# Patient Record
Sex: MALE | Race: BLACK OR AFRICAN AMERICAN | NOT HISPANIC OR LATINO | Employment: UNEMPLOYED | URBAN - METROPOLITAN AREA
[De-identification: names, ages, dates, MRNs, and addresses within clinical notes are randomized per-mention and may not be internally consistent; named-entity substitution may affect disease eponyms.]

---

## 2023-12-30 NOTE — PROGRESS NOTES
"Transfer Type: [facility]  Transfer Triage Note    Date of call: 12/30/23  Time of call: 12:04 PM    Current Patient Location:  [facility]  Current Level of Care: ICU    Vitals:   Blood Pressure 100/51 12/30/2023 11:45 AM CST     Pulse 73 12/30/2023 12:00 PM CST     Temperature 36.7  C (98.1  F) 12/30/2023 10:00 AM CST     Respiratory Rate 16 12/30/2023 10:00 AM CST     Oxygen Saturation 94% 12/30/2023 12:00 PM CST     Inhaled Oxygen Concentration - -     Weight 96.8 kg (213 lb 6.5 oz) 12/29/2023 12:00 PM CST     Height 162.6 cm (5' 4\") 12/29/2023 12:00 PM CST     Body Mass Index 36.63 12/29/2023 12:00 PM CST        Diagnosis: Hepatic artery aneurysm  Reason for requested transfer: Procedure can be done here and not at referring hospital  Further diagnostic work up, management, and consultation for specialized care   Isolation Needs: None    Care everywhere has been updated and reviewed: Yes  Necessary images have been sent through PACS: Yes    If patient is transferring for specialty care or specific procedure, the specialist required has participated in the transfer call and agreed with need for transfer and anticipated timeline: No -- on a prior call had spoke with Dr. Ba of transplant surgery who agreed with transfer    Transfer accepted: Yes  Stability of Patient: Patient is vitally stable, with no critical labs, and will likely remain stable throughout the transfer process  Is the patient appropriate for [facility]? No, What specific [place] needs are anticipated? IR, Transplant Surgery  Level of Care Needed: IMC  Telemetry Needed:  Med (Remote) Telemetry  Expected Time of Arrival for Transfer: 8-24 hours  Arrival Location:  [facility]     Recommendations for Management and Stabilization: Not needed    Additional Comments:     Jong Quiroga is a 43 year old with a history of tobacco abuse who presented to [facility] with abdominal pain. 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  The situation was discussed with [facility] specialists (IR, Hepatobiliary) who were concerned that embolization of the aneurysm would result in depriving a significant part of the liver blood flow and thus necessitating a liver transplant.  They did not wish to proceed with an intervention unless he were in a transplant center and had been evaluated.    Initially on a nicardipine drip to keep SBP <120 (per Vascular), and now liberalized to <140 and is off the drip.  Is back on home medications including clonidine.  He is not on any other infusions.    Vasculitis labs are pending.    He is accepted to an [facility] with tele bed, unclear timing of transfer given bed limitations.    Dr. Shaver (Dick provider) will contact us if he worsens to re-discuss.      Myrna Curry MD

## 2024-01-04 ENCOUNTER — HOSPITAL ENCOUNTER (INPATIENT)
Facility: CLINIC | Age: 44
LOS: 3 days | Discharge: HOME | End: 2024-01-07
Attending: INTERNAL MEDICINE | Admitting: STUDENT IN AN ORGANIZED HEALTH CARE EDUCATION/TRAINING PROGRAM
Payer: COMMERCIAL

## 2024-01-04 DIAGNOSIS — I10 ESSENTIAL HYPERTENSION: ICD-10-CM

## 2024-01-04 DIAGNOSIS — I77.79 HEPATIC ARTERY DISSECTION (H): Primary | ICD-10-CM

## 2024-01-04 DIAGNOSIS — E11.65 TYPE 2 DIABETES MELLITUS WITH HYPERGLYCEMIA, UNSPECIFIED WHETHER LONG TERM INSULIN USE (H): ICD-10-CM

## 2024-01-04 DIAGNOSIS — R74.01 TRANSAMINITIS: ICD-10-CM

## 2024-01-04 LAB
ALBUMIN SERPL BCG-MCNC: 4.8 G/DL (ref 3.5–5.2)
ALP SERPL-CCNC: 211 U/L (ref 40–150)
ALT SERPL W P-5'-P-CCNC: 94 U/L (ref 0–70)
ANION GAP SERPL CALCULATED.3IONS-SCNC: 12 MMOL/L (ref 7–15)
AST SERPL W P-5'-P-CCNC: 44 U/L (ref 0–45)
BILIRUB SERPL-MCNC: 0.2 MG/DL
BUN SERPL-MCNC: 18.6 MG/DL (ref 6–20)
CALCIUM SERPL-MCNC: 9.9 MG/DL (ref 8.6–10)
CHLORIDE SERPL-SCNC: 100 MMOL/L (ref 98–107)
CREAT SERPL-MCNC: 1.09 MG/DL (ref 0.67–1.17)
DEPRECATED HCO3 PLAS-SCNC: 28 MMOL/L (ref 22–29)
EGFRCR SERPLBLD CKD-EPI 2021: 87 ML/MIN/1.73M2
ERYTHROCYTE [DISTWIDTH] IN BLOOD BY AUTOMATED COUNT: 14.2 % (ref 10–15)
GLUCOSE BLDC GLUCOMTR-MCNC: 131 MG/DL (ref 70–99)
GLUCOSE SERPL-MCNC: 179 MG/DL (ref 70–99)
HCT VFR BLD AUTO: 42.2 % (ref 40–53)
HGB BLD-MCNC: 13.5 G/DL (ref 13.3–17.7)
INR PPP: 0.99 (ref 0.85–1.15)
MCH RBC QN AUTO: 27.3 PG (ref 26.5–33)
MCHC RBC AUTO-ENTMCNC: 32 G/DL (ref 31.5–36.5)
MCV RBC AUTO: 85 FL (ref 78–100)
PLATELET # BLD AUTO: 379 10E3/UL (ref 150–450)
POTASSIUM SERPL-SCNC: 3.8 MMOL/L (ref 3.4–5.3)
PROT SERPL-MCNC: 8.3 G/DL (ref 6.4–8.3)
RBC # BLD AUTO: 4.94 10E6/UL (ref 4.4–5.9)
SODIUM SERPL-SCNC: 140 MMOL/L (ref 135–145)
WBC # BLD AUTO: 7.3 10E3/UL (ref 4–11)

## 2024-01-04 PROCEDURE — 250N000013 HC RX MED GY IP 250 OP 250 PS 637

## 2024-01-04 PROCEDURE — 80053 COMPREHEN METABOLIC PANEL: CPT

## 2024-01-04 PROCEDURE — 99222 1ST HOSP IP/OBS MODERATE 55: CPT | Mod: GC | Performed by: SURGERY

## 2024-01-04 PROCEDURE — 85610 PROTHROMBIN TIME: CPT

## 2024-01-04 PROCEDURE — 93010 ELECTROCARDIOGRAM REPORT: CPT | Performed by: INTERNAL MEDICINE

## 2024-01-04 PROCEDURE — 36415 COLL VENOUS BLD VENIPUNCTURE: CPT

## 2024-01-04 PROCEDURE — 120N000003 HC R&B IMCU UMMC

## 2024-01-04 PROCEDURE — 93005 ELECTROCARDIOGRAM TRACING: CPT

## 2024-01-04 PROCEDURE — 85027 COMPLETE CBC AUTOMATED: CPT

## 2024-01-04 PROCEDURE — 99223 1ST HOSP IP/OBS HIGH 75: CPT | Mod: GC | Performed by: STUDENT IN AN ORGANIZED HEALTH CARE EDUCATION/TRAINING PROGRAM

## 2024-01-04 PROCEDURE — 250N000011 HC RX IP 250 OP 636

## 2024-01-04 RX ORDER — DEXTROSE MONOHYDRATE 25 G/50ML
25-50 INJECTION, SOLUTION INTRAVENOUS
Status: DISCONTINUED | OUTPATIENT
Start: 2024-01-04 | End: 2024-01-07 | Stop reason: HOSPADM

## 2024-01-04 RX ORDER — CLONIDINE HYDROCHLORIDE 0.1 MG/1
0.1 TABLET ORAL 2 TIMES DAILY
Status: DISCONTINUED | OUTPATIENT
Start: 2024-01-05 | End: 2024-01-07 | Stop reason: HOSPADM

## 2024-01-04 RX ORDER — AMOXICILLIN 250 MG
1 CAPSULE ORAL 2 TIMES DAILY PRN
Status: DISCONTINUED | OUTPATIENT
Start: 2024-01-04 | End: 2024-01-07 | Stop reason: HOSPADM

## 2024-01-04 RX ORDER — HYDROMORPHONE HYDROCHLORIDE 1 MG/ML
0.5 INJECTION, SOLUTION INTRAMUSCULAR; INTRAVENOUS; SUBCUTANEOUS EVERY 4 HOURS PRN
Status: DISCONTINUED | OUTPATIENT
Start: 2024-01-04 | End: 2024-01-06

## 2024-01-04 RX ORDER — NALOXONE HYDROCHLORIDE 0.4 MG/ML
0.4 INJECTION, SOLUTION INTRAMUSCULAR; INTRAVENOUS; SUBCUTANEOUS
Status: DISCONTINUED | OUTPATIENT
Start: 2024-01-04 | End: 2024-01-07 | Stop reason: HOSPADM

## 2024-01-04 RX ORDER — NICOTINE POLACRILEX 4 MG
15-30 LOZENGE BUCCAL
Status: DISCONTINUED | OUTPATIENT
Start: 2024-01-04 | End: 2024-01-07 | Stop reason: HOSPADM

## 2024-01-04 RX ORDER — METOPROLOL TARTRATE 50 MG
50 TABLET ORAL 2 TIMES DAILY
Status: DISCONTINUED | OUTPATIENT
Start: 2024-01-04 | End: 2024-01-07 | Stop reason: HOSPADM

## 2024-01-04 RX ORDER — POLYETHYLENE GLYCOL 3350 17 G/17G
17 POWDER, FOR SOLUTION ORAL 2 TIMES DAILY
Status: DISCONTINUED | OUTPATIENT
Start: 2024-01-04 | End: 2024-01-07 | Stop reason: HOSPADM

## 2024-01-04 RX ORDER — ASPIRIN 81 MG/1
81 TABLET, CHEWABLE ORAL DAILY
Status: DISCONTINUED | OUTPATIENT
Start: 2024-01-05 | End: 2024-01-07 | Stop reason: HOSPADM

## 2024-01-04 RX ORDER — AMLODIPINE BESYLATE 10 MG/1
10 TABLET ORAL DAILY
Status: ON HOLD | COMMUNITY
End: 2024-01-07

## 2024-01-04 RX ORDER — HYDROCHLOROTHIAZIDE 25 MG/1
25 TABLET ORAL DAILY
Status: DISCONTINUED | OUTPATIENT
Start: 2024-01-05 | End: 2024-01-07 | Stop reason: HOSPADM

## 2024-01-04 RX ORDER — HYDROCHLOROTHIAZIDE 50 MG/1
50 TABLET ORAL DAILY
Status: DISCONTINUED | OUTPATIENT
Start: 2024-01-05 | End: 2024-01-04

## 2024-01-04 RX ORDER — POLYETHYLENE GLYCOL 3350 17 G/17G
17 POWDER, FOR SOLUTION ORAL 2 TIMES DAILY PRN
Status: DISCONTINUED | OUTPATIENT
Start: 2024-01-04 | End: 2024-01-07 | Stop reason: HOSPADM

## 2024-01-04 RX ORDER — HYDRALAZINE HYDROCHLORIDE 20 MG/ML
10-20 INJECTION INTRAMUSCULAR; INTRAVENOUS EVERY 6 HOURS PRN
Status: DISCONTINUED | OUTPATIENT
Start: 2024-01-04 | End: 2024-01-07 | Stop reason: HOSPADM

## 2024-01-04 RX ORDER — HYDRALAZINE HYDROCHLORIDE 20 MG/ML
10-20 INJECTION INTRAMUSCULAR; INTRAVENOUS EVERY 6 HOURS PRN
Status: DISCONTINUED | OUTPATIENT
Start: 2024-01-04 | End: 2024-01-04

## 2024-01-04 RX ORDER — HYDROCHLOROTHIAZIDE 25 MG/1
25 TABLET ORAL DAILY
Status: ON HOLD | COMMUNITY
End: 2024-01-07

## 2024-01-04 RX ORDER — AMOXICILLIN 250 MG
2 CAPSULE ORAL 2 TIMES DAILY PRN
Status: DISCONTINUED | OUTPATIENT
Start: 2024-01-04 | End: 2024-01-07 | Stop reason: HOSPADM

## 2024-01-04 RX ORDER — AMOXICILLIN 250 MG
2 CAPSULE ORAL AT BEDTIME
Status: DISCONTINUED | OUTPATIENT
Start: 2024-01-04 | End: 2024-01-07 | Stop reason: HOSPADM

## 2024-01-04 RX ORDER — OXYCODONE HYDROCHLORIDE 5 MG/1
5-10 TABLET ORAL EVERY 4 HOURS PRN
Status: DISCONTINUED | OUTPATIENT
Start: 2024-01-04 | End: 2024-01-07 | Stop reason: HOSPADM

## 2024-01-04 RX ORDER — NALOXONE HYDROCHLORIDE 0.4 MG/ML
0.2 INJECTION, SOLUTION INTRAMUSCULAR; INTRAVENOUS; SUBCUTANEOUS
Status: DISCONTINUED | OUTPATIENT
Start: 2024-01-04 | End: 2024-01-07 | Stop reason: HOSPADM

## 2024-01-04 RX ORDER — ACETAMINOPHEN 325 MG/1
975 TABLET ORAL EVERY 6 HOURS PRN
Status: DISCONTINUED | OUTPATIENT
Start: 2024-01-04 | End: 2024-01-07 | Stop reason: HOSPADM

## 2024-01-04 RX ORDER — CLONIDINE 0.2 MG/24H
1 PATCH, EXTENDED RELEASE TRANSDERMAL WEEKLY
Status: DISCONTINUED | OUTPATIENT
Start: 2024-01-06 | End: 2024-01-05 | Stop reason: DRUGHIGH

## 2024-01-04 RX ORDER — LIDOCAINE 40 MG/G
CREAM TOPICAL
Status: DISCONTINUED | OUTPATIENT
Start: 2024-01-04 | End: 2024-01-07 | Stop reason: HOSPADM

## 2024-01-04 RX ORDER — AMLODIPINE BESYLATE 10 MG/1
10 TABLET ORAL DAILY
Status: DISCONTINUED | OUTPATIENT
Start: 2024-01-05 | End: 2024-01-07 | Stop reason: HOSPADM

## 2024-01-04 RX ORDER — CALCIUM CARBONATE 500 MG/1
1000 TABLET, CHEWABLE ORAL 4 TIMES DAILY PRN
Status: DISCONTINUED | OUTPATIENT
Start: 2024-01-04 | End: 2024-01-07 | Stop reason: HOSPADM

## 2024-01-04 RX ORDER — METOPROLOL TARTRATE 50 MG
50 TABLET ORAL 2 TIMES DAILY
Status: ON HOLD | COMMUNITY
End: 2024-01-07

## 2024-01-04 RX ORDER — CLONIDINE HYDROCHLORIDE 0.1 MG/1
0.1 TABLET ORAL 3 TIMES DAILY PRN
Status: DISCONTINUED | OUTPATIENT
Start: 2024-01-04 | End: 2024-01-07 | Stop reason: HOSPADM

## 2024-01-04 RX ORDER — CLONIDINE 0.2 MG/24H
1 PATCH, EXTENDED RELEASE TRANSDERMAL WEEKLY
Status: ON HOLD | COMMUNITY
End: 2024-01-07

## 2024-01-04 RX ADMIN — SENNOSIDES AND DOCUSATE SODIUM 2 TABLET: 50; 8.6 TABLET ORAL at 22:30

## 2024-01-04 RX ADMIN — HYDROMORPHONE HYDROCHLORIDE 0.5 MG: 1 INJECTION, SOLUTION INTRAMUSCULAR; INTRAVENOUS; SUBCUTANEOUS at 20:36

## 2024-01-04 RX ADMIN — ACETAMINOPHEN 975 MG: 325 TABLET, FILM COATED ORAL at 17:46

## 2024-01-04 RX ADMIN — POLYETHYLENE GLYCOL 3350 17 G: 17 POWDER, FOR SOLUTION ORAL at 20:36

## 2024-01-04 RX ADMIN — INSULIN ASPART 1 UNITS: 100 INJECTION, SOLUTION INTRAVENOUS; SUBCUTANEOUS at 23:57

## 2024-01-04 RX ADMIN — OXYCODONE HYDROCHLORIDE 10 MG: 5 TABLET ORAL at 22:35

## 2024-01-04 RX ADMIN — OXYCODONE HYDROCHLORIDE 10 MG: 5 TABLET ORAL at 17:46

## 2024-01-04 RX ADMIN — METOPROLOL TARTRATE 50 MG: 50 TABLET, FILM COATED ORAL at 20:36

## 2024-01-04 ASSESSMENT — ACTIVITIES OF DAILY LIVING (ADL)
ADLS_ACUITY_SCORE: 18

## 2024-01-04 ASSESSMENT — COLUMBIA-SUICIDE SEVERITY RATING SCALE - C-SSRS
1. IN THE PAST MONTH, HAVE YOU WISHED YOU WERE DEAD OR WISHED YOU COULD GO TO SLEEP AND NOT WAKE UP?: NO
6. HAVE YOU EVER DONE ANYTHING, STARTED TO DO ANYTHING, OR PREPARED TO DO ANYTHING TO END YOUR LIFE?: NO
2. HAVE YOU ACTUALLY HAD ANY THOUGHTS OF KILLING YOURSELF IN THE PAST MONTH?: NO

## 2024-01-05 ENCOUNTER — APPOINTMENT (OUTPATIENT)
Dept: GENERAL RADIOLOGY | Facility: CLINIC | Age: 44
End: 2024-01-05
Attending: INTERNAL MEDICINE
Payer: COMMERCIAL

## 2024-01-05 ENCOUNTER — APPOINTMENT (OUTPATIENT)
Dept: CT IMAGING | Facility: CLINIC | Age: 44
End: 2024-01-05
Attending: INTERNAL MEDICINE
Payer: COMMERCIAL

## 2024-01-05 LAB
ALBUMIN SERPL BCG-MCNC: 4.3 G/DL (ref 3.5–5.2)
ALP SERPL-CCNC: 246 U/L (ref 40–150)
ALT SERPL W P-5'-P-CCNC: 235 U/L (ref 0–70)
AMPHETAMINES UR QL SCN: NORMAL
ANION GAP SERPL CALCULATED.3IONS-SCNC: 14 MMOL/L (ref 7–15)
AST SERPL W P-5'-P-CCNC: 293 U/L (ref 0–45)
ATRIAL RATE - MUSE: 64 BPM
BARBITURATES UR QL SCN: NORMAL
BENZODIAZ UR QL SCN: NORMAL
BILIRUB SERPL-MCNC: 0.6 MG/DL
BUN SERPL-MCNC: 16.5 MG/DL (ref 6–20)
BZE UR QL SCN: NORMAL
CALCIUM SERPL-MCNC: 9.6 MG/DL (ref 8.6–10)
CANNABINOIDS UR QL SCN: NORMAL
CHLORIDE SERPL-SCNC: 98 MMOL/L (ref 98–107)
CK SERPL-CCNC: 83 U/L (ref 39–308)
CREAT SERPL-MCNC: 0.93 MG/DL (ref 0.67–1.17)
DEPRECATED HCO3 PLAS-SCNC: 25 MMOL/L (ref 22–29)
DIASTOLIC BLOOD PRESSURE - MUSE: NORMAL MMHG
EGFRCR SERPLBLD CKD-EPI 2021: >90 ML/MIN/1.73M2
FENTANYL UR QL: NORMAL
GLUCOSE BLDC GLUCOMTR-MCNC: 165 MG/DL (ref 70–99)
GLUCOSE BLDC GLUCOMTR-MCNC: 167 MG/DL (ref 70–99)
GLUCOSE BLDC GLUCOMTR-MCNC: 182 MG/DL (ref 70–99)
GLUCOSE BLDC GLUCOMTR-MCNC: 247 MG/DL (ref 70–99)
GLUCOSE BLDC GLUCOMTR-MCNC: 252 MG/DL (ref 70–99)
GLUCOSE BLDC GLUCOMTR-MCNC: 309 MG/DL (ref 70–99)
GLUCOSE SERPL-MCNC: 163 MG/DL (ref 70–99)
GLUCOSE SERPL-MCNC: 163 MG/DL (ref 70–99)
HOLD SPECIMEN: NORMAL
INTERPRETATION ECG - MUSE: NORMAL
LIPASE SERPL-CCNC: 29 U/L (ref 13–60)
OPIATES UR QL SCN: NORMAL
P AXIS - MUSE: 48 DEGREES
PCP QUAL URINE (ROCHE): NORMAL
POTASSIUM SERPL-SCNC: 3.7 MMOL/L (ref 3.4–5.3)
PR INTERVAL - MUSE: 156 MS
PROT SERPL-MCNC: 7.5 G/DL (ref 6.4–8.3)
QRS DURATION - MUSE: 88 MS
QT - MUSE: 410 MS
QTC - MUSE: 422 MS
R AXIS - MUSE: 10 DEGREES
SODIUM SERPL-SCNC: 137 MMOL/L (ref 135–145)
SYSTOLIC BLOOD PRESSURE - MUSE: NORMAL MMHG
T AXIS - MUSE: 214 DEGREES
VENTRICULAR RATE- MUSE: 64 BPM

## 2024-01-05 PROCEDURE — 99232 SBSQ HOSP IP/OBS MODERATE 35: CPT | Mod: GC | Performed by: PEDIATRICS

## 2024-01-05 PROCEDURE — 250N000013 HC RX MED GY IP 250 OP 250 PS 637

## 2024-01-05 PROCEDURE — 74177 CT ABD & PELVIS W/CONTRAST: CPT | Mod: 26 | Performed by: RADIOLOGY

## 2024-01-05 PROCEDURE — 250N000011 HC RX IP 250 OP 636

## 2024-01-05 PROCEDURE — 99255 IP/OBS CONSLTJ NEW/EST HI 80: CPT | Performed by: STUDENT IN AN ORGANIZED HEALTH CARE EDUCATION/TRAINING PROGRAM

## 2024-01-05 PROCEDURE — 36415 COLL VENOUS BLD VENIPUNCTURE: CPT

## 2024-01-05 PROCEDURE — 120N000003 HC R&B IMCU UMMC

## 2024-01-05 PROCEDURE — 80053 COMPREHEN METABOLIC PANEL: CPT

## 2024-01-05 PROCEDURE — 99223 1ST HOSP IP/OBS HIGH 75: CPT | Performed by: INTERNAL MEDICINE

## 2024-01-05 PROCEDURE — 82550 ASSAY OF CK (CPK): CPT | Performed by: INTERNAL MEDICINE

## 2024-01-05 PROCEDURE — 71260 CT THORAX DX C+: CPT | Mod: 26 | Performed by: RADIOLOGY

## 2024-01-05 PROCEDURE — 74174 CTA ABD&PLVS W/CONTRAST: CPT

## 2024-01-05 PROCEDURE — 83690 ASSAY OF LIPASE: CPT | Performed by: STUDENT IN AN ORGANIZED HEALTH CARE EDUCATION/TRAINING PROGRAM

## 2024-01-05 PROCEDURE — 250N000012 HC RX MED GY IP 250 OP 636 PS 637

## 2024-01-05 PROCEDURE — 80307 DRUG TEST PRSMV CHEM ANLYZR: CPT

## 2024-01-05 PROCEDURE — 999N000122 CT OUTSIDE READ

## 2024-01-05 PROCEDURE — 99231 SBSQ HOSP IP/OBS SF/LOW 25: CPT | Performed by: NURSE PRACTITIONER

## 2024-01-05 PROCEDURE — 74174 CTA ABD&PLVS W/CONTRAST: CPT | Mod: 26 | Performed by: RADIOLOGY

## 2024-01-05 RX ORDER — IOPAMIDOL 755 MG/ML
126 INJECTION, SOLUTION INTRAVASCULAR ONCE
Status: COMPLETED | OUTPATIENT
Start: 2024-01-05 | End: 2024-01-05

## 2024-01-05 RX ORDER — ALBUTEROL SULFATE 90 UG/1
2 AEROSOL, METERED RESPIRATORY (INHALATION) EVERY 4 HOURS PRN
Status: DISCONTINUED | OUTPATIENT
Start: 2024-01-05 | End: 2024-01-07 | Stop reason: HOSPADM

## 2024-01-05 RX ORDER — ALBUTEROL SULFATE 90 UG/1
2 AEROSOL, METERED RESPIRATORY (INHALATION) EVERY 4 HOURS PRN
COMMUNITY
End: 2024-04-10

## 2024-01-05 RX ORDER — OXYCODONE HYDROCHLORIDE 5 MG/1
5-10 TABLET ORAL EVERY 4 HOURS PRN
COMMUNITY
End: 2024-04-01

## 2024-01-05 RX ORDER — NICOTINE 21 MG/24HR
1 PATCH, TRANSDERMAL 24 HOURS TRANSDERMAL DAILY PRN
COMMUNITY
End: 2024-04-01

## 2024-01-05 RX ORDER — IPRATROPIUM BROMIDE AND ALBUTEROL 20; 100 UG/1; UG/1
1 SPRAY, METERED RESPIRATORY (INHALATION) 4 TIMES DAILY PRN
COMMUNITY
End: 2024-04-10

## 2024-01-05 RX ORDER — AMOXICILLIN 250 MG
1-2 CAPSULE ORAL 2 TIMES DAILY
COMMUNITY
End: 2024-04-01

## 2024-01-05 RX ORDER — ASPIRIN 81 MG/1
81 TABLET, CHEWABLE ORAL DAILY
Status: ON HOLD | COMMUNITY
End: 2024-01-07

## 2024-01-05 RX ADMIN — HYDROMORPHONE HYDROCHLORIDE 0.5 MG: 1 INJECTION, SOLUTION INTRAMUSCULAR; INTRAVENOUS; SUBCUTANEOUS at 15:49

## 2024-01-05 RX ADMIN — OXYCODONE HYDROCHLORIDE 10 MG: 5 TABLET ORAL at 08:37

## 2024-01-05 RX ADMIN — AMLODIPINE BESYLATE 10 MG: 10 TABLET ORAL at 08:33

## 2024-01-05 RX ADMIN — INSULIN GLARGINE 30 UNITS: 100 INJECTION, SOLUTION SUBCUTANEOUS at 21:30

## 2024-01-05 RX ADMIN — OXYCODONE HYDROCHLORIDE 10 MG: 5 TABLET ORAL at 12:18

## 2024-01-05 RX ADMIN — CLONIDINE HYDROCHLORIDE 0.1 MG: 0.1 TABLET ORAL at 08:33

## 2024-01-05 RX ADMIN — ASPIRIN 81 MG CHEWABLE TABLET 81 MG: 81 TABLET CHEWABLE at 08:32

## 2024-01-05 RX ADMIN — INSULIN ASPART 3 UNITS: 100 INJECTION, SOLUTION INTRAVENOUS; SUBCUTANEOUS at 20:04

## 2024-01-05 RX ADMIN — METOPROLOL TARTRATE 50 MG: 50 TABLET, FILM COATED ORAL at 08:33

## 2024-01-05 RX ADMIN — ACETAMINOPHEN 975 MG: 325 TABLET, FILM COATED ORAL at 17:23

## 2024-01-05 RX ADMIN — IOPAMIDOL 126 ML: 755 INJECTION, SOLUTION INTRAVENOUS at 18:12

## 2024-01-05 RX ADMIN — OXYCODONE HYDROCHLORIDE 10 MG: 5 TABLET ORAL at 21:29

## 2024-01-05 RX ADMIN — HYDROMORPHONE HYDROCHLORIDE 0.5 MG: 1 INJECTION, SOLUTION INTRAMUSCULAR; INTRAVENOUS; SUBCUTANEOUS at 19:51

## 2024-01-05 RX ADMIN — CLONIDINE HYDROCHLORIDE 0.1 MG: 0.1 TABLET ORAL at 19:51

## 2024-01-05 RX ADMIN — INSULIN ASPART 4 UNITS: 100 INJECTION, SOLUTION INTRAVENOUS; SUBCUTANEOUS at 13:32

## 2024-01-05 RX ADMIN — POLYETHYLENE GLYCOL 3350 17 G: 17 POWDER, FOR SOLUTION ORAL at 19:51

## 2024-01-05 RX ADMIN — INSULIN ASPART 1 UNITS: 100 INJECTION, SOLUTION INTRAVENOUS; SUBCUTANEOUS at 09:44

## 2024-01-05 RX ADMIN — INSULIN ASPART 1 UNITS: 100 INJECTION, SOLUTION INTRAVENOUS; SUBCUTANEOUS at 04:55

## 2024-01-05 RX ADMIN — METOPROLOL TARTRATE 50 MG: 50 TABLET, FILM COATED ORAL at 19:50

## 2024-01-05 RX ADMIN — HYDROMORPHONE HYDROCHLORIDE 0.5 MG: 1 INJECTION, SOLUTION INTRAMUSCULAR; INTRAVENOUS; SUBCUTANEOUS at 00:59

## 2024-01-05 RX ADMIN — POLYETHYLENE GLYCOL 3350 17 G: 17 POWDER, FOR SOLUTION ORAL at 08:34

## 2024-01-05 RX ADMIN — HYDROCHLOROTHIAZIDE 25 MG: 25 TABLET ORAL at 08:33

## 2024-01-05 RX ADMIN — OXYCODONE HYDROCHLORIDE 10 MG: 5 TABLET ORAL at 17:23

## 2024-01-05 RX ADMIN — OXYCODONE HYDROCHLORIDE 10 MG: 5 TABLET ORAL at 04:51

## 2024-01-05 RX ADMIN — INSULIN ASPART 3 UNITS: 100 INJECTION, SOLUTION INTRAVENOUS; SUBCUTANEOUS at 15:55

## 2024-01-05 ASSESSMENT — ACTIVITIES OF DAILY LIVING (ADL)
ADLS_ACUITY_SCORE: 18

## 2024-01-05 NOTE — PROGRESS NOTES
[facility]    Progress Note - Medicine Service, MAROON TEAM 3       Date of Admission:  1/4/2024    Assessment & Plan   Jong Quiroga is a 43 year old male admitted on 1/4/2024. 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 Was transferred to [facility] where CT demonstrated complex dissection of celiac artery and aneurysm of the hepatic artery system and ultimately transferred to [facility] for further treatment option including liver transplantation.      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 Workup notable for elevated LFTS ( , , and Alk Phos 310), hgb 11.3, WBC 12.8. 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 Was started on esmolol and nicardipine gtt for SBP < 120 and transferred to [facility] on 12/29/2023. CT at [facility] demonstrated complex dissection of celiac artery with aneurysmal degeneration of the the entire common, proper and right hepatic artery. Was evaluated by IR and vascular surgery and hepatobiliary surgery services at outside hospital and given the complex involvement of the dissections was recommended to transfer to a center with limited transportation capacity. On admission afebrile, HDS.  Suspect his artery dissection is likely secondary to uncontrolled hypertension and tobacco use however ddx includes vasculitis, connective tissues disease and inflammatory process (given maternal  history of lupus, elevated inflammatory markers, elevated C3 normal C4 and subjective chronic night chills). Reassuringly initial infectious workup has been benign (negative Hep B, C and HIV). Of note, blood pressure has been well controlled with PTA antihypertensives since admission and his symptomatic pain has resolved. Has a longstanding hx of HTN on 4 antihypertensives but reports uncontrolled Bps at home without consistent medication management for years. Also chronic tobacco use with 1ppd.  Discussed case with IR and rheumatology, awaiting consults from vascular surgery, liver transplant and hepatology. No acute interventions from IR and rheumatology will follow outside imaging and make additional recommendations based on the imaging reads (requested renal vasculature notes from radiology).  - Notable labs:   - WHIT, ANCA, anti DNA neg  - CRP 0.9, ESR 32  - C3 169 elevated, C4 36.34nl  - Hep C nr, HepB ag nr, HIV neg  - continue pta hydrochlorothiazide 25 mg daily  - Continue PTA amlodipine 10 mg daily  - S/p pta clonidine patch 0.2 mg/24 hr    - Continue PO clonidine 0.1 mg BID + prn  - Continue PTA metoprolol tartrate 50 mg BID  - Continue PTA ASA  - IR consulted, appreciate recommendations  - vascular surgery consulted, appreciate recommendations  - Hepatology consulted, appreciate recommendations  - SOT liver transplant consulted, appreciate recommendations  - Rheumatology consulted, appreciate recommendations  - NPO until cleared by surgery  - oxycodone for breakthrough pain + ashlie bowel regimen   - Strict BP control with SBP<140, HR<80      Elevated transaminases  Cholelithiasis without cholecystitis  At OSH, LFTs at first , , and Alk Phos 310 but have been improving. On admission, , AST 44. ALT94. CT with Cholelithiasis without cholecystitis. Denies abd pain. Today his LFTs are rising back to initial levels at OSH, most likely secondary to complex dissection and aneurysmal  "degeneration.  - ctm  -Daily CMP     T2DM  A1c in 11/2023 13. Home regimen: 60U glargine at bedtime and 5 U aspart 3 times daily  - 30 U lantus at bedtime, held while NPO  - MDSSI     Tobacco use  Used to smoke 1- 2 ppd.  Has not smoked in a week, declines nicotine patch at this time, does have an interest in quitting.     Concern for COPD  No PFTs and denies cough. Had cough previously that improved with robitussin.  -Continue PTA albuterol PRN  -Continue PTA Ipratropium-albuterol PRN     Mild right hydronephrosis  Denies urinary symptoms             Diet: NPO for Medical/Clinical Reasons Except for: Meds, Ice Chips    DVT Prophylaxis: Low Risk/Ambulatory with no VTE prophylaxis indicated  Tran Catheter: Not present  Fluids: none  Lines: None     Cardiac Monitoring: ACTIVE order. Indication: aneurysm  Code Status: Full Code      Clinically Significant Risk Factors Present on Admission                # Drug Induced Platelet Defect: home medication list includes an antiplatelet medication   # Hypertension: Home medication list includes antihypertensive(s)      # Obesity: Estimated body mass index is 35.46 kg/m  as calculated from the following:    Height as of this encounter: 1.626 m (5' 4\").    Weight as of this encounter: 93.7 kg (206 lb 9.1 oz).              Disposition Plan      Expected Discharge Date: 01/08/2024      Destination: home  Discharge Comments: No medical interventions being offered. Possibly home tomorrow        The patient's care was discussed with the Attending Physician, Dr. Linton .    Barbara Barillas MD  Medicine Service, [address]  Securely message with [employer] (more info)  Text page via [facility] Paging/Directory   See signed in provider for up to date coverage information  ______________________________________________________________________    Interval History   LUKE. Says his pain is a lot better than it was before, he is having " minimal right abdominal pain. Says he often forgets to take his blood pressure medications at home. He is hungry, but otherwise has no concerns. Denies leg swelling, chest pain, shortness of breath. He feels his abdomen is softer than it was the last few days.    Physical Exam   Vital Signs: Temp: 97.9  F (36.6  C) Temp src: Oral BP: 114/62 Pulse: 61   Resp: 16 SpO2: 99 % O2 Device: None (Room air)    Weight: 206 lbs 9.14 oz    Constitutional: awake, alert, cooperative, no apparent distress, and appears stated age  Eyes: Lids and lashes normal, extra ocular muscles intact, sclera clear, conjunctiva normal  Respiratory: No increased work of breathing, good air exchange, clear to auscultation bilaterally, no crackles or wheezing  Cardiovascular: Regular rate and rhythm and no murmur noted  GI: No scars, normal bowel sounds, soft, distended, tender to palpation in RUQ and RLQ, no masses palpated  Skin: no rashes and no lesions  Musculoskeletal: no lower extremity pitting edema present, full range of motion noted  Neurologic: Awake, alert, oriented to name, place and time.  Cranial nerves II-XII are grossly intact. Moves all extremities equally    Medical Decision Making       Please see A&P for additional details of medical decision making.      Data     I have personally reviewed the following data over the past 24 hrs:    7.3  \   13.5   / 379     137 98 16.5 /  309 (H)   3.7 25 0.93 \     ALT: 235 (H) AST: 293 (H) AP: 246 (H) TBILI: 0.6   ALB: 4.3 TOT PROTEIN: 7.5 LIPASE: N/A     INR:  0.99 PTT:  N/A   D-dimer:  N/A Fibrinogen:  N/A       Imaging results reviewed over the past 24 hrs:   No results found for this or any previous visit (from the past 24 hour(s)).

## 2024-01-05 NOTE — CONSULTS
[facility]    Consult Note - Vascular Surgery Service  Date of Admission:  1/4/2024  Consult Requested by: Nayana Hansen MD   Reason for Consult: Right and Left Hepatic Artery Aneurysms    Assessment & Plan: Surgery   carolina Quiroga is a 43 year old male w/ PMHx significant for T2DM, COPD, Tobacco use disorder (2 PPD), and bipolar disorder who presented to [facility] ED on 12/28/2023 w/ 3 days of right shoulder pain and generalized abdominal pain with CT findings concerning for RHA aneurysm and celiac artery dissection at the origin of the left gastric, splenic and hepatic arteries. The patient was transferred to the [facility] for possible advanced vascular and liver transplant surgical intervention.     - Strict BP control with SBP<140, HR<80  - Keep NPO  - No immediate plans for surgical intervention overnight  - No feasible distal end point for stenting or bypass  - Recommend continuing with liver transplant workup   - Please page vascular surgery if changes in hemodynamics      The patient's care was discussed with the  fellow Dr. Contreras who  discussed with staff Dr. Mercado .    Fern Willard MD PGY2  General Surgery Resident   [facility]  Text page via [facility] Paging/Directory     ______________________________________________________________________    Chief Complaint   Abdominal pain     History is obtained from the patient and per chart review.     History of Present Illness   Jong Quiroga is a 43 year old male w/ PMHx significant for T2DM, COPD, Tobacco use disorder (2 PPD), and bipolar disorder who presented to [facility] on 12/28/2023 w/ 3 days of right shoulder pain and generalized abdominal pain. At the time, he was hypertensive with SBP of 206 on presentation. Labs were notable for elevated , , and Alk Phos 310. CBC showed Hgb 11.3 and WBC 12.8. CTAP was notable for for RHA aneurysm of 17  mm with pronounced heterogenous soft tissue thickening around PHILLY, PHA, and RHA. He was started on esmolol and nicardipine gtt for SBP goal < 120 at the time and transferred to [facility]. At [facility], he underwent repeat CT on 12/29 which demonstrated complex dissection of the celiac artery located at origin of left gastric, splenic and hepatic arteries. There was aneurysmal degeneration of the RHA to 3.5 cm. Origin of LHA was occluded, reconstituting via pancreaticoduodenal a. GDA not visualized.     He was transferred to [facility] given presence of liver transplant team.     On my assessment, patient states that he started to experience epigastric and right upper abdominal pain 4-5 days before presenting to the OSH on 12/28. The pain came on 30 minutes or so after eating with associated right shoulder pain. Currently the patient denies any pain. He was able to eat at the OSH without nausea, vomiting, or abdominal pain. Last BM was this morning, denies bloody stools or dark tarry stools in the last week. Has had streaks of blood in his stools in the past, however. Denies fevers or chills. Denies chest pain or SOB. Denies numbness or tingling in extremities. Denies weakness. Endorses right shoulder pain since arrival that has improved with pain medications. Denies previous history of MI, TIA, or Stroke. Denies previous surgeries.       Past Medical History    No past medical history on file.    Past Surgical History   No past surgical history on file.    Prior to Admission Medications   Current Facility-Administered Medications   Medication    acetaminophen (TYLENOL) tablet 975 mg    calcium carbonate (TUMS) chewable tablet 1,000 mg    HYDROmorphone (PF) (DILAUDID) injection 0.5 mg    lidocaine (LMX4) cream    lidocaine 1 % 0.1-1 mL    naloxone (NARCAN) injection 0.2 mg    Or    naloxone (NARCAN) injection 0.4 mg    Or    naloxone (NARCAN) injection 0.2 mg    Or    naloxone (NARCAN) injection 0.4 mg     oxyCODONE (ROXICODONE) tablet 5-10 mg    polyethylene glycol (MIRALAX) Packet 17 g    senna-docusate (SENOKOT-S/PERICOLACE) 8.6-50 MG per tablet 1 tablet    Or    senna-docusate (SENOKOT-S/PERICOLACE) 8.6-50 MG per tablet 2 tablet    sodium chloride (PF) 0.9% PF flush 3 mL    sodium chloride (PF) 0.9% PF flush 3 mL          Review of Systems    The 10 point Review of Systems is negative other than noted in the HPI or here.      Physical Exam   Vital Signs: Temp: 97.9  F (36.6  C) Temp src: Oral BP: 131/87 Pulse: 64   Resp: 14 SpO2: 100 % O2 Device: None (Room air)    Weight: 208 lbs 14.4 oz  No intake or output data in the 24 hours ending 01/04/24 1805  Constitutional: awake, alert, cooperative, no apparent distress, and appears stated age  Respiratory: non labored breathing on room air   Cardiovascular: lowly hypertensive, RRR  GI: abdomen soft, NT, ND  Skin: no bruising or bleeding, normal skin color, texture, turgor, and no redness, warmth, or swelling  Musculoskeletal: no pitting edema, palpable DP and PT pulses and palpable radial pulses bilaterally   Neurologic: AOX3, CN II-XII grossly intact, LLANOS           Data   Recent Labs   Lab 01/04/24  1749   WBC 7.3   HGB 13.5   MCV 85      INR 0.99      POTASSIUM 3.8   CHLORIDE 100   CO2 28   BUN 18.6   CR 1.09   ANIONGAP 12   LUIS 9.9   *   ALBUMIN 4.8   PROTTOTAL 8.3   BILITOTAL 0.2   ALKPHOS 211*   ALT 94*   AST 44     Vascular surgery attending staff note (virtual consultation): Chart and imaging reviewed in detail.  I agree with the documentation by our fellow, Dr. Contreras.  Patient is a 43-year-old male who is being evaluated for shoulder pain and was found to have hepatic artery aneurysm.  The common hepatic, right and left hepatic arteries are involved with the aneurysmal degeneration and goes well into the parenchyma.  Regrettably nothing for vascular surgery to offer.    MEGAN CONTRERAS M.D.

## 2024-01-05 NOTE — CONSULTS
[facility]    Consult Note - Transplant Surgery Service  Date of Admission:  1/4/2024  Consult Requested by: Nayana Hansen MD   Reason for Consult: Right Hepatic Artery Aneurysm    Assessment & Plan: Surgery   carolina Quiroga is a 43 year old male w/ PMHx significant for T2DM, COPD, Tobacco use disorder (2 PPD), and bipolar disorder who presented to [place] ED on 12/28/2023 w/ 3 days of right shoulder pain and generalized abdominal pain with CT findings concerning for RHA aneurysm and celiac artery dissection at the origin of the left gastric, splenic and hepatic arteries. The patient was transferred to the [facility] for possible advanced vascular and liver transplant surgical intervention.     - Strict BP control with SBP<140, HR<80  - Keep NPO  - Liver transplant workup pending conversation with day team in the morning      The patient's care was discussed with the  fellow Dr. Ross who will discuss with staff .    Fern Willard MD PGY2  General Surgery Resident   [facility]  Text page via [facility] Paging/Directory     ______________________________________________________________________    Chief Complaint   Abdominal pain     History is obtained from the patient and per chart review.     History of Present Illness   Jong Quiroga is a 43 year old male w/ PMHx significant for T2DM, COPD, Tobacco use disorder (2 PPD), and bipolar disorder who presented to [place] ED on 12/28/2023 w/ 3 days of right shoulder pain and generalized abdominal pain. At the time, he was hypertensive with SBP of 206 on presentation. Labs were notable for elevated , , and Alk Phos 310. CBC showed Hgb 11.3 and WBC 12.8. CTAP was notable for for RHA aneurysm of 17 mm with pronounced heterogenous soft tissue thickening around PHILLY, PHA, and RHA. He was started on esmolol and nicardipine gtt for SBP goal < 120 at the time and  transferred to [facility]. At [facility], he underwent repeat CT on 12/29 which demonstrated complex dissection of the celiac artery located at origin of left gastric, splenic and hepatic arteries. There was aneurysmal degeneration of the RHA to 3.5 cm. Origin of LHA was occluded, reconstituting via pancreaticoduodenal a. GDA not visualized.     He was transferred to [facility] given presence of liver transplant team.     On my assessment, patient states that he started to experience epigastric and right upper abdominal pain 4-5 days before presenting to the OSH on 12/28. The pain came on 30 minutes or so after eating with associated right shoulder pain. Currently the patient denies any pain. He was able to eat at the OSH without nausea, vomiting, or abdominal pain. Last BM was this morning, denies bloody stools or dark tarry stools in the last week. Has had streaks of blood in his stools in the past, however. Denies fevers or chills. Denies chest pain or SOB. Denies numbness or tingling in extremities. Denies weakness. Endorses right shoulder pain since arrival that has improved with pain medications. Denies previous history of MI, TIA, or Stroke. Denies previous surgeries.       Past Medical History    No past medical history on file.    Past Surgical History   No past surgical history on file.    Prior to Admission Medications   Current Facility-Administered Medications   Medication    acetaminophen (TYLENOL) tablet 975 mg    [START ON 1/5/2024] amLODIPine (NORVASC) tablet 10 mg    [START ON 1/5/2024] aspirin (ASA) chewable tablet 81 mg    calcium carbonate (TUMS) chewable tablet 1,000 mg    cloNIDine (CATAPRES-TTS1) Patch in Place    [START ON 1/6/2024] cloNIDine (CATAPRES-TTS2) 0.2 MG/24HR WK patch 1 patch    glucose gel 15-30 g    Or    dextrose 50 % injection 25-50 mL    Or    glucagon injection 1 mg    hydrALAZINE (APRESOLINE) injection 10-20 mg    [START ON 1/5/2024] hydrochlorothiazide  (HYDRODIURIL) tablet 50 mg    HYDROmorphone (PF) (DILAUDID) injection 0.5 mg    insulin aspart (NovoLOG) injection (RAPID ACTING)    lidocaine (LMX4) cream    lidocaine 1 % 0.1-1 mL    metoprolol tartrate (LOPRESSOR) tablet 50 mg    naloxone (NARCAN) injection 0.2 mg    Or    naloxone (NARCAN) injection 0.4 mg    Or    naloxone (NARCAN) injection 0.2 mg    Or    naloxone (NARCAN) injection 0.4 mg    oxyCODONE (ROXICODONE) tablet 5-10 mg    polyethylene glycol (MIRALAX) Packet 17 g    polyethylene glycol (MIRALAX) Packet 17 g    senna-docusate (SENOKOT-S/PERICOLACE) 8.6-50 MG per tablet 1 tablet    Or    senna-docusate (SENOKOT-S/PERICOLACE) 8.6-50 MG per tablet 2 tablet    senna-docusate (SENOKOT-S/PERICOLACE) 8.6-50 MG per tablet 2 tablet    sodium chloride (PF) 0.9% PF flush 3 mL    sodium chloride (PF) 0.9% PF flush 3 mL          Review of Systems    The 10 point Review of Systems is negative other than noted in the HPI or here.      Physical Exam   Vital Signs: Temp: 97.9  F (36.6  C) Temp src: Oral BP: 131/87 Pulse: 64   Resp: 14 SpO2: 100 % O2 Device: None (Room air)    Weight: 208 lbs 14.4 oz  No intake or output data in the 24 hours ending 01/04/24 1805  Constitutional: awake, alert, cooperative, no apparent distress, and appears stated age  Respiratory: non labored breathing on room air   Cardiovascular: lowly hypertensive, RRR  GI: abdomen soft, NT, ND  Skin: no bruising or bleeding, normal skin color, texture, turgor, and no redness, warmth, or swelling  Musculoskeletal: no pitting edema, palpable DP and PT pulses and palpable radial pulses bilaterally   Neurologic: AOX3, CN II-XII grossly intact, LLANOS           Data   Recent Labs   Lab 01/04/24  1749   WBC 7.3   HGB 13.5   MCV 85      INR 0.99      POTASSIUM 3.8   CHLORIDE 100   CO2 28   BUN 18.6   CR 1.09   ANIONGAP 12   LUIS 9.9   *   ALBUMIN 4.8   PROTTOTAL 8.3   BILITOTAL 0.2   ALKPHOS 211*   ALT 94*   AST 44      Attestation:  Patient has been seen with team and evaluated by me.  No immediate indication for LT, but complex problem re: RHA pathology that requires further diagnosis and hopefully an endovascular approach.  Will discuss with IR and vascular and continue to follow.  Vital signs, labs, medications and orders were reviewed.   When obtained, diagnostic images were reviewed by me and interpreted as above.    The care plan was discussed with the multidisciplinary team and I agree with the findings and plan in this note, with any differences recorded in blue.      Sid Pat MD  Professor of Surgery

## 2024-01-05 NOTE — CONSULTS
"    Interventional Radiology Consult Service Note    IR consulted for possible \"newly found to have hepatic artery dissection at OSH and transferred to [facility] for further care\"     This is a 43 year old male w/ PMHx significant for T2DM, COPD, Tobacco use disorder (2 PPD), and bipolar disorder who presented to [place] ED on 12/28/2023 w/ 3 days of right shoulder pain and generalized abdominal pain with CT findings concerning for RHA aneurysm and celiac artery dissection at the origin of the left gastric, splenic and hepatic arteries. The patient was transferred to the [facility] for possible advanced vascular and liver transplant surgical intervention. IR is consulted given CTA findings.    Case and imaging was reviewed with Dr. Silva and Dr. Crawford from IR. 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 No role for endovascular intervention with IR at this time given extensive nature of vascular abnormalities. Recommend medical management and consultation with rheumatology for possible etiology (vasculitis, BRUNA, infection, vs other).    Recommendations were reviewed with Dr. Barillas and Dr. Linton.    Vitals:   /75 (BP Location: Right arm)   Pulse 79   Temp 97.6  F (36.4  C) (Oral)   Resp 16   Ht 1.626 m (5' 4\")   Wt 93.7 kg (206 lb 9.1 oz)   SpO2 99%   BMI 35.46 kg/m      Pertinent Labs:     Lab Results   Component Value Date    WBC 7.3 01/04/2024       Lab Results   Component Value Date    HGB 13.5 01/04/2024       Lab Results   Component Value Date     01/04/2024       Lab Results   Component Value Date    INR 0.99 01/04/2024       Lab Results   Component Value Date    POTASSIUM 3.7 01/05/2024        CAROLINE Cruz CNP  Interventional Radiology   Pager: [phone]    "

## 2024-01-05 NOTE — PROGRESS NOTES
"VASCULAR SURGERY PROGRESS NOTE    Subjective:  Resting comfortably in bed, notes he has some sensitivity to light palpation in right upper quadrant of abdomen.  No other concerns.    Objective:  Intake/Output Summary (Last 24 hours) at 1/5/2024 0704  Last data filed at 1/4/2024 2328  Gross per 24 hour   Intake --   Output 300 ml   Net -300 ml     Labs:  ROUTINE IP LABS (Last four results)  BMP  Recent Labs   Lab 01/05/24  0454 01/05/24  0442 01/04/24  2353 01/04/24  2132 01/04/24  1749   NA  --  137  --   --  140   POTASSIUM  --  3.7  --   --  3.8   CHLORIDE  --  98  --   --  100   LUIS  --  9.6  --   --  9.9   CO2  --  25  --   --  28   BUN  --  16.5  --   --  18.6   CR  --  0.93  --   --  1.09   * 163*  163* 167* 131* 179*     CBC  Recent Labs   Lab 01/04/24  1749   WBC 7.3   RBC 4.94   HGB 13.5   HCT 42.2   MCV 85   MCH 27.3   MCHC 32.0   RDW 14.2        INR  Recent Labs   Lab 01/04/24  1749   INR 0.99     PHYSICAL EXAM:  /89 (BP Location: Right arm)   Pulse 73   Temp 97.6  F (36.4  C) (Oral)   Resp 18   Ht 1.626 m (5' 4\")   Wt 93.7 kg (206 lb 9.1 oz)   SpO2 100%   BMI 35.46 kg/m    General: The patient is alert and oriented. Appropriate. No acute distress  Psych: pleasant affect, answers questions appropriately  Skin: Color appropriate for race, warm, dry.  Respiratory: The patient does not require supplemental oxygen. Breathing unlabored  GI:  Abdomen soft, nontender to light palpation.  Extremities: Palpable DP bilaterally no edema.  Motor and sensory function intact bilaterally warm, well-perfused.    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     LFTs: rising both  and  this morning.    ASSESSMENT / PLAN:  Jong Quiroga is a 43 year old male w/ PMHx significant for T2DM, COPD, Tobacco use disorder (2 PPD), and bipolar disorder who " presented to [place] ED on 12/28/2023 w/ 3 days of right shoulder pain and generalized abdominal pain with CT findings concerning for RHA aneurysm and celiac artery dissection at the origin of the left gastric, splenic and hepatic arteries. The patient was transferred to the [facility] for possible advanced vascular and liver transplant surgical intervention.      - Strict BP control with SBP<140, HR<80  - No immediate plans for surgical intervention: no feasible distal end point for stenting or bypass  - Recommend continuing with monitoring and/or liver transplant workup   - Please page vascular surgery if changes in hemodynamics    Discussed pt history, exam, assessment and plan with Dr. Garcia of the vascular surgery service, who is in agreement with the above.    Bianca Griffin, [facility]  Vascular Surgery  Pager: [phone]  [email or web address]  Send message or 10 digit call back number Securely via [employer] with the [employer] Web Console (learn more here)

## 2024-01-05 NOTE — PLAN OF CARE
"Care from: 0700 - 1930    Neuro: A&Ox4.   Cardiac: SR. VSS.   Respiratory: Sating 99% on RA.  GI/: Adequate urine output. LBM 1/4 c/o constipation - scheduled miralax.  Diet/appetite: Tolerating Reg diet. Eating well.  Activity:  Ind up ad doris  Pain: Pt states 10/10 R arm pain - PRN oxycodone given 10mg x2  Skin: No new deficits noted.  LDA's: R PIV - SL  POCT: Q4 BG checks    /62 (BP Location: Left arm)   Pulse 61   Temp 97.9  F (36.6  C) (Oral)   Resp 16   Ht 1.626 m (5' 4\")   Wt 93.7 kg (206 lb 9.1 oz)   SpO2 99%   BMI 35.46 kg/m      Plan: Continue to monitor Pt status and report changes to primary treatment team. Encourage activity. UA sample needed.    "

## 2024-01-05 NOTE — PROGRESS NOTES
Assumed cares from 0292-4694    cardiac:  On tele SR. VSS.   Respiratory: Sating >99 on RA.  GI/: Adequate urine output. With no BM during shift  Diet/appetite: NPO since 1930.  Activity:  Independent in room and to bathroom   Pain: Pt reported R. Shoulder pain which was rated as 10/10. Provided PRN Dilaudid x2 and PRN oxy x2.   Skin: No new deficits noted.  LDA's: R. PIV SL    Plan: Continue with POC. Notify primary team with changes.

## 2024-01-05 NOTE — PROGRESS NOTES
Transplant Surgery Progress Note  01/05/2024       Subjective:  - Feeling fine. Pt states that he has some vague discomfort in his right abdomen but it is stable. No nausea or vomiting. No chest pain or shortness of breath. Pt has an appetite       Objective:  Temp:  [97.6  F (36.4  C)-98.1  F (36.7  C)] 97.9  F (36.6  C)  Pulse:  [61-79] 61  Resp:  [14-18] 16  BP: (114-133)/(62-89) 114/62  SpO2:  [99 %-100 %] 99 %    I/O last 3 completed shifts:  In: -   Out: 300 [Urine:300]      Gen: Awake, alert, NAD  Resp: NLB on RA  Abd: soft, nondistended, nontender  Ext: WWP, no edema  Skin: nonjaundiced     Labs:  Recent Labs   Lab 01/04/24  1749   WBC 7.3   HGB 13.5          Recent Labs   Lab 01/05/24  0841 01/05/24  0454 01/05/24  0442 01/04/24  2132 01/04/24  1749   NA  --   --  137  --  140   POTASSIUM  --   --  3.7  --  3.8   CHLORIDE  --   --  98  --  100   CO2  --   --  25  --  28   BUN  --   --  16.5  --  18.6   CR  --   --  0.93  --  1.09   * 165* 163*  163*   < > 179*   LUIS  --   --  9.6  --  9.9    < > = values in this interval not displayed.       Imaging:  CT Abdomen pelvis    1. Complex iliac artery dissection, associated with aneurysmal degeneration of the entire common, proper and right hepatic artery and involves the intrahepatic right-sided hepatic arteries as well with occlusions of some of the right hepatic branches. The flow lumen is irregular in appearance. Evaluation is limited by lack of noncontrast phase. The eccentric material along the aneurysm and at the edge of the splenic artery origin is most likely intramural hematoma, or false lumen thrombosis, rather than eccentric thrombus within an aneurysm.   2. No evidence of rupture.   3. Nonspecific appearance but there is minimal atherosclerotic change present and the underlying etiology could be BRUNA (segmental arterial mediolysis), or less likely a vasculitis. Infection should also be excluded but lack of inflammatory stranding would  argue against it.   4. Trace intrahepatic bile duct prominence most likely due to external compression although potentially could be related to mild bile duct ischemia.   5. Persistent urinary bladder distention.        Assessment/Plan:   43 year old male with no surgical history with PMHx significant for T2DM, COPD, Tobacco use disorder (2 PPD), and bipolar disorder, poorly controlled HTN presented from OSH for abdominal and right shoulder pain found to have CT evidence of RHA aneurysm and celiac artery dissection at the origin of the left gastric, splenic and hepatic arteries for which IR, vascular surgery, rheumatology and transplant surgery was consulted.     On evaluation of the CT scan it is a little difficult to clearly determine where the dissection starts. It does appear to be near the celiac origin and dissect distally into the intrahepatic branches, but we will need better imaging to further delineate the extent. The other concern is LFTs rising.    - Recommend Arteriogram  - Agree with vascular and IR consults   - Daily Hepatic panel  - Agree the BP control  - No role for transplant surgery at this point in time. We will follow along         Seen, examined, and discussed with chief resident, who will discuss with staff.  - - - - - - - - - - - - - - - - - -  Jadon Peterson PGY-3

## 2024-01-05 NOTE — H&P
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 Was transferred to [facility] where CT demonstrated complex dissection of iliac artery and aneurysm of the hepatic artery system and ultimately transferred to [facility] for further treatment option including liver transplantation.     Complex iliac artery dissection, associated with aneurysmal degeneration of the hepatic artery system  Hypertension  Has a longstanding hx of HTN on 4 antihypertensives but reports uncontrolled Bps at home. Also chronic tobacco use with 1ppd. Presented to [facility] ED with 3 days of right shoulder pain and generalized abd pain, . Workup notable for elevated LFTS ( , , and Alk Phos 310, now improving ), hgb 11.3, WBC 12.8. 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 Was started on esmolol and nicardipine gtt for SBP < 120 and transferred to [facility] on 12/29/2023. CT at [facility] demonstrated complex dissection of iliac artery with aneurysmal degeneration of the the entire common, proper and right hepatic artery. Images also demonstrated gallstones without cholecystitis and a mild right hydronephrosis. Was evaluated by IR and vascular surgery and hepatobiliary surgery services at outside hospital and given the complex involvement of the dissections was recommended to transfer to a center with limited transportation capacity. On admission  afebrile, HDS.  Symmetric pulses in all extremities.  Was complaining of right shoulder pain with radiation to his right neck, but pain improved with oxycodone prn. Suspect his artery dissection is likely secondary to uncontrolled hypertension and tobacco use however ddx includes vasculitis, connective tissues disease and inflammatory process (given maternal history of lupus, elevated inflammatory markers, elevated C3 normal C4 and subjective chronic night chills).  - Notable labs:   - WHIT, ANCA, anti DNA neg  - CRP 0.9, ESR 32  - C3 169 elevated, C4 36.34nl  - Hep C nr, HepB ag nr, HIV neg  - continue pta hydrochlorothiazide 25 mg daily  - Continue PTA amlodipine 10 mg daily  - discontinue pta clonidine patch 0.2 mg/24 hr    - start PO clonidine 0.1 mg BID + prn  - Continue PTA metoprolol tartrate 50 mg BID  - Continue PTA ASA  - IR consult  - vascular surgery consult  - Hepatology consult  - SOT liver transplant consult  - Rheumatology consult  - NPO  - oxycodone for breakthrough pain + ashlie bowel regimen   - Strict BP control with SBP<140, HR<80     Elevated transaminases  Cholelithiasis without cholecystitis  At OSH, LFTs at first , , and Alk Phos 310 but have been improving. On admission, , AST 44. ALT94. CT with Cholelithiasis without cholecystitis. Denies abd pain.  - ctm    T2DM  A1c in 11/2023 13. Home regimen: 60U glargine at bedtime and 5 U aspart 3 times daily  - 30 U lantus at bedtime + MDSSI    Tobacco use  Used to smoke 1- 2 ppd.  Has not smoked in a week.   - nicotine patch    Concern for COPD  No PFTs and denies cough. Had cough previously that improved with robitussin.    Mild right hydronephrosis  Denies urinary symptoms              Diet: NPO per Anesthesia Guidelines for Procedure/Surgery Except for: Meds    DVT Prophylaxis: Pneumatic Compression Devices  Tran Catheter: Not present  Fluids: none  Lines: None     Cardiac Monitoring: ACTIVE order. Indication: aneurysm  Code  "Status: Full Code      Clinically Significant Risk Factors Present on Admission                       # Obesity: Estimated body mass index is 35.86 kg/m  as calculated from the following:    Height as of this encounter: 1.626 m (5' 4\").    Weight as of this encounter: 94.8 kg (208 lb 14.4 oz).              Disposition Plan      Expected Discharge Date: 01/08/2024      Destination: home          The patient's care was discussed with the Attending Physician, Dr. House .      Nayana Hansen MD  Medicine Service, [facility]  Securely message with [employer] (more info)  Text page via [facility] Paging/Directory   See signed in provider for up to date coverage information  ______________________________________________________________________    Chief Complaint   Transfer from [facility] for complex iliac artery dissection    History is obtained from the patient and chart review.    History of Present Illness   Jong Quiroga is a 43 year old male with past medical history of hypertension on 4 antihypertensives, tobacco use, type 2 diabetes, and bipolar disorder.  Presented to [place] ED on 12/28/2023 with 3 days of right shoulder pain and generalized abd pain, . Workup notable for elevated LFTS ( , , and Alk Phos 310 ), hgb 11.3, WBC 12.8. 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 Was started on esmolol and nicardipine gtt for SBP < 120 and transferred to [facility] on 12/29/2023. CT at [facility] demonstrated complex dissection of iliac artery with aneurysmal degeneration of the the entire common, proper and right hepatic artery. Images also demonstrated gallstones without cholecystitis and a mild right hydronephrosis. Was evaluated by IR and vascular surgery and hepatobiliary surgery services at outside hospital and given the complex involvement of the dissections was recommended to transfer to a center with limited transportation " capacity.   On admission afebrile, HDS.  Symmetric pulses in all extremities.  Was complaining of right shoulder pain with radiation to his right neck, but pain improved with oxycodone prn.     ROS  + Night sweats for years  No claudications  No stiffness  No vision changes  No abdominal pain  No rash    FH  Mother: hx of lupus and  from kidney complications  Father:  diabetes    SH  Tobacco: 1-2 ppd  Previous use of unprescribed GBP      Physical Exam   Vital Signs: Temp: 97.9  F (36.6  C) Temp src: Oral BP: 131/87 Pulse: 64   Resp: 14 SpO2: 100 % O2 Device: None (Room air)    Weight: 208 lbs 14.4 oz    GEN: alert, comfortable, NAD  HEENT: EOMI,  anicteric sclera  CV: RRR, normal S1 S2, no m/g/r  RESP: lungs clear to auscultation - no rales, rhonchi or wheezes  ABDOMEN:  soft, nontender, nondistended, +BS  MSK/SKIN: no edema, no rash  NEURO: Alert and oriented, moving all limbs spontaneously  PSYCH: Appropriate mood and affect to match         Medical Decision Making             Data   Recent Labs   Lab 24  2132 24  1749   WBC  --  7.3   HGB  --  13.5   MCV  --  85   PLT  --  379   INR  --  0.99   NA  --  140   POTASSIUM  --  3.8   CHLORIDE  --  100   CO2  --  28   BUN  --  18.6   CR  --  1.09   ANIONGAP  --  12   LUIS  --  9.9   * 179*   ALBUMIN  --  4.8   PROTTOTAL  --  8.3   BILITOTAL  --  0.2   ALKPHOS  --  211*   ALT  --  94*   AST  --  44

## 2024-01-05 NOTE — CONSULTS
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  Medical hepatology was consulted for possibility of liver transplant evaluation.    #. Celiac Artery Dissection  #. Aneurysm of the right hepatic artery  #. Abnormal Liver Studies  Patient presented with abdominal pain in the setting of hypertension with systolic blood pressures above 200 and was noted to have a celiac artery dissection and an aneurysm of his right hepatic artery. With regards to etiology of his dissection it is unclear at this time.  Rheumatology has been consulted and believe that his presentation is likely suggestive of segmental artery medial lysis and less likely vasculitis.    He has abnormal liver studies with elevated AST, ALT and ALP in the 200s to 300s, which slowly improved and then stalled out around 12/31/2023 and then his AST increased on 1/5/2023. His liver studies prior to his dissection were within normal limits. Suspect his abnormal liver studies are related to his dissection - especially given the patient has what appears to be intra-hepatic biliary ductal injury related to reduced perfusion.     Hepatitis B and C studies are negative.  WHIT negative, less likely autoimmune related liver injury.  Given the patient was at an outside facility until 1/4/2024 it is difficult to know his blood pressure trends and if he had any episodes of  hypotension with his blood pressure management but none since admission to [facility].         Recommendations:   -- Follow up F-actin and CK. Consider lactate  -- Agree with recommendations per transplant surgery of: IR consultation, vascular consultation and arteriogram for further evaluation.   -- Agree with blood pressure control per primary team    Alyssa Post MD (Lizzie)   of Medicine  Advanced & Transplant Hepatology  [facility]         HPI:   Jong Quiroga is a 43 year old male with PMHx of HTN, type II diabetes, obesity and bipolar disorder who originally presented on 12/28/2023 abdominal pain + shoulder pain and was found to be hypertensive. 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  Medical hepatology was consulted for possibility of liver transplant evaluation.    See H&P from OSH for presenting symptoms in late December 2023.  At this point in time the patient feels generally well.  He reports some vague abdominal discomfort in his right lower quadrant.  He denies any nausea or vomiting.  He denies any fevers, chest pain or shortness of breath.  He is interested in eating today.         Past Medical History:   Type 2 diabetes, tobacco use, obesity, hypertension and bipolar disorder         Past Surgical History:   A surgical intervention was performed in childhood; patient unclear what it was         Medications:     Current Facility-Administered Medications   Medication     acetaminophen (TYLENOL) tablet 975 mg     albuterol (PROVENTIL HFA/VENTOLIN HFA) inhaler     amLODIPine (NORVASC) tablet 10 mg     aspirin (ASA) chewable tablet 81 mg     calcium carbonate (TUMS) chewable tablet 1,000 mg     cloNIDine (CATAPRES) tablet 0.1 mg     cloNIDine (CATAPRES) tablet 0.1 mg     glucose gel 15-30 g    Or     dextrose 50 % injection 25-50 mL    Or     glucagon injection 1 mg     hydrALAZINE (APRESOLINE) injection  "10-20 mg     hydrochlorothiazide (HYDRODIURIL) tablet 25 mg     HYDROmorphone (PF) (DILAUDID) injection 0.5 mg     insulin aspart (NovoLOG) injection (RAPID ACTING)     insulin glargine (LANTUS PEN) injection 30 Units     lidocaine (LMX4) cream     lidocaine 1 % 0.1-1 mL     metoprolol tartrate (LOPRESSOR) tablet 50 mg     naloxone (NARCAN) injection 0.2 mg    Or     naloxone (NARCAN) injection 0.4 mg    Or     naloxone (NARCAN) injection 0.2 mg    Or     naloxone (NARCAN) injection 0.4 mg     oxyCODONE (ROXICODONE) tablet 5-10 mg     polyethylene glycol (MIRALAX) Packet 17 g     polyethylene glycol (MIRALAX) Packet 17 g     senna-docusate (SENOKOT-S/PERICOLACE) 8.6-50 MG per tablet 1 tablet    Or     senna-docusate (SENOKOT-S/PERICOLACE) 8.6-50 MG per tablet 2 tablet     senna-docusate (SENOKOT-S/PERICOLACE) 8.6-50 MG per tablet 2 tablet     sodium chloride (PF) 0.9% PF flush 3 mL     sodium chloride (PF) 0.9% PF flush 3 mL     umeclidinium-vilanterol (ANORO ELLIPTA) 62.5-25 MCG/ACT oral inhaler 1 puff            Allergies    No Known Allergies         Social History:   - Lives with Milka + two kids  - Tobacco use: 1-2 ppd  - Prior alcohol use; denies any recent use  - Currently on probation; denies any current use of recreational drugs        Family History:   - Father: DM, asthma  - Mother: Lupus, renal dysfunction         Review of Systems:   A complete 10 point review of systems was obtained.  Please see the HPI for pertinent positives and negatives.  All other systems were reviewed and were found to be negative.          Physical Exam:   VS:  /62 (BP Location: Left arm)   Pulse 61   Temp 97.9  F (36.6  C) (Oral)   Resp 16   Ht 1.626 m (5' 4\")   Wt 93.7 kg (206 lb 9.1 oz)   SpO2 99%   BMI 35.46 kg/m      Wt:   Wt Readings from Last 2 Encounters:   01/05/24 93.7 kg (206 lb 9.1 oz)      Constitutional: cooperative, pleasant  Eyes: Conjunctiva anicteric  HEENT: Normal oropharynx without ulcers or " exudate  CV: No Noemi edema  Respiratory: Breathing comfortably on ambient air  Abd: Mild TTP in RLQ, no rebound or guarding   Skin: no jaundice  Neuro: A&O x 3         Laboratory:   BMP  Recent Labs   Lab 01/05/24  1330 01/05/24  0841 01/05/24  0454 01/05/24  0442 01/04/24  2132 01/04/24  1749   NA  --   --   --  137  --  140   POTASSIUM  --   --   --  3.7  --  3.8   CHLORIDE  --   --   --  98  --  100   LUIS  --   --   --  9.6  --  9.9   CO2  --   --   --  25  --  28   BUN  --   --   --  16.5  --  18.6   CR  --   --   --  0.93  --  1.09   * 182* 165* 163*  163*   < > 179*    < > = values in this interval not displayed.     CBC  Recent Labs   Lab 01/04/24  1749   WBC 7.3   RBC 4.94   HGB 13.5   HCT 42.2   MCV 85   MCH 27.3   MCHC 32.0   RDW 14.2        INR  Recent Labs   Lab 01/04/24  1749   INR 0.99     Liver Enzymes  Recent Labs   Lab 01/05/24  0442 01/04/24  1749   ALKPHOS 246* 211*   * 44   * 94*   BILITOTAL 0.6 0.2   PROTTOTAL 7.5 8.3   ALBUMIN 4.3 4.8     Imaging/Procedures/Studies:   CT Abdomen Pelvis w/Contrast 12/29/2023  1. Complex iliac artery dissection, associated with aneurysmal degeneration of the entire common, proper and right hepatic artery and involves the intrahepatic right-sided hepatic arteries as well with occlusions of some of the right hepatic branches. The flow lumen is irregular in appearance. Evaluation is limited by lack of noncontrast phase. The eccentric material along the aneurysm and at the edge of the splenic artery origin is most likely intramural hematoma, or false lumen thrombosis, rather than eccentric thrombus within an aneurysm.   2. No evidence of rupture.   3. Nonspecific appearance but there is minimal atherosclerotic change present and the underlying etiology could be BRUNA (segmental arterial mediolysis), or less likely a vasculitis. Infection should also be excluded but lack of inflammatory stranding would argue against it.   4. Trace  intrahepatic bile duct prominence most likely due to external compression although potentially could be related to mild bile duct ischemia.   5. Persistent urinary bladder distention.

## 2024-01-05 NOTE — CONSULTS
Care Management Follow Up    Length of Stay (days): 1    Expected Discharge Date: 01/08/2024     Concerns to be Addressed:  Social Work Consult     Patient plan of care discussed at interdisciplinary rounds: Yes    Anticipated Discharge Disposition:  N/A     Anticipated Discharge Services:  N/A  Anticipated Discharge DME:  N/A    Patient/family educated on Medicare website which has current facility and service quality ratings:  N/A  Education Provided on the Discharge Plan:  N/A  Patient/Family in Agreement with the Plan:  N/A    Referrals Placed by CM/SW:  N/A  Private pay costs discussed: Not applicable    Additional Information:    SW met pt at bedside. Introduced self and role. SW asked pt if pt has insurance, as it is not currently in pt's chart. Pt let SW know that pt has [employer], but the insurance card is in his wallet at home. Pt called someone in his home and they took photos of the front and back of the card. SW took photos of the card and sent an email to [email or web address] so they can upload his insurance card into his chart. SW also asked pt who his PCP is, as we don't have it in file, and pt let SW know that his PCP is Dr. Storm Armenta at [facility].    Pt also let SW know that pt has a court appointment on 1/9 @10 am at the [facility] and was wondering how he would be able to go to apt. while being inpatient. SW let pt know that if pt is still inpatient at that time, SW can work with pt to see what SW can help with. Pt was agreeable. Care management will follow for updates.    SARTHAK Amaro, ZANESW  6B   Ph: [phone]  Pager: [phone]

## 2024-01-05 NOTE — CONSULTS
RHEUMATOLOGY INPATIENT CONSULT NOTE     Subjective    CHIEF COMPLAINT/REASON FOR CONSULT  Reason for consult: hepatic artery dissection, concern for vasculitis.     HISTORY OF PRESENT ILLNESS  Mr. Quiroga is a 43 year old male with medical history notable for hypertension, history of substance use disorder, obesity, type 2 diabetes who was transferred from outside hospital for concerns of hepatic artery dissection.    He was in his usual state of health until December 24, 2024 on [place] when he felt acute sharp pain in the epigastrium that radiated to the right shoulder, pain initially was on and off  but occur 30-45 minutes after eating, continued for few days till he presented to an outside hospital on December 28, he was found at the time to have elevated liver enzymes and alkaline phosphatase and there was concern for cholecystitis, ultrasound of the abdomen the time did not show features of cholecystitis however CT of the abdomen showed soft tissue thickening surrounding common hepatic artery as well as right hepatic artery with aneurysmal dilatation therefore he was referred to [facility] for further evaluation.    Since his transfer, he is overall feeling well, he denies fevers, chills, shortness of breath, hemoptysis.    Prior to this episode he denies prolonged fevers, weight loss, chronic sinusitis, hearing loss, skin rash, ulcerations, inflammatory arthritis.    He is a current smoker, 2 pack/day, used to drink alcohol previously but he denies recent intake.  He denies recent use of recreational drugs as he is on probation.    Family history is positive for mother with lupus, no significant family history of aneurysms or early cardiovascular events or heart attacks.    He has underwent lab evaluation in the outside hospital showing negative hepatitis C antibody, HIV antibody, hepatitis B surface antigen, WHIT, double-stranded DNA, ANCA panel for vasculitis.  He had normal complement C4 and slightly elevated  complement C3    He had slightly elevated C-reactive protein at 0.9 [normal less than 0.5] on 12/29 and elevated ESR 32 on 12/30.    REVIEW OF SYSTEMS  Pertinent positives and negatives as documented in the above history of present illness.    Objective   VITAL SIGNS  Temp:  [97.6  F (36.4  C)-98.1  F (36.7  C)] 97.6  F (36.4  C)  Pulse:  [63-79] 79  Resp:  [14-18] 16  BP: (114-133)/(75-89) 114/75  SpO2:  [99 %-100 %] 99 %  Admission Weight: 94.8 kg (208 lb 14.4 oz)  Current Weight: 93.7 kg (206 lb 9.1 oz)    PHYSICAL EXAMINATION  Physical Exam  General: Alert, oriented, appropriate affect, no apparent distress.  Eyes: Clear conjunctivae and lids.  ENT:  Moist oral mucosa without mucositis.  Skin: No rheumatologic rashes or ulcers.   Vessels:  Normal radial and pedal pulses. No edema.  Heart: Regular rate. No murmurs or rubs.  Lungs: Clear to auscultation bilaterally.  Abdomen: Soft  Joints: No synovitis of the upper and lower extremities including hands, wrists, elbows, knees, ankles or feet bilaterally.       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    His presentation with acute abdominal pain that he remembers quite well with the date and the time suggests arterial dissection rather than an underlying slow inflammatory process, furthermore he denies prodromal illnesses, fevers, weight loss before his presentation that is usually seen with vasculitis.  There are no other features suggestive of vasculitis including skin rash, ulcerations.  He has modest elevation in C-reactive protein which could be  reaction to the dissecting process. Moreover, he currently overall looks well without features suggestive of an underlying ongoing inflammatory process.    The outside report has conflicting description, CT abdomen on 29 December mentioning complex ILIAC artery dissection and then further talks about hepatic artery involvement, likely this is a typo and it was meant to be celiac artery involvement rather than two territorial involvement.  Therefore, I suggest obtaining outside scans and have our radiologist over read both to further discern the underlying vascular process.    Overall, His presentation is likely suggestive of segmental arterial medial lysis [BRUNA] this is seen in mesenteric vasculature usually involving 1 territory but can be multiple, resulting in a dissecting process and usually treated conservatively without immunosuppressive therapy.    Vasculitis either infectious or autoimmune mediated can be on the differential although seems less likely at the current time, further evaluation can be pursued depending on the over read of the outside scans.     The recommendations discussed with the primary team    Rheumatology service will continue to follow and will await the CT results    If vasculitis remains on the differential after reviewing his CT scan then further workup can be pursued including evaluation for other vasculature with CTA head and Neck, CTA Chest, abdomen, pelvis with distal runoff. Workup for infectious etiology can include blood cultures, histoplasma urine antigen, syphilis screen, Quantiferon gold and Karius test.     Clemencia Harris MD  Rheumatology attending       85 MINUTES SPENT BY ME on the date of service doing chart review, history, exam, documentation & further activities per the note.

## 2024-01-05 NOTE — CONSULTS
Care Management Follow Up    Length of Stay (days): 1    Expected Discharge Date: 01/08/2024     Concerns to be Addressed:  Advance Care Directive Consult    Patient plan of care discussed at interdisciplinary rounds: Yes    Anticipated Discharge Disposition:  N/A     Anticipated Discharge Services:  N/A  Anticipated Discharge DME:  N/A    Patient/family educated on Medicare website which has current facility and service quality ratings:  N/A  Education Provided on the Discharge Plan:  N/A  Patient/Family in Agreement with the Plan:  N/A    Referrals Placed by CM/SW:  N/A  Private pay costs discussed: Not applicable    Additional Information:    SW met pt at bedside. Introduced self and role. SW provided blank honoring choices HCD form for pt to fill out. SW also notified pt that there is a notary on the hospital campus to notarize the HCD forms if interested. Pt was agreeable. Care management will follow for updates.     SARTHAK Amaro, ZANESW  6B   Ph: [phone]  Pager: [phone]

## 2024-01-05 NOTE — PHARMACY-ADMISSION MEDICATION HISTORY
Pharmacist Admission Medication History    Admission medication history is complete. The information provided in this note is only as accurate as the sources available at the time of the update.    Information Source(s): [employer] -- [facility] Discharge note from 1/4/2024    Pertinent Information:  None    Changes made to PTA medication list:  Added:   --Aspirin  --Nicotine patch  --Oxycodone  --Senna-docusate  --Insulin aspart sliding scale  --Albuterol inhaler  --Ipratropium-albuterol inhaler  Deleted: None  Changed: None       Allergies reviewed with patient and updates made in EHR: yes    Medication History Completed By: Uli Marte [facility] 1/5/2024 7:14 AM    PTA Med List   Medication Sig Last Dose    albuterol (PROAIR HFA/PROVENTIL HFA/VENTOLIN HFA) 108 (90 Base) MCG/ACT inhaler Inhale 2 puffs into the lungs every 4 hours as needed for shortness of breath Unknown    amLODIPine (NORVASC) 10 MG tablet Take 10 mg by mouth daily 1/3/2024 at 0800    aspirin (ASA) 81 MG chewable tablet Take 81 mg by mouth daily 1/3/2024 at AM    cloNIDine (CATAPRES-TTS2) 0.2 MG/24HR WK patch Place 1 patch onto the skin once a week     hydrochlorothiazide (HYDRODIURIL) 25 MG tablet Take 25 mg by mouth daily 1/3/2024 at 0800    insulin aspart (NOVOLOG PEN) 100 UNIT/ML pen Inject 0-12 Units Subcutaneous 3 times daily (before meals) Blood Glucose             Dose  < 70                               see hypoglycemia protocol                             no dose, give prandial insulin  150-199                         2 units  200-249                         4 units  250-299                         6 units  300-349                         8 units  350-399                         10 units  400 and greater             12 units and call MD 1/3/2024 at PM    insulin aspart (NOVOLOG PEN) 100 UNIT/ML pen Inject 5 Units Subcutaneous 3 times daily (with meals) 1/3/2024    insulin glargine (LANTUS PEN) 100 UNIT/ML pen  Inject 60 Units Subcutaneous at bedtime 1/3/2024 at 2200    ipratropium-albuterol (COMBIVENT RESPIMAT)  MCG/ACT inhaler Inhale 1 puff into the lungs 4 times daily as needed for shortness of breath or wheezing Unknown    metoprolol tartrate (LOPRESSOR) 50 MG tablet Take 50 mg by mouth 2 times daily 1/3/2024 at 2000    nicotine (NICODERM CQ) 14 MG/24HR 24 hr patch Place 1 patch onto the skin daily as needed for smoking cessation Past Week    oxyCODONE (ROXICODONE) 5 MG tablet Take 5-10 mg by mouth every 4 hours as needed for severe pain Past Week    senna-docusate (SENOKOT-S/PERICOLACE) 8.6-50 MG tablet Take 1-2 tablets by mouth 2 times daily 1/3/2024 at PM

## 2024-01-05 NOTE — PLAN OF CARE
B/P: 131/87, T: 97.9, P: 64, R: 14    Neuro: A&Ox4.   Cardiac: SR. VSS.   Respiratory: Sating 100% on RA.  GI/: Adequate urine output. H/o constipation, requested Miralax be scheduled.   Diet/appetite: NPO except meds.   Activity:  Up independently to bathroom.   Pain: R shoulder pain. Oxy and IV Dilaudid PRN.   Skin: No new deficits noted.  LDA's: R PIV SL, flushes well.     Plan: Awaiting Consults. Monitor BP closely and treat pain. Continue with POC. Notify primary team with changes.

## 2024-01-06 ENCOUNTER — APPOINTMENT (OUTPATIENT)
Dept: CT IMAGING | Facility: CLINIC | Age: 44
End: 2024-01-06
Attending: INTERNAL MEDICINE
Payer: COMMERCIAL

## 2024-01-06 LAB
ALBUMIN SERPL BCG-MCNC: 4.4 G/DL (ref 3.5–5.2)
ALBUMIN UR-MCNC: NEGATIVE MG/DL
ALP SERPL-CCNC: 244 U/L (ref 40–150)
ALT SERPL W P-5'-P-CCNC: 209 U/L (ref 0–70)
ANION GAP SERPL CALCULATED.3IONS-SCNC: 10 MMOL/L (ref 7–15)
APPEARANCE UR: CLEAR
AST SERPL W P-5'-P-CCNC: 96 U/L (ref 0–45)
BILIRUB SERPL-MCNC: 0.4 MG/DL
BILIRUB UR QL STRIP: NEGATIVE
BUN SERPL-MCNC: 18.7 MG/DL (ref 6–20)
CALCIUM SERPL-MCNC: 9.5 MG/DL (ref 8.6–10)
CHLORIDE SERPL-SCNC: 98 MMOL/L (ref 98–107)
COLOR UR AUTO: ABNORMAL
CREAT SERPL-MCNC: 1.06 MG/DL (ref 0.67–1.17)
DEPRECATED HCO3 PLAS-SCNC: 28 MMOL/L (ref 22–29)
EGFRCR SERPLBLD CKD-EPI 2021: 90 ML/MIN/1.73M2
ERYTHROCYTE [DISTWIDTH] IN BLOOD BY AUTOMATED COUNT: 14.1 % (ref 10–15)
GLUCOSE BLDC GLUCOMTR-MCNC: 229 MG/DL (ref 70–99)
GLUCOSE BLDC GLUCOMTR-MCNC: 231 MG/DL (ref 70–99)
GLUCOSE BLDC GLUCOMTR-MCNC: 233 MG/DL (ref 70–99)
GLUCOSE BLDC GLUCOMTR-MCNC: 253 MG/DL (ref 70–99)
GLUCOSE BLDC GLUCOMTR-MCNC: 254 MG/DL (ref 70–99)
GLUCOSE BLDC GLUCOMTR-MCNC: 315 MG/DL (ref 70–99)
GLUCOSE SERPL-MCNC: 237 MG/DL (ref 70–99)
GLUCOSE UR STRIP-MCNC: 500 MG/DL
HCT VFR BLD AUTO: 39.6 % (ref 40–53)
HGB BLD-MCNC: 12.7 G/DL (ref 13.3–17.7)
HGB UR QL STRIP: NEGATIVE
KETONES UR STRIP-MCNC: NEGATIVE MG/DL
LEUKOCYTE ESTERASE UR QL STRIP: NEGATIVE
MCH RBC QN AUTO: 26.7 PG (ref 26.5–33)
MCHC RBC AUTO-ENTMCNC: 32.1 G/DL (ref 31.5–36.5)
MCV RBC AUTO: 83 FL (ref 78–100)
MUCOUS THREADS #/AREA URNS LPF: PRESENT /LPF
NITRATE UR QL: NEGATIVE
PH UR STRIP: 5 [PH] (ref 5–7)
PLATELET # BLD AUTO: 343 10E3/UL (ref 150–450)
POTASSIUM SERPL-SCNC: 3.6 MMOL/L (ref 3.4–5.3)
PROT SERPL-MCNC: 7.6 G/DL (ref 6.4–8.3)
RBC # BLD AUTO: 4.76 10E6/UL (ref 4.4–5.9)
RBC URINE: <1 /HPF
SODIUM SERPL-SCNC: 136 MMOL/L (ref 135–145)
SP GR UR STRIP: 1.02 (ref 1–1.03)
SQUAMOUS EPITHELIAL: <1 /HPF
UROBILINOGEN UR STRIP-MCNC: NORMAL MG/DL
WBC # BLD AUTO: 6.2 10E3/UL (ref 4–11)
WBC URINE: 1 /HPF

## 2024-01-06 PROCEDURE — 250N000011 HC RX IP 250 OP 636

## 2024-01-06 PROCEDURE — 71275 CT ANGIOGRAPHY CHEST: CPT | Mod: 26 | Performed by: RADIOLOGY

## 2024-01-06 PROCEDURE — 99232 SBSQ HOSP IP/OBS MODERATE 35: CPT | Mod: GC | Performed by: PEDIATRICS

## 2024-01-06 PROCEDURE — 70496 CT ANGIOGRAPHY HEAD: CPT | Mod: 26 | Performed by: RADIOLOGY

## 2024-01-06 PROCEDURE — 81001 URINALYSIS AUTO W/SCOPE: CPT

## 2024-01-06 PROCEDURE — 71275 CT ANGIOGRAPHY CHEST: CPT

## 2024-01-06 PROCEDURE — 120N000003 HC R&B IMCU UMMC

## 2024-01-06 PROCEDURE — 80053 COMPREHEN METABOLIC PANEL: CPT

## 2024-01-06 PROCEDURE — 87040 BLOOD CULTURE FOR BACTERIA: CPT

## 2024-01-06 PROCEDURE — 86015 ACTIN ANTIBODY EACH: CPT | Performed by: INTERNAL MEDICINE

## 2024-01-06 PROCEDURE — 250N000013 HC RX MED GY IP 250 OP 250 PS 637

## 2024-01-06 PROCEDURE — 87385 HISTOPLASMA CAPSUL AG IA: CPT

## 2024-01-06 PROCEDURE — 250N000011 HC RX IP 250 OP 636: Performed by: PEDIATRICS

## 2024-01-06 PROCEDURE — 99232 SBSQ HOSP IP/OBS MODERATE 35: CPT | Performed by: STUDENT IN AN ORGANIZED HEALTH CARE EDUCATION/TRAINING PROGRAM

## 2024-01-06 PROCEDURE — 86481 TB AG RESPONSE T-CELL SUSP: CPT

## 2024-01-06 PROCEDURE — 36415 COLL VENOUS BLD VENIPUNCTURE: CPT

## 2024-01-06 PROCEDURE — 70496 CT ANGIOGRAPHY HEAD: CPT

## 2024-01-06 PROCEDURE — 86780 TREPONEMA PALLIDUM: CPT

## 2024-01-06 PROCEDURE — 70498 CT ANGIOGRAPHY NECK: CPT | Mod: 26 | Performed by: RADIOLOGY

## 2024-01-06 PROCEDURE — 85027 COMPLETE CBC AUTOMATED: CPT

## 2024-01-06 PROCEDURE — 86256 FLUORESCENT ANTIBODY TITER: CPT | Performed by: INTERNAL MEDICINE

## 2024-01-06 RX ORDER — IOPAMIDOL 755 MG/ML
134 INJECTION, SOLUTION INTRAVASCULAR ONCE
Status: COMPLETED | OUTPATIENT
Start: 2024-01-06 | End: 2024-01-06

## 2024-01-06 RX ADMIN — OXYCODONE HYDROCHLORIDE 10 MG: 5 TABLET ORAL at 15:42

## 2024-01-06 RX ADMIN — INSULIN ASPART 4 UNITS: 100 INJECTION, SOLUTION INTRAVENOUS; SUBCUTANEOUS at 13:22

## 2024-01-06 RX ADMIN — POLYETHYLENE GLYCOL 3350 17 G: 17 POWDER, FOR SOLUTION ORAL at 08:48

## 2024-01-06 RX ADMIN — INSULIN ASPART 2 UNITS: 100 INJECTION, SOLUTION INTRAVENOUS; SUBCUTANEOUS at 00:15

## 2024-01-06 RX ADMIN — HYDROMORPHONE HYDROCHLORIDE 0.5 MG: 1 INJECTION, SOLUTION INTRAMUSCULAR; INTRAVENOUS; SUBCUTANEOUS at 08:40

## 2024-01-06 RX ADMIN — HYDROMORPHONE HYDROCHLORIDE 0.5 MG: 1 INJECTION, SOLUTION INTRAMUSCULAR; INTRAVENOUS; SUBCUTANEOUS at 04:24

## 2024-01-06 RX ADMIN — HYDROCHLOROTHIAZIDE 25 MG: 25 TABLET ORAL at 08:49

## 2024-01-06 RX ADMIN — CLONIDINE HYDROCHLORIDE 0.1 MG: 0.1 TABLET ORAL at 20:28

## 2024-01-06 RX ADMIN — OXYCODONE HYDROCHLORIDE 10 MG: 5 TABLET ORAL at 02:19

## 2024-01-06 RX ADMIN — METOPROLOL TARTRATE 50 MG: 50 TABLET, FILM COATED ORAL at 08:49

## 2024-01-06 RX ADMIN — ASPIRIN 81 MG CHEWABLE TABLET 81 MG: 81 TABLET CHEWABLE at 08:49

## 2024-01-06 RX ADMIN — OXYCODONE HYDROCHLORIDE 10 MG: 5 TABLET ORAL at 20:28

## 2024-01-06 RX ADMIN — IOPAMIDOL 134 ML: 755 INJECTION, SOLUTION INTRAVENOUS at 14:16

## 2024-01-06 RX ADMIN — INSULIN ASPART 2 UNITS: 100 INJECTION, SOLUTION INTRAVENOUS; SUBCUTANEOUS at 04:37

## 2024-01-06 RX ADMIN — AMLODIPINE BESYLATE 10 MG: 10 TABLET ORAL at 08:48

## 2024-01-06 RX ADMIN — HYDROMORPHONE HYDROCHLORIDE 0.5 MG: 1 INJECTION, SOLUTION INTRAMUSCULAR; INTRAVENOUS; SUBCUTANEOUS at 00:05

## 2024-01-06 RX ADMIN — OXYCODONE HYDROCHLORIDE 10 MG: 5 TABLET ORAL at 06:43

## 2024-01-06 RX ADMIN — CLONIDINE HYDROCHLORIDE 0.1 MG: 0.1 TABLET ORAL at 08:49

## 2024-01-06 RX ADMIN — METOPROLOL TARTRATE 50 MG: 50 TABLET, FILM COATED ORAL at 20:28

## 2024-01-06 RX ADMIN — POLYETHYLENE GLYCOL 3350 17 G: 17 POWDER, FOR SOLUTION ORAL at 20:28

## 2024-01-06 RX ADMIN — INSULIN ASPART 3 UNITS: 100 INJECTION, SOLUTION INTRAVENOUS; SUBCUTANEOUS at 08:35

## 2024-01-06 ASSESSMENT — ACTIVITIES OF DAILY LIVING (ADL)
ADLS_ACUITY_SCORE: 18

## 2024-01-06 NOTE — PROGRESS NOTES
Care Management Follow Up    Length of Stay (days): 2    Expected Discharge Date: 01/08/2024     Concerns to be Addressed:   discharge planning     Patient plan of care discussed at interdisciplinary rounds: Yes    Anticipated Discharge Disposition:  Home     Anticipated Discharge Services:  TBD  Anticipated Discharge DME:  TBD    Patient/family educated on Medicare website which has current facility and service quality ratings:  N/A  Education Provided on the Discharge Plan: N/A   Patient/Family in Agreement with the Plan:  N/A    Referrals Placed by CM/SW:    Private pay costs discussed: Not applicable    Additional Information:  GEORGI requested by weekday SW to follow up with pt regarding completion of HCD and getting document notarized.     SW met with pt at bedside to follow up on completion of pt's HCD. Pt reported that he has completed the documented and provided to SW. Document completed aside from being signed and notarized. SW to check with CM Department notarize to see if they can notary pt's HCD today.    GEORGI messaged CM Dept Notary/SW (Yahaira) who reported that they could meet with pt some time today to complete the HCD. Please see GEORGI Syed note from today for further information.    SHAVON Burks   [facility]     Social Work and Care Management Department       SEARCHABLE in [employer] - search SOCIAL WORK       [place] (0800 - 1630) Saturday and Sunday     Units: 4A, 4C, & 4E Pager: [phone]     Units: 5A & 5C Pager: [phone]     Units: 6A & 6B   Pager: [phone]     Units: 6C Pager: [phone]     Units: 7A & 7B  Pager: [phone]     Units: 7C Pager: [phone]     Unit: [place] ED Pager: [phone]      [facility] ([id number]) Saturday and Sunday      Units: 5 Ortho, 5 Med/Surg & WB ED  Pager:[phone]     Units: 6 Med/Surg, 8A, & 10A ICU  Pager: [phone]        After hours (1630 - 0000) Saturday & Sunday; (5663-9265) Mon-Fri; (9564-1073) [place]  Recognized Holidays     Units: ALL  Pager: [phone]

## 2024-01-06 NOTE — PLAN OF CARE
Neuro: A&Ox4.   Cardiac: SR/SB VSS.   Respiratory: Sating 90s on RA.  GI/: Adequate urine output. BM- on shift   Diet/appetite: Tolerating Reg diet. Eating well. ACHS- primary adjusted insulin coverage   Activity:  IND in room  Pain: At acceptable level on current regimen. Managed with oxy IV dilaudid discontinued. Refuses tylenol   Skin: No new deficits noted.  LDA's: R PIV    Plan: Continue with POC. Notify primary team with changes.   CTA head/neck: No stenosis or aneurysm per findings, see results for more details  Labs: patient refusing BC-primary okay to draw in AM   Urine antigen/urine analysis: mucus and glucose present

## 2024-01-06 NOTE — PLAN OF CARE
Neuro: A&Ox4. Pleasant, able to make needs known.  Cardiac: SB-SR 50s-70s. SBP <140.   Respiratory: Sating >98% on RA.  GI/: Adequate urine output. No reported BM this shift.  Diet/appetite: Tolerating regular diet. Eating well.  Activity:  Independent in room.  Pain: At acceptable level on current regimen.   Skin: No new deficits noted.  LDA's:  -R PIV: SL    Plan: Pt hoping to discharge soon. Continue with POC. Notify primary team with changes.        Goal Outcome Evaluation: progressing

## 2024-01-06 NOTE — PLAN OF CARE
Took over care at 1600. Pt A&Ox4, up ad doris. BP by machine low, but normal when checked manually. Pt rating pain 8-10/10. Pt received dilaudid x1 and oxy with tylenol x1. Family at bedside. Pt currently down for CTA. Will cont to monitor.

## 2024-01-06 NOTE — PROVIDER NOTIFICATION
Provider notified: Barbara Barillas     9:03 AM  RN: Patient status: pt is anxious about leaving today-pt aware that he can't go home on IV diluadid and says he already manages BG at home. Requesting you to come talk with him  Provider: still waiting on CTA to result, will round on him before noon     1535 PM  RN Were you able to chat with the pt about blood cultures? Also, the new insulin orders are for meals/bedtime. Can you update the BG checks?  Provider: talked to team and they are okay with waiting until AM labs to get blood. I'll change the glucose checks

## 2024-01-06 NOTE — PROGRESS NOTES
Care Management Follow Up    Length of Stay (days): 2    Expected Discharge Date: 01/08/2024     Concerns to be Addressed:     Health Care Directive    Additional Information:    /Varghese was asked to help notarize a Health Care Directive by the [address] [place] [address] coverage SW.  met with patient to notarize HCD.  SW met with patient at bedside to complete HCD form. HCD form was verbally reviewed and verified with patient by the . Pt signed and dated form and SW notarized form.   HCD was emailed to [employer] and a copy was placed in the pt's chart. The original HCD was given to the patient along with a copy.    SW/RNCC will continue to follow and assist patient and family with any discharge needs that may arise.    SARTHAK Lieberman, [facility]  Coverage   Demetrio KwongUniversity of South Alabama Children[facility] Hospital  [email or web address]

## 2024-01-06 NOTE — PROGRESS NOTES
"RHEUMATOLOGY PROGRESS NOTE     Subjective      Jong Quiroga was seen and examined at bedside today  ROS      Objective   /78   Pulse 67   Temp 97.8  F (36.6  C) (Oral)   Resp 15   Ht 1.626 m (5' 4\")   Wt 94.3 kg (207 lb 14.3 oz)   SpO2 98%   BMI 35.68 kg/m        PHYSICAL EXAMINATION  Physical Exam  HENT:      Head: Normocephalic.      Mouth/Throat:      Mouth: Mucous membranes are moist.   Cardiovascular:      Pulses: Normal pulses.      Heart sounds: Normal heart sounds.   Pulmonary:      Effort: Pulmonary effort is normal.      Breath sounds: Normal breath sounds.   Skin:     Findings: No rash.   Neurological:      General: No focal deficit present.      Mental Status: He is alert.       Assessment & Plan     # Abdominal pain   # Celiac artery dissection  # Elevated liver enzymes, down-trending   # hypertension, diabetes, current smoking     He currently denies abdominal pain and is overall looking stable.     He underwent CTA abdomen and pelvis yesterday, final read is still pending.      I discussed the imaging with our colleagues in interventional radiology.  There seems to be dissection involving the celiac artery and there are some changes noted in the hepatic vessels as well.  There is a probability that the changes noted in the hepatic vessels are propagation from the dissection in the celiac.      There is occlusion of the hepatic artery and gets feeding from collateral circulation so there is a probability of a prior vascular event that took time to form collateralization, this is not likely related to the current presentation.     We agreed to further expand our vascular view by evaluating other vessels looking for abnormalities/inflammatory or degenerative changes that could provider more information about his disease process.     Therefore, recommend getting CTA head and neck, CTA chest as well as initiate infectious workup with urinalysis with microscopy, blood cultures, QuantiFERON " gold, syphilis screen histoplasma urine antigen.    Recommendations discussed with the primary team    Recommendations   1- CTA Head and Neck  2- CTA Chest   3- Urinalysis with microscopy   4- blood cultures, Quantiferon gold, Syphilis screen and histoplasma urine antigen      40 minutes spent by me on the date of the encounter doing chart review, history and exam, documentation and further activities per the note      Clemencia Harris MD  Rheumatology attending

## 2024-01-06 NOTE — PROGRESS NOTES
[facility]    Progress Note - Medicine Service, MAROON TEAM 3       Date of Admission:  1/4/2024    Assessment & Plan   Jong Quiroga is a 43 year old male admitted on 1/4/2024. 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 Was transferred to [facility] where CT demonstrated complex dissection of celiac artery and aneurysm of the hepatic artery system and ultimately transferred to [facility] for further treatment option including liver transplantation.    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 Workup notable for elevated LFTS ( , , and Alk Phos 310), hgb 11.3, WBC 12.8. 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 Was started on esmolol and nicardipine gtt for SBP < 120 and transferred to [facility] on 12/29/2023. CT at [facility] demonstrated complex dissection of celiac artery with aneurysmal degeneration of the the entire common, proper and right hepatic artery. Was evaluated by IR and vascular surgery and hepatobiliary surgery services at outside hospital and given the complex involvement of the dissections was recommended to transfer to a center with limited transportation capacity. On admission afebrile, HDS.  Suspect his artery  dissection is likely secondary to uncontrolled hypertension and tobacco use however ddx includes vasculitis, connective tissues disease and inflammatory process (given maternal history of lupus, elevated inflammatory markers, elevated C3 normal C4 and subjective chronic night chills). Reassuringly initial infectious workup has been benign (negative Hep B, C and HIV). Of note, blood pressure has been well controlled with PTA antihypertensives since admission and his symptomatic pain has resolved. Has a longstanding hx of HTN on 4 antihypertensives but reports uncontrolled Bps at home without consistent medication management for years. Also chronic tobacco use with 1ppd.  Multiple specialists were consulted including IR, vascular surgery, transplant surgery, hepatology and rheumatology. In terms of surgical interventions, no acute interventions at this time, he is currently not a candidate for transplant, vascular surgery has noted no feasible distal end point for stenting or bypass. Will medically optimize his blood pressure and diabetes management at this time while continuing to rule out rheumatologic etiologies including vasculitis (autoimmune vs infectious). At this time he is medically stable with stable vitals that are well managed on oral antihypertensives.  - Notable labs:   - WHIT, ANCA, anti DNA neg  - CRP 0.9, ESR 32  - C3 169 elevated, C4 36.34nl  - Hep C nr, HepB ag nr, HIV neg  - continue pta hydrochlorothiazide 25 mg daily  - Continue PTA amlodipine 10 mg daily  - S/p pta clonidine patch 0.2 mg/24 hr    - Continue PO clonidine 0.1 mg BID + prn  - Continue PTA metoprolol tartrate 50 mg BID  - Continue PTA ASA  - IR consulted, appreciate recommendations   > No intervention at this time  - vascular surgery consulted, appreciate recommendations   > No immediate plans for surgical intervention   > Strict BP control with SBP <140 and HR <80   > Continue aspirin   > Tentative plan for repeat CTA in 1 month?  -  "Hepatology consulted, appreciate recommendations   > F actin pending   > CK within normal limits  - SOT liver transplant consulted, appreciate recommendations   > No immediate plans for surgical intervention   > Recommend follow up with vascular surgery  - Rheumatology consulted, appreciate recommendations   > CTA head, neck and chest pending   > Infectious workup pending: UA with microscopy, blood cultures, histo, syphilis, quant gold  - oxycodone for breakthrough pain + ashlie bowel regimen      Elevated transaminases  Cholelithiasis without cholecystitis  At OSH, LFTs at first , , and Alk Phos 310 but have been improving. On admission, , AST 44. ALT94. CT with Cholelithiasis without cholecystitis. Denies abd pain. They have since been more elevated similar to the outside hospital, with some down trending today, most likely secondary to complex dissection and aneurysmal degeneration.  - ctm  -Daily CMP     T2DM  A1c in 11/2023 13. Home regimen: 60U glargine at bedtime and 5 U aspart 3 times daily  - 30 U lantus at bedtime  - MDSSI     Tobacco use  Used to smoke 1- 2 ppd.  Has not smoked in a week, declines nicotine patch at this time, does have an interest in quitting.     Concern for COPD  No PFTs and denies cough. Had cough previously that improved with robitussin.  -Continue PTA albuterol PRN  -Continue PTA Ipratropium-albuterol PRN     Mild right hydronephrosis  Denies urinary symptoms             Diet: Regular Diet Adult    DVT Prophylaxis: Low Risk/Ambulatory with no VTE prophylaxis indicated  Tran Catheter: Not present  Fluids: none  Lines: None     Cardiac Monitoring: None  Code Status: Full Code      Clinically Significant Risk Factors                         # Obesity: Estimated body mass index is 35.68 kg/m  as calculated from the following:    Height as of this encounter: 1.626 m (5' 4\").    Weight as of this encounter: 94.3 kg (207 lb 14.3 oz)., PRESENT ON ADMISSION        "     Disposition Plan     Expected Discharge Date: 01/08/2024      Destination: home  Discharge Comments: No medical interventions being offered. Possibly home tomorrow        The patient's care was discussed with the Attending Physician, Dr. Linton .    Barbara Barillas MD  Medicine Service, [facility] TEAM [address] [place] [address]  Securely message with [employer] (more info)  Text page via [employer] Paging/Directory   See signed in provider for up to date coverage information  ______________________________________________________________________    Interval History   NAEON. Says his pain is a lot better than it was before, his pain is well managed with oral oxycodone. He has no concerns at this time.    Physical Exam   Vital Signs: Temp: 97.8  F (36.6  C) Temp src: Oral BP: 123/78 Pulse: 67   Resp: 15 SpO2: 98 % O2 Device: None (Room air)    Weight: 207 lbs 14.3 oz    Constitutional: awake, alert, cooperative, no apparent distress, and appears stated age  Eyes: Lids and lashes normal, extra ocular muscles intact, sclera clear, conjunctiva normal  Respiratory: No increased work of breathing, good air exchange, clear to auscultation bilaterally, no crackles or wheezing  Cardiovascular: Regular rate and rhythm and no murmur noted  GI: No scars, normal bowel sounds, soft, distended, tender to palpation in RUQ and RLQ, no masses palpated  Skin: no rashes and no lesions  Musculoskeletal: no lower extremity pitting edema present, full range of motion noted  Neurologic: Awake, alert, oriented to name, place and time.  Cranial nerves II-XII are grossly intact. Moves all extremities equally    Medical Decision Making       Please see A&P for additional details of medical decision making.      Data     I have personally reviewed the following data over the past 24 hrs:    6.2  \   12.7 (L)   / 343     136 98 18.7 /  315 (H)   3.6 28 1.06 \     ALT: 209 (H) AST: 96 (H) AP: 244 (H) TBILI: 0.4   ALB: 4.4  TOT PROTEIN: 7.6 LIPASE: N/A       Imaging results reviewed over the past 24 hrs:   Recent Results (from the past 24 hour(s))   CTA Abdomen Pelvis with Contrast    Narrative    EXAM: CTA ABDOMEN PELVIS WITH CONTRAST 1/5/2024 6:31 PM    HISTORY: 43 years Male Celiac artery dissection with hepatic system  aneurysms    COMPARISON: CT 12/29/2023.    TECHNIQUE: Axial CT images from the lung bases through the lower  abdomen were obtained with IV contrast. Coronal and sagittal reformats  provided. Contrast dose: [id number]: 126mL.    FINDINGS:    VASCULAR: Again demonstrated is a dissection starting from the  mid/distal celiac artery extending to the bifurcation of the common  hepatic and splenic arteries.     There is diffuse aneurysmal degeneration extending from the common  hepatic artery through the proper hepatic artery, and to the proximal  intrahepatic segments of the right, left, and segmental hepatic  arteries. Overall, the size and morphology/appearance is unchanged.  There is circumferential soft tissue attenuation involving the involve  segments. There is also some mild involvement of the very origin of  the splenic artery.    Immediately prior to the hepatic artery bifurcation this measures up  to 3.5 cm, previous measuring up to 3.5 cm. Within the right hepatic  artery and the contrast opacified lumen is dilated measuring up to 17  mm (series 8 image 25), previously measuring up to 1.7 cm with the  combined diameter measuring up to 3.6 cm (series 8 image 25). No  inflammatory stranding is seen surrounding the aneurysmal formation.     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 No  substantial change in extent of multifocal occluded right intrahepatic  arteries throughout the liver, when compared to CT 12/29/2023. There  is partial extrinsic compression of the main portal vein secondary to  large aneurysmal degeneration with otherwise  patent appearance.    Remainder of the large mediastinal vessels of the abdomen and pelvis  are normal. Specifically, the mid and distal splenic artery, bilateral  renal arteries, and bilateral common and external iliac arteries are  normal. The aorta is normal caliber without aneurysm or dissection.    On delayed venous images, the portal vein is patent, there is some  slight mass effect from the aneurysmal hepatic artery on the proximal  right portal vein.    HEPATIC: No suspicious focal hepatic masses or lesions.    BILIARY: No calcified gallstones. Stable right intrahepatic biliary  ductal dilation. No extrahepatic ductal or ductal dilation.    SPLEEN: Normal size. No focal lesions.    PANCREAS: No mass or peripancreatic fluid.    ADRENALS: Unremarkable.    RENAL: Lobulated contour of the kidneys. No hydronephrosis. Punctate  nonobstructive right renal calculi. No suspicious renal mass. Urinary  bladder is partially distended with borderline near circumferential  bladder wall thickening. No perivesicular inflammatory changes.    GASTROINTESTINAL: The distal esophagus, stomach, and duodenum appear  unremarkable. Small and large bowel are normal in caliber and without  abnormal wall thickening. No portal venous gas or pneumatosis.  Appendix is unremarkable. Colonic diverticulosis without  diverticulitis.    MESENTERY/PERITONEUM: No ascites or pneumoperitoneum.    LYMPH NODES: No lymphadenopathy.    LUNG BASES: Unremarkable.    MUSCULOSKELETAL: Multilevel degenerative changes of the spine. No  acute or suspicious osseous abnormality.      Impression    IMPRESSION:   1.  Stable/unchanged dissection involving and starting from the mid  celiac artery 2 the bifurcation. Unchanged aneurysmal degeneration of  the common hepatic, proper hepatic, and right hepatic arteries. There  continues to be multifocal occlusions of numerous right intrahepatic  arterial branches and chronic occlusion of the left hepatic artery.  No  evidence of parenchymal ischemia or heterogeneous perfusion. No  evidence of rupture or surrounding inflammatory changes. The remainder  of the large immediate size vessels of the abdomen and pelvis are  normal. The etiology of this is indeterminate, given the relatively  normal appearance of the remainder of the visceral arteries, this may  be in keeping with a single process related to spontaneous dissection  of the celiac artery with secondary chronic aneurysmal dilatation of  the hepatic vasculature. However, vasculitis remains on the  differential, inflammatory or infectious.  2.  Stable mild right intrahepatic biliary ductal dilation which is  likely secondary to mass effect from aneurysmal dilatation of the  right hepatic artery.  3.  Urinary bladder is distended with borderline circumferential  bladder wall thickening, can be seen with cystitis. Consider  correlation with urinalysis.    I have personally reviewed the examination and initial interpretation  and I agree with the findings.    JERSON PIRES         SYSTEM ID:  [id number]

## 2024-01-06 NOTE — PROGRESS NOTES
"Vascular Surgery Progress Note    Subjective:  Patient with abdominal pain.  Patient tolerated diet well.    Objective:    *Vitals, labs, intake and output, pertinent imaging, and other pertinent studies were reviewed*    Gen: NAD  HEENT: NCAT  Abdomen: Obese, soft, nondistended, nontender  Neuro: Moving all extremities  Psych: Cooperative    Assessment and Plan:  Jong Quiroga is a 43 year old male w/ PMHx significant for T2DM, COPD, Tobacco use disorder (2 PPD), and bipolar disorder who presented to [place] ED on 12/28/2023 w/ 3 days of right shoulder pain and generalized abdominal pain with CT findings concerning for RHA aneurysm and celiac artery dissection at the origin of the left gastric, splenic and hepatic arteries. The patient was transferred to the [facility] for possible advanced vascular and liver transplant surgical intervention.      - Continue aspirin  - Strict BP control with SBP<140, HR<80  - No immediate plans for surgical intervention: no feasible distal end point for stenting or bypass  - Recommend continuing with monitoring and/or liver transplant workup   - Please page vascular surgery if changes in hemodynamics    Discussed with Dr. Radha Stout MD  Vascular Surgery Fellow        /78   Pulse 67   Temp 97.8  F (36.6  C) (Oral)   Resp 15   Ht 1.626 m (5' 4\")   Wt 94.3 kg (207 lb 14.3 oz)   SpO2 98%   BMI 35.68 kg/m       Intake/Output Summary (Last 24 hours) at 1/6/2024 1021  Last data filed at 1/6/2024 0835  Gross per 24 hour   Intake 1630 ml   Output 600 ml   Net 1030 ml                "

## 2024-01-07 VITALS
WEIGHT: 207.89 LBS | SYSTOLIC BLOOD PRESSURE: 125 MMHG | HEART RATE: 67 BPM | RESPIRATION RATE: 20 BRPM | BODY MASS INDEX: 35.49 KG/M2 | HEIGHT: 64 IN | OXYGEN SATURATION: 98 % | TEMPERATURE: 98.2 F | DIASTOLIC BLOOD PRESSURE: 84 MMHG

## 2024-01-07 DIAGNOSIS — I77.79 HEPATIC ARTERY DISSECTION (H): Primary | ICD-10-CM

## 2024-01-07 LAB
ALBUMIN SERPL BCG-MCNC: 4.3 G/DL (ref 3.5–5.2)
ALP SERPL-CCNC: ABNORMAL U/L
ALT SERPL W P-5'-P-CCNC: ABNORMAL U/L
ANION GAP SERPL CALCULATED.3IONS-SCNC: 14 MMOL/L (ref 7–15)
AST SERPL W P-5'-P-CCNC: ABNORMAL U/L
BILIRUB SERPL-MCNC: 0.4 MG/DL
BUN SERPL-MCNC: 15.7 MG/DL (ref 6–20)
CALCIUM SERPL-MCNC: 9.9 MG/DL (ref 8.6–10)
CHLORIDE SERPL-SCNC: 97 MMOL/L (ref 98–107)
CREAT SERPL-MCNC: 0.84 MG/DL (ref 0.67–1.17)
DEPRECATED HCO3 PLAS-SCNC: 24 MMOL/L (ref 22–29)
EGFRCR SERPLBLD CKD-EPI 2021: >90 ML/MIN/1.73M2
ERYTHROCYTE [DISTWIDTH] IN BLOOD BY AUTOMATED COUNT: 14.2 % (ref 10–15)
GLUCOSE BLDC GLUCOMTR-MCNC: 184 MG/DL (ref 70–99)
GLUCOSE SERPL-MCNC: 158 MG/DL (ref 70–99)
HCT VFR BLD AUTO: 41.8 % (ref 40–53)
HGB BLD-MCNC: 13.5 G/DL (ref 13.3–17.7)
MCH RBC QN AUTO: 27 PG (ref 26.5–33)
MCHC RBC AUTO-ENTMCNC: 32.3 G/DL (ref 31.5–36.5)
MCV RBC AUTO: 84 FL (ref 78–100)
PLATELET # BLD AUTO: 341 10E3/UL (ref 150–450)
POTASSIUM SERPL-SCNC: 5.9 MMOL/L (ref 3.4–5.3)
PROT SERPL-MCNC: 7.9 G/DL (ref 6.4–8.3)
RBC # BLD AUTO: 5 10E6/UL (ref 4.4–5.9)
SMA IGG SER-ACNC: 22 UNITS
SODIUM SERPL-SCNC: 135 MMOL/L (ref 135–145)
T PALLIDUM AB SER QL: NONREACTIVE
TSH SERPL DL<=0.005 MIU/L-ACNC: 0.55 UIU/ML (ref 0.3–4.2)
WBC # BLD AUTO: 6.5 10E3/UL (ref 4–11)

## 2024-01-07 PROCEDURE — 250N000013 HC RX MED GY IP 250 OP 250 PS 637

## 2024-01-07 PROCEDURE — 85027 COMPLETE CBC AUTOMATED: CPT

## 2024-01-07 PROCEDURE — 99239 HOSP IP/OBS DSCHRG MGMT >30: CPT | Performed by: PEDIATRICS

## 2024-01-07 PROCEDURE — 36415 COLL VENOUS BLD VENIPUNCTURE: CPT

## 2024-01-07 PROCEDURE — 84443 ASSAY THYROID STIM HORMONE: CPT

## 2024-01-07 PROCEDURE — 99231 SBSQ HOSP IP/OBS SF/LOW 25: CPT | Performed by: STUDENT IN AN ORGANIZED HEALTH CARE EDUCATION/TRAINING PROGRAM

## 2024-01-07 PROCEDURE — 84155 ASSAY OF PROTEIN SERUM: CPT

## 2024-01-07 PROCEDURE — 87040 BLOOD CULTURE FOR BACTERIA: CPT

## 2024-01-07 RX ORDER — HYDROCHLOROTHIAZIDE 25 MG/1
25 TABLET ORAL DAILY
Qty: 30 TABLET | Refills: 0 | Status: SHIPPED | OUTPATIENT
Start: 2024-01-07

## 2024-01-07 RX ORDER — AMLODIPINE BESYLATE 10 MG/1
10 TABLET ORAL DAILY
Qty: 30 TABLET | Refills: 0 | Status: SHIPPED | OUTPATIENT
Start: 2024-01-08

## 2024-01-07 RX ORDER — METOPROLOL TARTRATE 50 MG
50 TABLET ORAL 2 TIMES DAILY
Qty: 60 TABLET | Refills: 0 | Status: SHIPPED | OUTPATIENT
Start: 2024-01-07

## 2024-01-07 RX ORDER — ASPIRIN 81 MG/1
81 TABLET, CHEWABLE ORAL DAILY
Qty: 30 TABLET | Refills: 0 | Status: SHIPPED | OUTPATIENT
Start: 2024-01-07 | End: 2024-04-10

## 2024-01-07 RX ORDER — CLONIDINE HYDROCHLORIDE 0.1 MG/1
0.1 TABLET ORAL 2 TIMES DAILY
Qty: 60 TABLET | Refills: 0 | Status: SHIPPED | OUTPATIENT
Start: 2024-01-07

## 2024-01-07 RX ADMIN — METOPROLOL TARTRATE 50 MG: 50 TABLET, FILM COATED ORAL at 09:21

## 2024-01-07 RX ADMIN — CLONIDINE HYDROCHLORIDE 0.1 MG: 0.1 TABLET ORAL at 09:21

## 2024-01-07 RX ADMIN — ASPIRIN 81 MG CHEWABLE TABLET 81 MG: 81 TABLET CHEWABLE at 09:21

## 2024-01-07 RX ADMIN — AMLODIPINE BESYLATE 10 MG: 10 TABLET ORAL at 09:21

## 2024-01-07 RX ADMIN — POLYETHYLENE GLYCOL 3350 17 G: 17 POWDER, FOR SOLUTION ORAL at 09:21

## 2024-01-07 RX ADMIN — HYDROCHLOROTHIAZIDE 25 MG: 25 TABLET ORAL at 09:21

## 2024-01-07 ASSESSMENT — ACTIVITIES OF DAILY LIVING (ADL)
ADLS_ACUITY_SCORE: 18

## 2024-01-07 NOTE — PROGRESS NOTES
"RHEUMATOLOGY PROGRESS NOTE     Subjective      Jong Quiroga was seen and examined at bedside today  ROS      Objective   /84   Pulse 67   Temp 98.2  F (36.8  C) (Oral)   Resp 20   Ht 1.626 m (5' 4\")   Wt 94.3 kg (207 lb 14.3 oz)   SpO2 98%   BMI 35.68 kg/m        PHYSICAL EXAMINATION  Physical Exam  Constitutional:       Appearance: Normal appearance.   HENT:      Head: Normocephalic and atraumatic.   Pulmonary:      Effort: Pulmonary effort is normal.   Neurological:      Mental Status: He is alert. Mental status is at baseline.       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  CTA chest, head and neck did not reveal changes suggestive of vasculitis.  He has thyroid enlargement on CT imaging, TSH this morning is 0.5 [normal].  Infectious workup has been ordered by primary team [blood cultures, treponema screen, QuantiFERON, histoplasma urine antigen].  Serum IgG as well as IgG subclasses has been added on his blood tests.    Overall, his picture continue to be suggestive of segmental arterial medial lysis rather than an underlying inflammatory blood vessel disease at the current time. Therefore, I would not recommend initiation of immunosuppressive therapy at the current time and I would suggest close follow up for his symptoms.     We discussed warning symptoms suggestive of vasculitis including [recurrence of abdominal pain, fevers, unexplained weight loss, skin rash, ulcerations, arm or leg claudication] for which he an early evaluation would be needed.    Follow-up with rheumatology in 6-8 weeks from discharge in conjunction with our colleagues in vascular medicine.  Please place an outpatient consultation order for Rheumatology.     Recommendations discussed with primary team and the patient.        Clemencia GAR " MD Steven  Rheumatology attending

## 2024-01-07 NOTE — PROGRESS NOTES
"Vascular Surgery Progress Note    Subjective:  Patient with abdominal pain.  Patient tolerated diet well.    Objective:    *Vitals, labs, intake and output, pertinent imaging, and other pertinent studies were reviewed*    Gen: NAD  HEENT: NCAT  Abdomen: Obese, soft, nondistended, nontender  Neuro: Moving all extremities  Psych: Cooperative    Assessment and Plan:  Jong Quiroga is a 43 year old male w/ PMHx significant for T2DM, COPD, Tobacco use disorder (2 PPD), and bipolar disorder who presented to [place] ED on 12/28/2023 w/ 3 days of right shoulder pain and generalized abdominal pain with CT findings concerning for RHA aneurysm and celiac artery dissection at the origin of the left gastric, splenic and hepatic arteries. The patient was transferred to the [facility] for possible advanced vascular and liver transplant surgical intervention.      - Continue aspirin  - Strict BP control with SBP<140, HR<80  - No immediate plans for surgical intervention: no feasible distal end point for stenting or bypass  - Recommend continuing with monitoring and/or liver transplant workup  - Recommend following with Dr. Pawel Pérez, our vascular medicine doctor  - Please page vascular surgery if changes in hemodynamics    Discussed with Dr. Radha Stout MD  Vascular Surgery Fellow        /84   Pulse 67   Temp 98.2  F (36.8  C) (Oral)   Resp 20   Ht 1.626 m (5' 4\")   Wt 94.3 kg (207 lb 14.3 oz)   SpO2 98%   BMI 35.68 kg/m       Intake/Output Summary (Last 24 hours) at 1/6/2024 1021  Last data filed at 1/6/2024 0835  Gross per 24 hour   Intake 1630 ml   Output 600 ml   Net 1030 ml                "

## 2024-01-07 NOTE — DISCHARGE SUMMARY
"[facility]  Hospitalist Discharge Summary      Date of Admission:  1/4/2024  Date of Discharge:  1/7/2024 12:59 PM  Discharging Provider: DAVINA TRAN MD  Discharge Service: Medicine Service, LUIS TEAM 3    Discharge Diagnoses   Celiac artery dissection  Aneurysmal degeneration of hepatic artery  Hypertension  Elevated transaminases  Cholelithiasis  Type 2 diabetes  Tobacco use disorder    Clinically Significant Risk Factors     # Obesity: Estimated body mass index is 35.68 kg/m  as calculated from the following:    Height as of this encounter: 1.626 m (5' 4\").    Weight as of this encounter: 94.3 kg (207 lb 14.3 oz).       Follow-ups Needed After Discharge   Follow-up Appointments     Adult [facility] Follow-up and recommended labs and tests      Follow up with primary care provider, Storm Armenta, within 1-2 weeks,   for hospital follow- up and regarding new diagnosis. No follow up labs or   test are needed.   Follow up with Vascular surgery, Dr. Pawel Pérez, within 1-2 weeks. for   hospital follow- up.  No follow up labs are needed, will need repeat CTA   abdomen within the month  Follow up with Rheumatology, within the month. for hospital follow- up.    No follow up labs or test are needed.      Appointments on [place] and/or [facility] (with [facility] or [facility]   provider or service). Call [phone] if you haven't heard regarding   these appointments within 7 days of discharge.          [ ] PCP: Please monitor blood pressure (goal SBP <140), blood glucose/diabetes management, and tobacco use. Goal is tobacco cessation and tight glycemic control.   [ ] Will have follow-up with Vascular surgery and will need repeat CTA abdomen in 1 month (referral placed)  [ ] Will have follow-up with Rheumatology in 6-8 weeks (referral placed)    Unresulted Labs Ordered in the Past 30 Days of this Admission       Date and Time Order Name Status Description    1/6/2024 10:00 " PM Blood Culture Arm, Right In process     1/6/2024 10:00 PM Blood Culture Hand, Left In process     1/6/2024  1:31 PM Quantiferon TB Gold Plus Purple Tube In process     1/6/2024  1:31 PM Quantiferon TB Gold Plus Yellow Tube In process     1/6/2024  1:31 PM Quantiferon TB Gold Plus Green Tube In process     1/6/2024  1:31 PM Quantiferon TB Gold Plus Grey Tube In process     1/6/2024  1:11 PM Histoplasma Galactomannan Antigen Quant by EIA, Urine In process     1/6/2024  1:11 PM Blood Culture Hand, Left Preliminary     1/5/2024 10:00 PM F Actin EIA with reflex In process         These results will be followed up by hospitalist pool, Vascular Surgery, Rheumatology    Discharge Disposition   Discharged to home  Condition at discharge: Stable    Hospital Course   Jong Quiroga is a 43 year old male with a history of HTN (on 4 antihypertensives in the outpatient setting), DM2 (on insulin), tobacco use disorder, and bipolar disorder transferred to [facility] on 1/4/2024 for a complex dissection of the celiac artery and aneurysm of the hepatic artery. He was previously admitted to [place] ED on 12/28/23 then transferred to [facility] prior to transfer to [facility].       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 Workup notable for elevated LFTS ( , , and Alk Phos 310), hgb 11.3, WBC 12.8. 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 Was started on esmolol and nicardipine gtt for SBP < 120 and transferred to [facility] on 12/29/2023. CT at [facility] demonstrated complex dissection of celiac artery with aneurysmal degeneration of the the entire common, proper and right hepatic artery. Was evaluated by IR and vascular surgery and hepatobiliary surgery services at outside hospital and given the complex involvement of the dissections was  recommended to transfer to a center with limited transportation capacity. On admission afebrile, HDS.  Suspect his artery dissection is likely secondary to uncontrolled hypertension and tobacco use however ddx included vasculitis, connective tissues disease and inflammatory process (given maternal history of lupus, elevated inflammatory markers, elevated C3 normal C4 and subjective chronic night chills). Reassuringly, initial infectious workup has been benign (negative Hep B, C and HIV). Of note, blood pressure has been well controlled with PTA antihypertensives since admission and his symptomatic pain has resolved. Has a longstanding hx of HTN on 4 antihypertensives but reports uncontrolled BPs at home without consistent medication management for years. Also chronic tobacco use with 1ppd.  Multiple specialists were consulted including IR, vascular surgery, transplant surgery, hepatology and rheumatology. In terms of surgical interventions, no acute interventions at this time, he is currently not a candidate for transplant, vascular surgery has noted no feasible distal end point for stenting or bypass. His comorbidities were medically optimized, and he will follow up with Vascular Surgery and Rheumatology in the outpatient setting for further management and monitoring. He wished to discharge home on 1/7, and Vascular Surgery and Rheumatology felt it was safe for him to do so.   [ ] Discharge antihypertensive regimen: hydrochlorothiazide 25 mg daily, amlodipine 10 mg daily, PO clonidine 0.1 mg BID, and metoprolol tartrate 50 mg BID. Goal SBP <140 and goal HR <80.  [ ] Started aspirin 81 mg daily   [ ] Will follow-up with Vascular Surgery. Will need repeat CTA abdomen in ~4 weeks.       Elevated transaminases  Cholelithiasis without cholecystitis  At OSH, LFTs at first , , and Alk Phos 310 but have been improving. On admission, , AST 44. ALT94. CT with Cholelithiasis without cholecystitis. Denied  abd pain. LFTs downtrending prior to discharge.      T2DM  A1c in 11/2023 13. Home regimen: 60U glargine at bedtime and 5 U aspart 3 times daily. On lantus and sliding scale insulin while inpatient. Discharged on home regimen.      Tobacco use  Used to smoke 1- 2 ppd.  Has not smoked in a week, declines nicotine patch at this time, does have an interest in quitting. It is essential that he discontinues tobacco use.      Concern for COPD  No PFTs, so no official diagnosis. Continued home PRN albuterol and PRN duonebs.      Mild right hydronephrosis  Noted incidentally. Denied urinary symptoms. Follow up as indicated.     Consultations This Hospital Stay   VASCULAR SURGERY ADULT IP CONSULT  INTERVENTIONAL RADIOLOGY ADULT/PEDS IP CONSULT  GI HEPATOLOGY ADULT IP CONSULT  TRANSPLANT SURGERY LIVER ADULT IP CONSULT  RHEUMATOLOGY IP CONSULT  ADVANCE DIRECTIVE IP CONSULT  SOCIAL WORK IP CONSULT  NURSING TO CONSULT FOR VASCULAR ACCESS CARE IP CONSULT    Code Status   Full Code    Time Spent on this Encounter   I, DAVINA TRAN MD, personally saw the patient today and spent greater than 30 minutes discharging this patient.       DAVINA TRAN MD  [facility] [address] [address]  Phone: [phone]  ______________________________________________________________________    Physical Exam   Vital Signs: Temp: 98.2  F (36.8  C) Temp src: Oral BP: 125/84 Pulse: 67   Resp: 20 SpO2: 98 % O2 Device: None (Room air)    Weight: 207 lbs 14.3 oz  Constitutional: awake, alert, cooperative, no apparent distress, and appears stated age  Eyes: extra-ocular muscles intact and sclera clear  ENT: normocepalic, without obvious abnormality, MMM, neck supple  Respiratory: No increased work of breathing, good air exchange, clear to auscultation bilaterally, no crackles or wheezing  Cardiovascular: regular rate and rhythm, normal S1 and S2, no murmur noted, and no edema  GI: normal bowel sounds,  soft, non-distended but protuberant, and non-tender  Skin: normal skin color, texture, turgor  Neurologic: Awake, alert, oriented, moving all extremities, no focal deficits       Primary Care Physician   Storm Armenta    Discharge Orders      Vascular Surgery Referral      Adult Rheumatology  Referral      Reason for your hospital stay    You were hospitalized for abdominal pain and new complex celiac artery dissection. You were seen by multiple specialists during your stay and will need to follow up with vascular surgery for further imaging and rheumatology as well.   - Please continue taking your blood pressure medications as prescribed.  - Continue insulin at home as prescribed by your primary care provider    If you develop severe abdominal pain, chest pain, or difficulty breathing please present to the Emergency department for evaluation.     Activity    Your activity upon discharge: activity as tolerated     Adult [facility] Follow-up and recommended labs and tests    Follow up with primary care provider, Storm Armenta, within 1-2 weeks, for hospital follow- up and regarding new diagnosis. No follow up labs or test are needed.   Follow up with Vascular surgery, Dr. Pawel Pérez, within 1-2 weeks. for hospital follow- up.  No follow up labs are needed, will need repeat CTA abdomen within the month  Follow up with Rheumatology, within the month. for hospital follow- up.  No follow up labs or test are needed.      Appointments on [place] and/or [facility] (with [facility] or [facility] provider or service). Call [phone] if you haven't heard regarding these appointments within 7 days of discharge.     Diet    Follow this diet upon discharge: Orders Placed This Encounter      Regular Diet Adult       Significant Results and Procedures   Most Recent 3 CBC's:  Recent Labs   Lab Test 01/07/24  0913 01/06/24  0543 01/04/24  1749   WBC 6.5 6.2 7.3   HGB 13.5 12.7* 13.5   MCV 84 83 85    343 379     Most  Recent 3 BMP's:  Recent Labs   Lab Test 01/07/24  0926 01/07/24  0913 01/06/24  2259 01/06/24  0732 01/06/24  0543 01/05/24  0454 01/05/24  0442   NA  --  135  --   --  136  --  137   POTASSIUM  --  5.9*  --   --  3.6  --  3.7   CHLORIDE  --  97*  --   --  98  --  98   CO2  --  24  --   --  28  --  25   BUN  --  15.7  --   --  18.7  --  16.5   CR  --  0.84  --   --  1.06  --  0.93   ANIONGAP  --  14  --   --  10  --  14   LUIS  --  9.9  --   --  9.5  --  9.6   * 158* 253*   < > 237*   < > 163*  163*    < > = values in this interval not displayed.     Most Recent 2 LFT's:  Recent Labs   Lab Test 01/07/24 0913 01/06/24  0543 01/05/24 0442   AST  --  96* 293*   ALT  --  209* 235*   ALKPHOS  --  244* 246*   BILITOTAL 0.4 0.4 0.6     7-Day Micro Results       Collected Updated Procedure Result Status      01/07/2024 0914 01/07/2024 0937 Blood Culture Hand, Left [[id number]]   Blood from Hand, Left    In process Component Value   No component results               01/07/2024 0913 01/07/2024 0937 Blood Culture Arm, Right [[id number]]   Blood from Arm, Right    In process Component Value   No component results               01/06/2024 1639 01/06/2024 1646 Quantiferon TB Gold Plus Grey Tube [[id number]]   Blood from Hand, Left    In process Component Value   No component results            01/06/2024 1639 01/06/2024 1646 Quantiferon TB Gold Plus Green Tube [[id number]]   Blood from Hand, Left    In process Component Value   No component results            01/06/2024 1639 01/06/2024 1646 Quantiferon TB Gold Plus Yellow Tube [[id number]]   Blood from Hand, Left    In process Component Value   No component results            01/06/2024 1639 01/06/2024 1646 Quantiferon TB Gold Plus Purple Tube [[id number]]   Blood from Hand, Left    In process Component Value   No component results            01/06/2024 1543 01/06/2024 1559 Histoplasma Galactomannan Antigen Quant by EIA, Urine [[id number]]   Urine, Clean  Catch    In process Component Value   No component results            01/06/2024 1332 01/07/2024 1416 Blood Culture Hand, Left [[id number]]   Blood from Hand, Left    Preliminary result Component Value   Culture No growth after 1 day  [P]                      Most Recent TSH and T4:  Recent Labs   Lab Test 01/07/24  0913   TSH 0.55     Most Recent 6 glucoses:  Recent Labs   Lab Test 01/07/24  0926 01/07/24  0913 01/06/24  2259 01/06/24  1855 01/06/24  1329 01/06/24  0732   * 158* 253* 231* 315* 254*   ,   Results for orders placed or performed during the hospital encounter of 01/04/24   CTA Abdomen Pelvis with Contrast    Narrative    EXAM: CTA ABDOMEN PELVIS WITH CONTRAST 1/5/2024 6:31 PM    HISTORY: 43 years Male Celiac artery dissection with hepatic system  aneurysms    COMPARISON: CT 12/29/2023.    TECHNIQUE: Axial CT images from the lung bases through the lower  abdomen were obtained with IV contrast. Coronal and sagittal reformats  provided. Contrast dose: [id number]: 126mL.    FINDINGS:    VASCULAR: Again demonstrated is a dissection starting from the  mid/distal celiac artery extending to the bifurcation of the common  hepatic and splenic arteries.     There is diffuse aneurysmal degeneration extending from the common  hepatic artery through the proper hepatic artery, and to the proximal  intrahepatic segments of the right, left, and segmental hepatic  arteries. Overall, the size and morphology/appearance is unchanged.  There is circumferential soft tissue attenuation involving the involve  segments. There is also some mild involvement of the very origin of  the splenic artery.    Immediately prior to the hepatic artery bifurcation this measures up  to 3.5 cm, previous measuring up to 3.5 cm. Within the right hepatic  artery and the contrast opacified lumen is dilated measuring up to 17  mm (series 8 image 25), previously measuring up to 1.7 cm with the  combined diameter measuring up to 3.6 cm  (series 8 image 25). No  inflammatory stranding is seen surrounding the aneurysmal formation.     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 No  substantial change in extent of multifocal occluded right intrahepatic  arteries throughout the liver, when compared to CT 12/29/2023. There  is partial extrinsic compression of the main portal vein secondary to  large aneurysmal degeneration with otherwise patent appearance.    Remainder of the large mediastinal vessels of the abdomen and pelvis  are normal. Specifically, the mid and distal splenic artery, bilateral  renal arteries, and bilateral common and external iliac arteries are  normal. The aorta is normal caliber without aneurysm or dissection.    On delayed venous images, the portal vein is patent, there is some  slight mass effect from the aneurysmal hepatic artery on the proximal  right portal vein.    HEPATIC: No suspicious focal hepatic masses or lesions.    BILIARY: No calcified gallstones. Stable right intrahepatic biliary  ductal dilation. No extrahepatic ductal or ductal dilation.    SPLEEN: Normal size. No focal lesions.    PANCREAS: No mass or peripancreatic fluid.    ADRENALS: Unremarkable.    RENAL: Lobulated contour of the kidneys. No hydronephrosis. Punctate  nonobstructive right renal calculi. No suspicious renal mass. Urinary  bladder is partially distended with borderline near circumferential  bladder wall thickening. No perivesicular inflammatory changes.    GASTROINTESTINAL: The distal esophagus, stomach, and duodenum appear  unremarkable. Small and large bowel are normal in caliber and without  abnormal wall thickening. No portal venous gas or pneumatosis.  Appendix is unremarkable. Colonic diverticulosis without  diverticulitis.    MESENTERY/PERITONEUM: No ascites or pneumoperitoneum.    LYMPH NODES: No lymphadenopathy.    LUNG BASES: Unremarkable.    MUSCULOSKELETAL:  Multilevel degenerative changes of the spine. No  acute or suspicious osseous abnormality.      Impression    IMPRESSION:   1.  Stable/unchanged dissection involving and starting from the mid  celiac artery 2 the bifurcation. Unchanged aneurysmal degeneration of  the common hepatic, proper hepatic, and right hepatic arteries. There  continues to be multifocal occlusions of numerous right intrahepatic  arterial branches and chronic occlusion of the left hepatic artery. No  evidence of parenchymal ischemia or heterogeneous perfusion. No  evidence of rupture or surrounding inflammatory changes. The remainder  of the large immediate size vessels of the abdomen and pelvis are  normal. The etiology of this is indeterminate, given the relatively  normal appearance of the remainder of the visceral arteries, this may  be in keeping with a single process related to spontaneous dissection  of the celiac artery with secondary chronic aneurysmal dilatation of  the hepatic vasculature. However, vasculitis remains on the  differential, inflammatory or infectious.  2.  Stable mild right intrahepatic biliary ductal dilation which is  likely secondary to mass effect from aneurysmal dilatation of the  right hepatic artery.  3.  Urinary bladder is distended with borderline circumferential  bladder wall thickening, can be seen with cystitis. Consider  correlation with urinalysis.    I have personally reviewed the examination and initial interpretation  and I agree with the findings.    [facility]         SYSTEM ID:  [id number]   CTA Head Neck with Contrast    Addendum: 1/6/2024    This is an addendum to the prior report to include the noncontrast CT  head images.    There is no mass effect, midline shift or extra-axial fluid  collection. Ventricles are proportionate to the cerebral sulci. No  acute loss of gray-white matter differentiation. Gray-white matter  differentiation is well-maintained. Basilar cisterns are  clear.    Paranasal sinuses and mastoid air cells are clear.    No acute intracranial pathology.    ANNIE MCMAHON MD         SYSTEM ID:  [id number]      Narrative    CTA HEAD NECK W CONTRAST 1/6/2024 2:44 PM    CT angiogram of the neck   CT angiogram of the base of the brain with contrast  Reconstruction on the 3D workstation    Provided History:  eval for aneurysm    Comparison:  None available.      Technique:  HEAD and NECK CTA: During rapid bolus intravenous injection of  nonionic contrast material, axial images were obtained using thin  collimation multidetector helical technique from the base of the upper  aortic arch through the [place] of [place]. This CT angiogram data was  reconstructed at thin intervals with mild overlap. Images were sent to  the 3D workstation, and 3D reconstructions were obtained. The axial  source images, multiplanar reformations, 3D reconstructions in both  maximum intensity projection display and volume rendered models were  reviewed, with reconstructions performed by the technologist.    Contrast: [id number]: 134mL    Findings:    Head CTA demonstrates no aneurysm or stenosis of the major  intracranial arteries.    Neck CTA demonstrates no stenosis of the major cervical arteries.  Common origin of the brachiocephalic and left common carotid artery..  The normal distal right internal carotid artery measures 5 mm. The  normal distal left internal carotid artery measures 5 mm.     No mass within the visualized portions of the cervical soft tissues or  lung apices. Large thyroid goiter extending into the upper mediastinum      Impression    Impression:    1. Head CTA demonstrates no aneurysm or stenosis of the major  intracranial arteries.   2. Neck CTA demonstrates no aneurysm or stenosis of the major cervical  arteries.  3. Thyroid goiter extending into the upper mediastinum.    I have personally reviewed the examination and initial interpretation  and I agree with the  findings.    ANNIE MCMAHON MD         SYSTEM ID:  [id number]   CTA Chest with Contrast    Narrative    EXAM: CTA CHEST WITH CONTRAST, 1/6/2024    INDICATION: eval for aneurysm.    COMPARISON: None available.    TECHNIQUE:  Helical scans through the chest were obtained before the  administration of intravenous contrast media and following the  injection of contrast media in the arterial phase. Source images were  reviewed as well as 3D and multi-planar reconstructions.    FINDINGS:      Normal caliber aorta and main pulmonary artery. There is no evidence  of dissection, ulceration, or aneurysm of the aorta.     There is normal branching pattern of the great vessels. Common origin  of the brachial cephalic and left carotid arteries, The proximal  pulmonary vasculature appears normal. The heart size is normal.No  pericardial effusion.     The central tracheobronchial tree is patent. No thoracic adenopathy.  No focal airspace opacities or consolidation No pneumothorax or  pleural effusion. No suspicious nodules or masses. Normal thyroid.     Redemonstration of complex hepatic artery aneurysmal dilation  involving the common, proper and intrahepatic right hepatic artery.  There is eccentric mural thrombus within the lumen. No worrisome bony  or soft tissue lesions.      Impression    IMPRESSION:  1. No aneurysm within the chest  2. Redemonstration of aneurysmal dilation of the common, proper and  intrahepatic right hepatic arteries.    I have personally reviewed the examination and initial interpretation  and I agree with the findings.    JERSON PIRES         SYSTEM ID:  [id number]       Discharge Medications   Current Discharge Medication List        START taking these medications    Details   cloNIDine (CATAPRES) 0.1 MG tablet Take 1 tablet (0.1 mg) by mouth 2 times daily  Qty: 60 tablet, Refills: 0    Associated Diagnoses: Hepatic artery dissection (H24); Essential hypertension           CONTINUE these  medications which have CHANGED    Details   amLODIPine (NORVASC) 10 MG tablet Take 1 tablet (10 mg) by mouth daily  Qty: 30 tablet, Refills: 0    Associated Diagnoses: Hepatic artery dissection (H24); Essential hypertension      aspirin (ASA) 81 MG chewable tablet Take 1 tablet (81 mg) by mouth daily  Qty: 30 tablet, Refills: 0    Associated Diagnoses: Hepatic artery dissection (H24)      hydrochlorothiazide (HYDRODIURIL) 25 MG tablet Take 1 tablet (25 mg) by mouth daily  Qty: 30 tablet, Refills: 0    Associated Diagnoses: Hepatic artery dissection (H24); Essential hypertension      !! insulin aspart (NOVOLOG PEN) 100 UNIT/ML pen Inject 5 Units Subcutaneous 3 times daily (with meals)  Qty: 15 mL, Refills: 0    Associated Diagnoses: Type 2 diabetes mellitus with hyperglycemia, unspecified whether long term insulin use (H)      insulin glargine (LANTUS PEN) 100 UNIT/ML pen Inject 60 Units Subcutaneous at bedtime  Qty: 15 mL, Refills: 0    Comments: If Lantus is not covered by insurance, may substitute Basaglar or Semglee or other insulin glargine product per insurance preference at same dose and frequency.    Associated Diagnoses: Type 2 diabetes mellitus with hyperglycemia, unspecified whether long term insulin use (H)      metoprolol tartrate (LOPRESSOR) 50 MG tablet Take 1 tablet (50 mg) by mouth 2 times daily  Qty: 60 tablet, Refills: 0    Associated Diagnoses: Hepatic artery dissection (H24); Essential hypertension       !! - Potential duplicate medications found. Please discuss with provider.        CONTINUE these medications which have NOT CHANGED    Details   albuterol (PROAIR HFA/PROVENTIL HFA/VENTOLIN HFA) 108 (90 Base) MCG/ACT inhaler Inhale 2 puffs into the lungs every 4 hours as needed for shortness of breath      !! insulin aspart (NOVOLOG PEN) 100 UNIT/ML pen Inject 0-12 Units Subcutaneous 3 times daily (before meals) Blood Glucose             Dose  < 70                               see hypoglycemia  protocol                             no dose, give prandial insulin  150-199                         2 units  200-249                         4 units  250-299                         6 units  300-349                         8 units  350-399                         10 units  400 and greater             12 units and call MD      ipratropium-albuterol (COMBIVENT RESPIMAT)  MCG/ACT inhaler Inhale 1 puff into the lungs 4 times daily as needed for shortness of breath or wheezing      nicotine (NICODERM CQ) 14 MG/24HR 24 hr patch Place 1 patch onto the skin daily as needed for smoking cessation      oxyCODONE (ROXICODONE) 5 MG tablet Take 5-10 mg by mouth every 4 hours as needed for severe pain      senna-docusate (SENOKOT-S/PERICOLACE) 8.6-50 MG tablet Take 1-2 tablets by mouth 2 times daily       !! - Potential duplicate medications found. Please discuss with provider.        STOP taking these medications       cloNIDine (CATAPRES-TTS2) 0.2 MG/24HR WK patch Comments:   Reason for Stopping:             Allergies   No Known Allergies

## 2024-01-07 NOTE — PROGRESS NOTES
DISCHARGE                         1/7/2024 12:45 PM  ----------------------------------------------------------------------------  Discharged to: Home  Via: private transportation  Accompanied by: Family (wife)  Discharge Instructions: Reg diet, as tolerated activity, (continue BP meds and insulin as directed, call with questions) medications, follow up appointments ([facility]), when to call the MD, aftercare instructions.  Prescriptions: To be filled by  pharmacy; medication list reviewed & sent with pt  Follow Up Appointments: arranged; information given  Belongings: All sent with pt  IV: d/c'd  Telemetry: d/c'd  Pt exhibits understanding of above discharge instructions; all questions answered.    Discharge Paperwork: Signed, copied, and sent home with patient.

## 2024-01-07 NOTE — PLAN OF CARE
Neuro: A&Ox4. Pleasant, able to make needs known. Slept well overnight.   Cardiac: SB-SR 50s-70s. SBP <140.              Respiratory: Sating >98% on RA.  GI/: Adequate urine output. No reported BM this shift.  Diet/appetite: Tolerating regular diet. Eating well.  Activity:  Independent in room.  Pain: At acceptable level on current regimen, prn oxy given x1.   Skin: No new deficits noted.  LDA's:  -R PIV: SL    Shift events: Pt refused AM labs at 6am, lab to retry later in morning.     Plan: Pt hoping to discharge soon. Continue with POC. Notify primary team with changes.    Goal Outcome Evaluation: progressing

## 2024-01-08 ENCOUNTER — PATIENT OUTREACH (OUTPATIENT)
Dept: CARE COORDINATION | Facility: CLINIC | Age: 44
End: 2024-01-08
Payer: MEDICAID

## 2024-01-08 ENCOUNTER — TELEPHONE (OUTPATIENT)
Dept: RHEUMATOLOGY | Facility: CLINIC | Age: 44
End: 2024-01-08
Payer: MEDICAID

## 2024-01-08 ENCOUNTER — DOCUMENTATION ONLY (OUTPATIENT)
Dept: OTHER | Facility: CLINIC | Age: 44
End: 2024-01-08
Payer: MEDICAID

## 2024-01-08 ENCOUNTER — TELEPHONE (OUTPATIENT)
Dept: VASCULAR SURGERY | Facility: CLINIC | Age: 44
End: 2024-01-08
Payer: MEDICAID

## 2024-01-08 LAB
QUANTIFERON MITOGEN: 10 IU/ML
QUANTIFERON NIL TUBE: 0.07 IU/ML
QUANTIFERON TB1 TUBE: 0.09 IU/ML
QUANTIFERON TB2 TUBE: 0.07

## 2024-01-08 NOTE — TELEPHONE ENCOUNTER
Called pt and LVM. Per Dr. Harris, he would like to see pt at 10 am on 02/13/2024-override DEBBY slot if pt is able to come to this. Needs labs 1 week prior to 2/13 if pt can come this day.

## 2024-01-08 NOTE — PROGRESS NOTES
[facility]: [facility]: Post-Discharge Note  SITUATION                                                      Admission:    Admission Date: 01/04/24   Reason for Admission: You were hospitalized for abdominal pain and new complex celiac artery dissection. You were seen by multiple  specialists during your stay and will need to follow up with vascular surgery for further imaging and  rheumatology as well.  Discharge:   Discharge Date: 01/07/24  Discharge Diagnosis: You were hospitalized for abdominal pain and new complex celiac artery dissection. You were seen by multiple  specialists during your stay and will need to follow up with vascular surgery for further imaging and  rheumatology as well.    BACKGROUND                                                      Per hospital discharge summary and inpatient provider notes:    Jong Quiroga is a 43 year old male with past medical history of hypertension on 4 antihypertensives, tobacco use, type 2 diabetes, and bipolar disorder.  Presented to [place] ED on 12/28/2023 with 3 days of right shoulder pain and generalized abd pain, . Workup notable for elevated LFTS ( , , and Alk Phos 310 ), hgb 11.3, WBC 12.8. 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 Was started on esmolol and nicardipine gtt for SBP < 120 and transferred to [facility] on 12/29/2023. CT at [facility] demonstrated complex dissection of iliac artery with aneurysmal degeneration of the the entire common, proper and right hepatic artery. Images also demonstrated gallstones without cholecystitis and a mild right hydronephrosis. Was evaluated by IR and vascular surgery and hepatobiliary surgery services at outside hospital and given the complex involvement of the dissections was recommended to transfer to a center with limited transportation capacity.   On admission afebrile, HDS.  Symmetric pulses in all extremities.  Was complaining of right shoulder  pain with radiation to his right neck, but pain improved with oxycodone prn.     ASSESSMENT           Discharge Assessment  How are you doing now that you are home?: I am doing pretty good.  How are your symptoms? (Red Flag symptoms escalate to triage hotline per guidelines): Improved  Do you feel your condition is stable enough to be safe at home until your provider visit?: Yes  Does the patient have their discharge instructions? : Yes  Does the patient have questions regarding their discharge instructions? : No  Were you started on any new medications or were there changes to any of your previous medications? : Yes  Does the patient have all of their medications?: Yes  Do you have questions regarding any of your medications? : No  Discharge follow-up appointment scheduled within 14 calendar days? : No (Pt is waiting to hear back from clinics to schedule follow ups.)  Is patient agreeable to assistance with scheduling? : No                  PLAN                                                      Outpatient Plan:  Follow up with primary care provider, Storm Armenta, within 1-2 weeks, for hospital follow- up and regarding new diagnosis.  No follow up labs or test are needed.  Follow up with Vascular surgery, Dr. Pawel Pérez, within 1-2 weeks. for hospital follow- up. No follow up labs are needed,  will need repeat CTA abdomen within the month  Follow up with Rheumatology, within the month. for hospital follow- up. No follow up labs or test are needed.  No future appointments.      For any urgent concerns, please contact our 24 hour nurse triage line: [phone] ([phone])         MILA White  [phone]  [facility]

## 2024-01-08 NOTE — TELEPHONE ENCOUNTER
Patient Contacted     Appointment type: MACIEL  Provider: JENN  Return date: 1/12/24  Specialty phone number: [phone]  Additional appointment(s) needed: NA  Additonal Notes: per referral.

## 2024-01-09 LAB
GAMMA INTERFERON BACKGROUND BLD IA-ACNC: 0.07 IU/ML
H CAPSUL AG UR QL IA: NOT DETECTED
H CAPSUL AG UR-MCNC: NOT DETECTED NG/ML
M TB IFN-G BLD-IMP: NEGATIVE
M TB IFN-G CD4+ BCKGRND COR BLD-ACNC: 9.93 IU/ML
MITOGEN IGNF BCKGRD COR BLD-ACNC: 0 IU/ML
MITOGEN IGNF BCKGRD COR BLD-ACNC: 0.02 IU/ML
SMOOTH MUSCLE IGG TITR SER: ABNORMAL {TITER}

## 2024-01-11 ENCOUNTER — TELEPHONE (OUTPATIENT)
Dept: RHEUMATOLOGY | Facility: CLINIC | Age: 44
End: 2024-01-11
Payer: MEDICAID

## 2024-01-11 LAB — BACTERIA BLD CULT: NO GROWTH

## 2024-01-11 NOTE — TELEPHONE ENCOUNTER
----- Message from Clemencia Harris MD sent at 1/7/2024 12:56 PM CST -----  Regarding: Hospital follow up  Leroy Hyl[place] and [place],    Can we schedule Jong with me on 2/13/2024 at 10 am DEBBY slot, 60 minute,New.  Labs 1 week before the appointment     Thank you

## 2024-01-12 ENCOUNTER — OFFICE VISIT (OUTPATIENT)
Dept: VASCULAR SURGERY | Facility: CLINIC | Age: 44
End: 2024-01-12
Payer: COMMERCIAL

## 2024-01-12 VITALS — SYSTOLIC BLOOD PRESSURE: 133 MMHG | DIASTOLIC BLOOD PRESSURE: 83 MMHG | OXYGEN SATURATION: 97 % | HEART RATE: 77 BPM

## 2024-01-12 DIAGNOSIS — I77.79 HEPATIC ARTERY DISSECTION (H): ICD-10-CM

## 2024-01-12 DIAGNOSIS — R74.01 TRANSAMINITIS: ICD-10-CM

## 2024-01-12 DIAGNOSIS — I99.8 SEGMENTAL ARTERIAL MEDIOLYSIS: Primary | ICD-10-CM

## 2024-01-12 DIAGNOSIS — I77.79 CELIAC ARTERY DISSECTION (H): ICD-10-CM

## 2024-01-12 LAB
BACTERIA BLD CULT: NO GROWTH
BACTERIA BLD CULT: NO GROWTH

## 2024-01-12 PROCEDURE — 99214 OFFICE O/P EST MOD 30 MIN: CPT | Performed by: HOSPITALIST

## 2024-01-12 RX ORDER — BLOOD-GLUCOSE METER
EACH MISCELLANEOUS
COMMUNITY
Start: 2023-11-22

## 2024-01-12 RX ORDER — PEN NEEDLE, DIABETIC 32GX 5/32"
NEEDLE, DISPOSABLE MISCELLANEOUS
COMMUNITY
Start: 2024-01-07

## 2024-01-12 RX ORDER — IBUPROFEN 600 MG/1
600 TABLET, FILM COATED ORAL
COMMUNITY
Start: 2023-08-15 | End: 2024-04-01

## 2024-01-12 RX ORDER — BLOOD SUGAR DIAGNOSTIC
STRIP MISCELLANEOUS
COMMUNITY
Start: 2023-11-22

## 2024-01-12 RX ORDER — LANCETS
EACH MISCELLANEOUS
COMMUNITY
Start: 2023-11-22

## 2024-01-12 RX ORDER — ACETAMINOPHEN 500 MG
1000 TABLET ORAL
COMMUNITY
Start: 2022-07-17 | End: 2024-04-01

## 2024-01-12 RX ORDER — DIPHENOXYLATE HYDROCHLORIDE AND ATROPINE SULFATE 2.5; .025 MG/1; MG/1
1 TABLET ORAL
COMMUNITY
Start: 2022-09-23 | End: 2024-04-01

## 2024-01-12 ASSESSMENT — PAIN SCALES - GENERAL: PAINLEVEL: NO PAIN (0)

## 2024-01-12 NOTE — PROGRESS NOTES
VASCULAR MEDICINE CONSULT NOTE          LOCATION:  [facility]        Date of Service: 1/12/2024      Primary Care Provider: Storm Armenta  Referring provider; n/a      Reason for the visit/chief complaint:   Celiac artery dissection      HPI:  oJng Quiroga is a pleasant 43 year old male who presents to our Vascular Medicine clinic accompanied by significant other Mena to follow-up after recent hospital stay for celiac artery dissection.    Mr. Quiroga has past medical history significant for recently diagnosed type 2 diabetes mellitus, hypertension, obesity, tobacco smoking and bipolar disorder who initially presented to outside hospital on 12/28 with abdominal pain.  At that time, he was noted to have elevated liver enzymes (, , and alk phos 310) and elevated blood pressure (200 systolic).  There was initial concern for possible cholecystitis.  However, further evaluation with CT abdomen pelvis showed aneurysmal dilatation of the right hepatic artery with diameter 1.7 mm with soft tissue thickening if included 36 mm.  At that time, patient was transferred to [facility], he was started on nicardipine and esmolol drip to improve blood pressure to control his blood pressure.      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 He was evaluated by IR, vascular surgery and hepatobiliary surgery there.  Due to the intrahepatic segment involvement of the dissection, recommendation was to transfer to a liver transplant center.  He was subsequently transferred to [facility] on 1/4.    After being transferred to [facility], he was evaluated by rheumatology, vascular surgery, GI/hepatobiliary, IR and  transplant surgery.  Differential diagnosis included vasculitis, infectious etiology among others. Per GI, there was no concern for autoimmune hepatitis.  I do see anti-smooth muscle antibodies came back elevated 1:160, F-Actin (smooth muscle) Ab 22 (0-19 units) but I do not see specific comments about this. Some infectious workup such HIV and viral hepatitis came back negative. Overall transaminitis improved and liver transplant was not felt indicated.  Per rheumatology evaluation, he had modest elevation in CRP and ESR CRP 8.9  (normal less than 10.5 mg/dl), ESR 32 with no constitutional symptoms or systemic symptoms to suggest vasculitis.  Similarly, he had no other areas of involvement on his arterial beds.  His presentation and abdominal pain were more so acute rather than insidious.  Further evaluation to the rest of his arterial bed included CT chest and head and neck that returned with no evidence of another aneurysm or dissection elsewhere.      Today, patient denies any abdominal pain.  He was discharged last Sunday approximately 5 days ago.  He has follow-up with rheumatology in 1 month but no other follow-ups.  He has some repeat labs ordered likely prior to his rheumatology follow-up?    With regards to his medical history:  Hypertension: Patient reports that he was diagnosed with hypertension approximately 10 years ago.  This has been labeled uncontrolled for which he has been on multiple medications however per patient and significant other, he was noncompliant and was not necessarily taking his blood pressure medications regularly.  Currently he is discharged on hydrochlorothiazide 25 mg daily, amlodipine 10 mg daily, PO clonidine 0.1 mg BID, and metoprolol tartrate 50 mg BID and reports complete compliance with these.  His blood pressure has been in the range of 120s systolic at home.  Type 2 diabetes mellitus: This was diagnosed in November after he was admitted with hyperosmolar hyper glycemic  status.  His A1c was found to be 13%.  Currently on insulin.  Tobacco smoking: Patient reports smoking 2 PPD, started in his early 30s approximately 10 years ago.  Although he did not smoke when his abdominal pain started, he is now back to smoking after his hospital discharge.        REVIEW OF SYSTEMS:      A 12 point ROS was reviewed and is negative except what is mentioned in HPI.       Past medical history, surgical history, medications, family history, social history and allergies were reviewed. Pertinent points mentioned under HPI.          OBJECTIVE:    Vital signs:  There were no vitals taken for this visit.  Wt Readings from Last 1 Encounters:   01/06/24 207 lb 14.3 oz     There is no height or weight on file to calculate BMI.    Physical exam:  General appearance: Pleasant male in no apparent distress.    HEENT: NC/AT.    Neck: Carotids +2/2 bilaterally without bruits.  No jugular venous distension.   Heart: RRR. Normal S1, S2. No murmur, rub, click, or gallop.   Chest: Clear to auscultation bilaterally.  Abdomen: Soft, very mild tenderness noted in the epigastrium and right upper quadrant only with deep palpation. No pulsatile mass.  No bruits.   Extremities: No lower extremity edema.  Lower extremity pulse exam completely normal.  Palpable DP and PT 2/2 bilaterally  Skin: No skin wounds.  Neurological: Alert, awake and oriented       DIAGNOSTIC STUDIES:   Labs and diagnostics reviewed including outside records. Pertinent points are mentioned under HPI and assessment and plan sections.          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 This is a nonatherosclerotic, non inherited, non inflammatory arteriopathy predominantly affecting the splanchnic vasculature. This is typically a diagnosis of exclusion.  Despite having risk factors for atherosclerosis such as hypertension, diabetes and tobacco smoking and with his age, Jong has very minimal atherosclerosis noted on his imaging.  Vasculitis was felt to be unlikely and I agree given the acute presentation.  WHIT was normal.  Elevated complement 3 is in the setting of the acute dissection (acute phase reactant), one would expect low C3 if autoimmune disorder is suspected.  He was evaluated by rheumatology who agreed to the low suspicion of vasculitis.  Ideally, we will get genetic evaluation to rule out other connective tissue disease/syndromes before we diagnose BRUNA, patient would like to defer this for now.    Overall, we discussed the importance of active surveillance and symptomatic/conservative management.  We discussed our available data in regards to the natural history of BRUNA, presentation, possible complications and the favorable outcome in terms of recurrent dissection or aneurysm although remains a possibility.  Patient clinically feels much improved.  I recommend repeating his currently ordered labs that include follow-up on liver function test, CRP and ESR in approximately 10 days - 2 weeks from his discharge.  Recommend continuing aspirin 81 mg indefinitely.        Recommendations:  Repeat CTA abdomen in 1 month expected the beginning of February with phone call follow-up.  If stable findings, we will repeat in another 3 months.  Continue aspirin 81 mg indefinitely.  Defer genetic workup for now.  He questioned if she needs to follow-up with rheumatology.  I will reach out to rheumatology/ and discuss.  Might need follow up with GI for the highly positive anti-smooth muscle antibodies.  I  understand his WHIT was negative.  Will check his repeat CMP and consider GI referral.  For his cardiovascular health in general, we discussed the importance of blood pressure control, diabetes control, healthy eating, exercising and weight loss.      Pleasure meeting with Jong and his significant other Milka in our clinic today.    Pawel Pérez MD  Vascular Medicine  January 12, 2024

## 2024-01-12 NOTE — PATIENT INSTRUCTIONS
Thank you so much for choosing us for your care. It was a pleasure to see you at the vascular clinic today.     Follow-up recommendations: Follow up Friday, 02/02/2024, with Dr Pérez for a telephone visit to discuss CT results.    Additional testing/imaging ordered today:   - Repeat CTA in a few weeks prior to 02/02 visit.   - Complete labs next week on Tuesday or Wednesday.       Our scheduling team will get in touch with you to set up any follow-up testing/imaging and/or appointments. Please be aware that any testing/imaging recommended today will need to completed prior to your next visit with the provider. If testing/imaging is not completed prior to your next visit, your visit may be rescheduled.        If you have any questions, please contact our clinic directly at [phone] and ask for the nurse. We also encourage the use of [employer] to communicate with your HealthCare Provider.      If you have an urgent need after business hours (8:00 am to 4:30 pm) please call [phone], option 4, and ask for the vascular attending on call. For non-urgent after hours needs, please call the vascular clinic at [phone]. For scheduling needs, please call our clinic directly at [phone].    Learning About BE FAST: Stroke Warning Signs  BE FAST is a simple way to remember the main symptoms of stroke. These symptoms happen suddenly. So learning what to look for helps you know when to call for medical help. BE FAST stands for:    B - Balance.  Loss of balance or trouble walking.     E - Eyes.  Trouble seeing out of one or both eyes.     F - Face.  Weakness or drooping on one side of the face.     A - Arm.  Weakness or numbness in an arm or leg.     S - Speech.  Trouble speaking.     T - Time to call 911.  Also call 911 if you have other stroke symptoms. They include:  Sudden confusion.  Sudden trouble understanding simple statements.  Fainting.  A seizure.  A sudden, severe headache.     A stroke happens when a  "blood vessel in the brain bursts or is blocked by a blood clot. The blood supply to part of the brain--and the oxygen the blood carries--is reduced. This damages the brain.   If you have a stroke, quick treatment may save your life. And it may reduce the damage in your brain so that you have fewer problems after the stroke.   Where can you learn more?  Go to https://www.[employer].net/patiented  Enter E640 in the search box to learn more about \"Learning About BE FAST: Stroke Warning Signs.\"  Current as of: December 18, 2022               Content Version: 13.8    4818-0944 [employer].   Care instructions adapted under license by your healthcare professional. If you have questions about a medical condition or this instruction, always ask your healthcare professional. [employer] disclaims any warranty or liability for your use of this information.    Learning About How to Prevent a Stroke  What is a stroke?  A stroke is damage to the brain that occurs when a blood vessel in the brain bursts or is blocked by a blood clot. Without blood and the oxygen it carries, part of the brain starts to die. The part of the body controlled by the damaged area of the brain can't work properly.  Brain damage can start within minutes of a stroke. But quick treatment can help limit the damage and increase the chance of a full recovery.  What puts you at risk for stroke?  A risk factor is anything that makes you more likely to have a particular health problem.  Risk factors for stroke that you can manage or change include:  Health problems like atrial fibrillation, diabetes, high blood pressure, high cholesterol, hardening of the arteries (atherosclerosis), and sickle cell disease.  Smoking.  Drinking more than 2 alcoholic drinks a day for men and 1 drink a day for women.  Being overweight.  Not eating healthy foods.  Not getting enough physical activity.  Risk factors you can't change include:  Having a " previous stroke.  Family history of stroke.  Being older.  Being , [place] Native, , or South  American.  Being female.  Having certain problems during pregnancy, such as preeclampsia.  Being past menopause.  Your doctor can help you know your risk. Then you and your doctor can talk about whether to take steps to lower it.  How can you help prevent a stroke?  Here are some things you can do to help prevent a stroke.  Manage health problems that raise your risk. These include atrial fibrillation, diabetes, high blood pressure, and high cholesterol.  Have a heart-healthy lifestyle.  Don't smoke. If you need help quitting, talk to your doctor about stop-smoking programs and medicines. These can increase your chances of quitting for good.  Limit alcohol to 2 drinks a day for men and 1 drink a day for women.  Stay at a healthy weight. Lose weight if you need to.  Be active. Get at least 30 minutes of exercise on most days of the week. Walking is a good choice. You also may want to do other activities, such as running, swimming, cycling, or playing tennis or team sports.  Eat heart-healthy foods. These include vegetables, fruits, nuts, beans, lean meat, fish, and whole grains. Limit sodium and sugar.  If you think you may have a problem with alcohol or drug use, talk to your doctor.  If you use hormone therapy for menopause or hormonal birth control, talk with your doctor. Ask if these are right for you. They may raise the risk of stroke in some people.  Decide with your doctor whether you will also take medicines to help lower your risk. For example, you and your doctor may decide you will take a medicine that prevents blood clots.  What are the BE FAST stroke warning signs?  BE FAST is a simple way to remember the main symptoms of stroke. These symptoms happen suddenly. So knowing what to look for helps you know when to call for medical help.  BE FAST stands for:  MONIKA berman. Loss of  "balance or trouble walking.  E yes. Trouble seeing out of one or both eyes.  F ace. Weakness or drooping on one side of the face.  A rm. Weakness or numbness in an arm or leg.  S peech. Trouble speaking.  T willard to call 911.  Other stroke symptoms include sudden confusion, sudden trouble understanding simple statements, fainting, a seizure, and a sudden, severe headache.  What are the symptoms of a stroke?  Symptoms of a stroke happen quickly. A stroke may cause:  Sudden numbness, tingling, weakness, or loss of movement in your face, arm, or leg, especially on only one side of your body.  Sudden vision changes.  Sudden trouble speaking.  Sudden confusion or trouble understanding simple statements.  Sudden problems with walking or balance.  A sudden, severe headache that is different from past headaches.  Fainting.  A seizure.  It's important to call for medical help if you have stroke symptoms. Quick treatment may save your life. And it may reduce the damage in your brain so that you have fewer problems after the stroke.  Follow-up care is a key part of your treatment and safety. Be sure to make and go to all appointments, and call your doctor if you are having problems. It's also a good idea to know your test results and keep a list of the medicines you take.  Where can you learn more?  Go to https://www.[employer].net/patiented  Enter G757 in the search box to learn more about \"Learning About How to Prevent a Stroke.\"  Current as of: December 18, 2022               Content Version: 13.8    1620-6344 [employer].   Care instructions adapted under license by your healthcare professional. If you have questions about a medical condition or this instruction, always ask your healthcare professional. [employer] disclaims any warranty or liability for your use of this information.         "

## 2024-01-12 NOTE — NURSING NOTE
Chief Complaint   Patient presents with    New Patient     New vascular        Vitals were taken, medications reconciled.    Anny Hernandez CMA  9:09 AM     How Severe Is Your Skin Lesion?: mild Has Your Skin Lesion Been Treated?: not been treated Is This A New Presentation, Or A Follow-Up?: Skin Lesion Is This A New Presentation, Or A Follow-Up?: Skin Lesions

## 2024-01-12 NOTE — LETTER
1/12/2024       RE: Jong Quiroga  [address] [facility] [address]       Dear Colleague,    Thank you for referring your patient, Jong Quiroga, to the [facility]ASCULAR [facility] [place] [facility]. Please see a copy of my visit note below.    VASCULAR MEDICINE CONSULT NOTE          LOCATION:  [facility]- CLINICS AND SURGERY CENTER        Date of Service: 1/12/2024      Primary Care Provider: Storm Armenta  Referring provider; n/a      Reason for the visit/chief complaint:   Celiac artery dissection      HPI:  Jong Quiroga is a pleasant 43 year old male who presents to our Vascular Medicine clinic accompanied by significant other Mena to follow-up after recent hospital stay for celiac artery dissection.    Mr. Quiroga has past medical history significant for recently diagnosed type 2 diabetes mellitus, hypertension, obesity, tobacco smoking and bipolar disorder who initially presented to outside hospital on 12/28 with abdominal pain.  At that time, he was noted to have elevated liver enzymes (, , and alk phos 310) and elevated blood pressure (200 systolic).  There was initial concern for possible cholecystitis.  However, further evaluation with CT abdomen pelvis showed aneurysmal dilatation of the right hepatic artery with diameter 1.7 mm with soft tissue thickening if included 36 mm.  At that time, patient was transferred to [facility], he was started on nicardipine and esmolol drip to improve blood pressure to control his blood pressure.      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 He was evaluated by IR, vascular surgery and hepatobiliary surgery  there.  Due to the intrahepatic segment involvement of the dissection, recommendation was to transfer to a liver transplant center.  He was subsequently transferred to [facility] on 1/4.    After being transferred to [facility], he was evaluated by rheumatology, vascular surgery, GI/hepatobiliary, IR and transplant surgery.  Differential diagnosis included vasculitis, infectious etiology among others. Per GI, there was no concern for autoimmune hepatitis.  I do see anti-smooth muscle antibodies came back elevated 1:160, F-Actin (smooth muscle) Ab 22 (0-19 units) but I do not see specific comments about this. Some infectious workup such HIV and viral hepatitis came back negative. Overall transaminitis improved and liver transplant was not felt indicated.  Per rheumatology evaluation, he had modest elevation in CRP and ESR CRP 8.9  (normal less than 10.5 mg/dl), ESR 32 with no constitutional symptoms or systemic symptoms to suggest vasculitis.  Similarly, he had no other areas of involvement on his arterial beds.  His presentation and abdominal pain were more so acute rather than insidious.  Further evaluation to the rest of his arterial bed included CT chest and head and neck that returned with no evidence of another aneurysm or dissection elsewhere.      Today, patient denies any abdominal pain.  He was discharged last Sunday approximately 5 days ago.  He has follow-up with rheumatology in 1 month but no other follow-ups.  He has some repeat labs ordered likely prior to his rheumatology follow-up?    With regards to his medical history:  Hypertension: Patient reports that he was diagnosed with hypertension approximately 10 years ago.  This has been labeled uncontrolled for which he has been on multiple medications however per patient and significant other, he was noncompliant and was not necessarily taking his blood pressure medications regularly.  Currently he is discharged on hydrochlorothiazide 25 mg daily,  amlodipine 10 mg daily, PO clonidine 0.1 mg BID, and metoprolol tartrate 50 mg BID and reports complete compliance with these.  His blood pressure has been in the range of 120s systolic at home.  Type 2 diabetes mellitus: This was diagnosed in November after he was admitted with hyperosmolar hyper glycemic status.  His A1c was found to be 13%.  Currently on insulin.  Tobacco smoking: Patient reports smoking 2 PPD, started in his early 30s approximately 10 years ago.  Although he did not smoke when his abdominal pain started, he is now back to smoking after his hospital discharge.        REVIEW OF SYSTEMS:      A 12 point ROS was reviewed and is negative except what is mentioned in HPI.       Past medical history, surgical history, medications, family history, social history and allergies were reviewed. Pertinent points mentioned under HPI.          OBJECTIVE:    Vital signs:  There were no vitals taken for this visit.  Wt Readings from Last 1 Encounters:   01/06/24 207 lb 14.3 oz     There is no height or weight on file to calculate BMI.    Physical exam:  General appearance: Pleasant male in no apparent distress.    HEENT: NC/AT.    Neck: Carotids +2/2 bilaterally without bruits.  No jugular venous distension.   Heart: RRR. Normal S1, S2. No murmur, rub, click, or gallop.   Chest: Clear to auscultation bilaterally.  Abdomen: Soft, very mild tenderness noted in the epigastrium and right upper quadrant only with deep palpation. No pulsatile mass.  No bruits.   Extremities: No lower extremity edema.  Lower extremity pulse exam completely normal.  Palpable DP and PT 2/2 bilaterally  Skin: No skin wounds.  Neurological: Alert, awake and oriented       DIAGNOSTIC STUDIES:   Labs and diagnostics reviewed including outside records. Pertinent points are mentioned under HPI and assessment and plan sections.          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 This is a nonatherosclerotic, non inherited, non inflammatory arteriopathy predominantly affecting the splanchnic vasculature. This is typically a diagnosis of exclusion.  Despite having risk factors for atherosclerosis such as hypertension, diabetes and tobacco smoking and with his age, Jong has very minimal atherosclerosis noted on his imaging.  Vasculitis was felt to be unlikely and I agree given the acute presentation.  WHIT was normal.  Elevated complement 3 is in the setting of the acute dissection (acute phase reactant), one would expect low C3 if autoimmune disorder is suspected.  He was evaluated by rheumatology who agreed to the low suspicion of vasculitis.  Ideally, we will get genetic evaluation to rule out other connective tissue disease/syndromes before we diagnose BRUNA, patient would like to defer this for now.    Overall, we discussed the importance of active surveillance and symptomatic/conservative management.  We discussed our available data in regards to the natural history of BRUNA, presentation, possible complications and the favorable outcome in terms of recurrent dissection or aneurysm although remains a possibility.  Patient clinically feels much improved.  I recommend repeating his currently ordered labs that include follow-up on liver function test, CRP and ESR in approximately 10 days - 2 weeks from his discharge.  Recommend continuing aspirin 81 mg indefinitely.        Recommendations:  Repeat CTA abdomen in 1 month expected the beginning of February with phone call follow-up.  If stable findings, we  will repeat in another 3 months.  Continue aspirin 81 mg indefinitely.  Defer genetic workup for now.  He questioned if she needs to follow-up with rheumatology.  I will reach out to rheumatology/ and discuss.  Might need follow up with GI for the highly positive anti-smooth muscle antibodies.  I understand his WHIT was negative.  Will check his repeat CMP and consider GI referral.  For his cardiovascular health in general, we discussed the importance of blood pressure control, diabetes control, healthy eating, exercising and weight loss.      Pleasure meeting with Jong and his significant other Milka in our clinic today.        Again, thank you for allowing me to participate in the care of your patient.      Sincerely,    Pawel Pérez MD

## 2024-01-17 ENCOUNTER — TELEPHONE (OUTPATIENT)
Dept: VASCULAR SURGERY | Facility: CLINIC | Age: 44
End: 2024-01-17
Payer: MEDICAID

## 2024-01-17 NOTE — TELEPHONE ENCOUNTER
RECORDS RECEIVED FROM:   Pawel Pérez MD         Appt Date: 1/25/2024   NOTES STATUS DETAILS   OFFICE NOTE from referring provider N/A    OFFICE NOTES from other specialists Internal 1/12/2024 [facility] with VIKAS Schulz   DISCHARGE SUMMARY from hospital Care Everywhere 1/4/2024 [facility] with VIKAS Linton  12/29/2023 [facility] with RANDY Tay   MEDICATION LIST N/A    LIVER BIOSPY (IF APPLICABLE)      PATHOLOGY REPORTS  N/A    IMAGING     ENDOSCOPY (IF AVAILABLE) N/A    COLONOSCOPY (IF AVAILABLE) N/A    ULTRASOUND LIVER Care Everywhere 12/2/8/2023([facility])   CT OF ABDOMEN Care Everywhere 1/5/2024, 1/6/2024, 12/29/2023([facility]), 12/28/2023([facility])   MRI OF LIVER N/A    FIBROSCAN, US ELASTOGRAPHY, FIBROSIS SCAN, MR ELASTOGRAPHY N/A    LABS     HEPATIC PANEL (LIVER PANEL) Internal 12/31/2023, 12/29/2023   BASIC METABOLIC PANEL Internal 12/29/2023, 11/21/2023   COMPLETE METABOLIC PANEL Internal 1/7/2024, 1/6/2024, 1/5/2024   COMPLETE BLOOD COUNT (CBC) Internal 1/7/2024, 1/6/2024, 12/29/2023, 12/28/2023   INTERNATIONAL NORMALIZED RATIO (INR) Internal 1/4/2024   HEPATITIS C ANTIBODY Internal 6/17/2019   HEPATITIS C VIRAL LOAD/PCR N/A    HEPATITIS C GENOTYPE N/A    HEPATITIS B SURFACE ANTIGEN Internal 6/17/2019

## 2024-01-17 NOTE — TELEPHONE ENCOUNTER
1/17 [facility] for patient to schedule labs Tuesday or Wednesday next week with a telephone follow-up with Dr. Pérez on February 2 with a repeat CTA 1 to 2 days prior

## 2024-01-18 ENCOUNTER — TELEPHONE (OUTPATIENT)
Dept: VASCULAR SURGERY | Facility: CLINIC | Age: 44
End: 2024-01-18
Payer: MEDICAID

## 2024-01-18 ENCOUNTER — TELEPHONE (OUTPATIENT)
Dept: GASTROENTEROLOGY | Facility: CLINIC | Age: 44
End: 2024-01-18
Payer: MEDICAID

## 2024-01-18 DIAGNOSIS — R79.89 ELEVATED LFTS: Primary | ICD-10-CM

## 2024-01-18 NOTE — TELEPHONE ENCOUNTER
LVM for PT to call [phone] to schedule lab appt at least 3 days before 1/25 for Myrna Kelly appt.

## 2024-01-18 NOTE — TELEPHONE ENCOUNTER
1/17 [facility] for patient to schedule labs Tuesday/Wednesday next week with telephone follow-up with Dr. Pérez on February 2nd with a repeat CTA 1 to 2 days prior        Max attempts reached  Closing encounter

## 2024-01-18 NOTE — TELEPHONE ENCOUNTER
----- Message from Nita Blackwell, RN sent at 1/18/2024 11:57 AM CST -----  Regarding: pre-visit labs  New patient scheduled with Myrna Kelly PA-C. Patient needs pre-visit lab appointment. Orders in. 2 attempts needed.    Thanks!    PAKO Del Valle, RN, PHN  Hepatology Clinic  [address] [place] [address]

## 2024-01-25 ENCOUNTER — PRE VISIT (OUTPATIENT)
Dept: GASTROENTEROLOGY | Facility: CLINIC | Age: 44
End: 2024-01-25

## 2024-01-30 ENCOUNTER — LAB (OUTPATIENT)
Dept: LAB | Facility: CLINIC | Age: 44
End: 2024-01-30
Payer: COMMERCIAL

## 2024-01-30 DIAGNOSIS — R79.89 ELEVATED LFTS: ICD-10-CM

## 2024-01-30 DIAGNOSIS — R74.01 TRANSAMINITIS: ICD-10-CM

## 2024-01-30 LAB
ALBUMIN SERPL BCG-MCNC: 4.4 G/DL (ref 3.5–5.2)
ALP SERPL-CCNC: 109 U/L (ref 40–150)
ALT SERPL W P-5'-P-CCNC: 21 U/L (ref 0–70)
ANION GAP SERPL CALCULATED.3IONS-SCNC: 11 MMOL/L (ref 7–15)
AST SERPL W P-5'-P-CCNC: 17 U/L (ref 0–45)
BILIRUB DIRECT SERPL-MCNC: <0.2 MG/DL (ref 0–0.3)
BILIRUB SERPL-MCNC: 0.4 MG/DL
BUN SERPL-MCNC: 13.3 MG/DL (ref 6–20)
CALCIUM SERPL-MCNC: 9.5 MG/DL (ref 8.6–10)
CHLORIDE SERPL-SCNC: 99 MMOL/L (ref 98–107)
CREAT SERPL-MCNC: 1.07 MG/DL (ref 0.67–1.17)
DEPRECATED HCO3 PLAS-SCNC: 29 MMOL/L (ref 22–29)
EGFRCR SERPLBLD CKD-EPI 2021: 88 ML/MIN/1.73M2
ERYTHROCYTE [DISTWIDTH] IN BLOOD BY AUTOMATED COUNT: 14.6 % (ref 10–15)
GLUCOSE SERPL-MCNC: 120 MG/DL (ref 70–99)
HCT VFR BLD AUTO: 38.5 % (ref 40–53)
HGB BLD-MCNC: 12.7 G/DL (ref 13.3–17.7)
INR PPP: 1.09 (ref 0.85–1.15)
MCH RBC QN AUTO: 27 PG (ref 26.5–33)
MCHC RBC AUTO-ENTMCNC: 33 G/DL (ref 31.5–36.5)
MCV RBC AUTO: 82 FL (ref 78–100)
PLATELET # BLD AUTO: 294 10E3/UL (ref 150–450)
POTASSIUM SERPL-SCNC: 3.2 MMOL/L (ref 3.4–5.3)
PROT SERPL-MCNC: 7.5 G/DL (ref 6.4–8.3)
RBC # BLD AUTO: 4.7 10E6/UL (ref 4.4–5.9)
SODIUM SERPL-SCNC: 139 MMOL/L (ref 135–145)
WBC # BLD AUTO: 9.2 10E3/UL (ref 4–11)

## 2024-01-30 PROCEDURE — 86706 HEP B SURFACE ANTIBODY: CPT | Performed by: PHYSICIAN ASSISTANT

## 2024-01-30 PROCEDURE — 82248 BILIRUBIN DIRECT: CPT | Performed by: PATHOLOGY

## 2024-01-30 PROCEDURE — 86704 HEP B CORE ANTIBODY TOTAL: CPT | Performed by: PHYSICIAN ASSISTANT

## 2024-01-30 PROCEDURE — 80053 COMPREHEN METABOLIC PANEL: CPT | Performed by: PATHOLOGY

## 2024-01-30 PROCEDURE — 36415 COLL VENOUS BLD VENIPUNCTURE: CPT | Performed by: PATHOLOGY

## 2024-01-30 PROCEDURE — 99000 SPECIMEN HANDLING OFFICE-LAB: CPT | Performed by: PATHOLOGY

## 2024-01-30 PROCEDURE — 85027 COMPLETE CBC AUTOMATED: CPT | Performed by: PATHOLOGY

## 2024-01-30 PROCEDURE — 85610 PROTHROMBIN TIME: CPT | Performed by: PATHOLOGY

## 2024-01-31 LAB
HBV CORE AB SERPL QL IA: NONREACTIVE
HBV SURFACE AB SERPL IA-ACNC: <3.5 M[IU]/ML
HBV SURFACE AB SERPL IA-ACNC: NONREACTIVE M[IU]/ML

## 2024-02-01 ENCOUNTER — ANCILLARY PROCEDURE (OUTPATIENT)
Dept: CT IMAGING | Facility: CLINIC | Age: 44
End: 2024-02-01
Attending: HOSPITALIST
Payer: MEDICAID

## 2024-02-01 DIAGNOSIS — R74.01 TRANSAMINITIS: ICD-10-CM

## 2024-02-01 DIAGNOSIS — I77.79 HEPATIC ARTERY DISSECTION (H): ICD-10-CM

## 2024-02-01 PROCEDURE — 74177 CT ABD & PELVIS W/CONTRAST: CPT | Performed by: STUDENT IN AN ORGANIZED HEALTH CARE EDUCATION/TRAINING PROGRAM

## 2024-02-01 RX ORDER — IOPAMIDOL 755 MG/ML
90 INJECTION, SOLUTION INTRAVASCULAR ONCE
Status: COMPLETED | OUTPATIENT
Start: 2024-02-01 | End: 2024-02-01

## 2024-02-01 RX ADMIN — IOPAMIDOL 90 ML: 755 INJECTION, SOLUTION INTRAVASCULAR at 08:34

## 2024-02-01 NOTE — DISCHARGE INSTRUCTIONS

## 2024-02-02 ENCOUNTER — TELEPHONE (OUTPATIENT)
Dept: VASCULAR SURGERY | Facility: CLINIC | Age: 44
End: 2024-02-02

## 2024-02-02 DIAGNOSIS — I77.79 HEPATIC ARTERY DISSECTION (H): Primary | ICD-10-CM

## 2024-02-02 DIAGNOSIS — I77.79 CELIAC ARTERY DISSECTION (H): ICD-10-CM

## 2024-02-02 NOTE — TELEPHONE ENCOUNTER
Vascular Clinic Appointment Request    Imaging needed? Yes. Orders placed.     Visit type: In-person    Provider: Dr. Pawel Pérez    Timeframe: 3 months (May 2024)    Appt notes: Follow-up celiac dissection surveillance    New or return?: Return vascular slot    Documentation routed to clinic coordinators for scheduling.    DAKOTA GrayN, RN  RN Care Coordinator  [facility] Vascular Surgery [facility] phone: [phone]  Fax: [phone]

## 2024-02-02 NOTE — TELEPHONE ENCOUNTER
I called Jong and let the pt know his CTA was stable per Dr Pérez. He should follow-up in 3 months. His potassium was slightly low at 3.2. Educated pt re: high-potassium diet and let him know he should have his potassium rechecked with his PCP in 1 week. Pt verbalized understanding.    PAKO Gray, RN  RN Care Coordinator  [facility] Vascular Surgery - [facility] phone: [phone]  Fax: [phone]

## 2024-02-06 ENCOUNTER — TELEPHONE (OUTPATIENT)
Dept: VASCULAR SURGERY | Facility: CLINIC | Age: 44
End: 2024-02-06
Payer: MEDICAID

## 2024-02-06 NOTE — TELEPHONE ENCOUNTER
Patient Contacted    Appointment type: MARTINASCP  Provider: JENN  Return date: 5/10/24  Specialty phone number: [phone]  Additional appointment(s) needed: CTA  Additonal Notes: per RNCC.

## 2024-02-09 DIAGNOSIS — I77.79 HEPATIC ARTERY DISSECTION (H): ICD-10-CM

## 2024-02-09 DIAGNOSIS — I10 ESSENTIAL HYPERTENSION: ICD-10-CM

## 2024-02-15 ENCOUNTER — DOCUMENTATION ONLY (OUTPATIENT)
Dept: RHEUMATOLOGY | Facility: CLINIC | Age: 44
End: 2024-02-15
Payer: MEDICAID

## 2024-02-15 NOTE — PROGRESS NOTES
Jong was a no-show for Rheumatology visit on 2/14/2024.    Clemencia Harris MD  Rheumatology attending

## 2024-04-01 ENCOUNTER — APPOINTMENT (OUTPATIENT)
Dept: ULTRASOUND IMAGING | Facility: CLINIC | Age: 44
End: 2024-04-01
Attending: EMERGENCY MEDICINE
Payer: MEDICAID

## 2024-04-01 ENCOUNTER — HOSPITAL ENCOUNTER (INPATIENT)
Facility: CLINIC | Age: 44
LOS: 2 days | Discharge: HOME | End: 2024-04-04
Attending: EMERGENCY MEDICINE | Admitting: HOSPITALIST
Payer: MEDICAID

## 2024-04-01 ENCOUNTER — APPOINTMENT (OUTPATIENT)
Dept: CT IMAGING | Facility: CLINIC | Age: 44
End: 2024-04-01
Attending: EMERGENCY MEDICINE
Payer: MEDICAID

## 2024-04-01 DIAGNOSIS — E80.6 HYPERBILIRUBINEMIA: ICD-10-CM

## 2024-04-01 DIAGNOSIS — E11.65 TYPE 2 DIABETES MELLITUS WITH HYPERGLYCEMIA, UNSPECIFIED WHETHER LONG TERM INSULIN USE (H): ICD-10-CM

## 2024-04-01 DIAGNOSIS — R79.89 ELEVATED LFTS: ICD-10-CM

## 2024-04-01 DIAGNOSIS — K80.71 CALCULUS OF GALLBLADDER AND BILE DUCT WITH OBSTRUCTION WITHOUT CHOLECYSTITIS: Primary | ICD-10-CM

## 2024-04-01 DIAGNOSIS — K83.1 BILIARY OBSTRUCTION (H): ICD-10-CM

## 2024-04-01 DIAGNOSIS — R11.2 NAUSEA AND VOMITING, UNSPECIFIED VOMITING TYPE: ICD-10-CM

## 2024-04-01 LAB
ALBUMIN SERPL BCG-MCNC: 4.8 G/DL (ref 3.5–5.2)
ALBUMIN UR-MCNC: 30 MG/DL
ALP SERPL-CCNC: 584 U/L (ref 40–150)
ALT SERPL W P-5'-P-CCNC: 716 U/L (ref 0–70)
ANION GAP SERPL CALCULATED.3IONS-SCNC: 17 MMOL/L (ref 7–15)
APPEARANCE UR: CLEAR
AST SERPL W P-5'-P-CCNC: ABNORMAL U/L
BASOPHILS # BLD AUTO: 0.1 10E3/UL (ref 0–0.2)
BASOPHILS NFR BLD AUTO: 1 %
BILIRUB DIRECT SERPL-MCNC: 6.19 MG/DL (ref 0–0.3)
BILIRUB SERPL-MCNC: 8.1 MG/DL
BILIRUB UR QL STRIP: ABNORMAL
BUN SERPL-MCNC: 13 MG/DL (ref 6–20)
CALCIUM SERPL-MCNC: 9.9 MG/DL (ref 8.6–10)
CELLULAR CAST: 1 /LPF
CHLORIDE SERPL-SCNC: 92 MMOL/L (ref 98–107)
COLOR UR AUTO: ABNORMAL
CREAT SERPL-MCNC: 0.98 MG/DL (ref 0.67–1.17)
DEPRECATED HCO3 PLAS-SCNC: 27 MMOL/L (ref 22–29)
EGFRCR SERPLBLD CKD-EPI 2021: >90 ML/MIN/1.73M2
EOSINOPHIL # BLD AUTO: 0.2 10E3/UL (ref 0–0.7)
EOSINOPHIL NFR BLD AUTO: 3 %
ERYTHROCYTE [DISTWIDTH] IN BLOOD BY AUTOMATED COUNT: 16.7 % (ref 10–15)
GLUCOSE SERPL-MCNC: 131 MG/DL (ref 70–99)
GLUCOSE UR STRIP-MCNC: NEGATIVE MG/DL
HCT VFR BLD AUTO: 42.9 % (ref 40–53)
HGB BLD-MCNC: 14.8 G/DL (ref 13.3–17.7)
HGB UR QL STRIP: ABNORMAL
IMM GRANULOCYTES # BLD: 0 10E3/UL
IMM GRANULOCYTES NFR BLD: 1 %
INR PPP: 0.91 (ref 0.85–1.15)
KETONES UR STRIP-MCNC: NEGATIVE MG/DL
LACTATE SERPL-SCNC: 1.7 MMOL/L (ref 0.7–2)
LEUKOCYTE ESTERASE UR QL STRIP: NEGATIVE
LIPASE SERPL-CCNC: 30 U/L (ref 13–60)
LYMPHOCYTES # BLD AUTO: 1.7 10E3/UL (ref 0.8–5.3)
LYMPHOCYTES NFR BLD AUTO: 23 %
MCH RBC QN AUTO: 28.4 PG (ref 26.5–33)
MCHC RBC AUTO-ENTMCNC: 34.5 G/DL (ref 31.5–36.5)
MCV RBC AUTO: 82 FL (ref 78–100)
MONOCYTES # BLD AUTO: 0.5 10E3/UL (ref 0–1.3)
MONOCYTES NFR BLD AUTO: 7 %
MUCOUS THREADS #/AREA URNS LPF: PRESENT /LPF
NEUTROPHILS # BLD AUTO: 4.9 10E3/UL (ref 1.6–8.3)
NEUTROPHILS NFR BLD AUTO: 65 %
NITRATE UR QL: NEGATIVE
NRBC # BLD AUTO: 0 10E3/UL
NRBC BLD AUTO-RTO: 0 /100
PH UR STRIP: 6 [PH] (ref 5–7)
PLATELET # BLD AUTO: 346 10E3/UL (ref 150–450)
POTASSIUM SERPL-SCNC: 3.4 MMOL/L (ref 3.4–5.3)
PROT SERPL-MCNC: 8.5 G/DL (ref 6.4–8.3)
RBC # BLD AUTO: 5.22 10E6/UL (ref 4.4–5.9)
RBC URINE: <1 /HPF
SODIUM SERPL-SCNC: 136 MMOL/L (ref 135–145)
SP GR UR STRIP: 1.02 (ref 1–1.03)
SQUAMOUS EPITHELIAL: <1 /HPF
T4 FREE SERPL-MCNC: 1.37 NG/DL (ref 0.9–1.7)
TROPONIN T SERPL HS-MCNC: 18 NG/L
TSH SERPL DL<=0.005 MIU/L-ACNC: 0.23 UIU/ML (ref 0.3–4.2)
UROBILINOGEN UR STRIP-MCNC: 3 MG/DL
WBC # BLD AUTO: 7.4 10E3/UL (ref 4–11)
WBC URINE: 3 /HPF

## 2024-04-01 PROCEDURE — 84484 ASSAY OF TROPONIN QUANT: CPT | Performed by: EMERGENCY MEDICINE

## 2024-04-01 PROCEDURE — 81001 URINALYSIS AUTO W/SCOPE: CPT | Performed by: EMERGENCY MEDICINE

## 2024-04-01 PROCEDURE — 74174 CTA ABD&PLVS W/CONTRAST: CPT | Mod: 26 | Performed by: RADIOLOGY

## 2024-04-01 PROCEDURE — 99285 EMERGENCY DEPT VISIT HI MDM: CPT | Mod: 25 | Performed by: EMERGENCY MEDICINE

## 2024-04-01 PROCEDURE — 93005 ELECTROCARDIOGRAM TRACING: CPT | Performed by: EMERGENCY MEDICINE

## 2024-04-01 PROCEDURE — 84443 ASSAY THYROID STIM HORMONE: CPT | Performed by: EMERGENCY MEDICINE

## 2024-04-01 PROCEDURE — 83690 ASSAY OF LIPASE: CPT | Performed by: EMERGENCY MEDICINE

## 2024-04-01 PROCEDURE — 84439 ASSAY OF FREE THYROXINE: CPT | Performed by: EMERGENCY MEDICINE

## 2024-04-01 PROCEDURE — 74174 CTA ABD&PLVS W/CONTRAST: CPT

## 2024-04-01 PROCEDURE — 93010 ELECTROCARDIOGRAM REPORT: CPT | Performed by: EMERGENCY MEDICINE

## 2024-04-01 PROCEDURE — 36415 COLL VENOUS BLD VENIPUNCTURE: CPT | Performed by: EMERGENCY MEDICINE

## 2024-04-01 PROCEDURE — 84155 ASSAY OF PROTEIN SERUM: CPT | Performed by: EMERGENCY MEDICINE

## 2024-04-01 PROCEDURE — 82248 BILIRUBIN DIRECT: CPT | Performed by: EMERGENCY MEDICINE

## 2024-04-01 PROCEDURE — 93976 VASCULAR STUDY: CPT | Mod: 26 | Performed by: RADIOLOGY

## 2024-04-01 PROCEDURE — 76705 ECHO EXAM OF ABDOMEN: CPT

## 2024-04-01 PROCEDURE — 83605 ASSAY OF LACTIC ACID: CPT | Performed by: EMERGENCY MEDICINE

## 2024-04-01 PROCEDURE — 85610 PROTHROMBIN TIME: CPT | Performed by: EMERGENCY MEDICINE

## 2024-04-01 PROCEDURE — 85025 COMPLETE CBC W/AUTO DIFF WBC: CPT | Performed by: EMERGENCY MEDICINE

## 2024-04-01 PROCEDURE — 82247 BILIRUBIN TOTAL: CPT | Performed by: EMERGENCY MEDICINE

## 2024-04-01 PROCEDURE — 250N000011 HC RX IP 250 OP 636: Performed by: EMERGENCY MEDICINE

## 2024-04-01 PROCEDURE — 76705 ECHO EXAM OF ABDOMEN: CPT | Mod: 26 | Performed by: RADIOLOGY

## 2024-04-01 RX ORDER — IBUPROFEN 600 MG/1
600 TABLET, FILM COATED ORAL EVERY 6 HOURS PRN
COMMUNITY
End: 2024-04-10

## 2024-04-01 RX ORDER — IOPAMIDOL 755 MG/ML
127 INJECTION, SOLUTION INTRAVASCULAR ONCE
Status: COMPLETED | OUTPATIENT
Start: 2024-04-01 | End: 2024-04-01

## 2024-04-01 RX ADMIN — IOPAMIDOL 90 ML: 755 INJECTION, SOLUTION INTRAVENOUS at 22:37

## 2024-04-01 ASSESSMENT — ACTIVITIES OF DAILY LIVING (ADL)
ADLS_ACUITY_SCORE: 35

## 2024-04-02 ENCOUNTER — APPOINTMENT (OUTPATIENT)
Dept: MRI IMAGING | Facility: CLINIC | Age: 44
End: 2024-04-02
Payer: MEDICAID

## 2024-04-02 PROBLEM — R79.89 ELEVATED LFTS: Status: ACTIVE | Noted: 2024-04-02

## 2024-04-02 PROBLEM — E80.6 HYPERBILIRUBINEMIA: Status: ACTIVE | Noted: 2024-04-02

## 2024-04-02 PROBLEM — K83.1 BILIARY OBSTRUCTION (H): Status: ACTIVE | Noted: 2024-04-02

## 2024-04-02 LAB
ALBUMIN SERPL BCG-MCNC: 4.5 G/DL (ref 3.5–5.2)
ALP SERPL-CCNC: 557 U/L (ref 40–150)
ALT SERPL W P-5'-P-CCNC: 633 U/L (ref 0–70)
ANION GAP SERPL CALCULATED.3IONS-SCNC: 16 MMOL/L (ref 7–15)
AST SERPL W P-5'-P-CCNC: 332 U/L (ref 0–45)
AST SERPL W P-5'-P-CCNC: 334 U/L (ref 0–45)
BASOPHILS # BLD AUTO: 0.1 10E3/UL (ref 0–0.2)
BASOPHILS NFR BLD AUTO: 1 %
BILIRUB SERPL-MCNC: 8.7 MG/DL
BUN SERPL-MCNC: 11.1 MG/DL (ref 6–20)
CALCIUM SERPL-MCNC: 9.8 MG/DL (ref 8.6–10)
CHLORIDE SERPL-SCNC: 92 MMOL/L (ref 98–107)
CREAT SERPL-MCNC: 0.87 MG/DL (ref 0.67–1.17)
CRP SERPL-MCNC: 15 MG/L
DEPRECATED HCO3 PLAS-SCNC: 27 MMOL/L (ref 22–29)
EGFRCR SERPLBLD CKD-EPI 2021: >90 ML/MIN/1.73M2
EOSINOPHIL # BLD AUTO: 0.2 10E3/UL (ref 0–0.7)
EOSINOPHIL NFR BLD AUTO: 3 %
ERYTHROCYTE [DISTWIDTH] IN BLOOD BY AUTOMATED COUNT: 16.4 % (ref 10–15)
GLUCOSE BLDC GLUCOMTR-MCNC: 132 MG/DL (ref 70–99)
GLUCOSE BLDC GLUCOMTR-MCNC: 141 MG/DL (ref 70–99)
GLUCOSE BLDC GLUCOMTR-MCNC: 153 MG/DL (ref 70–99)
GLUCOSE BLDC GLUCOMTR-MCNC: 154 MG/DL (ref 70–99)
GLUCOSE SERPL-MCNC: 133 MG/DL (ref 70–99)
HCT VFR BLD AUTO: 40.8 % (ref 40–53)
HGB BLD-MCNC: 14.3 G/DL (ref 13.3–17.7)
IMM GRANULOCYTES # BLD: 0 10E3/UL
IMM GRANULOCYTES NFR BLD: 1 %
LYMPHOCYTES # BLD AUTO: 1.6 10E3/UL (ref 0.8–5.3)
LYMPHOCYTES NFR BLD AUTO: 22 %
MCH RBC QN AUTO: 28.5 PG (ref 26.5–33)
MCHC RBC AUTO-ENTMCNC: 35 G/DL (ref 31.5–36.5)
MCV RBC AUTO: 81 FL (ref 78–100)
MONOCYTES # BLD AUTO: 0.5 10E3/UL (ref 0–1.3)
MONOCYTES NFR BLD AUTO: 7 %
NEUTROPHILS # BLD AUTO: 4.6 10E3/UL (ref 1.6–8.3)
NEUTROPHILS NFR BLD AUTO: 66 %
NRBC # BLD AUTO: 0 10E3/UL
NRBC BLD AUTO-RTO: 0 /100
PLATELET # BLD AUTO: 333 10E3/UL (ref 150–450)
POTASSIUM SERPL-SCNC: 2.6 MMOL/L (ref 3.4–5.3)
PROT SERPL-MCNC: 8 G/DL (ref 6.4–8.3)
RBC # BLD AUTO: 5.01 10E6/UL (ref 4.4–5.9)
SODIUM SERPL-SCNC: 135 MMOL/L (ref 135–145)
WBC # BLD AUTO: 7 10E3/UL (ref 4–11)

## 2024-04-02 PROCEDURE — 84450 TRANSFERASE (AST) (SGOT): CPT | Performed by: EMERGENCY MEDICINE

## 2024-04-02 PROCEDURE — 74183 MRI ABD W/O CNTR FLWD CNTR: CPT | Mod: 26 | Performed by: STUDENT IN AN ORGANIZED HEALTH CARE EDUCATION/TRAINING PROGRAM

## 2024-04-02 PROCEDURE — 99223 1ST HOSP IP/OBS HIGH 75: CPT | Mod: GC | Performed by: HOSPITALIST

## 2024-04-02 PROCEDURE — 250N000013 HC RX MED GY IP 250 OP 250 PS 637

## 2024-04-02 PROCEDURE — 74183 MRI ABD W/O CNTR FLWD CNTR: CPT

## 2024-04-02 PROCEDURE — 255N000002 HC RX 255 OP 636: Performed by: STUDENT IN AN ORGANIZED HEALTH CARE EDUCATION/TRAINING PROGRAM

## 2024-04-02 PROCEDURE — 82962 GLUCOSE BLOOD TEST: CPT

## 2024-04-02 PROCEDURE — 250N000012 HC RX MED GY IP 250 OP 636 PS 637

## 2024-04-02 PROCEDURE — 85025 COMPLETE CBC W/AUTO DIFF WBC: CPT

## 2024-04-02 PROCEDURE — 36415 COLL VENOUS BLD VENIPUNCTURE: CPT | Performed by: EMERGENCY MEDICINE

## 2024-04-02 PROCEDURE — 84450 TRANSFERASE (AST) (SGOT): CPT

## 2024-04-02 PROCEDURE — 86140 C-REACTIVE PROTEIN: CPT | Performed by: STUDENT IN AN ORGANIZED HEALTH CARE EDUCATION/TRAINING PROGRAM

## 2024-04-02 PROCEDURE — 258N000003 HC RX IP 258 OP 636

## 2024-04-02 PROCEDURE — 250N000011 HC RX IP 250 OP 636

## 2024-04-02 PROCEDURE — 120N000002 HC R&B MED SURG/OB UMMC

## 2024-04-02 PROCEDURE — 99207 PR NO BILLABLE SERVICE THIS VISIT: CPT | Performed by: STUDENT IN AN ORGANIZED HEALTH CARE EDUCATION/TRAINING PROGRAM

## 2024-04-02 PROCEDURE — A9585 GADOBUTROL INJECTION: HCPCS | Performed by: STUDENT IN AN ORGANIZED HEALTH CARE EDUCATION/TRAINING PROGRAM

## 2024-04-02 PROCEDURE — 99223 1ST HOSP IP/OBS HIGH 75: CPT | Performed by: PHYSICIAN ASSISTANT

## 2024-04-02 PROCEDURE — 36415 COLL VENOUS BLD VENIPUNCTURE: CPT

## 2024-04-02 PROCEDURE — 99207 PR NO BILLABLE SERVICE THIS VISIT: CPT | Mod: GC | Performed by: INTERNAL MEDICINE

## 2024-04-02 RX ORDER — DEXTROSE MONOHYDRATE 25 G/50ML
25-50 INJECTION, SOLUTION INTRAVENOUS
Status: DISCONTINUED | OUTPATIENT
Start: 2024-04-02 | End: 2024-04-04 | Stop reason: HOSPADM

## 2024-04-02 RX ORDER — AMOXICILLIN 250 MG
1 CAPSULE ORAL 2 TIMES DAILY PRN
Status: DISCONTINUED | OUTPATIENT
Start: 2024-04-02 | End: 2024-04-04 | Stop reason: HOSPADM

## 2024-04-02 RX ORDER — POTASSIUM CHLORIDE 750 MG/1
40 TABLET, EXTENDED RELEASE ORAL ONCE
Status: COMPLETED | OUTPATIENT
Start: 2024-04-02 | End: 2024-04-02

## 2024-04-02 RX ORDER — SODIUM CHLORIDE, SODIUM LACTATE, POTASSIUM CHLORIDE, CALCIUM CHLORIDE 600; 310; 30; 20 MG/100ML; MG/100ML; MG/100ML; MG/100ML
INJECTION, SOLUTION INTRAVENOUS CONTINUOUS
Status: DISCONTINUED | OUTPATIENT
Start: 2024-04-02 | End: 2024-04-04 | Stop reason: HOSPADM

## 2024-04-02 RX ORDER — GADOBUTROL 604.72 MG/ML
10 INJECTION INTRAVENOUS ONCE
Status: COMPLETED | OUTPATIENT
Start: 2024-04-02 | End: 2024-04-02

## 2024-04-02 RX ORDER — NICOTINE POLACRILEX 4 MG
15-30 LOZENGE BUCCAL
Status: DISCONTINUED | OUTPATIENT
Start: 2024-04-02 | End: 2024-04-04 | Stop reason: HOSPADM

## 2024-04-02 RX ORDER — METOPROLOL TARTRATE 50 MG
50 TABLET ORAL 2 TIMES DAILY
Status: DISCONTINUED | OUTPATIENT
Start: 2024-04-02 | End: 2024-04-04 | Stop reason: HOSPADM

## 2024-04-02 RX ORDER — AMOXICILLIN 250 MG
2 CAPSULE ORAL 2 TIMES DAILY PRN
Status: DISCONTINUED | OUTPATIENT
Start: 2024-04-02 | End: 2024-04-04 | Stop reason: HOSPADM

## 2024-04-02 RX ORDER — HYDROCHLOROTHIAZIDE 25 MG/1
25 TABLET ORAL DAILY
Status: DISCONTINUED | OUTPATIENT
Start: 2024-04-02 | End: 2024-04-04 | Stop reason: HOSPADM

## 2024-04-02 RX ORDER — AMLODIPINE BESYLATE 10 MG/1
10 TABLET ORAL DAILY
Status: DISCONTINUED | OUTPATIENT
Start: 2024-04-02 | End: 2024-04-04 | Stop reason: HOSPADM

## 2024-04-02 RX ORDER — ALBUTEROL SULFATE 90 UG/1
2 AEROSOL, METERED RESPIRATORY (INHALATION) EVERY 4 HOURS PRN
Status: DISCONTINUED | OUTPATIENT
Start: 2024-04-02 | End: 2024-04-04 | Stop reason: HOSPADM

## 2024-04-02 RX ORDER — HYDRALAZINE HYDROCHLORIDE 20 MG/ML
10 INJECTION INTRAMUSCULAR; INTRAVENOUS EVERY 4 HOURS PRN
Status: DISCONTINUED | OUTPATIENT
Start: 2024-04-02 | End: 2024-04-04 | Stop reason: HOSPADM

## 2024-04-02 RX ORDER — CALCIUM CARBONATE 500 MG/1
1000 TABLET, CHEWABLE ORAL 4 TIMES DAILY PRN
Status: DISCONTINUED | OUTPATIENT
Start: 2024-04-02 | End: 2024-04-04 | Stop reason: HOSPADM

## 2024-04-02 RX ORDER — ASPIRIN 81 MG/1
81 TABLET, CHEWABLE ORAL DAILY
Status: DISCONTINUED | OUTPATIENT
Start: 2024-04-02 | End: 2024-04-04 | Stop reason: HOSPADM

## 2024-04-02 RX ORDER — CLONIDINE HYDROCHLORIDE 0.1 MG/1
0.1 TABLET ORAL 2 TIMES DAILY
Status: DISCONTINUED | OUTPATIENT
Start: 2024-04-02 | End: 2024-04-04 | Stop reason: HOSPADM

## 2024-04-02 RX ORDER — IBUPROFEN 600 MG/1
600 TABLET, FILM COATED ORAL EVERY 6 HOURS PRN
Status: DISCONTINUED | OUTPATIENT
Start: 2024-04-02 | End: 2024-04-04 | Stop reason: HOSPADM

## 2024-04-02 RX ORDER — POTASSIUM CHLORIDE 7.45 MG/ML
10 INJECTION INTRAVENOUS
Status: COMPLETED | OUTPATIENT
Start: 2024-04-02 | End: 2024-04-02

## 2024-04-02 RX ORDER — LIDOCAINE 40 MG/G
CREAM TOPICAL
Status: DISCONTINUED | OUTPATIENT
Start: 2024-04-02 | End: 2024-04-04 | Stop reason: HOSPADM

## 2024-04-02 RX ADMIN — METOPROLOL TARTRATE 50 MG: 50 TABLET, FILM COATED ORAL at 08:10

## 2024-04-02 RX ADMIN — SODIUM CHLORIDE, POTASSIUM CHLORIDE, SODIUM LACTATE AND CALCIUM CHLORIDE: 600; 310; 30; 20 INJECTION, SOLUTION INTRAVENOUS at 22:44

## 2024-04-02 RX ADMIN — INSULIN ASPART 1 UNITS: 100 INJECTION, SOLUTION INTRAVENOUS; SUBCUTANEOUS at 22:50

## 2024-04-02 RX ADMIN — METOPROLOL TARTRATE 50 MG: 50 TABLET, FILM COATED ORAL at 22:37

## 2024-04-02 RX ADMIN — POTASSIUM CHLORIDE 40 MEQ: 750 TABLET, EXTENDED RELEASE ORAL at 08:32

## 2024-04-02 RX ADMIN — HYDROCHLOROTHIAZIDE 25 MG: 25 TABLET ORAL at 08:10

## 2024-04-02 RX ADMIN — SODIUM CHLORIDE, POTASSIUM CHLORIDE, SODIUM LACTATE AND CALCIUM CHLORIDE: 600; 310; 30; 20 INJECTION, SOLUTION INTRAVENOUS at 13:15

## 2024-04-02 RX ADMIN — POTASSIUM CHLORIDE 10 MEQ: 7.46 INJECTION, SOLUTION INTRAVENOUS at 09:55

## 2024-04-02 RX ADMIN — CLONIDINE HYDROCHLORIDE 0.1 MG: 0.1 TABLET ORAL at 08:10

## 2024-04-02 RX ADMIN — INSULIN GLARGINE 20 UNITS: 100 INJECTION, SOLUTION SUBCUTANEOUS at 22:50

## 2024-04-02 RX ADMIN — GADOBUTROL 10 ML: 604.72 INJECTION INTRAVENOUS at 15:24

## 2024-04-02 RX ADMIN — AMLODIPINE BESYLATE 10 MG: 10 TABLET ORAL at 08:10

## 2024-04-02 RX ADMIN — CLONIDINE HYDROCHLORIDE 0.1 MG: 0.1 TABLET ORAL at 22:37

## 2024-04-02 RX ADMIN — ASPIRIN 81 MG CHEWABLE TABLET 81 MG: 81 TABLET CHEWABLE at 08:10

## 2024-04-02 RX ADMIN — POTASSIUM CHLORIDE 40 MEQ: 750 TABLET, EXTENDED RELEASE ORAL at 17:30

## 2024-04-02 RX ADMIN — POTASSIUM CHLORIDE 10 MEQ: 7.46 INJECTION, SOLUTION INTRAVENOUS at 08:40

## 2024-04-02 ASSESSMENT — ACTIVITIES OF DAILY LIVING (ADL)
ADLS_ACUITY_SCORE: 35
ADLS_ACUITY_SCORE: 18
ADLS_ACUITY_SCORE: 35
ADLS_ACUITY_SCORE: 18
ADLS_ACUITY_SCORE: 35

## 2024-04-02 NOTE — CONSULTS
Advanced Endoscopy/Pancreaticobiliary Consultation      Date of Admission:  4/1/2024  Reason for Admission: Jaundice, nausea  Date of Consult  4/2/2024   Requesting Physician:  Hai Lei DO           ASSESSMENT AND RECOMMENDATIONS:   Assessment:  44 year old male with a history of HTN (on 4 antihypertensives), DM2, polysubstance use disorder (marijuana, alcohol and tobacco), complex celiac artery dissection and hepatic artery aneurysm 2/2 possible segmental arterial mediolysis (follows with vascular surgery Dr. Pawel Pérez) who presented to the ED with abdominal pain, elevated liver tests and GI AE consulted for further evaluation and management of possible biliary obstruction.    #. Celiac Artery Dissection  #. Aneurysm of the right hepatic artery  #. Abnormal Liver Studies, cholestatic pattern  Patient presenting with abdominal discomfort and jaundice. Liver tests significantly elevated and higher than prior admission in January, now with hyperbilirubinemia. Tbili 8.1 (direct 6.19), alk phos 584, , . Imaging with known stable size of the aneurysmal hepatic artery but now with increase recanalization seen. There is new dilation of the intrahepatic bile ducts with normal size extrahepatic bile duct. Concern now for biliary obstruction and plan for MRCP for further evaluation, possible ERCP pending results.      #. Positive Smooth Muscle Wendy IgG titer (1/6/2024)  Admission in January with elevated liver tests. F-actin weakly positive and reflexed to smooth muscle wendy titer which was 1:160, suggestive of autoimmune hepatitis or chronic active hepatitis. Hepatology team also involved and tentatively planning liver biopsy pending MR results    #. Polysubstance use  Reports alcohol and continuous tobacco use. Recommended cessation especially in light of underlying liver disease.     Recommendations:  -- MR/MRCP with IV contrast today for further evaluation of biliary obstruction  -- Hepatology  involved  -- If ERCP indicated, may consider EUS w liver biopsy under same anesthesia  -- Alcohol cessation  -- Analgesia/Antiemetics per primary team    Discussed with primary team medicine resident    Gastroenterology follow up recommendations: MARYELLEN    Thank you for involving us in this patient's care. Please do not hesitate to contact the GI service with any questions or concerns.     Pt seen and care plan discussed with Dr. Guru Castle, GI staff physician.      Overall time spent on the date of this encounter preparing to see the patient (including chart review of available notes, clinical status events, imaging and labs); obtaining history; examining the patient, ordering medications, tests or procedures; communicating with other health care professionals; and documenting the above clinical information in the electronic medical record was  85  minutes.      Keyona Patton PA-C  Advanced Endoscopy/Pancreaticobiliary GI Service  [facility]  Vocera   -------------------------------------------------------------------------------------------------------------------       Reason for Consultation:   Patient with history of hepatic artery aneurysm, presented with transaminitis, elevated ALP.  Possible MRCP?            History of Present Illness:     Jong Quiroga is a 44 year old male with a history of HTN (on 4 antihypertensives), DM2, polysubstance use disorder (marijuana, alcohol and tobacco), complex celiac artery dissection and hepatic artery aneurysm 2/2 possible segmental arterial mediolysis (follows with vascular surgery Dr. Pawel Pérez) who presented to the ED with nausea and dark urine.    In the ED, vitals included T 98.5F, HR 73, /84, RR 20, O2 sat 99 on RA. Lab eval remarkable for WBC 7.4, Hgb 14.8, Plt 346, Tbili 8.1, Alk phos 584, ,  and INR 0.91. Imaging obtained included CT abd/pelv and RUQ US (described below). The patient was made NPO after  "midnight and admitted to the internal med service. Has decline pain medication.    Patient seen and examined at 1125. Currently, the patient reports that he came to the hospital because he has been nauseated and unable to eat anything for ~1 month. Notes that his urine was really dark, had trouble urinating and so came to the hospital. No vomiting but just \"feels ill\" after eating. Thinks he is losing weight (clothes fitting loosely) but when weight this AM was 191 which is his baseline. Denies fevers, chills or sweats. Notes that he has been seeing dark red blood in his stool for ~1 month. Denies hematemesis. No abdominal pain. Feels very tired and itchy. States that he is very frustrated about answering he same questions and being told the same thing over and over from multiple people. He states that he was hospitalized for 12 days back in January and left with no answers and nothing was done. Never had a colonoscopy. Last BM was this AM and described as soft and light brown, some \"flecks\" or red blood. .                Past Medical History:   Reviewed and edited as appropriate  Segmental arterial mediolysis  Celiac artery dissection  Hepatic artery aneurysm  Alcohol use disorder  Tobacco use  Hypertension         Past Surgical History:   Never had surgery         Social History:   The patient lives in [place] with his SO  Employer: not employed    Reports ongoing alcohol use, last drink yesterday           Family History:   Patient's family history is reviewed today and is non-contributory  No known family history of GI/liver disease         Allergies:   Reviewed and edited as appropriate   No Known Allergies         Medications:     Current Facility-Administered Medications   Medication    albuterol (PROVENTIL HFA/VENTOLIN HFA) inhaler    amLODIPine (NORVASC) tablet 10 mg    aspirin (ASA) chewable tablet 81 mg    calcium carbonate (TUMS) chewable tablet 1,000 mg    cloNIDine (CATAPRES) tablet 0.1 mg    " glucose gel 15-30 g    Or    dextrose 50 % injection 25-50 mL    Or    glucagon injection 1 mg    hydrochlorothiazide (HYDRODIURIL) tablet 25 mg    [Held by provider] ibuprofen (ADVIL/MOTRIN) tablet 600 mg    insulin aspart (NovoLOG) injection (RAPID ACTING)    insulin glargine (LANTUS PEN) injection 20 Units    lidocaine (LMX4) cream    lidocaine 1 % 0.1-1 mL    metoprolol tartrate (LOPRESSOR) tablet 50 mg    potassium chloride 10 mEq in 100 mL sterile water infusion    senna-docusate (SENOKOT-S/PERICOLACE) 8.6-50 MG per tablet 1 tablet    Or    senna-docusate (SENOKOT-S/PERICOLACE) 8.6-50 MG per tablet 2 tablet    sodium chloride (PF) 0.9% PF flush 3 mL    sodium chloride (PF) 0.9% PF flush 3 mL    umeclidinium-vilanterol (ANORO ELLIPTA) 62.5-25 MCG/ACT oral inhaler 1 puff     Current Outpatient Medications   Medication Sig    albuterol (PROAIR HFA/PROVENTIL HFA/VENTOLIN HFA) 108 (90 Base) MCG/ACT inhaler Inhale 2 puffs into the lungs every 4 hours as needed for shortness of breath    amLODIPine (NORVASC) 10 MG tablet Take 1 tablet (10 mg) by mouth daily    aspirin (ASA) 81 MG chewable tablet Take 1 tablet (81 mg) by mouth daily    cloNIDine (CATAPRES) 0.1 MG tablet Take 1 tablet (0.1 mg) by mouth 2 times daily    hydrochlorothiazide (HYDRODIURIL) 25 MG tablet Take 1 tablet (25 mg) by mouth daily    ibuprofen (ADVIL/MOTRIN) 600 MG tablet Take 600 mg by mouth every 6 hours as needed for moderate pain    insulin aspart (NOVOLOG PEN) 100 UNIT/ML pen Inject 5 Units Subcutaneous 3 times daily (with meals)    insulin aspart (NOVOLOG PEN) 100 UNIT/ML pen Inject 0-12 Units Subcutaneous 3 times daily (before meals) Blood Glucose             Dose  < 70                               see hypoglycemia protocol                             no dose, give prandial insulin  150-199                         2 units  200-249                         4 units  250-299                         6 units  300-349                          8 units  350-399                         10 units  400 and greater             12 units and call MD    insulin glargine (LANTUS PEN) 100 UNIT/ML pen Inject 60 Units Subcutaneous at bedtime    ipratropium-albuterol (COMBIVENT RESPIMAT)  MCG/ACT inhaler Inhale 1 puff into the lungs 4 times daily as needed for shortness of breath or wheezing    metoprolol tartrate (LOPRESSOR) 50 MG tablet Take 1 tablet (50 mg) by mouth 2 times daily    BD LINO U/F 32G X 4 MM insulin pen needle     blood glucose monitoring (SOFTCLIX) lancets     Blood Glucose Monitoring Suppl (ACCU-CHEK GUIDE ME) w/Device KIT     OPTIUM TEST STRIPS test strip Dispense item covered by pt ins. E11.65 NIDDM type II, uncontrolled - Test 4 times/day. Reason: High A1C             Physical Exam:   Temp: 98.3  F (36.8  C) Temp src: Oral BP: (!) 173/98 Pulse: 73   Resp: 16 SpO2: 97 % O2 Device: None (Room air)    Wt:   Wt Readings from Last 2 Encounters:   01/06/24 94.3 kg (207 lb 14.3 oz)        General: Comfortable appearing black male in NAD.  Answers appropriately.    HEENT: Head is AT/NC. Sclera icteric. No conjunctival injection.  Oropharynx is clear, moist and w/o exudate or lesions.  Lungs: Clear to auscultation anteriorly  Heart: Regular rate and rhythm.    Abdomen:  Soft, non-tender, non-distended.  BS +. No rebound or peritoneal signs  Extremities: WWP, no pedal edema.  Skin: jaundiced mucous membranes (mouth)  Neurologic: Grossly non-focal.  CN 2-12 grossly intact.            Data:   Labs and imaging below were independently reviewed and interpreted    LAB WORK:    BMP  Recent Labs   Lab 04/02/24  0646 04/01/24 2050    136   POTASSIUM 2.6* 3.4   CHLORIDE 92* 92*   LUIS 9.8 9.9   CO2 27 27   BUN 11.1 13.0   CR 0.87 0.98   * 131*     CBC  Recent Labs   Lab 04/02/24 0646 04/01/24 2050   WBC 7.0 7.4   RBC 5.01 5.22   HGB 14.3 14.8   HCT 40.8 42.9   MCV 81 82   MCH 28.5 28.4   MCHC 35.0 34.5   RDW 16.4* 16.7*    346      INR  Recent Labs   Lab 04/01/24 2050   INR 0.91     LFTs  Recent Labs   Lab 04/02/24  0646 04/02/24  0338 04/01/24 2050   ALKPHOS 557*  --  584*   * 334*  --    *  --  716*   BILITOTAL 8.7*  --  8.1*   PROTTOTAL 8.0  --  8.5*   ALBUMIN 4.5  --  4.8      PANC  Recent Labs   Lab 04/01/24 2050   LIPASE 30       IMAGING:  ------------------------------------------------------------------  EXAM: CTA ABDOMEN PELVIS WITH CONTRAST  LOCATION: [facility]  DATE: 4/1/2024                                                                      IMPRESSION:  1.  Since 02/01/2024, the overall size of the aneurysmal hepatic artery appears stable, however there is increased recanalization seen of the partially thrombosed proper hepatic artery.     2.  Increased dilatation of the intrahepatic bile ducts with normal-caliber extrahepatic bile ducts, this is worrisome for changes of obstruction and this may be secondary to the above-mentioned area of aneurysmal dilatation of the hepatic artery.   Laboratory correlation would be of benefit, if further imaging is required then an MRCP would be the exam of choice for further evaluation.  ------------------------------------------------------------------  EXAMINATION: US ABDOMEN LIMITED W ABDOMEN DOPPLER LIMITED 4/2/2024  12:14 AM                                                               Impression:   1. Patent Doppler evaluation with antegrade flow. Known hepatic artery  aneurysm is better evaluated on recent CT. Slow velocity in the  hepatic artery.  2. Normal grayscale appearance of the liver.  3. The gallbladder is obscured by overlying bowel gas.       =======================================================================

## 2024-04-02 NOTE — ED PROVIDER NOTES
[place] EMERGENCY DEPARTMENT ([facility])    4/01/24       ED PROVIDER NOTE    History   No chief complaint on file.    HPI  Jong Quiroga is a 44 year old male with PMH notable for celiac artery dissection, hepatic artery aneurysm, HTN, COPD, DM2 with long-term use of insulin, MDD, Anxiety, PTSD, etc. who presents to the Emergency Department due to 5 days of abdominal pain, darker urine, nausea.     REVIEW OF HISTORY:  Patient was seen in [facility] ED on 12/28/23 with RUQ abdominal pain and was found to have Aneurysmal dilatation of the hepatic artery. He was therefore transferred to [facility] on 12/29/23. CT showed complex dissection involving the celiac artery at the origin of the left gastric, splenic, and hepatic arteries. Per IR and vascular surgery consult, he was transferred to [facility] for evaluation by liver transplant service. Suspect aneurysm 2/2 uncontrolled HTN and tobacco abuse. No acute interventions at that time. Not a candidate for transplant. Per vascular surgery, no feasible distal end point for stenting or bypass. He was planned follow up with Vascular Surgery and Rheumatology in the outpatient setting for further management.      CTA ABDOMEN PELVIS WITH CONTRAST 2/1/2024   IMPRESSION:   1.  Stable/unchanged dissection involving and starting from the mid celiac artery 2 the bifurcation. Unchanged aneurysmal degeneration of the common hepatic, proper hepatic, and right hepatic arteries. There continues to be multifocal occlusions of numerous right intrahepatic arterial branches and chronic occlusion of the left hepatic artery. No evidence of parenchymal ischemia or heterogeneous perfusion. No evidence of rupture or surrounding inflammatory changes. The remainder of the large immediate size vessels of the abdomen and pelvis are normal. The etiology of this is indeterminate, given the relatively normal appearance of the remainder of the visceral arteries, this may  be in keeping with a single  process related to spontaneous dissection of the celiac artery with secondary chronic aneurysmal dilatation of the hepatic vasculature. Differential includes vasculitis, Segmental  arterial mediolysis.   2.  Slightly increased thrombosis of the right hepatic arterial aneurysm, with slightly decreased size of the perfused part.   3.  Stable mild right intrahepatic biliary ductal dilation which is likely secondary to mass effect from aneurysmal dilatation of the right hepatic artery.  4.  Urinary bladder is distended with borderline circumferential bladder wall thickening, can be seen with cystitis. Consider correlation with urinalysis.    CURRENT PRESENTATION  Patient presents today with a 5-day history of vague abdominal discomfort as well as noticing darker urine, nausea and does not quite feeling well.    He reports that he been doing fairly well over the last couple of months since his prior admission and the above conditions as described there and further in EMR. More recently though, he has gotten off probation, and spent some time enjoying himself and reports that he had been drinking, smoking marijuana, and he is at the point now where he wants to get healthier and is not liking how he physically feels at this point.    Over the last 5 days he has had the vague abdominal discomfort throughout the whole of the abdomen, perhaps a bit worse in the upper abdomen, but not significantly so. Associate with nausea but without vomiting. Does have poor appetite. No fevers or chills. No chest pain or breathing symptoms. Has noticed that his urine is darker, but not bloody, no dysuria and no other  symptoms. No change to bowel habits or BMs. No blood or melena. No new neck or back pain. No extremity symptoms, no neurosymptoms. No rashes or skin changes, but is diffusely itchy. No other new symptoms or complaints at this time.  Full ROS completed without any additional findings.    This part of the document was  transcribed by Jordon Jean, Medical Scribe.     Past Medical History  PMH: celiac artery dissection, hepatic artery aneurysm, HTN, COPD, DM2 with long-term use of insulin, MDD, Anxiety, PTSD, etc.  No past surgical history on file.  acetaminophen (TYLENOL) 500 MG tablet  albuterol (PROAIR HFA/PROVENTIL HFA/VENTOLIN HFA) 108 (90 Base) MCG/ACT inhaler  amLODIPine (NORVASC) 10 MG tablet  aspirin (ASA) 81 MG chewable tablet  BD LINO U/F 32G X 4 MM insulin pen needle  blood glucose monitoring (SOFTCLIX) lancets  Blood Glucose Monitoring Suppl (ACCU-CHEK GUIDE ME) w/Device KIT  cloNIDine (CATAPRES) 0.1 MG tablet  hydrochlorothiazide (HYDRODIURIL) 25 MG tablet  ibuprofen (ADVIL/MOTRIN) 600 MG tablet  insulin aspart (NOVOLOG PEN) 100 UNIT/ML pen  insulin aspart (NOVOLOG PEN) 100 UNIT/ML pen  insulin glargine (LANTUS PEN) 100 UNIT/ML pen  ipratropium-albuterol (COMBIVENT RESPIMAT)  MCG/ACT inhaler  metoprolol tartrate (LOPRESSOR) 50 MG tablet  Multiple Vitamin (MULTI-VITAMINS) TABS  nicotine (NICODERM CQ) 14 MG/24HR 24 hr patch  OPTIUM TEST STRIPS test strip  oxyCODONE (ROXICODONE) 5 MG tablet  senna-docusate (SENOKOT-S/PERICOLACE) 8.6-50 MG tablet      No Known Allergies  Family History  No family history on file.  Social History   Social History     Tobacco Use    Smoking status: Every Day     Types: Cigarettes     Passive exposure: Current    Smokeless tobacco: Never   Substance Use Topics    Alcohol use: Not Currently    Drug use: Yes     Types: Marijuana         A complete review of systems was performed with pertinent positives and negatives noted in the HPI, and all other systems negative.    Physical Exam   BP: 123/84  Pulse: 73  Temp: 98.5  F (36.9  C)  Resp: 20  SpO2: 99 %  Physical Exam  CONSTITUTIONAL: Well-developed and well-nourished. Awake and alert. Non-toxic appearance. No acute distress.   HENT:   - Head: Normocephalic and atraumatic.   - Ears: External ear grossly normal.   - Nose: Nose normal.  No rhinorrhea. No epistaxis.   - Mouth/Throat: MMM  EYES: Conjunctivae and lids are normal. Scleral icterus is noted.   NECK: Normal range of motion and phonation normal. Neck supple.  No tracheal deviation, no stridor. No edema or erythema noted.  CARDIOVASCULAR: Normal rate, regular rhythm and no appreciable abnormal heart sounds.  PULMONARY/CHEST: Normal work of breathing. No accessory muscle usage or stridor. No respiratory distress.  No appreciable abnormal breath sounds.  ABDOMEN: Does have some mild diffuse discomfort to palpation but without echo peritoneal findings. No palpable masses or abnormal pulsatility appreciated.   MUSCULOSKELETAL: Extremities warm and seemingly well perfused. No edema or calf tenderness.  NEUROLOGIC: Awake, alert. Not disoriented. No seizure activity. GCS 15  SKIN: Skin is warm and dry. No rash noted. No diaphoresis. No pallor.   PSYCHIATRIC: Normal mood and affect. Speech and behavior normal. Thought processes linear. Cognition and memory are normal. No SI/HI reported.      ED Course, Procedures, & Data             EKG Interpretation:      Interpreted by Anna Marie Archuleta MD  Time reviewed: 2005  Symptoms at time of EKG: Abdominal pain, nausea  Rhythm: Sinus  Rate: Normal  Axis: Normal  Ectopy: None  Conduction: Possible LVH (consistent with previous)  ST Segments/ T Waves: ST/T wave findings consistent with prior LVH/repolarization abnormalities.  Has TWI in leads I, II, III and aVF as well as V4-V6, and then has some concave ST elevation in V2 (all of which is consistent with previous)  Comparison to prior: vs 4 Jan 2024; appears quite similar to previous.  Clinical Impression: Sinus with findings for LVH/repolarization findings, as well as T WI in multiple leads as described above, all grossly similar to previous.      Critical care was not performed.     Medical Decision Making  The patient's presentation was of high complexity (an acute health issue posing potential threat to  life or bodily function).    The patient's evaluation involved:  review of 3+ test result(s) ordered prior to this encounter (see separate area of note for details)  ordering and/or review of 3+ test(s) in this encounter (see separate area of note for details)  discussion of management or test interpretation with another health professional (see separate area of note for details)    The patient's management necessitated high risk (a decision regarding hospitalization) and further care after sign-out to admitting IM team and overnight EM physician (see their note for further management).    Assessment & Plan    IMPRESSION:   Jong Quiroga is a 44 year old male with PMH notable for celiac artery dissection, hepatic artery aneurysm, HTN, COPD, DM2 with long-term use of insulin, MDD, Anxiety, PTSD, etc. who presents to the Emergency Department due to 5 days of abdominal pain, darker urine, nausea.     Clinically, patient appears nontoxic, NAD. Vitals grossly WNL. Otherwise on examination some diffuse abdominal discomfort to palpation without echo peritoneal findings. No other acute abnormalities appreciated on exam.    DDx includes, but not limited to, some sort of hepatobiliary issue (does seem to have some icterus on exam and is reporting diffuse itching), complication from his prior celiac artery dissection, artery aneurysm, thrombus, pancreatitis (says he has been drinking previously though not today and is currently clinically sober), gastritis/PUD, enteritis, colitis, amongst others.  Considered cardiac or thoracic etiology though think that to be of lower likelihood.    PLAN:   - Laboratory studies, urine studies  - Abdominal imaging, originally talked about ultrasound, but will after discussing with Radiology will get CTA of the abdomen/ pelvis (later added US given lab findings). Risks/benefits of pursuing imaging reviewed and accepted.   - Symptom management as needed.  - Dispo pending ED  "course.    RESULTS:  Labs:   - Notable hyperbilirubinemia (total and direct both elevated), ALT and Alk phos elevated.  - AST hemolyzed, new sample being drawn  - Normal lactate, normal troponin, normal WBC and Hgb  Urine:   - No apparent UTI  Imaging: Written preliminary reports reviewed:  - CT A/P: \"1.  Since 02/01/2024, the overall size of the aneurysmal hepatic artery appears stable, however there is increased recanalization seen of the partially thrombosed proper hepatic artery.  2.  Increased dilatation of the intrahepatic bile ducts with normal-caliber extrahepatic bile ducts, this is worrisome for changes of obstruction and this may be secondary to the above-mentioned area of aneurysmal dilatation of the hepatic artery.   Laboratory correlation would be of benefit, if further imaging is required then an MRCP would be the exam of choice for further evaluation.\"  Results/reports reviewed w/ patient who expresses understanding of findings and F/U recommendations.    RE-EVALUATION:  - Pt otherwise continues to do well here in the ED, no acute issues or apparent concerning changes in vitals or clinical appearance.    DISCUSSIONS:  - w/ IM: Reviewed patient/presentation, current state of workup/any pending studies. They will admit for further evaluation/management, F/U pending studies as needed, coordinate w/ consulting services as needed. No additional requests/recommendations for workup/management for in the ED at this time.  - w/ Patient: I have reviewed the available findings, tentative plan, with the patient and his loved one/guest. They expressed understanding and agreement with this plan. All questions answered to the best of our ability at this time.       DISPOSITION/PLANNING:  IMPRESSION:   - Hyperbilirubinemia / biliary obstruction / LFT elevation  - Hepatic artery aneurysm  - Abdominal pain, nausea, scleral icterus, diffuse pruritis  DISPOSITION:  - Admit to IM for further evaluation/management  OTHER " RECOMMENDATIONS:   - GI / Vascular consults  - Serial labs  - Likely MRCP and/or ERCP      ______________________________________________________________________          Anna Marie Archuleta MD  [facility] EMERGENCY DEPARTMENT  4/1/2024     Anna Marie Archuleta MD  04/02/24 0625

## 2024-04-02 NOTE — PROGRESS NOTES
Gastroenterology Consultation  GI Hepatology Service    Date of Admission:  4/1/2024  Date of Consult  4/2/2024   Reason for consult:   Mixed pattern of liver injury, high anti-smooth muscle titer  Requesting Physician:  Hai Lei DO  PATIENT:  Jong Quiroga  MRN:         [id number]          ASSESSMENT AND RECOMMENDATIONS:   Assessment:  Jong Quiroga is a 44 year old male who was admitted on 4/1/2024 for 5 days of nausea and progressively dark urine.      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  He was evaluated by vascular surgery, IR, rhematology, and hepatology.  No suitable surgical / endovascular target.  Extensive workup revealed negative Hep B, C, HIV, histoplasma, quant gold, treponemia, WHIT, ANCA, DsDNA, normal C3 and C4.  However, Anti-smooth muscle antibody was positive at 1:160 titer and F-actin was mildly positive at 22. Patient' CRP initially was only 0.9.  Patient did not follow-up with GI in the clinic because he felt well.     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 CTA in Feb and at admission shows stable size of hepatic artery but LFTs are much higher.  Does show intrahepatic dilation but no extrahpeatic dilation.  CRP is also now more elevated than in January.     - AIH is a challengeing diagnosis.  [employer] Hepatitis [employer] (IAIHG) simplified diagnostic criteria include autoantibodies, immunoglobulin G, histology, and exclusion of viral hepatitis.     Patient meets criteria for autoantibodies and exclusion of viral hepatitis;  no IgG level or histology yet.  Response to steroid is another criteria that solidifies the  diagnosis.      # HTN - on 4 anti-hypertensive   # T2DM - on insulin  # Obesity, class I  # Bipolar disorder  # Binge Alcohol use     Recommendations:  - Agree with MRCP and panc-bili GI team is following.    - Patient likely needs a liver biopsy.  IR is consulted and tentatively planning on doing on 4/5/24 if patient stays inpatient because of need to hold ASA for 3 days.   - Ordered IgG level and hepatitis A and E level (not done last time).   - Recommend complete alcohol cessation -- currently binge drinks 1 Bottle of vodka twice a week.     Thank you for involving us in this patient's care. Please do not hesitate to contact the GI service with any questions or concerns.  The patient was discussed and plan agreed upon with Dr. Ramos.    Haresh Klein (Vijay Sloan DO, MS  Gastroenterology Fellow  [facility]  Text Page          History of Present Illness:   Patient seen and examined at 11am. History is obtained from chart review and patient.    Jong Quiroga is a 44 year old male who was admitted on 4/1/2024 for 5 days of nausea and progressively dark urine.  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  He was evaluated by vascular surgery, IR, rhematology, and hepatology.  No suitable surgical / endovascular target.  Extensive workup revealed negative Hep B, C, HIV, histoplasma, quant gold, treponemia, WHIT, ANCA, DsDNA, normal C3 and C4.  However, Anti-smooth muscle antibody was positive at 1:160 titer and F-actin was mildly positive at 22. Patient's CRP initially was only 0.9.  Patient did not follow-up with GI in the clinic because he felt well.             Past Medical History:   Reviewed            Past Surgical History:   Reviewed            Social History:   [place]   Employer: Unemployed  Alcohol: 1  bottle of vodka twice a week, binge drinks.   Tobacco:  ~ 1 PPD  Marijuana: Daily use  Illicit drugs: Denies.            Family History:   Patient's family history is reviewed today and is non-contributory           Allergies:   Reviewed         Medications:   Antiplatelets: Yes  Anticoagulation: No         Review of Systems:     A review of systems was performed and is negative except as noted in the HPI           Physical Exam:   Temp: 98.3  F (36.8  C) Temp src: Oral BP: (!) 146/99 Pulse: 66   Resp: 16 SpO2: 97 % O2 Device: None (Room air)          General Appearance: NAD, pleasant  HEENT: EOMI   Respiratory: Breathing comfortably on RA   Cardiovascular:   GI: soft, NTND, no peritoneal sign  Extremities: No LE edema noted   Neuro: Moving all extremities   Skin: No jaundice rash on exposed areas   Psych: Alert and oriented, appropriate mood and affect, speech coherent / logical    Data:  Labs and imaging from this admission reviewed.    30y (1993) man with a PMHx significant for SLE on hydroxychloroquine, initially admitted to [facility] on 12/12, found to have b/l PE w/ superimposed PNA, transferred to [facility] on 12/22 for thoracic eval of b/l L > R effusions, L loculated. Patient was on heparin drip. Followed by ID and rheumatology due to perisistent fevers, no clear source identified (was on vancomycin, piperacillin-tazobactam). RRT called on 12/25 for SOB and chest pain. During RRT, patient was witnessed to have an episode of generalized convulsion lasting ~45 seconds that resolved spontaneously, associated with urinary incontinence. Prolonged period of encephalopathy this admission. Initially followed by General Neurology team due to concern for seizure, EEG showed mild cerebral dysfunction but no epileptiform activity or seizures. Stroke Neurology consulted for imaging findings -  L temporal cortical punctate focus of diffusion restriction - ?infarct.    Impression:  1. Incidental finding of small RIGHT temporal cortical punctate focus with diffusion restriction which may be acute ischemic infarct, differential also includes post-ictal changes, lupus related inflammatory changes. No focal neurologic deficits on exam.  Patient is currently anticoagulated for PE. If ischemic, mechanism of infarct may be related to patient's SLE - vasculopathy, hypercoagulability, or sterile nonbacterial thrombotic endocarditis (Libman-Sacks endocarditis). Patient may be hypercoagulable in setting of SLE, however APS labs interpreted as negative per Rheumatolgy. Will need to evaluate for cardiac thrombus or vegetations with KARIE.  2. One episode of generalized convulsion associated with urinary incontinence which may have been seizure, possibly provoked event. Multiple episodes of lower extremity shaking while conversational during episodes, no tongue biting, post ictal period or incontinence likely due to metabolic etiology, less likely focal onset aware motor seizures. EEGs showing mild cerebral dysfunction but no epileptiform activity or seizures, being monitored off antiseizure medications.    Recommendations:  [x] Obtain CT Angio Head/ Neck - results above (no acute finding)  [ ] Recommend KARIE to evaluate for potential thrombus/ vegetations   [x] Continue anticoagulation - transitioned to apixaban 5mg BID  [x] TTE done 12/25  [x] MRI brain w/o, reviewed  [ ] SBP goal normotension  [x] A1C 5.5,  - continue atorvastatin 80mg as ordered  [x] LP done 12/25, results reviewed   [x] EEG on 12/25/24 and 1/8/24 - Mild cerebral dysfunction No seizures or epileptiform activity.   [x] Appreciate Rheum evaluation. Will need to repeat hypercoagulable, APLS labs outpatient   [ ] Continue to monitor patient off EEG, no anti-seizure medications recommended at this time  [ ] Seizure and fall precautions  [ ] PT/OT as tolerated     Patient seen and d/w Dr. Libman. Plan is formalized once attending attestation is complete. 30y (1993) man with a PMHx significant for SLE on hydroxychloroquine, initially admitted to [facility] on 12/12, found to have b/l PE w/ superimposed PNA, transferred to [facility] on 12/22 for thoracic eval of b/l L > R effusions, L loculated. Patient was on heparin drip. Followed by ID and rheumatology due to perisistent fevers, no clear source identified (was on vancomycin, piperacillin-tazobactam). RRT called on 12/25 for SOB and chest pain. During RRT, patient was witnessed to have an episode of generalized convulsion lasting ~45 seconds that resolved spontaneously, associated with urinary incontinence. Prolonged period of encephalopathy this admission. Initially followed by General Neurology team due to concern for seizure, EEG showed mild cerebral dysfunction but no epileptiform activity or seizures. Stroke Neurology consulted for imaging findings -  L temporal cortical punctate focus of diffusion restriction - ?infarct.    Impression:  1. Incidental finding of small RIGHT temporal cortical punctate focus with diffusion restriction which may be acute ischemic infarct, differential also includes post-ictal changes, lupus related inflammatory changes. No focal neurologic deficits on exam.  Patient is currently anticoagulated for PE. If ischemic, mechanism of infarct may be related to patient's SLE - vasculopathy, hypercoagulability, or sterile nonbacterial thrombotic endocarditis (Libman-Sacks endocarditis). Patient may be hypercoagulable in setting of SLE, however APS labs interpreted as negative per Rheumatolgy. Will need to evaluate for cardiac thrombus or vegetations with KARIE.  2. One episode of generalized convulsion associated with urinary incontinence which may have been seizure, possibly provoked event. Multiple episodes of lower extremity shaking while conversational during episodes, no tongue biting, post ictal period or incontinence likely due to metabolic etiology, less likely focal onset aware motor seizures. EEGs showing mild cerebral dysfunction but no epileptiform activity or seizures, being monitored off antiseizure medications.    Recommendations:  [x] Obtain CT Angio Head/ Neck - results above (no acute finding)  [ ] Recommend KARIE to evaluate for potential thrombus/ vegetations   [x] Continue anticoagulation - transitioned to apixaban 5mg BID  [x] TTE done 12/25  [x] MRI brain w/o, reviewed  [ ] SBP goal normotension  [x] A1C 5.5,  - continue atorvastatin 80mg as ordered  [x] LP done 12/25, results reviewed   [x] EEG on 12/25/24 and 1/8/24 - Mild cerebral dysfunction No seizures or epileptiform activity.   [x] Appreciate Rheum evaluation. Will need to repeat hypercoagulable, APLS labs outpatient   [ ] Continue to monitor patient off EEG, no anti-seizure medications recommended at this time  [ ] Seizure and fall precautions  [ ] PT/OT as tolerated     Patient seen and d/w Dr. Libman. Plan is formalized once attending attestation is complete. 30y (1993) man with a PMHx significant for SLE on hydroxychloroquine, initially admitted to [facility] on 12/12, found to have b/l PE w/ superimposed PNA, transferred to [facility] on 12/22 for thoracic eval of b/l L > R effusions, L loculated. Patient was on heparin drip. Followed by ID and rheumatology due to perisistent fevers, no clear source identified (was on vancomycin, piperacillin-tazobactam). RRT called on 12/25 for SOB and chest pain. During RRT, patient was witnessed to have an episode of generalized convulsion lasting ~45 seconds that resolved spontaneously, associated with urinary incontinence. Prolonged period of encephalopathy this admission. Initially followed by General Neurology team due to concern for seizure, EEG showed mild cerebral dysfunction but no epileptiform activity or seizures. Stroke Neurology consulted for imaging findings -  L temporal cortical punctate focus of diffusion restriction - ?infarct.    Impression:  1. Incidental finding of small RIGHT temporal cortical punctate focus with diffusion restriction (PCA vs. MCA territory) which may be acute ischemic infarct, differential also includes post-ictal changes, lupus related inflammatory changes. No focal neurologic deficits on exam.  Patient is currently anticoagulated for PE. If ischemic, mechanism of infarct may be related to patient's SLE - vasculopathy, hypercoagulability, or sterile nonbacterial thrombotic endocarditis (Libman-Sacks endocarditis). Patient may be hypercoagulable in setting of SLE, however APS labs interpreted as negative per Rheumatolgy. Will need to evaluate for cardiac thrombus or vegetations with KARIE.  2. One episode of generalized convulsion associated with urinary incontinence which may have been seizure, possibly provoked event. Multiple episodes of lower extremity shaking while conversational during episodes, no tongue biting, post ictal period or incontinence likely due to metabolic etiology, less likely focal onset aware motor seizures. EEGs showing mild cerebral dysfunction but no epileptiform activity or seizures, being monitored off antiseizure medications.    Recommendations:  [x] Obtain CT Angio Head/ Neck - results above (no acute finding)  [ ] Recommend KARIE to evaluate for potential thrombus/ vegetations   [x] Continue anticoagulation - transitioned to apixaban 5mg BID  [x] TTE done 12/25  [x] MRI brain w/o, reviewed  [ ] SBP goal normotension  [x] A1C 5.5,  - continue atorvastatin 80mg as ordered  [x] LP done 12/25, results reviewed   [x] EEG on 12/25/24 and 1/8/24 - Mild cerebral dysfunction No seizures or epileptiform activity.   [x] Appreciate Rheum evaluation. Will need to repeat hypercoagulable, APLS labs outpatient   [ ] Continue to monitor patient off EEG, no anti-seizure medications recommended at this time  [ ] Seizure and fall precautions  [ ] PT/OT as tolerated     Patient seen and d/w Dr. Libman. Plan is formalized once attending attestation is complete. 30y (1993) man with a PMHx significant for SLE on hydroxychloroquine, initially admitted to [facility] on 12/12, found to have b/l PE w/ superimposed PNA, transferred to [facility] on 12/22 for thoracic eval of b/l L > R effusions, L loculated. Patient was on heparin drip. Followed by ID and rheumatology due to perisistent fevers, no clear source identified (was on vancomycin, piperacillin-tazobactam). RRT called on 12/25 for SOB and chest pain. During RRT, patient was witnessed to have an episode of generalized convulsion lasting ~45 seconds that resolved spontaneously, associated with urinary incontinence. Prolonged period of encephalopathy this admission. Initially followed by General Neurology team due to concern for seizure, EEG showed mild cerebral dysfunction but no epileptiform activity or seizures. Stroke Neurology consulted for imaging findings -  L temporal cortical punctate focus of diffusion restriction - ?infarct.    Impression:  1. Incidental finding of small RIGHT temporal cortical punctate focus with diffusion restriction (PCA vs. MCA territory) which may be acute ischemic infarct, differential also includes post-ictal changes, lupus related inflammatory changes. No focal neurologic deficits on exam.  Patient is currently anticoagulated for PE. If ischemic, mechanism of infarct may be related to patient's SLE - vasculopathy, hypercoagulability, or sterile nonbacterial thrombotic endocarditis (Libman-Sacks endocarditis). Patient may be hypercoagulable in setting of SLE, however APS labs interpreted as negative per Rheumatolgy. Will need to evaluate for cardiac thrombus or vegetations with KARIE.  2. One episode of generalized convulsion associated with urinary incontinence which may have been seizure, possibly provoked event. Multiple episodes of lower extremity shaking while conversational during episodes, no tongue biting, post ictal period or incontinence likely due to metabolic etiology, less likely focal onset aware motor seizures. EEGs showing mild cerebral dysfunction but no epileptiform activity or seizures, being monitored off antiseizure medications.    Recommendations:  [x] Obtain CT Angio Head/ Neck - results above (no acute finding)  [ ] Recommend KARIE to evaluate for potential thrombus/ vegetations   [x] Continue anticoagulation - transitioned to apixaban 5mg BID  [x] TTE done 12/25  [x] MRI brain w/o, reviewed  [ ] SBP goal normotension  [x] A1C 5.5,  - continue atorvastatin 80mg as ordered  [x] LP done 12/25, results reviewed   [x] EEG on 12/25/24 and 1/8/24 - Mild cerebral dysfunction No seizures or epileptiform activity.   [x] Appreciate Rheum evaluation. Will need to repeat hypercoagulable, APLS labs outpatient   [ ] Continue to monitor patient off EEG, no anti-seizure medications recommended at this time  [ ] Seizure and fall precautions  [ ] PT/OT as tolerated     Patient seen and d/w Dr. Libman. Plan is formalized once attending attestation is complete. 30y (1993) man with a PMHx significant for SLE on hydroxychloroquine, initially admitted to [facility] on 12/12, found to have b/l PE w/ superimposed PNA, transferred to [facility] on 12/22 for thoracic eval of b/l L > R effusions, L loculated. Patient was on heparin drip. Followed by ID and rheumatology due to perisistent fevers, no clear source identified (was on vancomycin, piperacillin-tazobactam). RRT called on 12/25 for SOB and chest pain. During RRT, patient was witnessed to have an episode of generalized convulsion lasting ~45 seconds that resolved spontaneously, associated with urinary incontinence. Prolonged period of encephalopathy this admission. Initially followed by General Neurology team due to concern for seizure, EEG showed mild cerebral dysfunction but no epileptiform activity or seizures. Stroke Neurology consulted for imaging findings -  R medial temporal cortical punctate focus of diffusion restriction - ?infarct.    Impression:  1. Incidental finding of small RIGHT medial temporal cortical punctate focus with diffusion restriction (PCA vs. MCA territory) which may be acute ischemic infarct, differential also includes post-ictal changes, cerebritis. No focal neurologic deficits on exam.  Patient is currently anticoagulated for PE. If ischemic, mechanism of infarct may be related to patient's SLE - vasculopathy, hypercoagulability, or sterile nonbacterial thrombotic endocarditis (Libman-Sacks endocarditis). Patient may be hypercoagulable in setting of SLE, however APS labs interpreted as negative per Rheumatolgy. Will need to evaluate for cardiac thrombus or vegetations with KARIE.  2. One episode of generalized convulsion associated with urinary incontinence which may have been seizure, possibly provoked event. Multiple episodes of lower extremity shaking while conversational during episodes, no tongue biting, post ictal period or incontinence likely due to metabolic etiology, less likely focal onset aware motor seizures. EEGs showing mild cerebral dysfunction but no epileptiform activity or seizures, being monitored off antiseizure medications.    Recommendations:  [x] Obtain CT Angio Head/ Neck - results above (no acute finding)  [ ] Recommend KARIE to evaluate for potential thrombus/ vegetations   [x] Continue anticoagulation - transitioned to apixaban 5mg BID  [x] TTE done 12/25  [x] MRI brain w/o, reviewed  [ ] SBP goal normotension  [x] A1C 5.5,  - continue atorvastatin 80mg as ordered  [x] LP done 12/25, results reviewed   [x] EEG on 12/25/24 and 1/8/24 - Mild cerebral dysfunction No seizures or epileptiform activity.   [x] Appreciate Rheum evaluation. Will need to repeat hypercoagulable, APLS labs outpatient   [ ] Continue to monitor patient off EEG, no anti-seizure medications recommended at this time  [ ] Seizure and fall precautions  [ ] PT/OT as tolerated     Patient seen and d/w Dr. Libman. Plan is formalized once attending attestation is complete. 30y (1993) man with a PMHx significant for SLE on hydroxychloroquine, initially admitted to [facility] on 12/12, found to have b/l PE w/ superimposed PNA, transferred to [facility] on 12/22 for thoracic eval of b/l L > R effusions, L loculated. Patient was on heparin drip. Followed by ID and rheumatology due to perisistent fevers, no clear source identified (was on vancomycin, piperacillin-tazobactam). RRT called on 12/25 for SOB and chest pain. During RRT, patient was witnessed to have an episode of generalized convulsion lasting ~45 seconds that resolved spontaneously, associated with urinary incontinence. Prolonged period of encephalopathy this admission. Initially followed by General Neurology team due to concern for seizure, EEG showed mild cerebral dysfunction but no epileptiform activity or seizures. Stroke Neurology consulted for imaging findings -  R medial temporal cortical punctate focus of diffusion restriction - ?infarct.    Impression:  1. Incidental finding of small RIGHT medial temporal cortical punctate focus with diffusion restriction (PCA vs. MCA territory) which may be acute ischemic infarct, differential also includes post-ictal changes, cerebritis. No focal neurologic deficits on exam.  Patient is currently anticoagulated for PE. If ischemic, mechanism of infarct may be related to patient's SLE - vasculopathy, hypercoagulability, or sterile nonbacterial thrombotic endocarditis (Libman-Sacks endocarditis). Patient may be hypercoagulable in setting of SLE, however APS labs interpreted as negative per Rheumatolgy. Will need to evaluate for cardiac thrombus or vegetations with KAIRE.  2. One episode of generalized convulsion associated with urinary incontinence which may have been seizure, possibly provoked event. Multiple episodes of lower extremity shaking while conversational during episodes, no tongue biting, post ictal period or incontinence likely due to metabolic etiology, less likely focal onset aware motor seizures. EEGs showing mild cerebral dysfunction but no epileptiform activity or seizures, being monitored off antiseizure medications.    Recommendations:  [x] Obtain CT Angio Head/ Neck - results above (no acute finding)  [ ] Recommend KARIE to evaluate for potential thrombus/ vegetations   [x] Continue anticoagulation - transitioned to apixaban 5mg BID  [x] TTE done 12/25  [x] MRI brain w/o, reviewed  [ ] SBP goal normotension  [x] A1C 5.5,  - continue atorvastatin 80mg as ordered  [x] LP done 12/25, results reviewed   [x] EEG on 12/25/24 and 1/8/24 - Mild cerebral dysfunction No seizures or epileptiform activity.   [x] Appreciate Rheum evaluation. Will need to repeat hypercoagulable, APLS labs outpatient   [ ] Continue to monitor patient off EEG, no anti-seizure medications recommended at this time  [ ] Seizure and fall precautions  [ ] PT/OT as tolerated     Patient seen and d/w Dr. Libman. Plan is formalized once attending attestation is complete. 30y (1993) man with a PMHx significant for SLE on hydroxychloroquine, initially admitted to [facility] on 12/12, found to have b/l PE w/ superimposed PNA, transferred to [facility] on 12/22 for thoracic eval of b/l L > R effusions, L loculated. Patient was on heparin drip. Followed by ID and rheumatology due to perisistent fevers, no clear source identified (was on vancomycin, piperacillin-tazobactam). RRT called on 12/25 for SOB and chest pain. During RRT, patient was witnessed to have an episode of generalized convulsion lasting ~45 seconds that resolved spontaneously, associated with urinary incontinence. Prolonged period of encephalopathy this admission. Initially followed by General Neurology team due to concern for seizure, EEG showed mild cerebral dysfunction but no epileptiform activity or seizures. Stroke Neurology consulted for imaging findings -  R medial temporal cortical punctate focus of diffusion restriction - ?infarct.    Impression:  1. Incidental finding of small RIGHT medial temporal white matter/cortical punctate focus with diffusion restriction (PCA vs. MCA territory) which may be acute ischemic infarct, differential also includes post-ictal changes, cerebritis. No focal neurologic deficits on exam.  Patient is currently anticoagulated for PE. If ischemic, mechanism of infarct may be related to patient's SLE - vasculopathy, hypercoagulability, or sterile nonbacterial thrombotic endocarditis (Libman-Sacks endocarditis). Patient may be hypercoagulable in setting of SLE, however APS labs interpreted as negative per Rheumatology Will need to evaluate for cardiac thrombus or vegetations with KARIE.  2. One episode of generalized convulsion associated with urinary incontinence which may have been seizure, possibly provoked event. Multiple episodes of lower extremity shaking while conversational during episodes, no tongue biting, post ictal period or incontinence likely due to metabolic etiology, less likely focal onset aware motor seizures. EEGs showing mild cerebral dysfunction but no epileptiform activity or seizures, being monitored off antiseizure medications.    Recommendations:  [x] Obtain CT Angio Head/ Neck - results above (no acute finding)  [ ] Recommend KARIE to evaluate for potential thrombus/ vegetations   [x] Continue anticoagulation - transitioned to apixaban 5mg BID  [x] TTE done 12/25  [x] MRI brain w/o, reviewed  [ ] SBP goal normotension  [x] A1C 5.5,  - continue atorvastatin 80mg as ordered  [x] LP done 12/25, results reviewed   [x] EEG on 12/25/24 and 1/8/24 - Mild cerebral dysfunction No seizures or epileptiform activity.   [x] Appreciate Rheum evaluation. Will need to repeat hypercoagulable, APLS labs outpatient   [ ] Continue to monitor patient off EEG, no anti-seizure medications recommended at this time  [ ] Seizure and fall precautions  [ ] PT/OT as tolerated     Patient seen and d/w Dr. Libman. Plan is formalized once attending attestation is complete. 30y (1993) man with a PMHx significant for SLE on hydroxychloroquine, initially admitted to [facility] on 12/12, found to have b/l PE w/ superimposed PNA, transferred to [facility] on 12/22 for thoracic eval of b/l L > R effusions, L loculated. Patient was on heparin drip. Followed by ID and rheumatology due to perisistent fevers, no clear source identified (was on vancomycin, piperacillin-tazobactam). RRT called on 12/25 for SOB and chest pain. During RRT, patient was witnessed to have an episode of generalized convulsion lasting ~45 seconds that resolved spontaneously, associated with urinary incontinence. Prolonged period of encephalopathy this admission. Initially followed by General Neurology team due to concern for seizure, EEG showed mild cerebral dysfunction but no epileptiform activity or seizures. Stroke Neurology consulted for imaging findings -  R medial temporal cortical punctate focus of diffusion restriction - ?infarct.    Impression:  1. Incidental finding of small RIGHT medial temporal white matter/cortical punctate focus with diffusion restriction (PCA vs. MCA territory) which may be acute ischemic infarct, differential also includes post-ictal changes, cerebritis. No focal neurologic deficits on exam.  Patient is currently anticoagulated for PE. If ischemic, mechanism of infarct may be related to patient's SLE - vasculopathy, hypercoagulability, or sterile nonbacterial thrombotic endocarditis (Libman-Sacks endocarditis). Patient may be hypercoagulable in setting of SLE, however APS labs interpreted as negative per Rheumatology Will need to evaluate for cardiac thrombus or vegetations with KARIE.  2. One episode of generalized convulsion associated with urinary incontinence which may have been seizure, possibly provoked event. Multiple episodes of lower extremity shaking while conversational during episodes, no tongue biting, post ictal period or incontinence likely due to metabolic etiology, less likely focal onset aware motor seizures. EEGs showing mild cerebral dysfunction but no epileptiform activity or seizures, being monitored off antiseizure medications.    Recommendations:  [x] Obtain CT Angio Head/ Neck - results above (no acute finding)  [ ] Recommend KARIE to evaluate for potential thrombus/ vegetations   [x] Continue anticoagulation - transitioned to apixaban 5mg BID  [x] TTE done 12/25  [x] MRI brain w/o, reviewed  [ ] SBP goal normotension  [x] A1C 5.5,  - continue atorvastatin 80mg as ordered  [x] LP done 12/25, results reviewed   [x] EEG on 12/25/24 and 1/8/24 - Mild cerebral dysfunction No seizures or epileptiform activity.   [x] Appreciate Rheum evaluation. Will need to repeat hypercoagulable, APLS labs outpatient   [ ] Continue to monitor patient off EEG, no anti-seizure medications recommended at this time  [ ] Seizure and fall precautions  [ ] PT/OT as tolerated     Patient seen and d/w Dr. Libman. Plan is formalized once attending attestation is complete.

## 2024-04-02 NOTE — PROGRESS NOTES
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 Found to have cholelithiasis and choledocholithiasis on MRCP.     Today:  -Hold ASA for potential liver biopsy 4/5 if pt still admitted  -MRCP with cholelithiasis and choledocholithiasis, plan for ERCP 4/3    Segmental arterial mediolysis   H/O Complex celiac artery dissection  H/O Aneurysmal hepatic artery (stable)  Increased intrahepatic biliary duct dilatation likely secondary to aneurysmal hepatic artery  Worsening transaminitis, Elevated ALP  Direct Hyperbilirubinemia  Cholelithiasis and choledocholithiasis w/o s/o cholecystis   Mr. Quiroga presented to the emergency department with 1 week of nausea, abdominal discomfort which he thinks is exacerbated by the recent increase in alcohol, marijuana and tobacco use.  Last alcoholic drink on Monday.  He has been smoking marijuana every day, last smoked yesterday.  Patient also endorses that he has been noticing dark urine, pale stools since last 5 days.  Although he has been having elevated liver enzymes patient was asymptomatic.  Recently hospitalized for 9 days but got nothing done.  Today, exam notable for mild jaundice, his labs notable for hyperbilirubinemia (increased direct bilirubin), CBC, troponin, lactate within normal limits.  CMP notable for elevated LFTs.  Although TSH is low, free T4 within normal limits.  Lipase within normal limits.  UA notable for bilirubinuria and dark urine and proteinuria.  CTA  abdomen pelvis notable for stable hepatic artery aneurysm, recanalization of partial hepatic artery thrombus, increased dilation of intrahepatic bile ducts likely secondary to aneurysmal dilatation of hepatic artery.  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  Less likely to be rheumatological disorder although anti-smooth muscle antibodies are elevated, however, he has follow-up scheduled with rheumatology. He was seen by multiple specialists including IR, vascular surgery, transplant surgery, hepatology and rheumatology during his last hospital admission and no surgical interventions have been taken at that time.  He is currently not a candidate for transplant given ongoing EtOH use, vascular surgery had noted no feasible distal endpoint for stenting or bypass. Pt got MRCP 4/2/24 which showed cholelithiasis and choledocholithiasis. GI deciding how best to manage.   -GI consulted and appreciate their recs  -ERCP planned for 4/3  -Patient likely needs a liver biopsy. IR is consulted and tentatively planning on doing on 4/5/24 if patient stays inpatient because of need to hold ASA for 3 days.   -No current utility or specific questions for vascular surgery, rheumatology or IR given stable aneurysm      Hypertension  -Continue PTA amlodipine 10 mg daily  -Continue PTA clonidine 0.1 mg twice daily  -Continue PTA hydrochlorothiazide 25 mg daily  -Continue PTA metoprolol 50 mg twice daily  -Hold aspirin 81 mg once daily  -PRN hydralazine for BP>160/100     Type 2 diabetes mellitus  HbA1c on 2/14/2024 is 9.2. Home regimen: 60U glargine at bedtime and 5 U aspart 3 times daily. On lantus and sliding scale insulin while inpatient.  FYI, patient has not been eating well since last week.  -N.p.o. for possible procedure  -Continue PTA glargine at a lower dose of 20 units (PTA 60 units at bedtime)  -medium-dose sliding scale insulin  -Hypoglycemia protocol  -POCT glucose checks      Tobacco use  Marijuana use  EtOH use   Used to smoke 1- 2 ppd.  Endorses that he has been smoking marijuana and tobacco since a month, which he thinks could have exacerbated his symptoms.  -Declined nicotine patch at the time of admission.  Will have to readdress this while inpatient  -Will offer addiction med services if pt interested    Hypokalemia  Potassium 2.6 on 4/2. S/p oral and IV repletion. Recheck 4/3 AM           Diet: Moderate Consistent Carb (60 g CHO per Meal) Diet    DVT Prophylaxis: Low Risk/Ambulatory with no VTE prophylaxis indicated  Tran Catheter: Not present  Fluids: IV LR 100ml/hr  Lines: None     Cardiac Monitoring: None  Code Status: Full Code        Disposition Plan   Dispo pending inpatient GI workup for biliary pathology likely 2/2 hepatic aneurysm.      Expected Discharge Date: 04/04/2024                The patient's care was discussed with the Attending Physician, Dr. Lei .    Resident/Fellow Attestation   I, Margarita Prajapati MD, was present with the medical/[employer] student who participated in the service and in the documentation of the note.  I have verified the history and personally performed the physical exam and medical decision making.  I agree with the assessment and plan of care as documented in the note.        Margarita Prajapati MD  PGY2  Date of Service (when I saw the patient): 04/02/24        [address]  Medical Student  Medicine Service, [facility] TEAM [address] [place] [address]  Securely message with [employer] (more info)  Text page via [facility] Paging/Directory   See signed in provider for up to date coverage information  ______________________________________________________________________    Interval History   Pt with improved nausea this AM and denies abdominal pain. Feeling frustrated by being in/out of hospital, but understanding of the importance of this hospital stay. GI involved and recommending MRCP.     Physical Exam   Vital Signs:  Temp: 98.3  F (36.8  C) Temp src: Oral BP: (!) 146/99 Pulse: 66   Resp: 16 SpO2: 97 % O2 Device: None (Room air)    Weight: 0 lbs 0 oz    CONSTITUTIONAL: Well-developed and well-nourished. Awake and alert.  Not in acute distress.  Appears frustrated  EYES: Conjunctivae and lids are normal. Mild scleral icterus  CARDIOVASCULAR: Normal rate, regular rhythm and no appreciable abnormal heart sounds.  PULMONARY/CHEST: Bilateral air entry present, no crackles heard.  ABDOMEN: Soft, nontender, nondistended.  Discomfort to palpation  MUSCULOSKELETAL: Extremities warm and seemingly well perfused. No edema or calf tenderness.  NEUROLOGIC: Awake, alert. Not disoriented. No seizure activity. GCS 15.  No focal neurological status  SKIN: Skin is warm and dry. No rash noted. No diaphoresis. No pallor.       Data     I have personally reviewed the following data over the past 24 hrs:    7.0  \   14.3   / 333     135 92 (L) 11.1 /  132 (H)   2.6 (LL) 27 0.87 \     ALT: 633 (HH) AST: 332 (H) AP: 557 (H) TBILI: 8.7 (H)   ALB: 4.5 TOT PROTEIN: 8.0 LIPASE: 30     Trop: 18 BNP: N/A     TSH: 0.23 (L) T4: 1.37 A1C: N/A     Procal: N/A CRP: 15.00 (H) Lactic Acid: 1.7       INR:  0.91 PTT:  N/A   D-dimer:  N/A Fibrinogen:  N/A         Narrative & Impression   MRCP Without and With Contrast     CLINICAL HISTORY: 43 y/o M known hepatic aneurysm presenting w/  worsening direct hyperbili and c/f other obstructive process.     DATE: 4/2/2024 3:23 PM                                         IMPRESSION:      Cholelithiasis and proximal choledocholithiasis. There is marked  narrowing of the mid common bile duct due to the known hepatic artery  aneurysm, which is better characterized on the recent CTA on 4/1/2024.  Mass effect of the aneurysm on the common bile duct likely results in  the moderate to marked intrahepatic biliary ductal dilatation and  probable intrahepatic gallstones. The distal common bile duct at the  ampulla is unremarkable.  No  evidence of acute cholecystitis.     I have personally reviewed the examination and initial interpretation  and I agree with the findings.

## 2024-04-02 NOTE — MEDICATION SCRIBE - ADMISSION MEDICATION HISTORY
Medication Scribe Admission Medication History    Admission medication history is complete. The information provided in this note is only as accurate as the sources available at the time of the update.    Information Source(s): Patient via in-person    Pertinent Information:    Medication on the PTA  list as reported by the patient as not being taken are: acetaminophen 500 MG tablet take 1,000 mg by mouth, senna-docusate 8.6-50 MG tablet take 1-2 tablets by mouth 2 times daily , oxycodone 5 mg tablet take 5-10 mg by mouth every 4 hours as needed for severe pain, nicotine 14 MG/24HR 24 hr patch place 1 patch onto the skin daily as needed for smoking cessation, multiple vitamin take 1 tablet by mouth,   Patient has been prescribed insulin aspart ( Novolog Pen) 100 Unit/ML pen inject 5 units subcutaneous 3 times daily with meals, also prescribed insulin glargine (Lantus Pen)  inject 60 units subcutaneous at bedtime patient reported that he has not taken the Lantus in about 2 weeks.patient says he does not think the Lantus dose was 60 units his doctor dropped insulin down to 48 units, 60 units is what is noted on the PTA list patient has sliding scale    insulin aspart (NOVOLOG PEN) 100 UNIT/ML pen     Inject 0-12 Units Subcutaneous 3 times daily (before meals) Blood Glucose             Dose  < 70                               see hypoglycemia protocol                             no dose, give prandial insulin  150-199                         2 units  200-249                         4 units  250-299                         6 units  300-349                         8 units  350-399                         10 units  400 and greater             12 units and call MD    Patient is not carb counting    Changes made to PTA medication list:  Added: None  Deleted:  acetaminophen 500 MG tablet,  senna-docusate 8.6-50 MG tablet, oxycodone 5 mg tablet,  nicotine 14 MG/24HR 24 hr patch, multiple vitamin   Changed: From  ibuprofen 600 MG tablet take 600 mg by mouth to ibuprofen 600 MG tablet take 600 mg by mouth as needed    Allergies reviewed with patient and updates made in EHR: yes    Medication History Completed By: Caprice Fraire 4/1/2024 11:32 PM    PTA Med List   Medication Sig Last Dose    albuterol (PROAIR HFA/PROVENTIL HFA/VENTOLIN HFA) 108 (90 Base) MCG/ACT inhaler Inhale 2 puffs into the lungs every 4 hours as needed for shortness of breath Unknown at as needed    amLODIPine (NORVASC) 10 MG tablet Take 1 tablet (10 mg) by mouth daily 4/1/2024 at am    aspirin (ASA) 81 MG chewable tablet Take 1 tablet (81 mg) by mouth daily 4/1/2024 at am    cloNIDine (CATAPRES) 0.1 MG tablet Take 1 tablet (0.1 mg) by mouth 2 times daily 4/1/2024 at am    hydrochlorothiazide (HYDRODIURIL) 25 MG tablet Take 1 tablet (25 mg) by mouth daily 4/1/2024 at am    ibuprofen (ADVIL/MOTRIN) 600 MG tablet Take 600 mg by mouth every 6 hours as needed for moderate pain Unknown at as needed    insulin aspart (NOVOLOG PEN) 100 UNIT/ML pen Inject 5 Units Subcutaneous 3 times daily (with meals) 3/31/2024 at am    insulin aspart (NOVOLOG PEN) 100 UNIT/ML pen Inject 0-12 Units Subcutaneous 3 times daily (before meals) Blood Glucose             Dose  < 70                               see hypoglycemia protocol                             no dose, give prandial insulin  150-199                         2 units  200-249                         4 units  250-299                         6 units  300-349                         8 units  350-399                         10 units  400 and greater             12 units and call MD Unknown at unknown    insulin glargine (LANTUS PEN) 100 UNIT/ML pen Inject 60 Units Subcutaneous at bedtime Past Week at unknown    ipratropium-albuterol (COMBIVENT RESPIMAT)  MCG/ACT inhaler Inhale 1 puff into the lungs 4 times daily as needed for shortness of breath or wheezing Unknown at as needed    metoprolol tartrate  (LOPRESSOR) 50 MG tablet Take 1 tablet (50 mg) by mouth 2 times daily 4/1/2024 at am

## 2024-04-02 NOTE — H&P
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    Segmental arterial mediolysis   H/O Complex celiac artery dissection  H/O Aneurysmal hepatic artery (stable)  Increased intrahepatic biliary duct dilatation likely secondary to aneurysmal hepatic artery  Worsening transaminitis, Elevated ALP  Hyperbilirubinemia  Obstructive jaundice  Mr. Quiroga presented to the emergency department with 1 week of nausea, abdominal discomfort which he thinks is exacerbated by the recent increase in alcohol, marijuana and tobacco use.  Last alcoholic drink on Monday.  He has been smoking marijuana every day, last smoked yesterday.  Patient also endorses that he has been noticing dark urine, pale stools since last 5 days.  Although he has been having elevated liver enzymes (, , and Alk Phos 310), patient was asymptomatic.  Recently hospitalized for 9 days but got nothing done.  Today, exam notable for mild jaundice, his labs notable for hyperbilirubinemia (increased direct bilirubin), CBC, troponin, lactate within normal limits.  CMP notable for ALP of 584, ALT of 716, AST of 334.  Although TSH is low, free T4 within normal limits.  Lipase within normal limits.  UA notable for bilirubinuria and dark urine and proteinuria.  CTA abdomen pelvis notable for stable hepatic artery aneurysm, recanalization of partial hepatic  artery thrombus, increased dilation of intrahepatic bile ducts likely secondary to aneurysmal dilatation of hepatic artery.  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  Less likely to be rheumatological disorder although anti-smooth muscle antibodies are elevated, however, he has follow-up scheduled with rheumatology. He was seen by multiple specialists including IR, vascular surgery, transplant surgery, hepatology and rheumatology during his last hospital admission and no surgical interventions have been taken at that time.  He is currently not a candidate for transplant, vascular surgery has noted no feasible distal endpoint for stenting or bypass (Patient is very frustrated about this).  -GI consulted and appreciate their recs  -N.p.o. since midnight  -Could consider MRCP for further evaluation of  intrahepatic bile duct dilation  -Might have to touch base with vascular surgery, rheumatology and IR again  -Did not start him on antiemetics as QTc is 448  -Patient refused pain medications as he does not have abdominal pain    Hypertension  -Continue PTA amlodipine 10 mg daily  -Continue PTA clonidine 0.1 mg twice daily  -Continue PTA hydrochlorothiazide 25 mg daily  -Continue PTA metoprolol 50 mg twice daily  -Continue aspirin 81 mg once daily    Type 2 diabetes mellitus  HbA1c on 2/14/2024 is 9.2. Home regimen: 60U glargine at bedtime and 5 U aspart 3 times daily. On lantus and sliding scale insulin while inpatient.  FYI, patient has not been eating well since last week.  -N.p.o. for possible procedure  -Continue PTA glargine at a lower dose of 20 units (PTA 60 units at bedtime)  -High-dose sliding scale insulin  -Hypoglycemia protocol  -POCT glucose checks     Tobacco use  Marijuana use  Used to smoke 1- 2 ppd.  Endorses that he has been smoking marijuana and tobacco since a month, which he thinks could have exacerbated his symptoms.  -Declined nicotine patch at the time  of admission.  Will have to readdress this while inpatient     Concern for COPD  No PFTs, so no official diagnosis. Continued home PRN albuterol and PRN Anoro Ellipta.        Diet: NPO for Medical/Clinical Reasons Except for: No Exceptions    DVT Prophylaxis: Ambulate every shift  Tran Catheter: Not present  Fluids: None  Lines: None     Cardiac Monitoring: None  Code Status: Full Code      Clinically Significant Risk Factors Present on Admission                # Drug Induced Platelet Defect: home medication list includes an antiplatelet medication   # Hypertension: Home medication list includes antihypertensive(s)                 Disposition Plan      Expected Discharge Date: 04/04/2024                The patient's care to be discussed in the AM.      Richard Staples MD  Medicine Service, [facility]  Securely message with [employer] (more info)  Text page via [employer] [employer] Paging/Directory   See signed in provider for up to date coverage information  ______________________________________________________________________    Chief Complaint   -Nausea  -Unable to eat anything  -Mild abdominal pain    History is obtained from the patient    History of Present Illness   Jong Quiroga is a 44 year old male who with a past medical history of segmental arterial mediolysis, history of celiac artery dissection, stable hepatic artery aneurysm presents to the emergency department with nausea and abdominal discomfort started a week ago.  Patient has been noticing darker urine and constipation since then.  Denies abdominal pain associated with discomfort.  Endorses chronic night sweats, reports recent weight loss (he is not sure how much he has lost but endorses that his shirts are loose for him now).  Denies fever, chills, rigors, cough, chest pain, headache, dizziness, blurry vision, epigastric abdominal pain, back pain.  He was very frustrated to answer all the questions  at 3 AM in the morning, did not actually want any provider to talk to him until 8 AM.  Reports that he is very frustrated with this hospital as he have to talk to a lot of doctors but nothing gets done.      Past Medical History    No past medical history on file.    Past Surgical History   No past surgical history on file.    Prior to Admission Medications   Prior to Admission Medications   Prescriptions Last Dose Informant Patient Reported? Taking?   BD LINO U/F 32G X 4 MM insulin pen needle  Self Yes No   Blood Glucose Monitoring Suppl (ACCU-CHEK GUIDE ME) w/Device KIT  Self Yes No   OPTIUM TEST STRIPS test strip  Self Yes No   Sig: Dispense item covered by pt ins. E11.65 NIDDM type II, uncontrolled - Test 4 times/day. Reason: High A1C   albuterol (PROAIR HFA/PROVENTIL HFA/VENTOLIN HFA) 108 (90 Base) MCG/ACT inhaler Unknown at as needed Self Yes Yes   Sig: Inhale 2 puffs into the lungs every 4 hours as needed for shortness of breath   amLODIPine (NORVASC) 10 MG tablet 4/1/2024 at am Self No Yes   Sig: Take 1 tablet (10 mg) by mouth daily   aspirin (ASA) 81 MG chewable tablet 4/1/2024 at am Self No Yes   Sig: Take 1 tablet (81 mg) by mouth daily   blood glucose monitoring (SOFTCLIX) lancets  Self Yes No   cloNIDine (CATAPRES) 0.1 MG tablet 4/1/2024 at am Self No Yes   Sig: Take 1 tablet (0.1 mg) by mouth 2 times daily   hydrochlorothiazide (HYDRODIURIL) 25 MG tablet 4/1/2024 at am Self No Yes   Sig: Take 1 tablet (25 mg) by mouth daily   ibuprofen (ADVIL/MOTRIN) 600 MG tablet Unknown at as needed  Yes Yes   Sig: Take 600 mg by mouth every 6 hours as needed for moderate pain   insulin aspart (NOVOLOG PEN) 100 UNIT/ML pen Unknown at unknown Self Yes Yes   Sig: Inject 0-12 Units Subcutaneous 3 times daily (before meals) Blood Glucose             Dose  < 70                               see hypoglycemia protocol                             no dose, give prandial insulin  150-199                         2  units  200-249                         4 units  250-299                         6 units  300-349                         8 units  350-399                         10 units  400 and greater             12 units and call MD   insulin aspart (NOVOLOG PEN) 100 UNIT/ML pen 3/31/2024 at am Self No Yes   Sig: Inject 5 Units Subcutaneous 3 times daily (with meals)   insulin glargine (LANTUS PEN) 100 UNIT/ML pen Past Week at unknown Self No Yes   Sig: Inject 60 Units Subcutaneous at bedtime   ipratropium-albuterol (COMBIVENT RESPIMAT)  MCG/ACT inhaler Unknown at as needed Self Yes Yes   Sig: Inhale 1 puff into the lungs 4 times daily as needed for shortness of breath or wheezing   metoprolol tartrate (LOPRESSOR) 50 MG tablet 4/1/2024 at am Self No Yes   Sig: Take 1 tablet (50 mg) by mouth 2 times daily      Facility-Administered Medications: None           Physical Exam   Vital Signs: Temp: 98.4  F (36.9  C) Temp src: Oral BP: (!) 141/76 Pulse: 73   Resp: 20 SpO2: 97 % O2 Device: None (Room air)    Weight: 0 lbs 0 oz    CONSTITUTIONAL: Well-developed and well-nourished. Awake and alert.  Not in acute distress.  Appears frustrated  EYES: Conjunctivae and lids are normal. No scleral icterus.   NECK: Normal range of motion and phonation normal. Neck supple.  No tracheal deviation, no stridor. No edema or erythema noted.  CARDIOVASCULAR: Normal rate, regular rhythm and no appreciable abnormal heart sounds.  PULMONARY/CHEST: Bilateral air entry present, no crackles heard.  ABDOMEN: Soft, nontender, nondistended.  Discomfort to palpation  MUSCULOSKELETAL: Extremities warm and seemingly well perfused. No edema or calf tenderness.  NEUROLOGIC: Awake, alert. Not disoriented. No seizure activity. GCS 15.  No focal neurological status  SKIN: Skin is warm and dry. No rash noted. No diaphoresis. No pallor.   PSYCHIATRIC: Patient appears very frustrated at 3 AM.     Medical Decision Making       Please see A&P for additional details  of medical decision making.      Data   ------------------------- PAST 24 HR DATA REVIEWED -----------------------------------------------    I have personally reviewed the following data over the past 24 hrs:    7.4  \   14.8   / 346     136 92 (L) 13.0 /  131 (H)   3.4 27 0.98 \     ALT: 716 (HH) AST: 334 (H) AP: 584 (H) TBILI: 8.1 (H)   ALB: 4.8 TOT PROTEIN: 8.5 (H) LIPASE: 30     Trop: 18 BNP: N/A     TSH: 0.23 (L) T4: 1.37 A1C: N/A     Procal: N/A CRP: N/A Lactic Acid: 1.7       INR:  0.91 PTT:  N/A   D-dimer:  N/A Fibrinogen:  N/A       Imaging results reviewed over the past 24 hrs:   Recent Results (from the past 24 hour(s))   CTA Abdomen Pelvis with Contrast    Narrative    EXAM: CTA ABDOMEN PELVIS WITH CONTRAST  LOCATION: [facility]  DATE: 4/1/2024    INDICATION: Hx celiac and hepatic artery dissections; abdominal pain. Nausea.  COMPARISON: 02/01/2024.  TECHNIQUE: CT angiogram abdomen pelvis during arterial phase of injection of IV contrast. 2D and 3D MIP reconstructions were performed by the CT technologist. Dose reduction techniques were used.  CONTRAST: Iopamidol (ISOVUE-370) solution 127 mL.    FINDINGS:  ANGIOGRAM ABDOMEN/PELVIS: Since 02/01/2024, the only significant change I see is there has been an increase in the amount of recannulization involving the distal aspect of the hepatic artery with recanalized area of aneurysmal dilatation on today's study   measuring approximately 1.7 cm in greatest radial dimension and on prior study this measured 1.2 cm. The overall size of the aneurysm to include the recanalized portion and the noncanalized portion overall appears stable. The multifocal areas of   dissection involving the celiac artery appear unchanged.    LOWER CHEST: Normal.    HEPATOBILIARY: There has been a slight increase in caliber of the intrahepatic bile ducts which are now mildly distended. Clinical/laboratory correlation may be helpful to rule  out changes of biliary tract obstruction and if this is of concern, MRCP may   be helpful for further evaluation. The extrahepatic bile duct shows no significant dilatation. This may represent mass effect from the known hepatic artery aneurysm. Normal gallbladder. The portal veins are patent.    PANCREAS: Normal.    SPLEEN: Normal.    ADRENAL GLANDS: Normal.    KIDNEYS/BLADDER: Normal variant persistent fetal lobulation.    BOWEL: Normal.    LYMPH NODES: Normal.    PELVIC ORGANS: Normal.    MUSCULOSKELETAL: Mild degenerative changes lower lumbar spine facet joints.      Impression    IMPRESSION:  1.  Since 02/01/2024, the overall size of the aneurysmal hepatic artery appears stable, however there is increased recanalization seen of the partially thrombosed proper hepatic artery.    2.  Increased dilatation of the intrahepatic bile ducts with normal-caliber extrahepatic bile ducts, this is worrisome for changes of obstruction and this may be secondary to the above-mentioned area of aneurysmal dilatation of the hepatic artery.   Laboratory correlation would be of benefit, if further imaging is required then an MRCP would be the exam of choice for further evaluation.   US Abdomen Limited w Abdomen Doppler Limited    Impression    RESIDENT PRELIMINARY INTERPRETATION  Impression:   1. Patent Doppler evaluation with antegrade flow. Known hepatic artery  aneurysm is better evaluated on recent CT.  2. Normal grayscale appearance of the liver.  3. The gallbladder is obscured by overlying bowel gas.

## 2024-04-02 NOTE — ED TRIAGE NOTES
Pt BIBA for orange urine, abd pain for 5 days, feels nausea and decreased appetite. Has not taken insulin due to low BG. Still taking other prescription meds

## 2024-04-02 NOTE — CONSULTS
"      [facility] Consult Service Note  Interventional Radiology  04/02/24   1:36 PM    Consult Requested: \"Elevated LFTs, concerns for autoimmune hepatitis\"    Recommendations/Plan:    Pending MRCP, IR will re-evaluate for percutaneous liver biopsy. We would not be able to offer biopsy until at least 4/5/24 given recent ASA intake.  Timing of procedure is TBD based on IR staffing/schedule and triage. We will not be scheduling the patient until MRCP is reviewed.   Please contact the IR charge RN at [phone] for estimated time of procedure.     Labs WNL for procedure.    Orders have NOT been entered for procedure.  Patient was given ASA 81mg 1 tab PO on 4/2/24. We need ASA 81mg to be held x3 days prior to biopsy. Earliest date we can offer is 4/5/24.     Case and imaging discussed with IR attending Dr. Chandana Lawson MD   Recommendations were reviewed with Keyona Patton and  Margarita Prajapati.    ADDENDUM: 4/3/24 1309  Per Dr. Klein from GI, no indication for IR biopsy at this time. Plan will be to pursue outpatient biopsy if LFTs do not improve. Plan will be for GI to place referral when/if bx if indicated.    IR will sign off at this time.    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 Patient is having an MRCP 4/2/24 to r/o biliary obstruction, if negative, would like a percutaneous liver biopsy. If MRCP with findings of biliary obstruction, GI plans to consider ERCP with possible EUS and liver biopsy.     Diagnostic labs to be " entered by: Connie Daniel.      Pertinent Imaging Reviewed:   US Abdomen Limited 4/1/24  CTA 4/1/24    Expected date of discharge:  04/04/2024    Vitals:   BP (!) 146/99   Pulse 66   Temp 98.3  F (36.8  C) (Oral)   Resp 16   SpO2 97%     Pertinent Labs Reviewed:  CBC:  Lab Results   Component Value Date    WBC 7.0 04/02/2024    WBC 7.4 04/01/2024    WBC 9.2 01/30/2024     Lab Results   Component Value Date    HGB 14.3 04/02/2024    HGB 14.8 04/01/2024    HGB 12.7 01/30/2024     Lab Results   Component Value Date     04/02/2024     04/01/2024     01/30/2024    INR:  Lab Results   Component Value Date    INR 0.91 04/01/2024          COVID Results:  COVID-19 Antibody Results, Testing for Immunity           No data to display              COVID-19 PCR Results           No data to display             Potassium   Date Value Ref Range Status   04/02/2024 2.6 (LL) 3.4 - 5.3 mmol/L Final          Simran Pacheco PA-C  Interventional Radiology  Pager: [phone]

## 2024-04-02 NOTE — PROGRESS NOTES
Neuro: A&Ox4.   Cardiac: Afebrile, VSS.   Respiratory: RA, lung sounds clear, denies SOB  GI/: Voiding spontaneously. No BM this shift.  Diet/appetite: Regular diet . Denies nausea.   Activity: Up independently     Pain: Denies   Skin: No new deficits noted.  Lines: PIV, SL  Drains: none  Replacements: none given    Plan: Continue with current POC. Report changes to primary team.

## 2024-04-03 ENCOUNTER — APPOINTMENT (OUTPATIENT)
Dept: GENERAL RADIOLOGY | Facility: CLINIC | Age: 44
End: 2024-04-03
Attending: STUDENT IN AN ORGANIZED HEALTH CARE EDUCATION/TRAINING PROGRAM
Payer: MEDICAID

## 2024-04-03 ENCOUNTER — ANESTHESIA EVENT (OUTPATIENT)
Dept: SURGERY | Facility: CLINIC | Age: 44
End: 2024-04-03
Payer: MEDICAID

## 2024-04-03 ENCOUNTER — ANESTHESIA (OUTPATIENT)
Dept: SURGERY | Facility: CLINIC | Age: 44
End: 2024-04-03
Payer: MEDICAID

## 2024-04-03 LAB
ALBUMIN SERPL BCG-MCNC: 4.2 G/DL (ref 3.5–5.2)
ALP SERPL-CCNC: 547 U/L (ref 40–150)
ALT SERPL W P-5'-P-CCNC: 538 U/L (ref 0–70)
ANION GAP SERPL CALCULATED.3IONS-SCNC: 13 MMOL/L (ref 7–15)
AST SERPL W P-5'-P-CCNC: 288 U/L (ref 0–45)
ATRIAL RATE - MUSE: 67 BPM
BASOPHILS # BLD AUTO: 0 10E3/UL (ref 0–0.2)
BASOPHILS NFR BLD AUTO: 1 %
BILIRUB SERPL-MCNC: 9.4 MG/DL
BUN SERPL-MCNC: 10.9 MG/DL (ref 6–20)
CALCIUM SERPL-MCNC: 9.7 MG/DL (ref 8.6–10)
CHLORIDE SERPL-SCNC: 97 MMOL/L (ref 98–107)
CREAT SERPL-MCNC: 0.83 MG/DL (ref 0.67–1.17)
DEPRECATED HCO3 PLAS-SCNC: 26 MMOL/L (ref 22–29)
DIASTOLIC BLOOD PRESSURE - MUSE: NORMAL MMHG
EGFRCR SERPLBLD CKD-EPI 2021: >90 ML/MIN/1.73M2
EOSINOPHIL # BLD AUTO: 0.2 10E3/UL (ref 0–0.7)
EOSINOPHIL NFR BLD AUTO: 3 %
ERYTHROCYTE [DISTWIDTH] IN BLOOD BY AUTOMATED COUNT: 16.5 % (ref 10–15)
GLUCOSE BLDC GLUCOMTR-MCNC: 106 MG/DL (ref 70–99)
GLUCOSE BLDC GLUCOMTR-MCNC: 116 MG/DL (ref 70–99)
GLUCOSE BLDC GLUCOMTR-MCNC: 147 MG/DL (ref 70–99)
GLUCOSE BLDC GLUCOMTR-MCNC: 158 MG/DL (ref 70–99)
GLUCOSE BLDC GLUCOMTR-MCNC: 165 MG/DL (ref 70–99)
GLUCOSE BLDC GLUCOMTR-MCNC: 178 MG/DL (ref 70–99)
GLUCOSE SERPL-MCNC: 122 MG/DL (ref 70–99)
HAV IGM SERPL QL IA: NONREACTIVE
HCT VFR BLD AUTO: 39.3 % (ref 40–53)
HGB BLD-MCNC: 13.7 G/DL (ref 13.3–17.7)
IGG SERPL-MCNC: 1093 MG/DL (ref 610–1616)
IMM GRANULOCYTES # BLD: 0 10E3/UL
IMM GRANULOCYTES NFR BLD: 1 %
INR PPP: 0.93 (ref 0.85–1.15)
INTERPRETATION ECG - MUSE: NORMAL
LYMPHOCYTES # BLD AUTO: 1.6 10E3/UL (ref 0.8–5.3)
LYMPHOCYTES NFR BLD AUTO: 23 %
MCH RBC QN AUTO: 28.2 PG (ref 26.5–33)
MCHC RBC AUTO-ENTMCNC: 34.9 G/DL (ref 31.5–36.5)
MCV RBC AUTO: 81 FL (ref 78–100)
MONOCYTES # BLD AUTO: 0.5 10E3/UL (ref 0–1.3)
MONOCYTES NFR BLD AUTO: 7 %
NEUTROPHILS # BLD AUTO: 4.3 10E3/UL (ref 1.6–8.3)
NEUTROPHILS NFR BLD AUTO: 65 %
NRBC # BLD AUTO: 0 10E3/UL
NRBC BLD AUTO-RTO: 0 /100
P AXIS - MUSE: 54 DEGREES
PLATELET # BLD AUTO: 349 10E3/UL (ref 150–450)
POTASSIUM SERPL-SCNC: 3.4 MMOL/L (ref 3.4–5.3)
PR INTERVAL - MUSE: 168 MS
PROT SERPL-MCNC: 7.5 G/DL (ref 6.4–8.3)
QRS DURATION - MUSE: 100 MS
QT - MUSE: 424 MS
QTC - MUSE: 448 MS
R AXIS - MUSE: 25 DEGREES
RBC # BLD AUTO: 4.85 10E6/UL (ref 4.4–5.9)
SODIUM SERPL-SCNC: 136 MMOL/L (ref 135–145)
SYSTOLIC BLOOD PRESSURE - MUSE: NORMAL MMHG
T AXIS - MUSE: 252 DEGREES
VENTRICULAR RATE- MUSE: 67 BPM
WBC # BLD AUTO: 6.6 10E3/UL (ref 4–11)

## 2024-04-03 PROCEDURE — C2617 STENT, NON-COR, TEM W/O DEL: HCPCS | Performed by: INTERNAL MEDICINE

## 2024-04-03 PROCEDURE — 360N000082 HC SURGERY LEVEL 2 W/ FLUORO, PER MIN: Performed by: INTERNAL MEDICINE

## 2024-04-03 PROCEDURE — 255N000002 HC RX 255 OP 636: Performed by: INTERNAL MEDICINE

## 2024-04-03 PROCEDURE — 99233 SBSQ HOSP IP/OBS HIGH 50: CPT | Mod: GC | Performed by: STUDENT IN AN ORGANIZED HEALTH CARE EDUCATION/TRAINING PROGRAM

## 2024-04-03 PROCEDURE — 43274 ERCP DUCT STENT PLACEMENT: CPT | Performed by: NURSE ANESTHETIST, CERTIFIED REGISTERED

## 2024-04-03 PROCEDURE — 250N000009 HC RX 250: Performed by: ANESTHESIOLOGY

## 2024-04-03 PROCEDURE — 250N000011 HC RX IP 250 OP 636: Performed by: NURSE ANESTHETIST, CERTIFIED REGISTERED

## 2024-04-03 PROCEDURE — 82040 ASSAY OF SERUM ALBUMIN: CPT

## 2024-04-03 PROCEDURE — 360N000083 HC SURGERY LEVEL 3 W/ FLUORO, PER MIN: Performed by: INTERNAL MEDICINE

## 2024-04-03 PROCEDURE — 250N000013 HC RX MED GY IP 250 OP 250 PS 637: Performed by: INTERNAL MEDICINE

## 2024-04-03 PROCEDURE — 250N000011 HC RX IP 250 OP 636: Performed by: INTERNAL MEDICINE

## 2024-04-03 PROCEDURE — 250N000009 HC RX 250: Performed by: INTERNAL MEDICINE

## 2024-04-03 PROCEDURE — 999N000141 HC STATISTIC PRE-PROCEDURE NURSING ASSESSMENT: Performed by: INTERNAL MEDICINE

## 2024-04-03 PROCEDURE — 370N000017 HC ANESTHESIA TECHNICAL FEE, PER MIN: Performed by: INTERNAL MEDICINE

## 2024-04-03 PROCEDURE — 272N000001 HC OR GENERAL SUPPLY STERILE: Performed by: INTERNAL MEDICINE

## 2024-04-03 PROCEDURE — 250N000013 HC RX MED GY IP 250 OP 250 PS 637

## 2024-04-03 PROCEDURE — 86709 HEPATITIS A IGM ANTIBODY: CPT | Performed by: STUDENT IN AN ORGANIZED HEALTH CARE EDUCATION/TRAINING PROGRAM

## 2024-04-03 PROCEDURE — 99233 SBSQ HOSP IP/OBS HIGH 50: CPT | Mod: 25 | Performed by: INTERNAL MEDICINE

## 2024-04-03 PROCEDURE — 250N000025 HC SEVOFLURANE, PER MIN: Performed by: INTERNAL MEDICINE

## 2024-04-03 PROCEDURE — 258N000003 HC RX IP 258 OP 636

## 2024-04-03 PROCEDURE — 0F798DZ DILATION OF COMMON BILE DUCT WITH INTRALUMINAL DEVICE, VIA NATURAL OR ARTIFICIAL OPENING ENDOSCOPIC: ICD-10-PCS | Performed by: INTERNAL MEDICINE

## 2024-04-03 PROCEDURE — 710N000010 HC RECOVERY PHASE 1, LEVEL 2, PER MIN: Performed by: INTERNAL MEDICINE

## 2024-04-03 PROCEDURE — C1769 GUIDE WIRE: HCPCS | Performed by: INTERNAL MEDICINE

## 2024-04-03 PROCEDURE — 85041 AUTOMATED RBC COUNT: CPT

## 2024-04-03 PROCEDURE — 82784 ASSAY IGA/IGD/IGG/IGM EACH: CPT | Performed by: STUDENT IN AN ORGANIZED HEALTH CARE EDUCATION/TRAINING PROGRAM

## 2024-04-03 PROCEDURE — 86790 VIRUS ANTIBODY NOS: CPT | Performed by: STUDENT IN AN ORGANIZED HEALTH CARE EDUCATION/TRAINING PROGRAM

## 2024-04-03 PROCEDURE — 250N000009 HC RX 250: Performed by: NURSE ANESTHETIST, CERTIFIED REGISTERED

## 2024-04-03 PROCEDURE — 36415 COLL VENOUS BLD VENIPUNCTURE: CPT

## 2024-04-03 PROCEDURE — 85610 PROTHROMBIN TIME: CPT

## 2024-04-03 PROCEDURE — 258N000003 HC RX IP 258 OP 636: Performed by: NURSE ANESTHETIST, CERTIFIED REGISTERED

## 2024-04-03 PROCEDURE — 0FC98ZZ EXTIRPATION OF MATTER FROM COMMON BILE DUCT, VIA NATURAL OR ARTIFICIAL OPENING ENDOSCOPIC: ICD-10-PCS | Performed by: INTERNAL MEDICINE

## 2024-04-03 PROCEDURE — 258N000003 HC RX IP 258 OP 636: Performed by: INTERNAL MEDICINE

## 2024-04-03 PROCEDURE — 250N000011 HC RX IP 250 OP 636: Performed by: ANESTHESIOLOGY

## 2024-04-03 PROCEDURE — C1726 CATH, BAL DIL, NON-VASCULAR: HCPCS | Performed by: INTERNAL MEDICINE

## 2024-04-03 PROCEDURE — 120N000002 HC R&B MED SURG/OB UMMC

## 2024-04-03 PROCEDURE — 999N000181 XR SURGERY CARM FLUORO GREATER THAN 5 MIN W STILLS: Mod: TC

## 2024-04-03 PROCEDURE — 43274 ERCP DUCT STENT PLACEMENT: CPT | Performed by: ANESTHESIOLOGY

## 2024-04-03 DEVICE — IMPLANTABLE DEVICE
Type: IMPLANTABLE DEVICE | Site: BILE DUCT | Status: NON-FUNCTIONAL
Removed: 2024-04-11

## 2024-04-03 RX ORDER — NALOXONE HYDROCHLORIDE 0.4 MG/ML
0.2 INJECTION, SOLUTION INTRAMUSCULAR; INTRAVENOUS; SUBCUTANEOUS
Status: DISCONTINUED | OUTPATIENT
Start: 2024-04-03 | End: 2024-04-04 | Stop reason: HOSPADM

## 2024-04-03 RX ORDER — IOPAMIDOL 510 MG/ML
INJECTION, SOLUTION INTRAVASCULAR PRN
Status: DISCONTINUED | OUTPATIENT
Start: 2024-04-03 | End: 2024-04-03 | Stop reason: HOSPADM

## 2024-04-03 RX ORDER — SODIUM CHLORIDE, SODIUM LACTATE, POTASSIUM CHLORIDE, CALCIUM CHLORIDE 600; 310; 30; 20 MG/100ML; MG/100ML; MG/100ML; MG/100ML
INJECTION, SOLUTION INTRAVENOUS CONTINUOUS PRN
Status: DISCONTINUED | OUTPATIENT
Start: 2024-04-03 | End: 2024-04-03

## 2024-04-03 RX ORDER — METOPROLOL TARTRATE 1 MG/ML
1-2 INJECTION, SOLUTION INTRAVENOUS EVERY 5 MIN PRN
Status: DISCONTINUED | OUTPATIENT
Start: 2024-04-03 | End: 2024-04-03 | Stop reason: HOSPADM

## 2024-04-03 RX ORDER — NALOXONE HYDROCHLORIDE 0.4 MG/ML
0.1 INJECTION, SOLUTION INTRAMUSCULAR; INTRAVENOUS; SUBCUTANEOUS
Status: DISCONTINUED | OUTPATIENT
Start: 2024-04-03 | End: 2024-04-03 | Stop reason: HOSPADM

## 2024-04-03 RX ORDER — HYDRALAZINE HYDROCHLORIDE 20 MG/ML
2.5-5 INJECTION INTRAMUSCULAR; INTRAVENOUS EVERY 10 MIN PRN
Status: DISCONTINUED | OUTPATIENT
Start: 2024-04-03 | End: 2024-04-03 | Stop reason: HOSPADM

## 2024-04-03 RX ORDER — FENTANYL CITRATE 50 UG/ML
INJECTION, SOLUTION INTRAMUSCULAR; INTRAVENOUS PRN
Status: DISCONTINUED | OUTPATIENT
Start: 2024-04-03 | End: 2024-04-03

## 2024-04-03 RX ORDER — ONDANSETRON 2 MG/ML
4 INJECTION INTRAMUSCULAR; INTRAVENOUS EVERY 30 MIN PRN
Status: DISCONTINUED | OUTPATIENT
Start: 2024-04-03 | End: 2024-04-03 | Stop reason: HOSPADM

## 2024-04-03 RX ORDER — ESMOLOL HYDROCHLORIDE 10 MG/ML
INJECTION INTRAVENOUS PRN
Status: DISCONTINUED | OUTPATIENT
Start: 2024-04-03 | End: 2024-04-03

## 2024-04-03 RX ORDER — ONDANSETRON 2 MG/ML
INJECTION INTRAMUSCULAR; INTRAVENOUS PRN
Status: DISCONTINUED | OUTPATIENT
Start: 2024-04-03 | End: 2024-04-03

## 2024-04-03 RX ORDER — INDOMETHACIN 50 MG/1
SUPPOSITORY RECTAL PRN
Status: DISCONTINUED | OUTPATIENT
Start: 2024-04-03 | End: 2024-04-03 | Stop reason: HOSPADM

## 2024-04-03 RX ORDER — HYDROMORPHONE HCL IN WATER/PF 6 MG/30 ML
0.2 PATIENT CONTROLLED ANALGESIA SYRINGE INTRAVENOUS EVERY 5 MIN PRN
Status: DISCONTINUED | OUTPATIENT
Start: 2024-04-03 | End: 2024-04-03 | Stop reason: HOSPADM

## 2024-04-03 RX ORDER — PROPOFOL 10 MG/ML
INJECTION, EMULSION INTRAVENOUS PRN
Status: DISCONTINUED | OUTPATIENT
Start: 2024-04-03 | End: 2024-04-03

## 2024-04-03 RX ORDER — HYDROMORPHONE HCL IN WATER/PF 6 MG/30 ML
0.4 PATIENT CONTROLLED ANALGESIA SYRINGE INTRAVENOUS EVERY 5 MIN PRN
Status: DISCONTINUED | OUTPATIENT
Start: 2024-04-03 | End: 2024-04-03 | Stop reason: HOSPADM

## 2024-04-03 RX ORDER — LABETALOL 20 MG/4 ML (5 MG/ML) INTRAVENOUS SYRINGE
PRN
Status: DISCONTINUED | OUTPATIENT
Start: 2024-04-03 | End: 2024-04-03

## 2024-04-03 RX ORDER — ONDANSETRON 4 MG/1
4 TABLET, ORALLY DISINTEGRATING ORAL EVERY 30 MIN PRN
Status: DISCONTINUED | OUTPATIENT
Start: 2024-04-03 | End: 2024-04-03 | Stop reason: HOSPADM

## 2024-04-03 RX ORDER — FENTANYL CITRATE 50 UG/ML
25 INJECTION, SOLUTION INTRAMUSCULAR; INTRAVENOUS EVERY 5 MIN PRN
Status: DISCONTINUED | OUTPATIENT
Start: 2024-04-03 | End: 2024-04-03 | Stop reason: HOSPADM

## 2024-04-03 RX ORDER — SODIUM CHLORIDE, SODIUM LACTATE, POTASSIUM CHLORIDE, CALCIUM CHLORIDE 600; 310; 30; 20 MG/100ML; MG/100ML; MG/100ML; MG/100ML
INJECTION, SOLUTION INTRAVENOUS CONTINUOUS
Status: DISCONTINUED | OUTPATIENT
Start: 2024-04-03 | End: 2024-04-03 | Stop reason: HOSPADM

## 2024-04-03 RX ORDER — FLUMAZENIL 0.1 MG/ML
0.2 INJECTION, SOLUTION INTRAVENOUS
Status: ACTIVE | OUTPATIENT
Start: 2024-04-03 | End: 2024-04-04

## 2024-04-03 RX ORDER — FENTANYL CITRATE 50 UG/ML
50 INJECTION, SOLUTION INTRAMUSCULAR; INTRAVENOUS EVERY 5 MIN PRN
Status: DISCONTINUED | OUTPATIENT
Start: 2024-04-03 | End: 2024-04-03 | Stop reason: HOSPADM

## 2024-04-03 RX ORDER — LIDOCAINE HYDROCHLORIDE 20 MG/ML
INJECTION, SOLUTION INFILTRATION; PERINEURAL PRN
Status: DISCONTINUED | OUTPATIENT
Start: 2024-04-03 | End: 2024-04-03

## 2024-04-03 RX ORDER — LIDOCAINE 40 MG/G
CREAM TOPICAL
Status: DISCONTINUED | OUTPATIENT
Start: 2024-04-03 | End: 2024-04-03 | Stop reason: HOSPADM

## 2024-04-03 RX ORDER — NALOXONE HYDROCHLORIDE 0.4 MG/ML
0.4 INJECTION, SOLUTION INTRAMUSCULAR; INTRAVENOUS; SUBCUTANEOUS
Status: DISCONTINUED | OUTPATIENT
Start: 2024-04-03 | End: 2024-04-04 | Stop reason: HOSPADM

## 2024-04-03 RX ORDER — INDOMETHACIN 50 MG/1
100 SUPPOSITORY RECTAL
Status: DISCONTINUED | OUTPATIENT
Start: 2024-04-03 | End: 2024-04-03 | Stop reason: HOSPADM

## 2024-04-03 RX ORDER — ONDANSETRON 4 MG/1
4 TABLET, ORALLY DISINTEGRATING ORAL EVERY 6 HOURS PRN
Status: DISCONTINUED | OUTPATIENT
Start: 2024-04-03 | End: 2024-04-04 | Stop reason: HOSPADM

## 2024-04-03 RX ORDER — ONDANSETRON 2 MG/ML
4 INJECTION INTRAMUSCULAR; INTRAVENOUS EVERY 6 HOURS PRN
Status: DISCONTINUED | OUTPATIENT
Start: 2024-04-03 | End: 2024-04-04 | Stop reason: HOSPADM

## 2024-04-03 RX ADMIN — AMLODIPINE BESYLATE 10 MG: 10 TABLET ORAL at 08:43

## 2024-04-03 RX ADMIN — MIDAZOLAM 2 MG: 1 INJECTION INTRAMUSCULAR; INTRAVENOUS at 16:18

## 2024-04-03 RX ADMIN — FENTANYL CITRATE 50 MCG: 50 INJECTION INTRAMUSCULAR; INTRAVENOUS at 16:58

## 2024-04-03 RX ADMIN — PROCHLORPERAZINE EDISYLATE 5 MG: 5 INJECTION INTRAMUSCULAR; INTRAVENOUS at 18:53

## 2024-04-03 RX ADMIN — ESMOLOL HYDROCHLORIDE 20 MG: 10 INJECTION, SOLUTION INTRAVENOUS at 17:29

## 2024-04-03 RX ADMIN — CLONIDINE HYDROCHLORIDE 0.1 MG: 0.1 TABLET ORAL at 20:41

## 2024-04-03 RX ADMIN — HYDROCHLOROTHIAZIDE 25 MG: 25 TABLET ORAL at 08:43

## 2024-04-03 RX ADMIN — Medication 10 MG: at 16:51

## 2024-04-03 RX ADMIN — Medication 30 MG: at 16:31

## 2024-04-03 RX ADMIN — PROPOFOL 200 MG: 10 INJECTION, EMULSION INTRAVENOUS at 16:30

## 2024-04-03 RX ADMIN — METOPROLOL TARTRATE 50 MG: 50 TABLET, FILM COATED ORAL at 08:43

## 2024-04-03 RX ADMIN — FENTANYL CITRATE 100 MCG: 50 INJECTION INTRAMUSCULAR; INTRAVENOUS at 16:30

## 2024-04-03 RX ADMIN — HYDRALAZINE HYDROCHLORIDE 10 MG: 20 INJECTION INTRAMUSCULAR; INTRAVENOUS at 20:56

## 2024-04-03 RX ADMIN — SUGAMMADEX 100 MG: 100 INJECTION, SOLUTION INTRAVENOUS at 17:37

## 2024-04-03 RX ADMIN — LABETALOL 20 MG/4 ML (5 MG/ML) INTRAVENOUS SYRINGE 10 MG: at 17:32

## 2024-04-03 RX ADMIN — METOPROLOL TARTRATE 50 MG: 50 TABLET, FILM COATED ORAL at 20:40

## 2024-04-03 RX ADMIN — ESMOLOL HYDROCHLORIDE 20 MG: 10 INJECTION, SOLUTION INTRAVENOUS at 17:19

## 2024-04-03 RX ADMIN — SODIUM CHLORIDE, POTASSIUM CHLORIDE, SODIUM LACTATE AND CALCIUM CHLORIDE: 600; 310; 30; 20 INJECTION, SOLUTION INTRAVENOUS at 20:50

## 2024-04-03 RX ADMIN — INSULIN ASPART 1 UNITS: 100 INJECTION, SOLUTION INTRAVENOUS; SUBCUTANEOUS at 08:52

## 2024-04-03 RX ADMIN — CLONIDINE HYDROCHLORIDE 0.1 MG: 0.1 TABLET ORAL at 08:43

## 2024-04-03 RX ADMIN — SODIUM CHLORIDE, POTASSIUM CHLORIDE, SODIUM LACTATE AND CALCIUM CHLORIDE: 600; 310; 30; 20 INJECTION, SOLUTION INTRAVENOUS at 16:28

## 2024-04-03 RX ADMIN — ONDANSETRON 4 MG: 2 INJECTION INTRAMUSCULAR; INTRAVENOUS at 17:35

## 2024-04-03 RX ADMIN — FENTANYL CITRATE 50 MCG: 50 INJECTION INTRAMUSCULAR; INTRAVENOUS at 17:13

## 2024-04-03 RX ADMIN — INSULIN GLARGINE 20 UNITS: 100 INJECTION, SOLUTION SUBCUTANEOUS at 21:39

## 2024-04-03 RX ADMIN — Medication 10 MG: at 17:20

## 2024-04-03 RX ADMIN — INSULIN ASPART 1 UNITS: 100 INJECTION, SOLUTION INTRAVENOUS; SUBCUTANEOUS at 21:38

## 2024-04-03 RX ADMIN — SODIUM CHLORIDE, POTASSIUM CHLORIDE, SODIUM LACTATE AND CALCIUM CHLORIDE: 600; 310; 30; 20 INJECTION, SOLUTION INTRAVENOUS at 08:38

## 2024-04-03 RX ADMIN — ONDANSETRON 4 MG: 2 INJECTION INTRAMUSCULAR; INTRAVENOUS at 18:37

## 2024-04-03 RX ADMIN — METOPROLOL TARTRATE 2 MG: 5 INJECTION INTRAVENOUS at 19:03

## 2024-04-03 RX ADMIN — LIDOCAINE HYDROCHLORIDE 80 MG: 20 INJECTION, SOLUTION INFILTRATION; PERINEURAL at 16:30

## 2024-04-03 ASSESSMENT — ACTIVITIES OF DAILY LIVING (ADL)
ADLS_ACUITY_SCORE: 18
ADLS_ACUITY_SCORE: 23
ADLS_ACUITY_SCORE: 18

## 2024-04-03 NOTE — BRIEF OP NOTE
[facility]    Brief Operative Note    Pre-operative diagnosis: Biliary obstruction (H28) [K83.1]  Post-operative diagnosis Same as pre-operative diagnosis    Procedure: ENDOSCOPIC RETROGRADE CHOLANGIOPANCREATOGRAPHY, biliary sphincterotomy, stone removal, stent placement, N/A - Mouth    Surgeon: Surgeon(s) and Role:     * Harsha Ferguson MD - Primary     * Elizabeth Canales MD - Fellow - Assisting  Anesthesia: General   Estimated Blood Loss: None    Drains: None  Specimens: * No specimens in log *  Findings:   None.  Complications: None.  Implants:   Implant Name Type Inv. Item Serial No.  Lot No. LRB No. Used Action   STENT COTTON-BREEN[employer]itex.la [id number] - [id number] Stent STENT COTTON-BREEN [place] [id number]  [employer] [id number] N/A 1 Implanted       Findings:  - Common hepatic duct stricture likely due to extrinsic compression from the hepatic artery aneurysm.  Dilated intrahepatics likely due to obstruction from this stricture.    - Selective biliary cannulation and biliary sphincterotomy performed.  - Cholelithiasis as well as stones in the distal common bile duct and in the left intrahepatics proximal to the common hepatic duct stricture, extracted with a 12mm balloon.  - A 10Fr stent placed across the area of extrinsic compression in the common hepatic duct.      Recommendations:  - Return patient to hospital [facility] for ongoing care.   - Resume previous diet.   - Avoid anticoagulation and antiplatelet for 72 hours post-sphincterotomy.  - Would repeat ERCP in 2-3 months for stent exchange, and continue treatment as long as aneurysm causes biliary compression.    - Would trend liver enzymes - if they continue to be elevated long-term, would consider further workup for alternative (non-obstructive) causes of elevation.    - Inpatient panc-bili team to continue to follow.  - The findings and  recommendations were discussed with the patient and their spouse.

## 2024-04-03 NOTE — PROGRESS NOTES
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 Found to have cholelithiasis and choledocholithiasis on MRCP. ERCP performed 4/3.    Today:  -Hold ASA for potential liver biopsy 4/5 if pt still admitted  -ERCP 4/3 w/ stent placed      Segmental arterial mediolysis   H/O Complex celiac artery dissection  H/O Aneurysmal hepatic artery (stable)  Increased intrahepatic biliary duct dilatation likely secondary to aneurysmal hepatic artery  Worsening transaminitis, Elevated ALP  Direct Hyperbilirubinemia  Cholelithiasis and choledocholithiasis w/o s/o cholecystis   Mr. Quiroga presented to the emergency department with 1 week of nausea, abdominal discomfort which he thinks is exacerbated by the recent increase in alcohol, marijuana and tobacco use.  Last alcoholic drink on Monday.  He has been smoking marijuana every day, last smoked yesterday.  Patient also endorses that he has been noticing dark urine, pale stools since last 5 days.  Although he has been having elevated liver enzymes patient was asymptomatic.  Recently hospitalized for 9 days but got nothing done.  Today, exam notable for mild jaundice, his labs notable for hyperbilirubinemia (increased direct bilirubin), CBC, troponin, lactate within normal limits.  CMP notable for elevated LFTs.  Although TSH is low, free T4 within normal limits.  Lipase within normal limits.  UA notable for bilirubinuria and dark urine and proteinuria.  CTA abdomen pelvis notable  for stable hepatic artery aneurysm, recanalization of partial hepatic artery thrombus, increased dilation of intrahepatic bile ducts likely secondary to aneurysmal dilatation of hepatic artery.  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  Less likely to be rheumatological disorder although anti-smooth muscle antibodies are elevated, however, he has follow-up scheduled with rheumatology. He was seen by multiple specialists including IR, vascular surgery, transplant surgery, hepatology and rheumatology during his last hospital admission and no surgical interventions have been taken at that time.  He is currently not a candidate for transplant given ongoing EtOH use, vascular surgery had noted no feasible distal endpoint for stenting or bypass. Pt got MRCP 4/2/24 which showed cholelithiasis and choledocholithiasis. GI deciding how best to manage.   -GI consulted and appreciate their recs  -ERCP 4/3 with stent placed  -IR consulted for liver biopsy to w/u autoimmune hepatitis, tentatively planning on doing on 4/5/24 if patient stays inpatient   -Discussed most recent imaging with vascular surgery who on review feel that surgical intervention still not feasible  -No current utility or specific questions for rheumatology or IR given stable aneurysm   -Daily CBC, CMP, INR     Hypertension  -Continue PTA amlodipine 10 mg daily  -Continue PTA clonidine 0.1 mg twice daily  -Continue PTA hydrochlorothiazide 25 mg daily  -Continue PTA metoprolol 50 mg twice daily  -Hold aspirin 81 mg once daily  -PRN hydralazine for BP>160/100     Type 2 diabetes mellitus  HbA1c on 2/14/2024 is 9.2. Home regimen: 60U glargine at bedtime and 5 U aspart 3 times daily. On lantus and sliding scale insulin while inpatient.  FYI, patient has not been eating well since last week.  -N.p.o. for possible procedure  -Continue PTA glargine at a lower dose of 20 units (PTA 60 units at bedtime)  -medium-dose sliding  scale insulin  -Hypoglycemia protocol  -POCT glucose checks     Tobacco use  Marijuana use  EtOH use   Used to smoke 1- 2 ppd.  Endorses that he has been smoking marijuana and tobacco since a month, which he thinks could have exacerbated his symptoms.  -Declined nicotine patch at the time of admission.  Will have to readdress this while inpatient  -Not interested in talking with addiction medicine    Hypokalemia  Potassium 2.6 on 4/2. Resolved with oral and IV repletion 4/3.           Diet: NPO per Anesthesia Guidelines for Procedure/Surgery Except for: Meds    DVT Prophylaxis: Low Risk/Ambulatory with no VTE prophylaxis indicated  Tran Catheter: Not present  Fluids: IV LR 100ml/hr  Lines: None     Cardiac Monitoring: None  Code Status: Full Code        Disposition Plan   Dispo pending inpatient GI workup for biliary pathology likely 2/2 hepatic aneurysm.      Expected Discharge Date: 04/05/2024                The patient's care was discussed with the Attending Physician, Dr. Lei .        [address]  Medical Student  Medicine Service, [address]  Securely message with [employer] (more info)  Text page via [employer] Paging/Directory   See signed in provider for up to date coverage information  ______________________________________________________________________    Interval History   Pt with improved nausea this AM and denies abdominal pain. Feeling frustrated by being in/out of hospital, but understanding of the importance of this hospital stay. ERCP performed with stent placed. Tolerated procedure well.     Physical Exam   Vital Signs: Temp: 98.6  F (37  C) Temp src: Oral BP: (!) 142/88 Pulse: 69   Resp: 18 SpO2: 100 % O2 Device: None (Room air)    Weight: 0 lbs 0 oz    CONSTITUTIONAL: Well-developed and well-nourished. Awake and alert.  Not in acute distress.  Appears frustrated  EYES: Conjunctivae and lids are normal. Mild scleral  icterus  CARDIOVASCULAR: Normal rate, regular rhythm and no appreciable abnormal heart sounds.  PULMONARY/CHEST: Bilateral air entry present, no crackles heard.  ABDOMEN: Soft, nontender, nondistended.  Discomfort to palpation  MUSCULOSKELETAL: Extremities warm and seemingly well perfused. No edema or calf tenderness.  NEUROLOGIC: Awake, alert. Not disoriented. No seizure activity. GCS 15.  No focal neurological status  SKIN: Skin is warm and dry. No rash noted. No diaphoresis. No pallor.       Data     I have personally reviewed the following data over the past 24 hrs:    6.6  \   13.7   / 349     136 97 (L) 10.9 /  147 (H)   3.4 26 0.83 \     ALT: 538 (HH) AST: 288 (H) AP: 547 (H) TBILI: 9.4 (H)   ALB: 4.2 TOT PROTEIN: 7.5 LIPASE: N/A     INR:  0.93 PTT:  N/A   D-dimer:  N/A Fibrinogen:  N/A         Narrative & Impression   MRCP Without and With Contrast     CLINICAL HISTORY: 43 y/o M known hepatic aneurysm presenting w/  worsening direct hyperbili and c/f other obstructive process.     DATE: 4/2/2024 3:23 PM                                         IMPRESSION:      Cholelithiasis and proximal choledocholithiasis. There is marked  narrowing of the mid common bile duct due to the known hepatic artery  aneurysm, which is better characterized on the recent CTA on 4/1/2024.  Mass effect of the aneurysm on the common bile duct likely results in  the moderate to marked intrahepatic biliary ductal dilatation and  probable intrahepatic gallstones. The distal common bile duct at the  ampulla is unremarkable.  No evidence of acute cholecystitis.     I have personally reviewed the examination and initial interpretation  and I agree with the findings.       Brief Operative Note     Pre-operative diagnosis:         Biliary obstruction (H28) [K83.1]  Post-operative diagnosis        Same as pre-operative diagnosis  Procedure:      ENDOSCOPIC RETROGRADE CHOLANGIOPANCREATOGRAPHY, biliary sphincterotomy, stone removal, stent  placement, N/A - Mouth     Surgeon:         Surgeon(s) and Role:     * Harsha Ferguson MD - Primary     * Elizabeth Canales MD - Fellow - Assisting     Implant Name Type Inv. Item Serial No.  Lot No. LRB No. Used Action   STENT [facility] [id number] - [id number] Stent STENT [facility] [id number]   [facility] [id number] N/A 1 Implanted         Findings:  - Common hepatic duct stricture likely due to extrinsic compression from the hepatic artery aneurysm.  Dilated intrahepatics likely due to obstruction from this stricture.    - Selective biliary cannulation and biliary sphincterotomy performed.  - Cholelithiasis as well as stones in the distal common bile duct and in the left intrahepatics proximal to the common hepatic duct stricture, extracted with a 12mm balloon.  - A 10Fr stent placed across the area of extrinsic compression in the common hepatic duct.

## 2024-04-03 NOTE — ANESTHESIA CARE TRANSFER NOTE
Patient: Jong Quiroga    Procedure: Procedure(s):  ENDOSCOPIC RETROGRADE CHOLANGIOPANCREATOGRAPHY, biliary sphincterotomy, stone removal, stent placement       Diagnosis: Biliary obstruction (H28) [K83.1]  Diagnosis Additional Information: No value filed.    Anesthesia Type:   General     Note:    Oropharynx: spontaneously breathing  Level of Consciousness: awake  Oxygen Supplementation: face mask  Level of Supplemental Oxygen (L/min / FiO2): 6  Independent Airway: airway patency satisfactory and stable  Dentition: dentition unchanged  Vital Signs Stable: post-procedure vital signs reviewed and stable  Report to RN Given: handoff report given  Patient transferred to: PACU  Comments: VSS. Report to RN. Patient awake and airway patent.  Handoff Report: Identifed the Patient, Identified the Reponsible Provider, Reviewed the pertinent medical history, Discussed the surgical course, Reviewed Intra-OP anesthesia mangement and issues during anesthesia, Set expectations for post-procedure period and Allowed opportunity for questions and acknowledgement of understanding      Vitals:  Vitals Value Taken Time   /99 04/03/24 1756   Temp     Pulse 84 04/03/24 1758   Resp 20 04/03/24 1758   SpO2 96 % 04/03/24 1758   Vitals shown include unfiled device data.    Electronically Signed By: CAROLINE Ghosh CRNA  April 3, 2024  5:58 PM

## 2024-04-03 NOTE — ANESTHESIA PROCEDURE NOTES
Airway       Patient location during procedure: OR       Procedure Start/Stop Times: 4/3/2024 4:34 PM  Staff -        CRNA: Olga Malloy APRN CRNA       Performed By: CRNA  Consent for Airway        Urgency: elective  Indications and Patient Condition       Indications for airway management: oneil-procedural       Induction type:intravenous       Mask difficulty assessment: 2 - vent by mask + OA or adjuvant +/- NMBA    Final Airway Details       Final airway type: endotracheal airway       Successful airway: ETT - single  Endotracheal Airway Details        ETT size (mm): 7.5       Cuffed: yes       Successful intubation technique: direct laryngoscopy       DL Blade Type: Vasquez 2       Grade View of Cords: 2       Adjucts: stylet       Position: Right       Measured from: lips       Secured at (cm): 22       Bite Block used: Endo bite block.    Post intubation assessment        Placement verified by: capnometry and chest rise        Number of attempts at approach: 1       Secured with: tape       Ease of procedure: easy       Dentition: Intact and Unchanged    Medication(s) Administered   Medication Administration Time: 4/3/2024 4:34 PM

## 2024-04-03 NOTE — PLAN OF CARE
Goal Outcome Evaluation:    A&Ox4.On RA. HTN but within parameters. Denies pain and nausea. Up ad doris in room. No BM, voiding spontaneously via urinal, AUOP. R PIV infusing LR @ 100ml/hr. NPO w/ meds. ERCP with possible IR liver biopsy today. Continue with POC.     Vitals: BP (!) 142/88 (BP Location: Right arm)   Pulse 69   Temp 98.6  F (37  C) (Oral)   Resp 18   SpO2 100%

## 2024-04-03 NOTE — ANESTHESIA PREPROCEDURE EVALUATION
Anesthesia Pre-Procedure Evaluation    Patient: Jong Quiroga   MRN: [id number] : 1980        Procedure : Procedure(s):  ENDOSCOPIC RETROGRADE CHOLANGIOPANCREATOGRAPHY          History reviewed. No pertinent past medical history.   No past surgical history on file.   No Known Allergies   Social History     Tobacco Use    Smoking status: Every Day     Types: Cigarettes     Passive exposure: Current    Smokeless tobacco: Never   Substance Use Topics    Alcohol use: Not Currently      Wt Readings from Last 1 Encounters:   24 94.3 kg (207 lb 14.3 oz)        Anesthesia Evaluation   Pt has had prior anesthetic.     No history of anesthetic complications       ROS/MED HX  ENT/Pulmonary:       Neurologic:       Cardiovascular:     (+)  hypertension- -   -  - -                                   (-) murmur   METS/Exercise Tolerance: >4 METS    Hematologic:       Musculoskeletal:       GI/Hepatic: Comment: Segmental arterial mediolysis   H/O Complex celiac artery dissection  H/O Aneurysmal hepatic artery (stable)  Increased intrahepatic biliary duct dilatation likely secondary to aneurysmal hepatic artery  Worsening transaminitis, Elevated ALP  Direct Hyperbilirubinemia  Cholelithiasis and choledocholithiasis w/o s/o cholecystis         Renal/Genitourinary:       Endo:     (+)  type II DM,                    Psychiatric/Substance Use: Comment: Tobacco use  Marijuana use  EtOH use         Infectious Disease:       Malignancy:       Other:            Physical Exam    Airway        Mallampati: I   TM distance: > 3 FB   Neck ROM: full   Mouth opening: > 3 cm    Respiratory Devices and Support         Dental     Comment: Teeth examined without any significant abnormality, patient denies loose, missing or chipped teeth or anything removable.         Cardiovascular          Rhythm and rate: regular and normal (-) no murmur    Pulmonary   pulmonary exam normal        breath sounds clear to auscultation           OUTSIDE  "LABS:  CBC:   Lab Results   Component Value Date    WBC 6.6 04/03/2024    WBC 7.0 04/02/2024    HGB 13.7 04/03/2024    HGB 14.3 04/02/2024    HCT 39.3 (L) 04/03/2024    HCT 40.8 04/02/2024     04/03/2024     04/02/2024     BMP:   Lab Results   Component Value Date     04/03/2024     04/02/2024    POTASSIUM 3.4 04/03/2024    POTASSIUM 2.6 (LL) 04/02/2024    CHLORIDE 97 (L) 04/03/2024    CHLORIDE 92 (L) 04/02/2024    CO2 26 04/03/2024    CO2 27 04/02/2024    BUN 10.9 04/03/2024    BUN 11.1 04/02/2024    CR 0.83 04/03/2024    CR 0.87 04/02/2024     (H) 04/03/2024     (H) 04/03/2024     COAGS:   Lab Results   Component Value Date    INR 0.93 04/03/2024     POC: No results found for: \"BGM\", \"HCG\", \"HCGS\"  HEPATIC:   Lab Results   Component Value Date    ALBUMIN 4.2 04/03/2024    PROTTOTAL 7.5 04/03/2024     (HH) 04/03/2024     (H) 04/03/2024    ALKPHOS 547 (H) 04/03/2024    BILITOTAL 9.4 (H) 04/03/2024     OTHER:   Lab Results   Component Value Date    LACT 1.7 04/01/2024    LUIS 9.7 04/03/2024    LIPASE 30 04/01/2024    TSH 0.23 (L) 04/01/2024    T4 1.37 04/01/2024       Anesthesia Plan    ASA Status:  2    NPO Status:  NPO Appropriate    Anesthesia Type: General.     - Airway: ETT   Induction: Intravenous, Propofol.   Maintenance: Balanced.        Consents    Anesthesia Plan(s) and associated risks, benefits, and realistic alternatives discussed. Questions answered and patient/representative(s) expressed understanding.     - Discussed:     - Discussed with:  Patient      - Extended Intubation/Ventilatory Support Discussed: No.      - Patient is DNR/DNI Status: No     Use of blood products discussed: No .     Postoperative Care    Pain management: IV analgesics, Oral pain medications, Multi-modal analgesia.   PONV prophylaxis: Ondansetron (or other 5HT-3), Dexamethasone or Solumedrol     Comments:    Other Comments: Discussed plan for general anesthetic with Oral ETT. " Discussed risks of sore throat, post op pain/nausea, oropharyngeal damage, rare major complications.             Greg Peters MD    I have reviewed the pertinent notes and labs in the chart from the past 30 days and (re)examined the patient.  Any updates or changes from those notes are reflected in this note.

## 2024-04-03 NOTE — PROGRESS NOTES
Gastroenterology Consultation  GI Hepatology Service    Date of Admission:  4/1/2024  Date of Consult  4/2/2024   Reason for consult:   Mixed pattern of liver injury, high anti-smooth muscle titer  Requesting Physician:  Hai Lei DO  PATIENT:  Jong Quiroga  MRN:         [id number]          ASSESSMENT AND RECOMMENDATIONS:   Assessment:  Jong Quiroga is a 44 year old male who was admitted on 4/1/2024 for 5 days of nausea and progressively dark urine.      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  He was evaluated by vascular surgery, IR, rhematology, and hepatology.  No suitable surgical / endovascular target.  Extensive workup revealed negative Hep B, C, HIV, histoplasma, quant gold, treponemia, WHIT, ANCA, DsDNA, normal C3 and C4.  However, Anti-smooth muscle antibody was positive at 1:160 titer and F-actin was mildly positive at 22. Patient' CRP initially was only 0.9.  Patient did not follow-up with GI in the clinic because he felt well.     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   - Given elevated ASMA and F-Actin, AIH is still on the ddx.  International [facility] (IAIHG) simplified diagnostic criteria include autoantibodies, immunoglobulin G, histology, and exclusion of viral hepatitis.     Patient meets criteria for autoantibodies and exclusion of viral hepatitis;  no IgG level or histology yet.  Response to steroid/Aza is another criteria that solidifies the diagnosis.      # HTN - on 4 anti-hypertensive   # T2DM - on insulin  # Obesity,  class I with significant central obesity  # Bipolar disorder  # Binge Alcohol use     Recommendations:  - ERCP today by panc bili team.   - Trend LFTs.  - If LFTs do not improve post ERCP, patient will need an outpatient liver biopsy.      - Follow-up IgG level and hepatitis E.   - Recommend complete alcohol cessation -- currently binge drinks 1 Bottle of vodka twice a week -- for ALD component.   - Recommend 5-10% total body weight loss for likely MASLD component as well as optimization of DM and HTN.     Outpatient: GI follow-up outpatient to ensure LFT normalization and possible liver biopsy if that doesn't happen.  He will need to hold asa for 3 days prior to outpatient liver biopsy per IR guidelines.     Thank you for involving us in this patient's care. Please do not hesitate to contact the GI service with any questions or concerns.  The patient was discussed and plan agreed upon with Dr. Ramos.    Haresh Klein (AdithyaHCA Florida Gulf Coast Hospitalangie Osbornemckenna, , MS  Gastroenterology Fellow  [facility]  Text Page          History of Present Illness:   Patient seen and examined at 11am. History is obtained from chart review and patient.    Jong Quiroga is a 44 year old male who was admitted on 4/1/2024 for 5 days of nausea and progressively dark urine.  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  He was evaluated by vascular surgery, IR, rhematology, and hepatology.  No suitable surgical / endovascular target.  Extensive workup revealed negative Hep B, C, HIV, histoplasma, quant gold, treponemia, WHIT, ANCA, DsDNA, normal C3 and C4.  However, Anti-smooth muscle antibody was positive at 1:160 titer and F-actin was mildly positive at 22. Patient's CRP initially was only 0.9.  Patient did not follow-up with GI in the clinic because he felt well.             Past Medical History:    Reviewed            Past Surgical History:   Reviewed            Social History:   [place]   Employer: Unemployed  Alcohol: 1 bottle of vodka twice a week, binge drinks.   Tobacco:  ~ 1 PPD  Marijuana: Daily use  Illicit drugs: Denies.            Family History:   Patient's family history is reviewed today and is non-contributory           Allergies:   Reviewed         Medications:   Antiplatelets: Yes  Anticoagulation: No         Review of Systems:     A review of systems was performed and is negative except as noted in the HPI           Physical Exam:   Temp: 98.6  F (37  C) Temp src: Oral BP: (!) 142/88 Pulse: 69   Resp: 18 SpO2: 100 % O2 Device: None (Room air)          General Appearance: NAD, pleasant  HEENT: EOMI   Respiratory: Breathing comfortably on RA   Cardiovascular:   GI: soft, NTND, no peritoneal sign  Extremities: No LE edema noted   Neuro: Moving all extremities   Skin: No jaundice rash on exposed areas   Psych: Alert and oriented, appropriate mood and affect, speech coherent / logical    Data:  Labs and imaging from this admission reviewed.

## 2024-04-03 NOTE — PLAN OF CARE
Goal Outcome Evaluation:      Plan of Care Reviewed With: patient    Overall Patient Progress: no changeOverall Patient Progress: no change     Temp: 98.5  F (36.9  C) Temp src: Oral BP: 136/81 Pulse: 72   Resp: 16 SpO2: 98 % O2 Device: None (Room air)        Slept well overnight. A&Ox4. Up independent. PIV infusing LR at 100mL/hr. Voiding adequate. Abdomen rounded. Diaphoretic.

## 2024-04-03 NOTE — PROGRESS NOTES
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 Pawel Pérez) who presented to the ED with abdominal pain, elevated liver tests and GI panc/bili team consulted for further evaluation and management of possible biliary obstruction.     # Celiac Artery Dissection  # Aneurysm of the right hepatic artery  # Abnormal Liver Studies, cholestatic pattern  # Suspected intrahepatic biliary ductal gallstones on MRCP  Patient presented with abdominal discomfort and jaundice. Liver tests significantly elevated and higher than prior admission in January, with new hyperbilirubinemia. Admission labs notable for: Tbili 8.1 (direct 6.19), alk phos 584, , . Imaging with known stable size of the aneurysmal hepatic artery but now with increase recanalization seen. Additionally, there is new dilation of the intrahepatic bile ducts with normal size extrahepatic bile duct. MRCP showed marked narrowing of mid CBD likely due to mass effect from hepatic artery aneurysm, resulting in intrahepatic biliary ductal dilatation. Will plan for ERCP for biliary drainage today (4/3).      #. Positive Smooth Muscle Wendy IgG titer (1/6/2024)  Admission in January with elevated liver tests. F-actin weakly positive and reflexed to smooth muscle wendy titer which was 1:160, suggestive of autoimmune hepatitis or chronic active hepatitis. Hepatology team involved, potential outpatient liver biopsy pending clinical course after ERCP.      #. Polysubstance use  Reports alcohol and continuous tobacco use. Recommended cessation especially in light of underlying liver disease.    RECOMMENDATIONS:  - ERCP today (4/3) for biliary  "drainage  - Appreciate hepatology involvement for possibility of autoimmune hepatitis. Potential outpatient liver biopsy pending clinical course after ERCP.   - Will discuss need for cholecystectomy given suspicion for intrahepatic ductal gallstones on MRCP  - Continue to encourage alcohol cessation  - Further recs to follow after ERCP    The patient was discussed and plan agreed upon with GI staff, Dr. Ferguson.     Cali Ayala MD  PGY2, Internal Medicine  Gastroenterology Consult Service    _______________________________________________________________  S: Patient seen this AM feeling \"ok.\" Denies any significant pain. No fevers/chills or vomiting. Aware of plan for procedure today. He is hoping to return home tomorrow after his procedure, as he was planning to have a vacation to the [place].     O:  Blood pressure (!) 142/88, pulse 69, temperature 98.6  F (37  C), temperature source Oral, resp. rate 18, SpO2 100%.    Gen: No acute distress, lying in bed.   HEENT: Normocephalic, atraumatic. Scleral icterus is present.   CV: RRR, no obvious murmur.   Lungs: CTAB, non-labored breathing on room air.   Abd: Soft, non-tender to palpation. +BS  Skin: Jaundice on mucous membranes, less apparent on skin.   Neuro: Alert and conversant.   Psych: Appropriate affect.       LABS:  [facility]  Recent Labs   Lab 04/03/24  0851 04/03/24  0637 04/03/24  0202 04/02/24  2251 04/02/24  1147 04/02/24  0646 04/01/24  2050   NA  --  136  --   --   --  135 136   POTASSIUM  --  3.4  --   --   --  2.6* 3.4   CHLORIDE  --  97*  --   --   --  92* 92*   LUIS  --  9.7  --   --   --  9.8 9.9   CO2  --  26  --   --   --  27 27   BUN  --  10.9  --   --   --  11.1 13.0   CR  --  0.83  --   --   --  0.87 0.98   * 122* 158* 153*   < > 133* 131*    < > = values in this interval not displayed.     CBC  Recent Labs   Lab 04/03/24  0637 04/02/24  0646 04/01/24  2050   WBC 6.6 7.0 7.4   RBC 4.85 5.01 5.22   HGB 13.7 14.3 14.8   HCT 39.3* 40.8 42.9 "   MCV 81 81 82   MCH 28.2 28.5 28.4   MCHC 34.9 35.0 34.5   RDW 16.5* 16.4* 16.7*    333 346     INR  Recent Labs   Lab 04/03/24  0637 04/01/24 2050   INR 0.93 0.91     LFTs  Recent Labs   Lab 04/03/24  0637 04/02/24  0646 04/02/24  0338 04/01/24 2050   ALKPHOS 547* 557*  --  584*   * 332* 334*  --    * 633*  --  716*   BILITOTAL 9.4* 8.7*  --  8.1*   PROTTOTAL 7.5 8.0  --  8.5*   ALBUMIN 4.2 4.5  --  4.8      PANC  Recent Labs   Lab 04/01/24 2050   LIPASE 30       IMAGING:  MRCP 4/2/2024  IMPRESSION:      Cholelithiasis and proximal choledocholithiasis. There is marked  narrowing of the mid common bile duct due to the known hepatic artery  aneurysm, which is better characterized on the recent CTA on 4/1/2024.  Mass effect of the aneurysm on the common bile duct likely results in  the moderate to marked intrahepatic biliary ductal dilatation and  probable intrahepatic gallstones. The distal common bile duct at the  ampulla is unremarkable.  No evidence of acute cholecystitis.    ENDOSCOPY:  None.

## 2024-04-03 NOTE — PLAN OF CARE
Goal Outcome Evaluation:      Plan of Care Reviewed With: patient    Overall Patient Progress: no changeOverall Patient Progress: no change    Vitals: Stable on RA  Neuro: A&Ox4  Mobility: Pt moves independently.  Pain/Nausea: Pain denied. Denies nausea.   Diet & GI: Regular diet. No BM this shift.  Labs: Monitored.   LDAs: R PIV w/ LR at 100  Skin/incisions: assessment refused  Respiratory: No acute changes this shift.  Cardiac: No acute changes this shift.  : Voiding appropriately and spontaneously.     NEW CHANGES: Pt arrived to unit from ED. No acute changes this shift. Plan for ERCP tomorrow.    Continue to implement Care Plan.    Fermin Stacy RN

## 2024-04-03 NOTE — PROGRESS NOTES
Admitted/transferred from:   2 RN full except refused   skin assessment completed by Fermin Stacy RN and n/a.  Skin assessment finding: other assessment refused    Interventions/actions: other assessment refused      Bedside Emergency Equipment Present:  Suction Regulator: Yes  Suction Canister: Yes  Tubing between Regulator and Canister: Yes  O2 Regulator with Tree: Yes  Ambu Bag: Yes    Fermin Stacy RN

## 2024-04-03 NOTE — ANESTHESIA POSTPROCEDURE EVALUATION
Patient: Jong Quiroga    Procedure: Procedure(s):  ENDOSCOPIC RETROGRADE CHOLANGIOPANCREATOGRAPHY, biliary sphincterotomy, stone removal, stent placement       Anesthesia Type:  General    Note:  Disposition: Outpatient   Postop Pain Control: Uneventful            Sign Out: Well controlled pain   PONV: No   Neuro/Psych: Uneventful            Sign Out: Acceptable/Baseline neuro status   Airway/Respiratory: Uneventful            Sign Out: Acceptable/Baseline resp. status   CV/Hemodynamics: Uneventful            Sign Out: Acceptable CV status; No obvious hypovolemia; No obvious fluid overload   Other NRE: NONE   DID A NON-ROUTINE EVENT OCCUR? No    Event details/Postop Comments:  No complications.           Last vitals:  Vitals Value Taken Time   /103 04/03/24 1815   Temp 37  C (98.6  F) 04/03/24 1800   Pulse 74 04/03/24 1816   Resp 22 04/03/24 1816   SpO2 100 % 04/03/24 1816   Vitals shown include unfiled device data.    Electronically Signed By: Storm Bland MD  April 3, 2024  6:16 PM

## 2024-04-04 VITALS
SYSTOLIC BLOOD PRESSURE: 162 MMHG | DIASTOLIC BLOOD PRESSURE: 94 MMHG | OXYGEN SATURATION: 99 % | RESPIRATION RATE: 16 BRPM | TEMPERATURE: 98.8 F | HEART RATE: 86 BPM

## 2024-04-04 LAB
ALBUMIN SERPL BCG-MCNC: 4.6 G/DL (ref 3.5–5.2)
ALP SERPL-CCNC: 558 U/L (ref 40–150)
ALT SERPL W P-5'-P-CCNC: 521 U/L (ref 0–70)
ANION GAP SERPL CALCULATED.3IONS-SCNC: 19 MMOL/L (ref 7–15)
AST SERPL W P-5'-P-CCNC: 261 U/L (ref 0–45)
BASOPHILS # BLD AUTO: 0 10E3/UL (ref 0–0.2)
BASOPHILS NFR BLD AUTO: 0 %
BILIRUB SERPL-MCNC: 4.3 MG/DL
BUN SERPL-MCNC: 13.9 MG/DL (ref 6–20)
CALCIUM SERPL-MCNC: 9.9 MG/DL (ref 8.6–10)
CHLORIDE SERPL-SCNC: 93 MMOL/L (ref 98–107)
CREAT SERPL-MCNC: 0.84 MG/DL (ref 0.67–1.17)
DEPRECATED HCO3 PLAS-SCNC: 22 MMOL/L (ref 22–29)
EGFRCR SERPLBLD CKD-EPI 2021: >90 ML/MIN/1.73M2
EOSINOPHIL # BLD AUTO: 0 10E3/UL (ref 0–0.7)
EOSINOPHIL NFR BLD AUTO: 0 %
ERYTHROCYTE [DISTWIDTH] IN BLOOD BY AUTOMATED COUNT: 16.5 % (ref 10–15)
GLUCOSE BLDC GLUCOMTR-MCNC: 139 MG/DL (ref 70–99)
GLUCOSE BLDC GLUCOMTR-MCNC: 175 MG/DL (ref 70–99)
GLUCOSE BLDC GLUCOMTR-MCNC: 176 MG/DL (ref 70–99)
GLUCOSE SERPL-MCNC: 166 MG/DL (ref 70–99)
HCT VFR BLD AUTO: 40.4 % (ref 40–53)
HGB BLD-MCNC: 14.2 G/DL (ref 13.3–17.7)
IMM GRANULOCYTES # BLD: 0.3 10E3/UL
IMM GRANULOCYTES NFR BLD: 2 %
INR PPP: 0.93 (ref 0.85–1.15)
LYMPHOCYTES # BLD AUTO: 0.9 10E3/UL (ref 0.8–5.3)
LYMPHOCYTES NFR BLD AUTO: 6 %
MCH RBC QN AUTO: 28.5 PG (ref 26.5–33)
MCHC RBC AUTO-ENTMCNC: 35.1 G/DL (ref 31.5–36.5)
MCV RBC AUTO: 81 FL (ref 78–100)
MONOCYTES # BLD AUTO: 0.2 10E3/UL (ref 0–1.3)
MONOCYTES NFR BLD AUTO: 1 %
NEUTROPHILS # BLD AUTO: 13.3 10E3/UL (ref 1.6–8.3)
NEUTROPHILS NFR BLD AUTO: 91 %
NRBC # BLD AUTO: 0 10E3/UL
NRBC BLD AUTO-RTO: 0 /100
PLATELET # BLD AUTO: 345 10E3/UL (ref 150–450)
POTASSIUM SERPL-SCNC: 3.5 MMOL/L (ref 3.4–5.3)
PROT SERPL-MCNC: 8.5 G/DL (ref 6.4–8.3)
RBC # BLD AUTO: 4.98 10E6/UL (ref 4.4–5.9)
SODIUM SERPL-SCNC: 134 MMOL/L (ref 135–145)
WBC # BLD AUTO: 14.7 10E3/UL (ref 4–11)

## 2024-04-04 PROCEDURE — 99207 PR NO BILLABLE SERVICE THIS VISIT: CPT | Performed by: SURGERY

## 2024-04-04 PROCEDURE — 80053 COMPREHEN METABOLIC PANEL: CPT | Performed by: INTERNAL MEDICINE

## 2024-04-04 PROCEDURE — 999N000248 HC STATISTIC IV INSERT WITH US BY RN

## 2024-04-04 PROCEDURE — 85610 PROTHROMBIN TIME: CPT | Performed by: INTERNAL MEDICINE

## 2024-04-04 PROCEDURE — 250N000011 HC RX IP 250 OP 636

## 2024-04-04 PROCEDURE — 85041 AUTOMATED RBC COUNT: CPT | Performed by: INTERNAL MEDICINE

## 2024-04-04 PROCEDURE — 99232 SBSQ HOSP IP/OBS MODERATE 35: CPT | Mod: GC | Performed by: INTERNAL MEDICINE

## 2024-04-04 PROCEDURE — 36415 COLL VENOUS BLD VENIPUNCTURE: CPT | Performed by: INTERNAL MEDICINE

## 2024-04-04 PROCEDURE — 250N000011 HC RX IP 250 OP 636: Performed by: INTERNAL MEDICINE

## 2024-04-04 PROCEDURE — 250N000013 HC RX MED GY IP 250 OP 250 PS 637: Performed by: INTERNAL MEDICINE

## 2024-04-04 PROCEDURE — 99239 HOSP IP/OBS DSCHRG MGMT >30: CPT | Mod: GC | Performed by: STUDENT IN AN ORGANIZED HEALTH CARE EDUCATION/TRAINING PROGRAM

## 2024-04-04 PROCEDURE — 258N000003 HC RX IP 258 OP 636: Performed by: INTERNAL MEDICINE

## 2024-04-04 RX ORDER — PROCHLORPERAZINE MALEATE 10 MG
10 TABLET ORAL EVERY 6 HOURS PRN
Qty: 15 TABLET | Refills: 0 | Status: SHIPPED | OUTPATIENT
Start: 2024-04-04 | End: 2024-07-02

## 2024-04-04 RX ADMIN — METOPROLOL TARTRATE 50 MG: 50 TABLET, FILM COATED ORAL at 08:20

## 2024-04-04 RX ADMIN — INSULIN ASPART 1 UNITS: 100 INJECTION, SOLUTION INTRAVENOUS; SUBCUTANEOUS at 08:20

## 2024-04-04 RX ADMIN — ONDANSETRON 4 MG: 4 TABLET, ORALLY DISINTEGRATING ORAL at 04:03

## 2024-04-04 RX ADMIN — SODIUM CHLORIDE, POTASSIUM CHLORIDE, SODIUM LACTATE AND CALCIUM CHLORIDE: 600; 310; 30; 20 INJECTION, SOLUTION INTRAVENOUS at 06:46

## 2024-04-04 RX ADMIN — PROCHLORPERAZINE EDISYLATE 5 MG: 5 INJECTION INTRAMUSCULAR; INTRAVENOUS at 07:45

## 2024-04-04 RX ADMIN — INSULIN ASPART 1 UNITS: 100 INJECTION, SOLUTION INTRAVENOUS; SUBCUTANEOUS at 12:13

## 2024-04-04 RX ADMIN — AMLODIPINE BESYLATE 10 MG: 10 TABLET ORAL at 08:20

## 2024-04-04 RX ADMIN — CLONIDINE HYDROCHLORIDE 0.1 MG: 0.1 TABLET ORAL at 08:20

## 2024-04-04 RX ADMIN — HYDROCHLOROTHIAZIDE 25 MG: 25 TABLET ORAL at 08:20

## 2024-04-04 ASSESSMENT — ACTIVITIES OF DAILY LIVING (ADL)
ADLS_ACUITY_SCORE: 27
ADLS_ACUITY_SCORE: 21
ADLS_ACUITY_SCORE: 27
ADLS_ACUITY_SCORE: 21
ADLS_ACUITY_SCORE: 21
ADLS_ACUITY_SCORE: 27
ADLS_ACUITY_SCORE: 21
ADLS_ACUITY_SCORE: 27

## 2024-04-04 NOTE — PROGRESS NOTES
Gastroenterology Consultation  GI Hepatology Service    Date of Admission:  4/1/2024  Date of Consult  4/2/2024   Reason for consult:   Mixed pattern of liver injury, high anti-smooth muscle titer  Requesting Physician:  Hai Lei DO  PATIENT:  Jong Quiroga  MRN:         [id number]          ASSESSMENT AND RECOMMENDATIONS:   Assessment:  Jong Quiroga is a 44 year old male who was admitted on 4/1/2024 for 5 days of nausea and progressively dark urine.      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  He was evaluated by vascular surgery, IR, rhematology, and hepatology.  No suitable surgical / endovascular target.  Extensive workup revealed negative Hep B, C, HIV, histoplasma, quant gold, treponemia, WHIT, ANCA, DsDNA, normal C3 and C4.  However, Anti-smooth muscle antibody was positive at 1:160 titer and F-actin was mildly positive at 22. Patient' CRP initially was only 0.9.  Patient did not follow-up with GI in the clinic because he felt well.     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  Now doing well post ERCP.   - Given elevated ASMA and F-Actin, AIH is still on the ddx.  IgG level is WNL. If LFTs do not normalize outpatient, may need liver biopsy to r/o AIH.     # HTN - on 4 anti-hypertensive   # T2DM - on insulin  # Obesity, class I with significant central obesity  #  Bipolar disorder  # Binge Alcohol use     Recommendations:  - We will discuss patient in multidisciplinary meeting with hepatology, radiology, and surgery about treatment option for BRUNA.   - Trend LFTs to normalization outpatient.  If doesn't normalize, may need outpatient liver biopsy.  He will need to hold asa for 3 days prior to outpatient liver biopsy per IR guidelines.    - Panc bili team will arrange repeat ERCP.  - Consider cholecystectomy given choledocholithiasis.  - Recommend complete alcohol cessation -- currently binge drinks 1 Bottle of vodka twice a week -- for ALD component.   - Recommend 5-10% total body weight loss for likely MASLD component as well as optimization of DM and HTN.     Thank you for involving us in this patient's care. Please do not hesitate to contact the GI service with any questions or concerns.  The patient was discussed and plan agreed upon with Dr. Sumner.    Haresh Klein (AdithyaHCA Florida University Hospital IndiaDO mckenna, MS  Gastroenterology Fellow  [facility]  Text Page          History of Present Illness:   Doing well post-ERCP.  No abdominal pain.  No GIB.  Tolerating diet.                 Physical Exam:   Temp: 98.8  F (37.1  C) Temp src: Oral BP: (!) 162/94 Pulse: 86   Resp: 16 SpO2: 99 % O2 Device: None (Room air) Oxygen Delivery: 2 LPM    General Appearance: NAD, pleasant  HEENT: EOMI   Respiratory: Breathing comfortably on RA   Cardiovascular:   GI: soft, NTND, no peritoneal sign  Extremities: No LE edema noted   Neuro: Moving all extremities   Skin: No jaundice rash on exposed areas   Psych: Alert and oriented, appropriate mood and affect, speech coherent / logical    Data:  Labs and imaging from this admission reviewed.

## 2024-04-04 NOTE — PROGRESS NOTES
Admitted/transferred from:   2 RN full except pt refused   skin assessment refused    Bedside Emergency Equipment Present:  Suction Regulator: Yes  Suction Canister: Yes  Tubing between Regulator and Canister: Yes  O2 Regulator with Tree: Yes  Ambu Bag: Yes    Fermin Stacy RN

## 2024-04-04 NOTE — DISCHARGE SUMMARY
[facility]  Discharge Summary - Medicine & Pediatrics       Date of Admission:  4/1/2024  Date of Discharge:  4/4/2024  2:50 PM  Discharging Provider: Dr. Hai Lei  Discharge Service: Medicine Service, LUIS TEAM 4    Discharge Diagnoses   Segmental arterial mediolysis   H/O Complex celiac artery dissection  H/O Aneurysmal hepatic artery (stable)  Increased intrahepatic biliary duct dilatation likely secondary to aneurysmal hepatic artery  Worsening transaminitis, Elevated ALP  Direct Hyperbilirubinemia  Cholelithiasis and choledocholithiasis w/o cholecystis  Hypertension   Type 2 diabetes mellitus  Tobacco use  Marijuana use  EtOH use    Hypokalemia       Follow-ups Needed After Discharge   Follow-up Appointments     Follow Up and recommended labs and tests      Follow up with primary care provider, Storm Armenta, within 7 days for   hospital follow- up.  The following labs/tests are recommended: CMP and   CBC.            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 Found to have cholelithiasis and choledocholithiasis s/p ERCP 4/3 with stone removal and stent placement. Hyperbilirubinemia improved post op.         Jaundice  Worsening transaminitis, Elevated ALP  Direct  Hyperbilirubinemia  Cholelithiasis and choledocholithiasis w/o cholecystis   Mr. Quiroga presented to the emergency department with 1 week of nausea, abdominal discomfort, dark urine, and pale stools since x 5 days. Was jaundiced on admission with labs notable for direct hyperbilirubinemia and elevated liver enzymes (Alk phos 584, , ). CBC, lipase, troponin, lactate within normal limits. UA notable for bilirubinuria and proteinuria.  CTA abdomen pelvis notable for stable hepatic artery aneurysm, recanalization of partial hepatic artery thrombus, increased dilation of intrahepatic bile ducts likely secondary to aneurysmal dilatation of hepatic artery. MRCP 4/2/24 showed cholelithiasis and choledocholithiasis. ERCP w/ stone removal and stent placement 4/3. Direct hyperbilirubinemia and LFTs improved at time of discharge. Of note, pt did have a rising WBC count on 4/4 (previously WBC 6.6 and at time of discharge was 14.7). He was asymptomatic with no abdominal tenderness and was afebrile. Discussed that he is at high risk for biliary infection and this may be initial signs, he was emphatic on going home and understands the signs/symptoms to return to the ED for.   -outpatient GI follow up with potential for biopsy for autoimmune hepatitis  -general surgery referral for potential cholecystectomy  -Repeat CBC, CMP, and INR in 1 week with PCP follow up shortly after       Segmental arterial mediolysis   H/O Complex celiac artery dissection  H/O Aneurysmal hepatic artery (stable)  Increased intrahepatic biliary duct dilatation likely secondary to aneurysmal hepatic artery  Segmental arterial mediolysis (BRUNA), involving celiac artery dissection and R hepatic artery aneurysm, 17mm, first diagnosed on 12/20203 when he began having R shoulder pain. He was evaluated by vascular surgery, IR, rhematology, and hepatology during hospitalization 1/4-1/7/2024. No suitable surgical / endovascular target. Extensive workup  revealed negative Hep B, C, HIV, histoplasma, quant gold, treponemia, WHIT, ANCA, DsDNA, normal C3 and C4. However, Anti-smooth muscle antibody was positive at 1:160 titer and F-actin was mildly positive at 22. Pt was supposed to follow up with GI outpatient but was feeling well and declined.  -Vascular surgery asked to review CTA from 4/1/24, no significant changes or new recommendations. Still not endovascular candidate       Hypertension  Patient with intermittent htn to 160/100. Unclear hx around current BP regiment choices. PCP to follow up. Potential need for further optimization marii in the setting of hepatic artery aneurysm.  -Continue amlodipine 10 mg daily  -Continue clonidine 0.1 mg twice daily  -Continue hydrochlorothiazide 25 mg daily  -Continue metoprolol 50 mg twice daily  -Continue aspirin 81 mg once daily       Type 2 diabetes mellitus  HbA1c on 2/14/2024 is 9.2. Home regimen: 48U glargine at bedtime and 5 U aspart 3 times daily. On lantus and sliding scale insulin and 20U glargine while inpatient.   -Continue PTA glargine at a lower dose 30 U daily  -follow up with primary marissa     Tobacco use  Marijuana use  EtOH use   Chronically smoked 1- 2 ppd. reduced tobacco use to nearly none but still with daily marijuana use. Reportedly drinking 1 pint of vodka twice a week over the past month.   -Not interested in talking with addiction medicine in patient         Consultations This Hospital Stay   GI PANCREATICOBILIARY ADULT IP CONSULT  INTERVENTIONAL RADIOLOGY ADULT/PEDS IP CONSULT  NURSING TO CONSULT FOR VASCULAR ACCESS CARE IP CONSULT  VASCULAR SURGERY ADULT IP CONSULT  PHARMACY LIAISON FOR MEDICATION COVERAGE CONSULT    Code Status   Full Code     Resident/Fellow Attestation   I, Margarita Prajapati MD, was present with the medical/SHLOMO student who participated in the service and in the documentation of the note.  I have verified the history and personally performed the physical exam and medical decision  making.  I agree with the assessment and plan of care as documented in the note.        Margarita Prajapati MD  PGY2  Date of Service (when I saw the patient): 04/04/24    The patient was discussed with Dr. Quique Campbell 4 Service  [facility] [address]  [address]  [place] [address]  Phone: [phone]  ______________________________________________________________________    Physical Exam   Vital Signs: Temp: 98.8  F (37.1  C) Temp src: Oral BP: (!) 162/94 Pulse: 86   Resp: 16 SpO2: 99 % O2 Device: None (Room air) Oxygen Delivery: 2 LPM  Weight: 0 lbs 0 oz    Exam  Gen: Awake and alert, appears comfortable, appears stated age, .  HEENT: NCAT, sclerae icteric, improving  CV: regular rhythm,  rate, S1/S2, extremities warm and well perfused, no lower extremity edema.  Pulm: Normal work of breathing, lungs clear to auscultation, no crackles or wheezing.  GI: Nontender, nondistended, . +bowel sounds.  Skin: Warm, dry, mild jaundice, .  Neuro: AOx3, speech normal, moves all extremities symmetrically.  Psych: Mood is good, affect is congruent.        Primary Care Physician   Storm Armenta    Discharge Orders      Comprehensive metabolic panel     INR     CBC with platelets     Adult General Surg Referral      Reason for your hospital stay    You were seen in the hospital for nausea and vomiting and jaundice that was caused by a blockage in your biliary system caused by a gallstone. Our gastroenterology doctors saw you and did a procedure to remove the stone. They also placed a stent to keep that area more open. Your liver numbers improved, which is good and we anticipate they will continue to improve in the coming days. You will need to get a repeat blood test to ensure this.     On the day of your discharge we did notice that your white blood cell count, which is a marker for infection and/or inflammation had doubled. This might be the first sign of infection. If you develop any  fevers, chills, or abdominal pain you need to return to the ED for evaluation.     Additionally if your nausea and vomiting return or your jaundice worsens please return to the ED since biliary stents can clog up again.     Please follow up with your PCP in the next 1-2 weeks.     Activity    Your activity upon discharge: activity as tolerated     Follow Up and recommended labs and tests    Follow up with primary care provider, Storm Armenta, within 7 days for hospital follow- up.  The following labs/tests are recommended: CMP and CBC.     Diet    Follow this diet upon discharge: Orders Placed This Encounter      Clear Liquid Diet       Significant Results and Procedures   Most Recent 3 CBC's:  Recent Labs   Lab Test 04/04/24  0822 04/03/24  0637 04/02/24  0646   WBC 14.7* 6.6 7.0   HGB 14.2 13.7 14.3   MCV 81 81 81    349 333     Most Recent 3 BMP's:  Recent Labs   Lab Test 04/04/24  1213 04/04/24  0822 04/04/24  0817 04/03/24  0851 04/03/24  0637 04/02/24  1147 04/02/24  0646   NA  --  134*  --   --  136  --  135   POTASSIUM  --  3.5  --   --  3.4  --  2.6*   CHLORIDE  --  93*  --   --  97*  --  92*   CO2  --  22  --   --  26  --  27   BUN  --  13.9  --   --  10.9  --  11.1   CR  --  0.84  --   --  0.83  --  0.87   ANIONGAP  --  19*  --   --  13  --  16*   LUIS  --  9.9  --   --  9.7  --  9.8   * 166* 175*   < > 122*   < > 133*    < > = values in this interval not displayed.     Most Recent 2 LFT's:  Recent Labs   Lab Test 04/04/24  0822 04/03/24  0637   * 288*   * 538*   ALKPHOS 558* 547*   BILITOTAL 4.3* 9.4*     Most Recent 3 INR's:  Recent Labs   Lab Test 04/04/24  0822 04/03/24  0637 04/01/24 2050   INR 0.93 0.93 0.91     Most Recent Urinalysis:  Recent Labs   Lab Test 04/01/24 2054   COLOR Dark Yellow*   APPEARANCE Clear   URINEGLC Negative   URINEBILI Large*   URINEKETONE Negative   SG 1.022   UBLD Trace*   URINEPH 6.0   PROTEIN 30*   NITRITE Negative   LEUKEST Negative   RBCU  <1   WBCU 3   ,   Results for orders placed or performed during the hospital encounter of 04/01/24   CTA Abdomen Pelvis with Contrast    Narrative    EXAM: CTA ABDOMEN PELVIS WITH CONTRAST  LOCATION: [facility]  DATE: 4/1/2024    INDICATION: Hx celiac and hepatic artery dissections; abdominal pain. Nausea.  COMPARISON: 02/01/2024.  TECHNIQUE: CT angiogram abdomen pelvis during arterial phase of injection of IV contrast. 2D and 3D MIP reconstructions were performed by the CT technologist. Dose reduction techniques were used.  CONTRAST: Iopamidol (ISOVUE-370) solution 127 mL.    FINDINGS:  ANGIOGRAM ABDOMEN/PELVIS: Since 02/01/2024, the only significant change I see is there has been an increase in the amount of recannulization involving the distal aspect of the hepatic artery with recanalized area of aneurysmal dilatation on today's study   measuring approximately 1.7 cm in greatest radial dimension and on prior study this measured 1.2 cm. The overall size of the aneurysm to include the recanalized portion and the noncanalized portion overall appears stable. The multifocal areas of   dissection involving the celiac artery appear unchanged.    LOWER CHEST: Normal.    HEPATOBILIARY: There has been a slight increase in caliber of the intrahepatic bile ducts which are now mildly distended. Clinical/laboratory correlation may be helpful to rule out changes of biliary tract obstruction and if this is of concern, MRCP may   be helpful for further evaluation. The extrahepatic bile duct shows no significant dilatation. This may represent mass effect from the known hepatic artery aneurysm. Normal gallbladder. The portal veins are patent.    PANCREAS: Normal.    SPLEEN: Normal.    ADRENAL GLANDS: Normal.    KIDNEYS/BLADDER: Normal variant persistent fetal lobulation.    BOWEL: Normal.    LYMPH NODES: Normal.    PELVIC ORGANS: Normal.    MUSCULOSKELETAL: Mild degenerative changes  lower lumbar spine facet joints.      Impression    IMPRESSION:  1.  Since 02/01/2024, the overall size of the aneurysmal hepatic artery appears stable, however there is increased recanalization seen of the partially thrombosed proper hepatic artery.    2.  Increased dilatation of the intrahepatic bile ducts with normal-caliber extrahepatic bile ducts, this is worrisome for changes of obstruction and this may be secondary to the above-mentioned area of aneurysmal dilatation of the hepatic artery.   Laboratory correlation would be of benefit, if further imaging is required then an MRCP would be the exam of choice for further evaluation.   US Abdomen Limited w Abdomen Doppler Limited    Narrative    EXAMINATION: US ABDOMEN LIMITED W ABDOMEN DOPPLER LIMITED 4/2/2024  12:14 AM     COMPARISON: Earlier same day CT    HISTORY: Abd pain, nausea, elevated LFTs (hx hepatic art aneurysm,  celiac artery dissection, thrombus)    TECHNIQUE: The abdomen was scanned in standard fashion with  specialized ultrasound transducer(s) using both gray-scale, color  Doppler, and spectral flow techniques.    Findings:  Liver: The liver demonstrates normal homogeneous echotexture. No  evidence of a focal hepatic mass.     Extrahepatic portal vein flow is antegrade at 58 cm/s.  Right portal vein flow is antegrade, measuring 20 cm/s.  Left portal vein flow is antegrade, measuring 16 cm/s.    Flow in the hepatic artery is towards the liver and:  36 cm/s peak systolic  0.73 resistive index.   Hepatic artery aneurysm at the majo hepatis, better evaluated on  recent CT.    The splenic vein is patent and flow is towards the liver. The left,  middle, and right hepatic veins are patent with flow towards the IVC.  The IVC is patent with flow towards the heart. The visualized aorta is  not dilated.    Gallbladder: Obscured by overlying bowel gas.    Bile Ducts: No intrahepatic biliary dilatation. The common bile duct  is obscured by overlying bowel  gas.    Pancreas: Visualized portions of the head and body of the pancreas are  unremarkable.    Right kidney: Measures 13.9 cm in length. Normal echotexture. No  hydronephrosis.      Fluid: No evidence of ascites or pleural effusions.        Impression    Impression:   1. Patent Doppler evaluation with antegrade flow. Known hepatic artery  aneurysm is better evaluated on recent CT. Slow velocity in the  hepatic artery.  2. Normal grayscale appearance of the liver.  3. The gallbladder is obscured by overlying bowel gas.    I have personally reviewed the examination and initial interpretation  and I agree with the findings.    MARIZA AWAN MD         SYSTEM ID:  [id number]   MR Abdomen MRCP w/o & w Contrast    Narrative    MRCP Without and With Contrast    CLINICAL HISTORY: 43 y/o M known hepatic aneurysm presenting w/  worsening direct hyperbili and c/f other obstructive process.    DATE: 4/2/2024 3:23 PM    TECHNIQUE:     Images were acquired with and without intravenous gadolinium contrast  through the upper abdomen. The following MR images were acquired  without intravenous contrast: TrueFISP, multiplanar T2-weighted, axial  T1 in/out of phase, T2-weighted MRCP images, axial diffusion-weighted  and axial apparent diffusion coefficient. T1-weighted images were  obtained before contrast at the multiple time points following  contrast injection. 3-D reformatted images were generated by the  technologist. Contrast dose: 10mL Gadavist    Comparison study: CTA abdomen and pelvis 4/1/2024    FINDINGS:    Liver: No evidence of cirrhosis. No focal mass. Redemonstration of the  aneurysmal hepatic artery, recently characterized on 4/1/2024 CTA.    Gallbladder/biliary tree: Cholelithiasis. Moderate to marked  intrahepatic biliary dilation. There are filling defects of the  proximal common bile duct (series 4 image 10). Suspected intrahepatic  biliary ductal gallstones as well (series 4 image 15 and series 10  image 47).  There is marked narrowing of the common bile duct as it  passes anterior around the hepatic artery aneurysm (series 10 image  43-38). The distal common bile duct is normal in caliber without  filling defects (series 10 image 38-35).  No associated inflammation.    Spleen: normal.    Kidneys: No hydronephrosis or mass. Lobulated kidneys with parapelvic  cysts, normal variant.    Adrenal glands: Normal.    Pancreas: No pancreatic mass or inflammation.    Bowel: Normal caliber of the visualized bowel. Colonic diverticulosis  without evidence of acute diverticulitis.    Lymph nodes: No suspicious lymphadenopathy.    Bones and soft tissues: No suspicious osseous lesions.    Ascites: No significant ascites.      Impression    IMPRESSION:     Cholelithiasis and proximal choledocholithiasis. There is marked  narrowing of the mid common bile duct due to the known hepatic artery  aneurysm, which is better characterized on the recent CTA on 4/1/2024.  Mass effect of the aneurysm on the common bile duct likely results in  the moderate to marked intrahepatic biliary ductal dilatation and  probable intrahepatic gallstones. The distal common bile duct at the  ampulla is unremarkable.  No evidence of acute cholecystitis.    I have personally reviewed the examination and initial interpretation  and I agree with the findings.    CRISTHIAN GUTIERRES DO         SYSTEM ID:  [id number]   XR Surgery GIAN G/T 5 Min Fluoro w Stills    Narrative    This exam was marked as non-reportable because it will not be read by a   radiologist or a [place] non-radiologist provider.             Discharge Medications   Discharge Medication List as of 4/4/2024  1:40 PM        START taking these medications    Details   prochlorperazine (COMPAZINE) 10 MG tablet Take 1 tablet (10 mg) by mouth every 6 hours as needed for nausea or vomiting, Disp-15 tablet, R-0, E-Prescribe           CONTINUE these medications which have CHANGED    Details   insulin glargine (LANTUS  PEN) 100 UNIT/ML pen Inject 30 Units Subcutaneous at bedtime, No Print OutIf Lantus is not covered by insurance, may substitute Basaglar or Semglee or other insulin glargine product per insurance preference at same dose and frequency.             CONTINUE these medications which have NOT CHANGED    Details   albuterol (PROAIR HFA/PROVENTIL HFA/VENTOLIN HFA) 108 (90 Base) MCG/ACT inhaler Inhale 2 puffs into the lungs every 4 hours as needed for shortness of breath, Historical      amLODIPine (NORVASC) 10 MG tablet Take 1 tablet (10 mg) by mouth daily, Disp-30 tablet, R-0, E-Prescribe      aspirin (ASA) 81 MG chewable tablet Take 1 tablet (81 mg) by mouth daily, Disp-30 tablet, R-0, E-Prescribe      cloNIDine (CATAPRES) 0.1 MG tablet Take 1 tablet (0.1 mg) by mouth 2 times daily, Disp-60 tablet, R-0, E-Prescribe      hydrochlorothiazide (HYDRODIURIL) 25 MG tablet Take 1 tablet (25 mg) by mouth daily, Disp-30 tablet, R-0, E-Prescribe      ibuprofen (ADVIL/MOTRIN) 600 MG tablet Take 600 mg by mouth every 6 hours as needed for moderate pain, Historical      !! insulin aspart (NOVOLOG PEN) 100 UNIT/ML pen Inject 5 Units Subcutaneous 3 times daily (with meals), Disp-15 mL, R-0, E-Prescribe      !! insulin aspart (NOVOLOG PEN) 100 UNIT/ML pen Inject 0-12 Units Subcutaneous 3 times daily (before meals) Blood Glucose             Dose  < 70                               see hypoglycemia protocol                             no dose, give prandial insulin  150-199                         2 u nits  200-249                         4 units  250-299                         6 units  300-349                         8 units  350-399                         10 units  400 and greater             12 units and call MD, Historical      ipratropium-albuterol (COMBIVENT RESPIMAT)  MCG/ACT inhaler Inhale 1 puff into the lungs 4 times daily as needed for shortness of breath or wheezing, Historical      metoprolol tartrate  (LOPRESSOR) 50 MG tablet Take 1 tablet (50 mg) by mouth 2 times daily, Disp-60 tablet, R-0, E-Prescribe      BD LINO U/F 32G X 4 MM insulin pen needle DAWHistorical      blood glucose monitoring (SOFTCLIX) lancets Historical      Blood Glucose Monitoring Suppl (ACCU-CHEK GUIDE ME) w/Device KIT Historical      OPTIUM TEST STRIPS test strip Dispense item covered by pt ins. E11.65 NIDDM type II, uncontrolled - Test 4 times/day. Reason: High A1C, [place], Historical       !! - Potential duplicate medications found. Please discuss with provider.        Allergies   No Known Allergies

## 2024-04-04 NOTE — PROGRESS NOTES
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 Pawel Pérez) who presented to the ED with abdominal pain, elevated liver tests and GI panc/bili team consulted for further evaluation and management of possible biliary obstruction.     # Celiac Artery Dissection  # Aneurysm of the right hepatic artery  # Abnormal Liver Studies, cholestatic pattern  # Intrahepatic biliary ductal gallstones on MRCP s/p duct clearance on ERCP  Patient presented with abdominal discomfort and jaundice. Liver tests significantly elevated and higher than prior admission in January, with new hyperbilirubinemia. Admission labs notable for: Tbili 8.1 (direct 6.19), alk phos 584, ,     S/p ERCP 4/3  - Common hepatic duct stricture likely due to extrinsic compression from the hepatic artery aneurysm.  Dilated intrahepatics likely due to obstruction from this stricture.    - Selective biliary cannulation and biliary sphincterotomy performed.  - Cholelithiasis as well as stones in the distal common bile duct and in the left intrahepatics proximal to the common hepatic duct stricture, extracted with a 12mm balloon.  - A 10Fr stent placed across the area of extrinsic compression in the common hepatic duct.           RECOMMENDATIONS:  -He tolerated the procedure well, and bilirubin downtrending well  - Appreciate hepatology involvement for possibility of autoimmune hepatitis. Potential outpatient liver biopsy pending clinical course after ERCP  - Avoid anticoagulation and antiplatelet for 72 hours post-sphincterotomy   - Will discuss need for cholecystectomy given suspicion for intrahepatic ductal gallstones on  MRCP  - Continue to encourage alcohol cessation        #. Positive Smooth Muscle Wendy IgG titer (1/6/2024)  Admission in January with elevated liver tests. F-actin weakly positive and reflexed to smooth muscle wendy titer which was 1:160, suggestive of autoimmune hepatitis or chronic active hepatitis. Hepatology team involved, potential outpatient liver biopsy pending clinical course after ERCP.      #. Polysubstance use  Reports alcohol and continuous tobacco use. Recommended cessation especially in light of underlying liver disease.         Outpatient:   Would repeat ERCP in 2-3 months for stent exchange, and continue treatment as long as aneurysm causes biliary compression      The patient was discussed and plan agreed upon with GI staff, Dr. Eulogio Zepeda MD  [id number]  P:[phone]    _______________________________________________________________  S: No aucte evewnyts overnight. He underwent ERCP with sphincterotomy, stone and sludge extraction and stent placement    O:  Blood pressure (!) 162/94, pulse 86, temperature 98.8  F (37.1  C), resp. rate 16, SpO2 99%.      Gen: No acute distress, lying in bed.   HEENT: Normocephalic, atraumatic. Scleral icterus is present.   CV: RRR, no obvious murmur.   Lungs: CTAB, non-labored breathing on room air.   Abd: Soft, non-tender to palpation. +BS  Skin: Jaundice on mucous membranes, less apparent on skin.   Neuro: Alert and conversant.   Psych: Appropriate affect.     LABS:  BMP  Recent Labs   Lab 04/04/24  0822 04/04/24  0817 04/04/24  0358 04/03/24  2111 04/03/24  0851 04/03/24  0637 04/02/24  1147 04/02/24  0646 04/01/24  2050   *  --   --   --   --  136  --  135 136   POTASSIUM 3.5  --   --   --   --  3.4  --  2.6* 3.4   CHLORIDE 93*  --   --   --   --  97*  --  92* 92*   LUIS 9.9  --   --   --   --  9.7  --  9.8 9.9   CO2 22  --   --   --   --  26  --  27 27   BUN 13.9  --   --   --   --  10.9  --  11.1 13.0   CR 0.84  --   --   --   --  0.83  --  0.87  0.98   * 175* 139* 178*   < > 122*   < > 133* 131*    < > = values in this interval not displayed.     CBC  Recent Labs   Lab 04/04/24  0822 04/03/24  0637 04/02/24  0646 04/01/24 2050   WBC 14.7* 6.6 7.0 7.4   RBC 4.98 4.85 5.01 5.22   HGB 14.2 13.7 14.3 14.8   HCT 40.4 39.3* 40.8 42.9   MCV 81 81 81 82   MCH 28.5 28.2 28.5 28.4   MCHC 35.1 34.9 35.0 34.5   RDW 16.5* 16.5* 16.4* 16.7*    349 333 346     INR  Recent Labs   Lab 04/04/24  0822 04/03/24 0637 04/01/24 2050   INR 0.93 0.93 0.91     LFTs  Recent Labs   Lab 04/04/24  0822 04/03/24  0637 04/02/24  0646 04/02/24  0338 04/01/24 2050   ALKPHOS 558* 547* 557*  --  584*   * 288* 332* 334*  --    * 538* 633*  --  716*   BILITOTAL 4.3* 9.4* 8.7*  --  8.1*   PROTTOTAL 8.5* 7.5 8.0  --  8.5*   ALBUMIN 4.6 4.2 4.5  --  4.8      PANC  Recent Labs   Lab 04/01/24 2050   LIPASE 30           ENDO 4/3-   Common hepatic duct stricture likely due to extrinsic compression from the hepatic artery aneurysm.  Dilated intrahepatics likely due to obstruction from this stricture.    - Selective biliary cannulation and biliary sphincterotomy performed.  - Cholelithiasis as well as stones in the distal common bile duct and in the left intrahepatics proximal to the common hepatic duct stricture, extracted with a 12mm balloon.  - A 10Fr stent placed across the area of extrinsic compression in the common hepatic duct.  SCOPY:

## 2024-04-04 NOTE — PLAN OF CARE
[facility]  Vascular Surgery Attending On Call  Asked to review recent CTA by medicine team wrt Hepatic Artery Aneurysm.  Chart reviewed; patient not seen.    Personally reviewed cta abdomen images of 4/1/24 and 1/5/24.  Note hepatic artery aneurysm. Hepatic artery normalises distally in the intra-hepatic portion post division.  He does not appear to have a distal landing zone for a stent graft nor an area for a  distal anastamosis for open surgery.   Agree with the vascular surgery notes from Jan 2024 that also give this opinion.  Suggest liase with Transplant surgery.    Approx 30min was performed in chart review and personal image evaluation in this complex patient.

## 2024-04-04 NOTE — PLAN OF CARE
Goal Outcome Evaluation:      Plan of Care Reviewed With: patient    Overall Patient Progress: improvingOverall Patient Progress: improving    Vitals: Stable on RA  Neuro: A&Ox4  Mobility: Pt moves independently.  Pain/Nausea: Pain denied. Denies nausea.   Diet & GI: Clear liquid diet. No BM this shift.  Labs: Monitored.   LDAs: R PIV  Skin/incisions: assessment refused  Respiratory: No acute changes this shift.  Cardiac: No acute changes this shift.  : Voiding appropriately and spontaneously.    NEW CHANGES: Pt returned from ERCP this shift. Voiding appropriately. Postop Vitals sequence started. No acute changes this shift.    Continue to implement Care Plan.    Fermin Stacy RN

## 2024-04-04 NOTE — PLAN OF CARE
Patient discharged to via Family. Accompanied by family. Discharge instructions reviewed with patient, opportunity offered to ask questions. All IV access taken. Prescriptions sent to discharge pharmacy, will be pickup by patient. All belongings sent with patient.    Hodan Norman RN

## 2024-04-05 ENCOUNTER — DOCUMENTATION ONLY (OUTPATIENT)
Dept: TRANSPLANT | Facility: CLINIC | Age: 44
End: 2024-04-05
Payer: MEDICAID

## 2024-04-05 LAB
ERCP: NORMAL
HEV IGM SER QL: NEGATIVE

## 2024-04-05 NOTE — PROGRESS NOTES
Tumor Conference Review 4/5/2024  MRI/CT Scan    Gallbladder with biliary dilatation, common bile duct stones.Dilation with narrowing appearing obstructions. Thrombosed vessel layer, spontaneous dissection history likely related to uncontrolled hypertension.     Plan: Consider Stent in IR consult

## 2024-04-07 ENCOUNTER — PATIENT OUTREACH (OUTPATIENT)
Dept: CARE COORDINATION | Facility: CLINIC | Age: 44
End: 2024-04-07
Payer: MEDICAID

## 2024-04-07 NOTE — PROGRESS NOTES
"Clinic Care Coordination Contact  [facility]: Post-Discharge Note  SITUATION                                                      Admission:    Admission Date: 04/01/24   Reason for Admission: Biliary obstruction  Discharge:   Discharge Date: 04/04/24    BACKGROUND                                                      Per hospital discharge summary and inpatient provider notes:  Jong Quiroga is a 44 year old male admitted on 4/1/2024 with a history of HTN (on 4 antihypertensives in the outpatient setting), DM2 (on insulin), polysubstance use disorder (marijuana, alcohol and tobacco), complex celiac artery dissection and 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 Found to have cholelithiasis and choledocholithiasis s/p ERCP 4/3 with stone removal and stent placement. Hyperbilirubinemia improved post op.      ASSESSMENT           Discharge Assessment  How are you doing now that you are home?: \" I'm doing okay \"  How are your symptoms? (Red Flag symptoms escalate to triage hotline per guidelines): Improved  Do you feel your condition is stable enough to be safe at home until your provider visit?: Yes  Does the patient have their discharge instructions? : Yes  Does the patient have questions regarding their discharge instructions? : No  Were you started on any new medications or were there changes to any of your previous medications? : Yes  Does the patient have all of their medications?: Yes  Do you have questions regarding any of your medications? : No  Do you have all of your needed medical supplies or equipment (DME)?  (i.e. oxygen tank, CPAP, cane, etc.): Yes  Discharge follow-up appointment scheduled within 14 calendar days? : Yes  Discharge Follow Up Appointment Date: 04/09/24  Discharge Follow Up Appointment Scheduled with?: Specialty Care Provider    Post-op ([facility] CTA Only)  If the patient had a " surgery or procedure, do they have any questions for a nurse?: No             PLAN                                                      Outpatient Plan:    Your activity upon discharge: activity as tolerated          Follow Up and recommended labs and tests     Follow up with primary care provider, Storm Armenta, within 7 days for hospital follow- up.  The following labs/tests are recommended: CMP and CBC.          Diet     Follow this diet upon discharge: Orders Placed This Encounter      Clear Liquid Diet       Future Appointments   Date Time Provider Department Center   5/10/2024 10:20 AM [id number]   5/10/2024 11:30 AM Pawel Pérez MD [facility]         For any urgent concerns, please contact our 24 hour nurse triage line: [phone] ([phone])         Mary Parekh MA

## 2024-04-08 ENCOUNTER — HOSPITAL ENCOUNTER (OUTPATIENT)
Facility: CLINIC | Age: 44
End: 2024-04-08
Attending: INTERNAL MEDICINE | Admitting: INTERNAL MEDICINE
Payer: COMMERCIAL

## 2024-04-08 ENCOUNTER — PATIENT OUTREACH (OUTPATIENT)
Dept: GASTROENTEROLOGY | Facility: CLINIC | Age: 44
End: 2024-04-08
Payer: MEDICAID

## 2024-04-08 ENCOUNTER — PREP FOR PROCEDURE (OUTPATIENT)
Dept: GASTROENTEROLOGY | Facility: CLINIC | Age: 44
End: 2024-04-08
Payer: MEDICAID

## 2024-04-08 DIAGNOSIS — K83.1 BILIARY STRICTURE (H): Primary | ICD-10-CM

## 2024-04-08 DIAGNOSIS — R79.89 ELEVATED LFTS: Primary | ICD-10-CM

## 2024-04-08 NOTE — TELEPHONE ENCOUNTER
"  Called to discuss with patient follow up to hospitalization with Dr Ferguson, procedure completed on 4/3. Pt said he was having intermittent dark urine. Is staying hydrated and able to keep fluids down. Did report difficulty with getting enough food down and not having normal BM's. Had one small BM since discharge. Is taking miralax and we discussed keeping up with this and if no response to consider OTC prn laxative like MOM. Encouraged continued hydration and monitoring of jaundice and urine for signs of elevated LFTs. Denies abdominal pain or vomiting, does spit up phlegm at times. Denies increase in jaundice at this time.     Pt is planning on coming to lab today to have labs ordered by provider Dr Prajapati drawn.  Will plan future LFT drawn in one month when he is having IR intervention.      Per Dr Ferguson \"I think one more check to ensure they normalize is fine. He's getting a CT and seeing IR to see if they can do something about this aneurysm early next month. We can get LFTs then. I can do at [facility].     Procedure/Imaging/Clinic: ERCP   Physician: Marty   Timin-3 months   Scope time needed: 20 min   Anesthesia: MAC   Dx: biliary stricture   Tier: 3   Location: [facility] or [facility]   Header of letter for pt communication: ERCP\"    Pt in agreement with  procedure date at [facility].      Explained they can expect a call from  for date of procedure, OR will call 1-2 days prior to procedure date with arrival time, will need a , someone to stay with them for 24 hours and should stay in town for 24 hours (within 45 min of Hospital) post procedure    Patient needs to get pre-op physical completed. If outside  health system will need physical faxed to number [phone]     If you do not get a preop physical, your procedure could be cancelled, patient voiced understanding*    Preop Plan: Dr Armenta, [facility] provider, will be seeing patient for follow up to hospitalization. Will update this note accordingly.     Does " patient have [employer] insurance?: No coverage listed - will ask PA team to contact patient in regards to procedure coverage.     Med Review    Blood thinner -   none  ASA - 81 mg  Diabetic - lantus 30 units at bedtime, discussed holding this the night prior to procedure  Any meds by injection or mouth for weight loss or diabetes- none    Patient Education r/t procedure: letter in mail, address confirmed    A pre-op nurse will call 1-2 days prior to the procedure.    NPO/Prep:   Adults and Children of all ages may consume solids up to 8 hours prior to arrival time      Verbalized understanding of all instructions. All questions answered.     Procedure order placed, message routed to OR      Roxanna Evans, RN, BSN,   [facility]  Care coordinator

## 2024-04-10 ENCOUNTER — APPOINTMENT (OUTPATIENT)
Dept: CT IMAGING | Facility: CLINIC | Age: 44
End: 2024-04-10
Attending: STUDENT IN AN ORGANIZED HEALTH CARE EDUCATION/TRAINING PROGRAM
Payer: MEDICAID

## 2024-04-10 ENCOUNTER — ANESTHESIA EVENT (OUTPATIENT)
Dept: SURGERY | Facility: CLINIC | Age: 44
End: 2024-04-10
Payer: MEDICAID

## 2024-04-10 ENCOUNTER — HOSPITAL ENCOUNTER (INPATIENT)
Facility: CLINIC | Age: 44
LOS: 4 days | Discharge: HOME | End: 2024-04-14
Attending: STUDENT IN AN ORGANIZED HEALTH CARE EDUCATION/TRAINING PROGRAM | Admitting: STUDENT IN AN ORGANIZED HEALTH CARE EDUCATION/TRAINING PROGRAM
Payer: MEDICAID

## 2024-04-10 DIAGNOSIS — R78.81 BACTEREMIA: ICD-10-CM

## 2024-04-10 DIAGNOSIS — K83.09 CHOLANGITIS (H): ICD-10-CM

## 2024-04-10 DIAGNOSIS — K83.1 BILIARY OBSTRUCTION (H): Primary | ICD-10-CM

## 2024-04-10 DIAGNOSIS — K92.1 MELENA: ICD-10-CM

## 2024-04-10 DIAGNOSIS — E11.65 TYPE 2 DIABETES MELLITUS WITH HYPERGLYCEMIA, UNSPECIFIED WHETHER LONG TERM INSULIN USE (H): ICD-10-CM

## 2024-04-10 DIAGNOSIS — K92.2 UPPER GI BLEED: ICD-10-CM

## 2024-04-10 LAB
ABO/RH(D): NORMAL
ALBUMIN SERPL BCG-MCNC: 4.1 G/DL (ref 3.5–5.2)
ALP SERPL-CCNC: 678 U/L (ref 40–150)
ALT SERPL W P-5'-P-CCNC: 362 U/L (ref 0–70)
ANION GAP SERPL CALCULATED.3IONS-SCNC: 13 MMOL/L (ref 7–15)
ANION GAP SERPL CALCULATED.3IONS-SCNC: 15 MMOL/L (ref 7–15)
ANTIBODY SCREEN: NEGATIVE
AST SERPL W P-5'-P-CCNC: 82 U/L (ref 0–45)
BASOPHILS # BLD AUTO: 0 10E3/UL (ref 0–0.2)
BASOPHILS NFR BLD AUTO: 0 %
BILIRUB SERPL-MCNC: 2.1 MG/DL
BUN SERPL-MCNC: 19.3 MG/DL (ref 6–20)
BUN SERPL-MCNC: 23.3 MG/DL (ref 6–20)
CALCIUM SERPL-MCNC: 8.7 MG/DL (ref 8.6–10)
CALCIUM SERPL-MCNC: 9.3 MG/DL (ref 8.6–10)
CHLORIDE SERPL-SCNC: 89 MMOL/L (ref 98–107)
CHLORIDE SERPL-SCNC: 92 MMOL/L (ref 98–107)
CREAT SERPL-MCNC: 1 MG/DL (ref 0.67–1.17)
CREAT SERPL-MCNC: 1.15 MG/DL (ref 0.67–1.17)
DEPRECATED HCO3 PLAS-SCNC: 28 MMOL/L (ref 22–29)
DEPRECATED HCO3 PLAS-SCNC: 28 MMOL/L (ref 22–29)
EGFRCR SERPLBLD CKD-EPI 2021: 81 ML/MIN/1.73M2
EGFRCR SERPLBLD CKD-EPI 2021: >90 ML/MIN/1.73M2
EOSINOPHIL # BLD AUTO: 0.1 10E3/UL (ref 0–0.7)
EOSINOPHIL NFR BLD AUTO: 1 %
ERYTHROCYTE [DISTWIDTH] IN BLOOD BY AUTOMATED COUNT: 15.8 % (ref 10–15)
ERYTHROCYTE [DISTWIDTH] IN BLOOD BY AUTOMATED COUNT: 15.8 % (ref 10–15)
ERYTHROCYTE [DISTWIDTH] IN BLOOD BY AUTOMATED COUNT: 15.9 % (ref 10–15)
GLUCOSE BLDC GLUCOMTR-MCNC: 121 MG/DL (ref 70–99)
GLUCOSE BLDC GLUCOMTR-MCNC: 136 MG/DL (ref 70–99)
GLUCOSE SERPL-MCNC: 125 MG/DL (ref 70–99)
GLUCOSE SERPL-MCNC: 187 MG/DL (ref 70–99)
HCT VFR BLD AUTO: 25.7 % (ref 40–53)
HCT VFR BLD AUTO: 28.3 % (ref 40–53)
HCT VFR BLD AUTO: 31.9 % (ref 40–53)
HGB BLD-MCNC: 11 G/DL (ref 13.3–17.7)
HGB BLD-MCNC: 8.9 G/DL (ref 13.3–17.7)
HGB BLD-MCNC: 9.2 G/DL (ref 13.3–17.7)
HGB BLD-MCNC: 9.8 G/DL (ref 13.3–17.7)
HGB BLD-MCNC: 9.8 G/DL (ref 13.3–17.7)
IMM GRANULOCYTES # BLD: 0.1 10E3/UL
IMM GRANULOCYTES NFR BLD: 1 %
INR PPP: 1.15 (ref 0.85–1.15)
LACTATE SERPL-SCNC: 1 MMOL/L (ref 0.7–2)
LACTATE SERPL-SCNC: 2.1 MMOL/L (ref 0.7–2)
LIPASE SERPL-CCNC: 23 U/L (ref 13–60)
LYMPHOCYTES # BLD AUTO: 1.1 10E3/UL (ref 0.8–5.3)
LYMPHOCYTES NFR BLD AUTO: 10 %
MCH RBC QN AUTO: 28.5 PG (ref 26.5–33)
MCH RBC QN AUTO: 28.6 PG (ref 26.5–33)
MCH RBC QN AUTO: 28.8 PG (ref 26.5–33)
MCHC RBC AUTO-ENTMCNC: 34.5 G/DL (ref 31.5–36.5)
MCHC RBC AUTO-ENTMCNC: 34.6 G/DL (ref 31.5–36.5)
MCHC RBC AUTO-ENTMCNC: 34.6 G/DL (ref 31.5–36.5)
MCV RBC AUTO: 82 FL (ref 78–100)
MCV RBC AUTO: 83 FL (ref 78–100)
MCV RBC AUTO: 84 FL (ref 78–100)
MONOCYTES # BLD AUTO: 1 10E3/UL (ref 0–1.3)
MONOCYTES NFR BLD AUTO: 8 %
MRSA DNA SPEC QL NAA+PROBE: NEGATIVE
NEUTROPHILS # BLD AUTO: 9.6 10E3/UL (ref 1.6–8.3)
NEUTROPHILS NFR BLD AUTO: 80 %
NRBC # BLD AUTO: 0 10E3/UL
NRBC BLD AUTO-RTO: 0 /100
PLATELET # BLD AUTO: 350 10E3/UL (ref 150–450)
PLATELET # BLD AUTO: 380 10E3/UL (ref 150–450)
PLATELET # BLD AUTO: 380 10E3/UL (ref 150–450)
POTASSIUM SERPL-SCNC: 2.9 MMOL/L (ref 3.4–5.3)
POTASSIUM SERPL-SCNC: 3.2 MMOL/L (ref 3.4–5.3)
POTASSIUM SERPL-SCNC: 3.4 MMOL/L (ref 3.4–5.3)
PROT SERPL-MCNC: 8.1 G/DL (ref 6.4–8.3)
RBC # BLD AUTO: 3.12 10E6/UL (ref 4.4–5.9)
RBC # BLD AUTO: 3.43 10E6/UL (ref 4.4–5.9)
RBC # BLD AUTO: 3.82 10E6/UL (ref 4.4–5.9)
SA TARGET DNA: NEGATIVE
SODIUM SERPL-SCNC: 132 MMOL/L (ref 135–145)
SODIUM SERPL-SCNC: 133 MMOL/L (ref 135–145)
SPECIMEN EXPIRATION DATE: NORMAL
WBC # BLD AUTO: 10.9 10E3/UL (ref 4–11)
WBC # BLD AUTO: 11 10E3/UL (ref 4–11)
WBC # BLD AUTO: 11.9 10E3/UL (ref 4–11)

## 2024-04-10 PROCEDURE — 99285 EMERGENCY DEPT VISIT HI MDM: CPT | Mod: 25 | Performed by: STUDENT IN AN ORGANIZED HEALTH CARE EDUCATION/TRAINING PROGRAM

## 2024-04-10 PROCEDURE — 84132 ASSAY OF SERUM POTASSIUM: CPT

## 2024-04-10 PROCEDURE — 80053 COMPREHEN METABOLIC PANEL: CPT

## 2024-04-10 PROCEDURE — 85025 COMPLETE CBC W/AUTO DIFF WBC: CPT | Performed by: STUDENT IN AN ORGANIZED HEALTH CARE EDUCATION/TRAINING PROGRAM

## 2024-04-10 PROCEDURE — C9113 INJ PANTOPRAZOLE SODIUM, VIA: HCPCS

## 2024-04-10 PROCEDURE — 36415 COLL VENOUS BLD VENIPUNCTURE: CPT | Performed by: STUDENT IN AN ORGANIZED HEALTH CARE EDUCATION/TRAINING PROGRAM

## 2024-04-10 PROCEDURE — 85018 HEMOGLOBIN: CPT

## 2024-04-10 PROCEDURE — 80053 COMPREHEN METABOLIC PANEL: CPT | Performed by: STUDENT IN AN ORGANIZED HEALTH CARE EDUCATION/TRAINING PROGRAM

## 2024-04-10 PROCEDURE — 250N000011 HC RX IP 250 OP 636: Performed by: STUDENT IN AN ORGANIZED HEALTH CARE EDUCATION/TRAINING PROGRAM

## 2024-04-10 PROCEDURE — 87149 DNA/RNA DIRECT PROBE: CPT | Performed by: STUDENT IN AN ORGANIZED HEALTH CARE EDUCATION/TRAINING PROGRAM

## 2024-04-10 PROCEDURE — 120N000003 HC R&B IMCU UMMC

## 2024-04-10 PROCEDURE — 85027 COMPLETE CBC AUTOMATED: CPT

## 2024-04-10 PROCEDURE — 258N000003 HC RX IP 258 OP 636: Performed by: STUDENT IN AN ORGANIZED HEALTH CARE EDUCATION/TRAINING PROGRAM

## 2024-04-10 PROCEDURE — 87181 SC STD AGAR DILUTION PER AGT: CPT | Performed by: STUDENT IN AN ORGANIZED HEALTH CARE EDUCATION/TRAINING PROGRAM

## 2024-04-10 PROCEDURE — 86900 BLOOD TYPING SEROLOGIC ABO: CPT | Performed by: STUDENT IN AN ORGANIZED HEALTH CARE EDUCATION/TRAINING PROGRAM

## 2024-04-10 PROCEDURE — 250N000011 HC RX IP 250 OP 636

## 2024-04-10 PROCEDURE — 85610 PROTHROMBIN TIME: CPT | Performed by: STUDENT IN AN ORGANIZED HEALTH CARE EDUCATION/TRAINING PROGRAM

## 2024-04-10 PROCEDURE — 87640 STAPH A DNA AMP PROBE: CPT

## 2024-04-10 PROCEDURE — 99207 PR NO BILLABLE SERVICE THIS VISIT: CPT | Performed by: PHYSICIAN ASSISTANT

## 2024-04-10 PROCEDURE — 82962 GLUCOSE BLOOD TEST: CPT

## 2024-04-10 PROCEDURE — 96361 HYDRATE IV INFUSION ADD-ON: CPT | Performed by: STUDENT IN AN ORGANIZED HEALTH CARE EDUCATION/TRAINING PROGRAM

## 2024-04-10 PROCEDURE — 74174 CTA ABD&PLVS W/CONTRAST: CPT

## 2024-04-10 PROCEDURE — 86923 COMPATIBILITY TEST ELECTRIC: CPT | Performed by: INTERNAL MEDICINE

## 2024-04-10 PROCEDURE — 36415 COLL VENOUS BLD VENIPUNCTURE: CPT

## 2024-04-10 PROCEDURE — 83605 ASSAY OF LACTIC ACID: CPT | Performed by: STUDENT IN AN ORGANIZED HEALTH CARE EDUCATION/TRAINING PROGRAM

## 2024-04-10 PROCEDURE — 74174 CTA ABD&PLVS W/CONTRAST: CPT | Mod: 26 | Performed by: RADIOLOGY

## 2024-04-10 PROCEDURE — C9113 INJ PANTOPRAZOLE SODIUM, VIA: HCPCS | Performed by: STUDENT IN AN ORGANIZED HEALTH CARE EDUCATION/TRAINING PROGRAM

## 2024-04-10 PROCEDURE — 99223 1ST HOSP IP/OBS HIGH 75: CPT | Mod: GC | Performed by: STUDENT IN AN ORGANIZED HEALTH CARE EDUCATION/TRAINING PROGRAM

## 2024-04-10 PROCEDURE — 96374 THER/PROPH/DIAG INJ IV PUSH: CPT | Performed by: STUDENT IN AN ORGANIZED HEALTH CARE EDUCATION/TRAINING PROGRAM

## 2024-04-10 PROCEDURE — 99285 EMERGENCY DEPT VISIT HI MDM: CPT | Performed by: STUDENT IN AN ORGANIZED HEALTH CARE EDUCATION/TRAINING PROGRAM

## 2024-04-10 PROCEDURE — 83690 ASSAY OF LIPASE: CPT | Performed by: STUDENT IN AN ORGANIZED HEALTH CARE EDUCATION/TRAINING PROGRAM

## 2024-04-10 PROCEDURE — 250N000013 HC RX MED GY IP 250 OP 250 PS 637: Performed by: STUDENT IN AN ORGANIZED HEALTH CARE EDUCATION/TRAINING PROGRAM

## 2024-04-10 RX ORDER — HYDROCHLOROTHIAZIDE 25 MG/1
25 TABLET ORAL DAILY
Status: CANCELLED | OUTPATIENT
Start: 2024-04-10

## 2024-04-10 RX ORDER — AMLODIPINE BESYLATE 5 MG/1
10 TABLET ORAL DAILY
Status: CANCELLED | OUTPATIENT
Start: 2024-04-10

## 2024-04-10 RX ORDER — PROCHLORPERAZINE 25 MG
25 SUPPOSITORY, RECTAL RECTAL EVERY 12 HOURS PRN
Status: DISCONTINUED | OUTPATIENT
Start: 2024-04-10 | End: 2024-04-14 | Stop reason: HOSPADM

## 2024-04-10 RX ORDER — LIDOCAINE 40 MG/G
CREAM TOPICAL
Status: DISCONTINUED | OUTPATIENT
Start: 2024-04-10 | End: 2024-04-14 | Stop reason: HOSPADM

## 2024-04-10 RX ORDER — METOPROLOL TARTRATE 50 MG
50 TABLET ORAL 2 TIMES DAILY
Status: CANCELLED | OUTPATIENT
Start: 2024-04-10

## 2024-04-10 RX ORDER — POTASSIUM CHLORIDE 750 MG/1
40 TABLET, EXTENDED RELEASE ORAL ONCE
Status: COMPLETED | OUTPATIENT
Start: 2024-04-10 | End: 2024-04-10

## 2024-04-10 RX ORDER — NICOTINE POLACRILEX 4 MG
15-30 LOZENGE BUCCAL
Status: DISCONTINUED | OUTPATIENT
Start: 2024-04-10 | End: 2024-04-14 | Stop reason: HOSPADM

## 2024-04-10 RX ORDER — PIPERACILLIN SODIUM, TAZOBACTAM SODIUM 3; .375 G/15ML; G/15ML
3.38 INJECTION, POWDER, LYOPHILIZED, FOR SOLUTION INTRAVENOUS EVERY 6 HOURS
Status: DISCONTINUED | OUTPATIENT
Start: 2024-04-10 | End: 2024-04-12 | Stop reason: ALTCHOICE

## 2024-04-10 RX ORDER — PROCHLORPERAZINE MALEATE 5 MG
5 TABLET ORAL EVERY 6 HOURS PRN
Status: DISCONTINUED | OUTPATIENT
Start: 2024-04-10 | End: 2024-04-14 | Stop reason: HOSPADM

## 2024-04-10 RX ORDER — POTASSIUM CHLORIDE 750 MG/1
20 TABLET, EXTENDED RELEASE ORAL ONCE
Status: COMPLETED | OUTPATIENT
Start: 2024-04-10 | End: 2024-04-10

## 2024-04-10 RX ORDER — PIPERACILLIN SODIUM, TAZOBACTAM SODIUM 4; .5 G/20ML; G/20ML
4.5 INJECTION, POWDER, LYOPHILIZED, FOR SOLUTION INTRAVENOUS ONCE
Qty: 15 ML | Refills: 0 | Status: DISCONTINUED | OUTPATIENT
Start: 2024-04-10 | End: 2024-04-10

## 2024-04-10 RX ORDER — IOPAMIDOL 755 MG/ML
112 INJECTION, SOLUTION INTRAVASCULAR ONCE
Status: COMPLETED | OUTPATIENT
Start: 2024-04-10 | End: 2024-04-10

## 2024-04-10 RX ORDER — CLONIDINE HYDROCHLORIDE 0.1 MG/1
0.1 TABLET ORAL 2 TIMES DAILY
Status: CANCELLED | OUTPATIENT
Start: 2024-04-10

## 2024-04-10 RX ORDER — ALBUTEROL SULFATE 90 UG/1
2 AEROSOL, METERED RESPIRATORY (INHALATION) EVERY 4 HOURS PRN
Status: CANCELLED | OUTPATIENT
Start: 2024-04-10

## 2024-04-10 RX ORDER — ASPIRIN 81 MG/1
81 TABLET, CHEWABLE ORAL DAILY
Status: CANCELLED | OUTPATIENT
Start: 2024-04-10

## 2024-04-10 RX ORDER — DEXTROSE MONOHYDRATE 25 G/50ML
25-50 INJECTION, SOLUTION INTRAVENOUS
Status: DISCONTINUED | OUTPATIENT
Start: 2024-04-10 | End: 2024-04-14 | Stop reason: HOSPADM

## 2024-04-10 RX ADMIN — PANTOPRAZOLE SODIUM 80 MG: 40 INJECTION, POWDER, FOR SOLUTION INTRAVENOUS at 11:03

## 2024-04-10 RX ADMIN — SODIUM CHLORIDE 1000 ML: 9 INJECTION, SOLUTION INTRAVENOUS at 11:03

## 2024-04-10 RX ADMIN — POTASSIUM CHLORIDE 20 MEQ: 750 TABLET, EXTENDED RELEASE ORAL at 15:55

## 2024-04-10 RX ADMIN — PIPERACILLIN AND TAZOBACTAM 3.38 G: 3; .375 INJECTION, POWDER, LYOPHILIZED, FOR SOLUTION INTRAVENOUS at 22:50

## 2024-04-10 RX ADMIN — SODIUM CHLORIDE 1000 ML: 9 INJECTION, SOLUTION INTRAVENOUS at 16:30

## 2024-04-10 RX ADMIN — PIPERACILLIN AND TAZOBACTAM 3.38 G: 3; .375 INJECTION, POWDER, LYOPHILIZED, FOR SOLUTION INTRAVENOUS at 16:35

## 2024-04-10 RX ADMIN — IOPAMIDOL 112 ML: 755 INJECTION, SOLUTION INTRAVENOUS at 12:45

## 2024-04-10 RX ADMIN — PANTOPRAZOLE SODIUM 40 MG: 40 INJECTION, POWDER, FOR SOLUTION INTRAVENOUS at 19:21

## 2024-04-10 RX ADMIN — VANCOMYCIN HYDROCHLORIDE 1250 MG: 10 INJECTION, POWDER, LYOPHILIZED, FOR SOLUTION INTRAVENOUS at 17:11

## 2024-04-10 RX ADMIN — POTASSIUM CHLORIDE 40 MEQ: 750 TABLET, EXTENDED RELEASE ORAL at 14:28

## 2024-04-10 ASSESSMENT — ACTIVITIES OF DAILY LIVING (ADL)
ADLS_ACUITY_SCORE: 38

## 2024-04-10 NOTE — MEDICATION SCRIBE - ADMISSION MEDICATION HISTORY
Medication Scribe Admission Medication History    Admission medication history is complete. The information provided in this note is only as accurate as the sources available at the time of the update.    Information Source(s): Patient via in-person    Pertinent Information:   Patient prescribed:   insulin aspart (NOVOLOG PEN) 100 UNIT/ML pen Inject 5 Units Subcutaneous 3 times daily (with meals)   insulin glargine (LANTUS PEN) 100 UNIT/ML pen  Inject 30 Units Subcutaneous at bedtime,      also has a sliding scale   insulin aspart (NOVOLOG PEN) 100 UNIT/ML pen     Inject 0-12 Units Subcutaneous 3 times daily (before meals) Blood Glucose             Dose  < 70                               see hypoglycemia protocol                             no dose, give prandial insulin  150-199                         2 units  200-249                         4 units  250-299                         6 units  300-349                         8 units  350-399                         10 units  400 and greater             12 units and call MD Sanches carb counting per patient    Patient reported not taking these medications that were on the PTA list:   albuterol (PROAIR HFA/PROVENTIL HFA/VENTOLIN HFA) 108 (90 Base) MCG/ACT inhaler  Inhale 2 puffs into the lungs every 4 hours as needed for shortness of breath   aspirin (ASA) 81 MG chewable tablet Take 1 tablet (81 mg) by mouth daily  ibuprofen (ADVIL/MOTRIN) 600 MG tablet  Take 600 mg by mouth every 6 hours as needed for moderate pain   ipratropium-albuterol (COMBIVENT RESPIMAT)  MCG/ACT inhaler  Inhale 1 puff into the lungs 4 times daily as needed for shortness of breath or wheezing     Changes made to PTA medication list:  Added: None  Deleted: albuterol (PROAIR HFA/PROVENTIL HFA/VENTOLIN HFA) 108 (90 Base) MCG/ACT inhaler  I  aspirin (ASA) 81 MG chewable tablet T  ibuprofen (ADVIL/MOTRIN) 600 MG tablet    Tipratropium-albuterol (COMBIVENT RESPIMAT)  MCG/ACT inhaler         Changed: None    Allergies reviewed with patient and updates made in EHR: yes    Medication History Completed By: Caprice Fraire 4/10/2024 3:36 PM    Prior to Admission medications    Medication Sig Last Dose Taking? Auth Provider Long Term End Date   amLODIPine (NORVASC) 10 MG tablet Take 1 tablet (10 mg) by mouth daily 4/10/2024 at am Yes Melinda Linton MD Yes    cloNIDine (CATAPRES) 0.1 MG tablet Take 1 tablet (0.1 mg) by mouth 2 times daily 4/10/2024 at am Yes Melinda Linton MD Yes    hydrochlorothiazide (HYDRODIURIL) 25 MG tablet Take 1 tablet (25 mg) by mouth daily 4/10/2024 at am Yes Melinda Linton MD Yes    insulin aspart (NOVOLOG PEN) 100 UNIT/ML pen Inject 5 Units Subcutaneous 3 times daily (with meals) 4/10/2024 at am Yes Melinda Linton MD Yes    insulin glargine (LANTUS PEN) 100 UNIT/ML pen Inject 30 Units Subcutaneous at bedtime 4/9/2024 at bedtime Yes Margarita Prajapati MD Yes    metoprolol tartrate (LOPRESSOR) 50 MG tablet Take 1 tablet (50 mg) by mouth 2 times daily 4/10/2024 at am Yes Melinda Linton MD Yes    prochlorperazine (COMPAZINE) 10 MG tablet Take 1 tablet (10 mg) by mouth every 6 hours as needed for nausea or vomiting Unknown at unknown Yes Margarita Prajapati MD     BD LINO U/F 32G X 4 MM insulin pen needle    Reported, Patient     blood glucose monitoring (SOFTCLIX) lancets    Reported, Patient     Blood Glucose Monitoring Suppl (ACCU-CHEK GUIDE ME) w/Device KIT    Reported, Patient     insulin aspart (NOVOLOG PEN) 100 UNIT/ML pen Inject 0-12 Units Subcutaneous 3 times daily (before meals) Blood Glucose             Dose  < 70                               see hypoglycemia protocol                             no dose, give prandial insulin  150-199                         2 units  200-249                         4 units  250-299                         6 units  300-349                         8 units  350-399                         10 units  400 and  greater             12 units and call MD   Unknown, Entered By History Yes    OPTIUM TEST STRIPS test strip Dispense item covered by pt ins. E11.65 NIDDM type II, uncontrolled - Test 4 times/day. Reason: High A1C   Reported, Patient

## 2024-04-10 NOTE — PROGRESS NOTES
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 Liver tests were trending down upon discharge. Now presenting to ED with black stools and fevers.    #. Black stools  #. Biliary obstruction s/p ERCP 4/3  #. Elevated liver tests  #. Fevers, leukocytosis with c/f sepsis  Patient now presenting to ED with black stools and concern for GIB, CTA obtained as below - no active bleeding Patient also reporting fevers up to 102F. Stent appears to be slightly below (caudal) to the level of obstruction on imaging. Liver tests overall decreased from previous, alk phos slightly increased. Overall c/f cholangitis, possible sphincterotomy bleed (although would be late presentation at 7 days post procedure). Would complete infectious workup per ED/primary team and tentatively plan for EGD/ERCP tomorrow.      Recommendations:  -- Please keep the patient NPO for tentative EGD/ERCP tomorrow  -- Trend liver tests  -- Start broad spectrum antibiotics, follow cultures  -- Ensure 2 large bore IVs (18G or larger)  -- Ensure active Type & Screen  -- Transfuse to maintain hgb >7.0  -- Monitor H/H and for ongoing s/s GI bleeding  -- Appreciate detailed nursing documentation of stool quality and quantity  -- IV PPI BID (after one time 80mg dose)  -- Continue IVFs per primary team  -- Hold anticoagulants, NSAIDs    Please call the GI fellow on call overnight with any acute changes including hemodynamically significant bleeding or other signs of decompensation    The patient was not seen today. This note is a product of chart  review and discussion with ED team.       Above in collaboration/discussed with Dr. Ferguson, GI attending    Keyona Patton PA-C  Advanced Endoscopy/Pancreaticobiliary GI Service  [facility]  Vocera       ==========================================  BMP  Recent Labs   Lab 04/10/24  1532 04/10/24  1059 04/04/24  1213 04/04/24  0822   * 132*  --  134*   POTASSIUM 3.2* 2.9*  --  3.5   CHLORIDE 92* 89*  --  93*   LUIS 8.7 9.3  --  9.9   CO2 28 28  --  22   BUN 19.3 23.3*  --  13.9   CR 1.00 1.15  --  0.84   * 187* 176* 166*     CBC  Recent Labs   Lab 04/10/24  1506 04/10/24  1059 04/04/24  0822   WBC  --  11.9* 14.7*   RBC  --  3.82* 4.98   HGB 9.8* 11.0* 14.2   HCT  --  31.9* 40.4   MCV  --  84 81   MCH  --  28.8 28.5   MCHC  --  34.5 35.1   RDW  --  15.9* 16.5*   PLT  --  380 345     INR  Recent Labs   Lab 04/10/24  1059 04/04/24  0822   INR 1.15 0.93     LFTs  Recent Labs   Lab 04/10/24  1059 04/04/24  0822   ALKPHOS 678* 558*   AST 82* 261*   * 521*   BILITOTAL 2.1* 4.3*   PROTTOTAL 8.1 8.5*   ALBUMIN 4.1 4.6      PANC  Recent Labs   Lab 04/10/24  1059   LIPASE 23           CTA 4/10/24  IMPRESSION:   1.  No convincing evidence of acute GI bleed.  2.  Interval placement of a biliary catheter with persistent/slightly  increased intrahepatic biliary dilatation. The common bile duct  obstruction is at the level of the common hepatic artery aneurysm and  the biliary stent is slightly caudal to the level of obstruction.   3.  New rounded hepatic hypodensity not seen on CTA 4/1/2024. This  could represent biloma from possible biliary ischemia versus focal  cystic intrahepatic biliary dilatation.  4.  Stable hepatic artery aneurysm causing mass effect of the biliary  system and pancreatic head.  5.  Hepatomegaly.

## 2024-04-10 NOTE — ED TRIAGE NOTES
44 year old male s/p ERCP with biliary stent placement on Thursday, presents with concerns of fevers, decreased appetite, and tarry colored stools since Saturday.  Patient noted to slightly hypotensive at Triage.      Triage Assessment (Adult)       Row Name 04/10/24 0956          Triage Assessment    Airway WDL WDL        Respiratory WDL    Respiratory WDL WDL        Skin Circulation/Temperature WDL    Skin Circulation/Temperature WDL WDL        Cardiac WDL    Cardiac WDL WDL        Peripheral/Neurovascular WDL    Peripheral Neurovascular WDL WDL        Cognitive/Neuro/Behavioral WDL    Cognitive/Neuro/Behavioral WDL WDL

## 2024-04-10 NOTE — ED PROVIDER NOTES
"ED Provider Note  [facility]      History   No chief complaint on file.    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  Patient reports developing fevers, diaphoresis and black stools for the past 4-5 days.  Prior to this, the patient states he was constipated and use more than 3 doses of MiraLAX which eventually allowed him to pass the black stools.  He notes his bowel movements smelled worse than usual.  Patient recorded a Tmax of 102.1.  Additionally, patient reports having alternating appearance of clear and dark urine over the past several days.  Patient states he has been having difficulty eating recently and is only consumed a spoonful of fruit at a time.  Patient has been in contact with his gastroenterology team who recommended the patient present to the ER if his symptoms did not improve.  Patient states he was instructed to check his bilirubin levels today; his most recent bilirubin was 4.3 on 4/4/2024.  The patient notes improvement of the yellowing of his eyes since his ERCP procedure on 4/4.       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 Found to have cholelithiasis and choledocholithiasis s/p ERCP 4/3 with stone removal and stent placement. Hyperbilirubinemia improved post op.\"     Past Medical History  Past " Medical History:   Diagnosis Date    Diabetes mellitus, type 2 (H)     Gall bladder stones     Hypertension      Past Surgical History:   Procedure Laterality Date    ABDOMEN SURGERY      ENDOSCOPIC RETROGRADE CHOLANGIOPANCREATOGRAM N/A 04/03/2024    Procedure: ENDOSCOPIC RETROGRADE CHOLANGIOPANCREATOGRAPHY, biliary sphincterotomy, stone removal, stent placement;  Surgeon: Harsha Ferguson MD;  Location: [facility] OR     albuterol (PROAIR HFA/PROVENTIL HFA/VENTOLIN HFA) 108 (90 Base) MCG/ACT inhaler  amLODIPine (NORVASC) 10 MG tablet  cloNIDine (CATAPRES) 0.1 MG tablet  hydrochlorothiazide (HYDRODIURIL) 25 MG tablet  metoprolol tartrate (LOPRESSOR) 50 MG tablet  aspirin (ASA) 81 MG chewable tablet  BD LINO U/F 32G X 4 MM insulin pen needle  blood glucose monitoring (SOFTCLIX) lancets  Blood Glucose Monitoring Suppl (ACCU-CHEK GUIDE ME) w/Device KIT  ibuprofen (ADVIL/MOTRIN) 600 MG tablet  insulin aspart (NOVOLOG PEN) 100 UNIT/ML pen  insulin aspart (NOVOLOG PEN) 100 UNIT/ML pen  insulin glargine (LANTUS PEN) 100 UNIT/ML pen  ipratropium-albuterol (COMBIVENT RESPIMAT)  MCG/ACT inhaler  OPTIUM TEST STRIPS test strip  prochlorperazine (COMPAZINE) 10 MG tablet      No Known Allergies  Family History  History reviewed. No pertinent family history.  Social History   Social History     Tobacco Use    Smoking status: Every Day     Types: Cigarettes     Passive exposure: Current    Smokeless tobacco: Never   Substance Use Topics    Alcohol use: Not Currently    Drug use: Yes     Types: Marijuana      Past medical history, past surgical history, medications, allergies, family history, and social history were reviewed with the patient. No additional pertinent items.      A medically appropriate review of systems was performed with pertinent positives and negatives noted in the HPI, and all other systems negative.    Physical Exam      Physical Exam  Constitutional:       General: He is not in acute distress.     Appearance:  Normal appearance. He is not toxic-appearing.   HENT:      Head: Atraumatic.   Eyes:      General: No scleral icterus.     Conjunctiva/sclera: Conjunctivae normal.   Cardiovascular:      Rate and Rhythm: Normal rate.      Heart sounds: Normal heart sounds.   Pulmonary:      Effort: Pulmonary effort is normal. No respiratory distress.      Breath sounds: Normal breath sounds.   Abdominal:      General: There is no distension.      Palpations: Abdomen is soft. There is no mass.      Tenderness: There is no abdominal tenderness. There is no guarding or rebound.      Hernia: No hernia is present.   Genitourinary:     Comments: Ed melena on rectal exam, Hemoccult positive  Musculoskeletal:         General: No deformity.      Cervical back: Neck supple.   Skin:     General: Skin is warm.   Neurological:      Mental Status: He is alert.           ED Course, Procedures, & Data    10:26 AM  The patient was seen and examined by Dr. Camilo Fan in Room [facility].     Procedures               No results found for any visits on 04/10/24.  Medications - No data to display  Labs Ordered and Resulted from Time of ED Arrival to Time of ED Departure - No data to display  No orders to display          Critical care was not performed.     Medical Decision Making  The patient's presentation was of moderate complexity (an undiagnosed new problem with uncertain diagnosis).    The patient's evaluation involved:  review of external note(s) from 2 sources (see separate area of note for details)  review of 3+ test result(s) ordered prior to this encounter (CBC, CMP, INR, lactate)  ordering and/or review of 3+ test(s) in this encounter (CBC, CMP, INR, lactate)  discussion of management or test interpretation with another health professional (gastroenterology)    The patient's management necessitated high risk (a decision regarding hospitalization).    Assessment & Plan    Patient presented to the emergency room for concern for fevers, chills,  new melena after biliary stent was placed and patient underwent ERCP.  Here in the emergency room patient with melena, labs show to point hemoglobin drop, mild elevation in lactate, mild hypokalemia and hyponatremia, and mild leukocytosis to 12.  Patient resuscitated with potassium, give pantoprazole here in the emergency room, and GI was consulted regarding questions about imaging as patient was status post procedure with no melena and no history of melena.  GI recommended ordering CT angiogram of abdomen pelvis and then admitting to medicine and they will see the patient while inpatient.  CT scan done here in the emergency room, and although consider blood products, as patient's hemoglobin is stable will defer at this point.  Patient then admitted and signed out inpatient hospitalist who accepted patient for admission for new GI bleed, hemoglobin drop, pending read of CT angiogram of abdomen and pelvis.  I have reviewed the nursing notes. I have reviewed the findings, diagnosis, plan and need for follow up with the patient.    New Prescriptions    No medications on file       Final diagnoses:   None     I, Zach Ambrocio, am serving as a trained medical scribe to document services personally performed by Camilo Fan MD, based on the provider's statements to me.     ICamilo MD, was physically present and have reviewed and verified the accuracy of this note documented by Zach Ambrocio.    Camilo Fan MD  [facility] EMERGENCY DEPARTMENT  4/10/2024        Camilo Fan MD  04/10/24 [id number]

## 2024-04-10 NOTE — PHARMACY-VANCOMYCIN DOSING SERVICE
"Pharmacy Vancomycin Initial Note  Date of Service April 10, 2024  Patient's  1980  44 year old, male    Indication: Intra-abdominal infection and Sepsis    Current estimated CrCl = Estimated Creatinine Clearance: 92.4 mL/min (based on SCr of 1 mg/dL).    Creatinine for last 3 days  4/10/2024: 10:59 AM Creatinine 1.15 mg/dL;  3:32 PM Creatinine 1.00 mg/dL    Recent Vancomycin Level(s) for last 3 days  No results found for requested labs within last 3 days.      Vancomycin IV Administrations (past 72 hours)        No vancomycin orders with administrations in past 72 hours.                    Nephrotoxins and other renal medications (From now, onward)      Start     Dose/Rate Route Frequency Ordered Stop    04/10/24 1700  vancomycin (VANCOCIN) 1,250 mg in 0.9% NaCl 250 mL intermittent infusion         1,250 mg  over 90 Minutes Intravenous EVERY 12 HOURS 04/10/24 1613      04/10/24 1610  piperacillin-tazobactam (ZOSYN) 4.5 g vial to attach to  mL bag        Note to Pharmacy: For [facility], [facility] and [facility]: For Zosyn-naive patients, use the \"Zosyn initial dose + extended infusion\" order panel.    4.5 g  over 30 Minutes Intravenous ONCE 04/10/24 1606              Contrast Orders - past 72 hours (72h ago, onward)      Start     Dose/Rate Route Frequency Stop    04/10/24 1230  iopamidol (ISOVUE-370) solution 112 mL         112 mL Intravenous ONCE 04/10/24 1245            Insight[employer] Prediction of Planned Initial Vancomycin Regimen  Loading dose: N/A  Regimen: 1250 mg IV every 12 hours.  Start time: 16:12 on 04/10/2024  Exposure target: AUC24 (range)400-600 mg/L.hr   AUC24,ss: 573 mg/L.hr  Probability of AUC24 > 400: 85 %  Ctrough,ss: 18 mg/L  Probability of Ctrough,ss > 20: 41 %  Probability of nephrotoxicity (Lodise TYLER ): 14 %        Plan:  Start vancomycin  1250 mg IV q12h.   Vancomycin monitoring method: AUC  Vancomycin therapeutic monitoring goal: 400-600 mg*h/L  Pharmacy will check vancomycin levels as " appropriate in 1-3 Days.    Serum creatinine levels will be ordered daily for the first week of therapy and at least twice weekly for subsequent weeks.      Asiya Deshpande, PharmD, BCEMP, BCPS

## 2024-04-10 NOTE — H&P
Resident/Fellow Attestation   I, Abhi Díaz MD, was present with the medical/SHLOMO student who participated in the service and in the documentation of the note.  I have verified the history and personally performed the physical exam and medical decision making.  I agree with the assessment and plan of care as documented in the note.      Abhi Díaz MD  PGY2  Date of Service (when I saw the patient): 04/10/24      [facility]    History and Physical - Medicine Service, MAROON TEAM 5       Date of Admission:  4/10/2024    Assessment & Plan      Jong Quiroga is a 44 year old male admitted on 4/10/2024. 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 He was recently admitted for choledocholithiasis with cholecystitis s/p ERCP with stone removal and stent placement 4/3, presenting with fever, melena, found to have acute blood loss anemia, leukocytosis, downtrending LFTs from prior discharge, and hepatic biliary dilatation on CTA with concern for cholangitis as well as a possible sphincterotomy/UGI bleed.     Acute blood loss anemia  C/f UGI Bleed  Melena  Multiple days of dark stool, had 2 weeks of constipation with black stool after first BM 4/6, hard consistency, not convincingly melena. CTA abdomen without signs of GI bleed. Possible that he has a post-sphincterotomy bleed s/p ERCP 4/3, though would be an atypically delayed presentation per GI. Hepatic artery aneurysm stable, but causing mass effect of the biliary system and pancreatic head.  - GI consult, appreciate recs  - S/p 1x 80mg protonix, continue IV PPI BID  - S/p NS bolus x1  - 2 large bore Ivs  - Type and screen complete  - Continue to trend HgB, transfused to maintain HgB > 7  - Avoid NSAIDs  - NPO at midnight for potential EGD or ERCP 4/11    C/f Cholangitis  Biliary obstruction (see  below) s/p ERCP   Fevers  Leukocytosis  Did not meet sepsis criteria at admission, clinically looks well. LFTs overall downtrending from discharge 4/4, however alk phos increased. Stent appears to be slightly below (caudal) to the level of obstruction on imaging. While he is not experiencing abdominal pain, he has had fevers, jaundice is present but per patient improving.   - Started on Vanc, Zosyn per GI  - MRSA nares pending.  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 He was evaluated by vascular surgery, IR, rhematology, and hepatology during hospitalization 1/4-1/7/2024. No suitable surgical / endovascular target. Extensive workup revealed negative Hep B, C, HIV, histoplasma, quant gold, treponemia, WHIT, ANCA, DsDNA, normal C3 and C4. However, Anti-smooth muscle antibody was positive at 1:160 titer and F-actin was mildly positive at 22. Pt was supposed to follow up with GI outpatient but was feeling well and declined.  -Vascular surgery asked to review CTA from 4/1/24, no significant changes or new recommendations. Still not endovascular candidate   -HOLD aspirin 81 mg once daily, per patient has been held since ERCP.    Hypotension, resolved  HTN, resistant  With systolic BP's low in the 90's with baseline BP's needing multiple anti-hypertensive to achieve normotension. S/p 1L NS in the ED.  - CTM    Hyponatremia, hypovolemic  Na 132 at admission. Likely 2/2 malnutrition with poor PO intake over last 2-3 months.   - s/p 1L NS  -Hold PTA amlodipine 10 mg daily  -Hold PTA clonidine 0.1 mg twice daily  -Hold  "PTA hydrochlorothiazide 25 mg daily  -Hold PTA metoprolol 50 mg twice daily    Hypokalemia  Likely 2/2 malnutrition. Received 20mEq in ED  - CTM    Anorexia  Malnutrition  Patient reports 7-10 lbs weight loss in last week. Endorses weeks of inability to tolerate PO. Unclear why he is finding eating difficult, says he just can't. Denies N/V.   - Nutrition consult placed    Type 2 diabetes mellitus  HbA1c on 2/14/2024 is 9.2. In light of poor PO intake, was doing sliding scale with Lantus at home for Bgs > 150. 4/1-4/4 hospitalization was on Lantus and sliding scale insulin and 20U glargine while inpatient. Home regimen recommended at last discharge 4/4:  glargine at a lower dose 30 U daily (previously 48U), 5U Lantus with meals.  - Holding home Lantus and mealtime Aspart  - MSSI  - Hypoglycemia protocol    Substance use history  - Formerly smoked 1-2 ppd. Reduced tobacco, still daily marijuana use. Denies alcohol over last month.          Diet: Regular Diet Adult  NPO per Anesthesia Guidelines for Procedure/Surgery Except for: Meds    DVT Prophylaxis: Evaluating for GIB  Tran Catheter: Not present  Fluids: none  Lines: None     Cardiac Monitoring: None  Code Status: Full Code      Clinically Significant Risk Factors Present on Admission        # Hypokalemia: Lowest K = 2.9 mmol/L in last 2 days, will replace as needed           # Hypertension: Home medication list includes antihypertensive(s)      # Obesity: Estimated body mass index is 31.34 kg/m  as calculated from the following:    Height as of this encounter: 1.626 m (5' 4\").    Weight as of this encounter: 82.8 kg (182 lb 9.6 oz).              Disposition Plan      Expected Discharge Date: 04/12/2024                The patient's care was discussed with the Attending Physician, Dr. Suarez and senior resident Dr. Díaz .      Sunni Carrillo  Medical Student Shann[employer] 5  ______________________________________________________________________    Chief Complaint "   Fever and melena    History is obtained from the patient    History of Present Illness   Jong Quiroga is a 44 year old male who presents fever and changes in stool. Jong was recently hospitalized 4/1/4/4 for choledocholithiasis now s/p ERCP (4/3) with stone removal and stenting. He tolerated the procedure well with improved hyperbilirubinemia post operatively. Reports 5 days of undulating fever (Tmax at home to 102.1) with cold sweats at night with new melena.  He also notes changes in his stool. He reports being constipated for weeks prior to starting having bowel movements daily starting on Sunday 4/7 after starting daily Miralax. Describes stool as dark and hard. Not sticky, no obvious change in odor. He notes his melena has improved. Has had episodic changes to urine color, overall becoming less dark. Denies chest pain, dyspnea. Notes recent weight loss (>10 lbs) in setting of low PO intake, this is not new and has been over the course of the last few months. He denies difficulty drinking. Has not taken aspirin since surgery, no recent NSAID use. Has not had alcohol over the last month.       Past Medical History    Past Medical History:   Diagnosis Date    Diabetes mellitus, type 2 (H)     Gall bladder stones     Hypertension        Past Surgical History   Past Surgical History:   Procedure Laterality Date    ABDOMEN SURGERY      ENDOSCOPIC RETROGRADE CHOLANGIOPANCREATOGRAM N/A 04/03/2024    Procedure: ENDOSCOPIC RETROGRADE CHOLANGIOPANCREATOGRAPHY, biliary sphincterotomy, stone removal, stent placement;  Surgeon: Harsha Ferguson MD;  Location: [facility] OR       Prior to Admission Medications   Prior to Admission Medications   Prescriptions Last Dose Informant Patient Reported? Taking?   BD LINO U/F 32G X 4 MM insulin pen needle  Self Yes No   Blood Glucose Monitoring Suppl (ACCU-CHEK GUIDE ME) w/Device KIT  Self Yes No   OPTIUM TEST STRIPS test strip  Self Yes No   Sig: Dispense item covered by pt ins. E11.65  NIDDM type II, uncontrolled - Test 4 times/day. Reason: High A1C   amLODIPine (NORVASC) 10 MG tablet 4/10/2024 at am Self No Yes   Sig: Take 1 tablet (10 mg) by mouth daily   blood glucose monitoring (SOFTCLIX) lancets  Self Yes No   cloNIDine (CATAPRES) 0.1 MG tablet 4/10/2024 at am Self No Yes   Sig: Take 1 tablet (0.1 mg) by mouth 2 times daily   hydrochlorothiazide (HYDRODIURIL) 25 MG tablet 4/10/2024 at am Self No Yes   Sig: Take 1 tablet (25 mg) by mouth daily   insulin aspart (NOVOLOG PEN) 100 UNIT/ML pen  Self Yes No   Sig: Inject 0-12 Units Subcutaneous 3 times daily (before meals) Blood Glucose             Dose  < 70                               see hypoglycemia protocol                             no dose, give prandial insulin  150-199                         2 units  200-249                         4 units  250-299                         6 units  300-349                         8 units  350-399                         10 units  400 and greater             12 units and call MD   insulin aspart (NOVOLOG PEN) 100 UNIT/ML pen 4/10/2024 at am Self No Yes   Sig: Inject 5 Units Subcutaneous 3 times daily (with meals)   insulin glargine (LANTUS PEN) 100 UNIT/ML pen 4/9/2024 at bedtime Self No Yes   Sig: Inject 30 Units Subcutaneous at bedtime   metoprolol tartrate (LOPRESSOR) 50 MG tablet 4/10/2024 at am Self No Yes   Sig: Take 1 tablet (50 mg) by mouth 2 times daily   prochlorperazine (COMPAZINE) 10 MG tablet Unknown at unknown Self No Yes   Sig: Take 1 tablet (10 mg) by mouth every 6 hours as needed for nausea or vomiting      Facility-Administered Medications: None        Social History   I have reviewed this patient's social history and updated it with pertinent information if needed.  Social History     Tobacco Use    Smoking status: Every Day     Types: Cigarettes     Passive exposure: Current    Smokeless tobacco: Never   Substance Use Topics    Alcohol use: Not Currently    Drug use: Yes      Types: Marijuana         Family History         Allergies   No Known Allergies     Physical Exam   Vital Signs: Temp: 99.8  F (37.7  C) Temp src: Oral BP: 112/61 Pulse: 81   Resp: 16 SpO2: 100 % O2 Device: None (Room air)    Weight: 182 lbs 9.6 oz    General Appearance: Laying in bed. NAD. Non-toxic.  Respiratory: Normal WOB on RA. CTAB bilaterally no crackles or wheezes appreciated.  Cardiovascular: RRR, no murmurs appreciated.   GI: Soft, non-distended. No tenderness to palpation of all four quadrants. No guarding or rebound tenderness.  Skin: No rashes on visualized skin.  Other: Alert and oriented to conversation.      Medical Decision Making          Data     I have personally reviewed the following data over the past 24 hrs:    10.9  \   8.9 (L)   / 350     133 (L) 92 (L) 19.3 /  136 (H)   3.2 (L) 28 1.00 \     ALT: 362 (H) AST: 82 (H) AP: 678 (H) TBILI: 2.1 (H)   ALB: 4.1 TOT PROTEIN: 8.1 LIPASE: 23     Procal: N/A CRP: N/A Lactic Acid: 1.0       INR:  1.15 PTT:  N/A   D-dimer:  N/A Fibrinogen:  N/A       Imaging results reviewed over the past 24 hrs:   Recent Results (from the past 24 hour(s))   CTA Abdomen Pelvis with Contrast    Narrative    EXAMINATION: CTA ABDOMEN PELVIS WITH CONTRAST, 4/10/2024 1:26 PM    INDICATION: procedure, GI bleed    COMPARISON STUDY: CTA 4/1/2024    TECHNIQUE: CT scan of the abdomen and pelvis was performed on  multidetector CT scanner using volumetric acquisition technique, and  images were reconstructed in multiple planes with variable thickness  and reviewed on dedicated workstations. CT scan radiation dose is  optimized to minimum requisite dose using automated dose modulation  techniques. Patient was scanned in noncontrast, late arterial, and  portal venous phase.    CONTRAST: 87 mL Isovue-370.     FINDINGS:  Gastrointestinal tract: There is diffuse hyperattenuation of the  intraluminal border of the entire colon likely representing residual  contrast from the ERCP dated  4/3/2024. No increased intraluminal  attenuation is seen on the arterial and delayed phases. Normal caliber  small and large bowel. Colonic diverticulosis. Unremarkable appendix.       Lower Chest:   Dependent atelectasis.     Abdomen and Pelvis:  Liver: Liver measures 25 cm in the midclavicular line, similar to  abdominal MR 4/2/2024. There is a focal peripheral hypodensity within  segment 5/8 not seen on 4/1/2024 CTA (series 10, image 93).    Biliary System: There is hyperattenuating material within the  gallbladder that are similar in all phases that was not seen on the  4/1/2024 CTA, likely from the ERCP 4/3/2024. New common bile duct  stent with distal tip in the second/third part of the duodenum.  Relative to the 4/1/2024 CTA, there is similar to slightly increased  intrahepatic biliary dilatation. As seen on prior MRI there is abrupt  termination of the common bile duct at the level of the common hepatic  artery aneurysm (series 16, image 113). The common bile duct stent in  slightly caudal to the level of the obstruction.    Pancreas: The hepatic artery aneurysm causes mass effect on the  pancreatic head.    Adrenal glands: No mass or nodules    Spleen: Normal.    Kidneys/Ureters/Bladder: Lobulated renal surface. No hydronephrosis.  Distended bladder.    Mesentery/peritoneum/retroperitoneum: No mass. No free fluid or air.    Lymph nodes: No significant lymphadenopathy.    Vasculature: Dilated common hepatic, left hepatic, and right hepatic  arteries with greatest diameter of a left hepatic artery branch  measuring 1.6 cm, stable.    Pelvis: Pelvic phleboliths.    Osseous structures: No aggressive or acute osseous lesion.      Soft tissues: Within normal limits.        Impression    IMPRESSION:   1.  No convincing evidence of acute GI bleed.  2.  Interval placement of a biliary catheter with persistent/slightly  increased intrahepatic biliary dilatation. The common bile duct  obstruction is at the level of  the common hepatic artery aneurysm and  the biliary stent is slightly caudal to the level of obstruction.   3.  New rounded hepatic hypodensity not seen on CTA 4/1/2024. This  could represent biloma from possible biliary ischemia versus focal  cystic intrahepatic biliary dilatation.  4.  Stable hepatic artery aneurysm causing mass effect of the biliary  system and pancreatic head.  5.  Hepatomegaly.    I have personally reviewed the examination and initial interpretation  and I agree with the findings.    JORDANA GARCIA MD         SYSTEM ID:  [id number]

## 2024-04-11 ENCOUNTER — ANESTHESIA (OUTPATIENT)
Dept: SURGERY | Facility: CLINIC | Age: 44
End: 2024-04-11
Payer: MEDICAID

## 2024-04-11 ENCOUNTER — APPOINTMENT (OUTPATIENT)
Dept: GENERAL RADIOLOGY | Facility: CLINIC | Age: 44
End: 2024-04-11
Attending: INTERNAL MEDICINE
Payer: MEDICAID

## 2024-04-11 LAB
ACINETOBACTER SPECIES: NOT DETECTED
ALBUMIN SERPL BCG-MCNC: 2.9 G/DL (ref 3.5–5.2)
ALBUMIN SERPL BCG-MCNC: 3.6 G/DL (ref 3.5–5.2)
ALP SERPL-CCNC: 543 U/L (ref 40–150)
ALP SERPL-CCNC: ABNORMAL U/L
ALT SERPL W P-5'-P-CCNC: 276 U/L (ref 0–70)
ALT SERPL W P-5'-P-CCNC: ABNORMAL U/L
ANION GAP SERPL CALCULATED.3IONS-SCNC: 15 MMOL/L (ref 7–15)
ANION GAP SERPL CALCULATED.3IONS-SCNC: 16 MMOL/L (ref 7–15)
AST SERPL W P-5'-P-CCNC: 68 U/L (ref 0–45)
AST SERPL W P-5'-P-CCNC: ABNORMAL U/L
BILIRUB SERPL-MCNC: 1.9 MG/DL
BILIRUB SERPL-MCNC: 1.9 MG/DL
BLD PROD TYP BPU: NORMAL
BLOOD COMPONENT TYPE: NORMAL
BUN SERPL-MCNC: 13.5 MG/DL (ref 6–20)
BUN SERPL-MCNC: 18 MG/DL (ref 6–20)
CALCIUM SERPL-MCNC: 7.1 MG/DL (ref 8.6–10)
CALCIUM SERPL-MCNC: 8.3 MG/DL (ref 8.6–10)
CHLORIDE SERPL-SCNC: 105 MMOL/L (ref 98–107)
CHLORIDE SERPL-SCNC: 98 MMOL/L (ref 98–107)
CITROBACTER SPECIES: NOT DETECTED
CODING SYSTEM: NORMAL
CREAT SERPL-MCNC: 0.79 MG/DL (ref 0.67–1.17)
CREAT SERPL-MCNC: 1.01 MG/DL (ref 0.67–1.17)
CROSSMATCH: NORMAL
CRP SERPL-MCNC: 158 MG/L
CTX-M: NORMAL
DEPRECATED HCO3 PLAS-SCNC: 15 MMOL/L (ref 22–29)
DEPRECATED HCO3 PLAS-SCNC: 22 MMOL/L (ref 22–29)
EGFRCR SERPLBLD CKD-EPI 2021: >90 ML/MIN/1.73M2
EGFRCR SERPLBLD CKD-EPI 2021: >90 ML/MIN/1.73M2
ENTEROBACTER SPECIES: NOT DETECTED
ENTEROCOCCUS FAECALIS: NOT DETECTED
ENTEROCOCCUS FAECIUM: NOT DETECTED
ERYTHROCYTE [DISTWIDTH] IN BLOOD BY AUTOMATED COUNT: 16.3 % (ref 10–15)
ESCHERICHIA COLI: NOT DETECTED
GLUCOSE BLDC GLUCOMTR-MCNC: 128 MG/DL (ref 70–99)
GLUCOSE BLDC GLUCOMTR-MCNC: 130 MG/DL (ref 70–99)
GLUCOSE BLDC GLUCOMTR-MCNC: 130 MG/DL (ref 70–99)
GLUCOSE BLDC GLUCOMTR-MCNC: 139 MG/DL (ref 70–99)
GLUCOSE BLDC GLUCOMTR-MCNC: 142 MG/DL (ref 70–99)
GLUCOSE BLDC GLUCOMTR-MCNC: 147 MG/DL (ref 70–99)
GLUCOSE BLDC GLUCOMTR-MCNC: 201 MG/DL (ref 70–99)
GLUCOSE SERPL-MCNC: 113 MG/DL (ref 70–99)
GLUCOSE SERPL-MCNC: 127 MG/DL (ref 70–99)
HCT VFR BLD AUTO: 23.1 % (ref 40–53)
HGB BLD-MCNC: 7.2 G/DL (ref 13.3–17.7)
HGB BLD-MCNC: 8.9 G/DL (ref 13.3–17.7)
HOLD SPECIMEN: NORMAL
IMP: NORMAL
INR PPP: 1.24 (ref 0.85–1.15)
ISSUE DATE AND TIME: NORMAL
KLEBSIELLA OXYTOCA: NOT DETECTED
KLEBSIELLA PNEUMONIAE: NOT DETECTED
KPC: NORMAL
LACTATE SERPL-SCNC: 2.3 MMOL/L (ref 0.7–2)
LISTERIA SPECIES (DETECTED/NOT DETECTED): NOT DETECTED
MCH RBC QN AUTO: 28 PG (ref 26.5–33)
MCHC RBC AUTO-ENTMCNC: 31.2 G/DL (ref 31.5–36.5)
MCV RBC AUTO: 90 FL (ref 78–100)
NDM: NORMAL
OXA (DETECTED/NOT DETECTED): NORMAL
PLATELET # BLD AUTO: 233 10E3/UL (ref 150–450)
POTASSIUM SERPL-SCNC: 3.6 MMOL/L (ref 3.4–5.3)
POTASSIUM SERPL-SCNC: 5 MMOL/L (ref 3.4–5.3)
PROT SERPL-MCNC: 6.3 G/DL (ref 6.4–8.3)
PROT SERPL-MCNC: 6.9 G/DL (ref 6.4–8.3)
PROTEUS SPECIES: NOT DETECTED
PSEUDOMONAS AERUGINOSA: NOT DETECTED
RBC # BLD AUTO: 2.57 10E6/UL (ref 4.4–5.9)
SODIUM SERPL-SCNC: 135 MMOL/L (ref 135–145)
SODIUM SERPL-SCNC: 136 MMOL/L (ref 135–145)
STAPHYLOCOCCUS AUREUS: NOT DETECTED
STAPHYLOCOCCUS EPIDERMIDIS: NOT DETECTED
STAPHYLOCOCCUS LUGDUNENSIS: NOT DETECTED
STAPHYLOCOCCUS SPECIES: NOT DETECTED
STREPTOCOCCUS AGALACTIAE: NOT DETECTED
STREPTOCOCCUS ANGINOSUS GROUP: DETECTED
STREPTOCOCCUS PNEUMONIAE: NOT DETECTED
STREPTOCOCCUS PYOGENES: NOT DETECTED
UNIT ABO/RH: NORMAL
UNIT NUMBER: NORMAL
UNIT STATUS: NORMAL
UNIT TYPE ISBT: 5100
VIM: NORMAL
WBC # BLD AUTO: 9.7 10E3/UL (ref 4–11)

## 2024-04-11 PROCEDURE — 250N000011 HC RX IP 250 OP 636: Performed by: ANESTHESIOLOGY

## 2024-04-11 PROCEDURE — P9016 RBC LEUKOCYTES REDUCED: HCPCS | Performed by: INTERNAL MEDICINE

## 2024-04-11 PROCEDURE — C9113 INJ PANTOPRAZOLE SODIUM, VIA: HCPCS

## 2024-04-11 PROCEDURE — 250N000012 HC RX MED GY IP 250 OP 636 PS 637

## 2024-04-11 PROCEDURE — C1769 GUIDE WIRE: HCPCS | Performed by: INTERNAL MEDICINE

## 2024-04-11 PROCEDURE — 250N000009 HC RX 250

## 2024-04-11 PROCEDURE — 258N000003 HC RX IP 258 OP 636: Performed by: STUDENT IN AN ORGANIZED HEALTH CARE EDUCATION/TRAINING PROGRAM

## 2024-04-11 PROCEDURE — 83605 ASSAY OF LACTIC ACID: CPT | Performed by: INTERNAL MEDICINE

## 2024-04-11 PROCEDURE — 250N000013 HC RX MED GY IP 250 OP 250 PS 637: Performed by: STUDENT IN AN ORGANIZED HEALTH CARE EDUCATION/TRAINING PROGRAM

## 2024-04-11 PROCEDURE — 82962 GLUCOSE BLOOD TEST: CPT

## 2024-04-11 PROCEDURE — 86140 C-REACTIVE PROTEIN: CPT | Performed by: INTERNAL MEDICINE

## 2024-04-11 PROCEDURE — C2617 STENT, NON-COR, TEM W/O DEL: HCPCS | Performed by: INTERNAL MEDICINE

## 2024-04-11 PROCEDURE — 43260 ERCP W/SPECIMEN COLLECTION: CPT | Performed by: ANESTHESIOLOGY

## 2024-04-11 PROCEDURE — 250N000011 HC RX IP 250 OP 636: Performed by: STUDENT IN AN ORGANIZED HEALTH CARE EDUCATION/TRAINING PROGRAM

## 2024-04-11 PROCEDURE — 370N000017 HC ANESTHESIA TECHNICAL FEE, PER MIN: Performed by: INTERNAL MEDICINE

## 2024-04-11 PROCEDURE — C2625 STENT, NON-COR, TEM W/DEL SY: HCPCS | Performed by: INTERNAL MEDICINE

## 2024-04-11 PROCEDURE — 250N000011 HC RX IP 250 OP 636

## 2024-04-11 PROCEDURE — 250N000013 HC RX MED GY IP 250 OP 250 PS 637: Performed by: INTERNAL MEDICINE

## 2024-04-11 PROCEDURE — 43260 ERCP W/SPECIMEN COLLECTION: CPT

## 2024-04-11 PROCEDURE — C1726 CATH, BAL DIL, NON-VASCULAR: HCPCS | Performed by: INTERNAL MEDICINE

## 2024-04-11 PROCEDURE — 250N000009 HC RX 250: Performed by: INTERNAL MEDICINE

## 2024-04-11 PROCEDURE — 120N000003 HC R&B IMCU UMMC

## 2024-04-11 PROCEDURE — 85610 PROTHROMBIN TIME: CPT

## 2024-04-11 PROCEDURE — 36415 COLL VENOUS BLD VENIPUNCTURE: CPT

## 2024-04-11 PROCEDURE — 999N000181 XR SURGERY CARM FLUORO GREATER THAN 5 MIN W STILLS: Mod: TC

## 2024-04-11 PROCEDURE — 250N000025 HC SEVOFLURANE, PER MIN: Performed by: INTERNAL MEDICINE

## 2024-04-11 PROCEDURE — 272N000001 HC OR GENERAL SUPPLY STERILE: Performed by: INTERNAL MEDICINE

## 2024-04-11 PROCEDURE — 258N000003 HC RX IP 258 OP 636: Performed by: ANESTHESIOLOGY

## 2024-04-11 PROCEDURE — 360N000083 HC SURGERY LEVEL 3 W/ FLUORO, PER MIN: Performed by: INTERNAL MEDICINE

## 2024-04-11 PROCEDURE — 99233 SBSQ HOSP IP/OBS HIGH 50: CPT | Mod: GC | Performed by: INTERNAL MEDICINE

## 2024-04-11 PROCEDURE — 36415 COLL VENOUS BLD VENIPUNCTURE: CPT | Performed by: INTERNAL MEDICINE

## 2024-04-11 PROCEDURE — 258N000003 HC RX IP 258 OP 636

## 2024-04-11 PROCEDURE — 255N000002 HC RX 255 OP 636: Performed by: INTERNAL MEDICINE

## 2024-04-11 PROCEDURE — 85018 HEMOGLOBIN: CPT

## 2024-04-11 PROCEDURE — 250N000009 HC RX 250: Performed by: ANESTHESIOLOGY

## 2024-04-11 PROCEDURE — 99222 1ST HOSP IP/OBS MODERATE 55: CPT | Mod: 25 | Performed by: INTERNAL MEDICINE

## 2024-04-11 PROCEDURE — 84155 ASSAY OF PROTEIN SERUM: CPT

## 2024-04-11 PROCEDURE — 999N000141 HC STATISTIC PRE-PROCEDURE NURSING ASSESSMENT: Performed by: INTERNAL MEDICINE

## 2024-04-11 PROCEDURE — 710N000010 HC RECOVERY PHASE 1, LEVEL 2, PER MIN: Performed by: INTERNAL MEDICINE

## 2024-04-11 PROCEDURE — 250N000013 HC RX MED GY IP 250 OP 250 PS 637

## 2024-04-11 PROCEDURE — 85027 COMPLETE CBC AUTOMATED: CPT

## 2024-04-11 DEVICE — STENT JOHLIN PANCREA WEDGE 10FRX22CM W/INTRO G26828
Type: IMPLANTABLE DEVICE | Site: BILE DUCT | Status: NON-FUNCTIONAL
Removed: 2024-05-08

## 2024-04-11 DEVICE — ZIMMON BILIARY STENT
Type: IMPLANTABLE DEVICE | Status: NON-FUNCTIONAL
Brand: ZIMMON
Removed: 2024-05-08

## 2024-04-11 RX ORDER — DEXAMETHASONE SODIUM PHOSPHATE 4 MG/ML
INJECTION, SOLUTION INTRA-ARTICULAR; INTRALESIONAL; INTRAMUSCULAR; INTRAVENOUS; SOFT TISSUE PRN
Status: DISCONTINUED | OUTPATIENT
Start: 2024-04-11 | End: 2024-04-11

## 2024-04-11 RX ORDER — ONDANSETRON 2 MG/ML
4 INJECTION INTRAMUSCULAR; INTRAVENOUS EVERY 30 MIN PRN
Status: DISCONTINUED | OUTPATIENT
Start: 2024-04-11 | End: 2024-04-11 | Stop reason: HOSPADM

## 2024-04-11 RX ORDER — LIDOCAINE 40 MG/G
CREAM TOPICAL
Status: DISCONTINUED | OUTPATIENT
Start: 2024-04-11 | End: 2024-04-14 | Stop reason: HOSPADM

## 2024-04-11 RX ORDER — NALOXONE HYDROCHLORIDE 0.4 MG/ML
0.4 INJECTION, SOLUTION INTRAMUSCULAR; INTRAVENOUS; SUBCUTANEOUS
Status: DISCONTINUED | OUTPATIENT
Start: 2024-04-11 | End: 2024-04-14 | Stop reason: HOSPADM

## 2024-04-11 RX ORDER — NALOXONE HYDROCHLORIDE 0.4 MG/ML
0.1 INJECTION, SOLUTION INTRAMUSCULAR; INTRAVENOUS; SUBCUTANEOUS
Status: DISCONTINUED | OUTPATIENT
Start: 2024-04-11 | End: 2024-04-11 | Stop reason: HOSPADM

## 2024-04-11 RX ORDER — ACETAMINOPHEN 325 MG/1
650 TABLET ORAL EVERY 6 HOURS PRN
Status: DISCONTINUED | OUTPATIENT
Start: 2024-04-11 | End: 2024-04-14 | Stop reason: HOSPADM

## 2024-04-11 RX ORDER — INDOMETHACIN 50 MG/1
100 SUPPOSITORY RECTAL
Status: DISCONTINUED | OUTPATIENT
Start: 2024-04-11 | End: 2024-04-11

## 2024-04-11 RX ORDER — ONDANSETRON 2 MG/ML
INJECTION INTRAMUSCULAR; INTRAVENOUS PRN
Status: DISCONTINUED | OUTPATIENT
Start: 2024-04-11 | End: 2024-04-11

## 2024-04-11 RX ORDER — HYDROMORPHONE HCL IN WATER/PF 6 MG/30 ML
0.2 PATIENT CONTROLLED ANALGESIA SYRINGE INTRAVENOUS EVERY 5 MIN PRN
Status: DISCONTINUED | OUTPATIENT
Start: 2024-04-11 | End: 2024-04-11 | Stop reason: HOSPADM

## 2024-04-11 RX ORDER — HYDROMORPHONE HCL IN WATER/PF 6 MG/30 ML
0.4 PATIENT CONTROLLED ANALGESIA SYRINGE INTRAVENOUS EVERY 5 MIN PRN
Status: DISCONTINUED | OUTPATIENT
Start: 2024-04-11 | End: 2024-04-11 | Stop reason: HOSPADM

## 2024-04-11 RX ORDER — FENTANYL CITRATE 50 UG/ML
INJECTION, SOLUTION INTRAMUSCULAR; INTRAVENOUS PRN
Status: DISCONTINUED | OUTPATIENT
Start: 2024-04-11 | End: 2024-04-11

## 2024-04-11 RX ORDER — IOPAMIDOL 510 MG/ML
INJECTION, SOLUTION INTRAVASCULAR PRN
Status: DISCONTINUED | OUTPATIENT
Start: 2024-04-11 | End: 2024-04-11 | Stop reason: HOSPADM

## 2024-04-11 RX ORDER — NALOXONE HYDROCHLORIDE 0.4 MG/ML
0.2 INJECTION, SOLUTION INTRAMUSCULAR; INTRAVENOUS; SUBCUTANEOUS
Status: DISCONTINUED | OUTPATIENT
Start: 2024-04-11 | End: 2024-04-14 | Stop reason: HOSPADM

## 2024-04-11 RX ORDER — SODIUM CHLORIDE, SODIUM LACTATE, POTASSIUM CHLORIDE, CALCIUM CHLORIDE 600; 310; 30; 20 MG/100ML; MG/100ML; MG/100ML; MG/100ML
INJECTION, SOLUTION INTRAVENOUS CONTINUOUS
Status: DISCONTINUED | OUTPATIENT
Start: 2024-04-11 | End: 2024-04-11 | Stop reason: HOSPADM

## 2024-04-11 RX ORDER — OXYCODONE HYDROCHLORIDE 5 MG/1
5 TABLET ORAL
Status: DISCONTINUED | OUTPATIENT
Start: 2024-04-11 | End: 2024-04-11 | Stop reason: HOSPADM

## 2024-04-11 RX ORDER — ONDANSETRON 4 MG/1
4 TABLET, ORALLY DISINTEGRATING ORAL EVERY 30 MIN PRN
Status: DISCONTINUED | OUTPATIENT
Start: 2024-04-11 | End: 2024-04-11 | Stop reason: HOSPADM

## 2024-04-11 RX ORDER — ONDANSETRON 2 MG/ML
4 INJECTION INTRAMUSCULAR; INTRAVENOUS EVERY 6 HOURS PRN
Status: DISCONTINUED | OUTPATIENT
Start: 2024-04-11 | End: 2024-04-14 | Stop reason: HOSPADM

## 2024-04-11 RX ORDER — FLUMAZENIL 0.1 MG/ML
0.2 INJECTION, SOLUTION INTRAVENOUS
Status: ACTIVE | OUTPATIENT
Start: 2024-04-11 | End: 2024-04-12

## 2024-04-11 RX ORDER — PROPOFOL 10 MG/ML
INJECTION, EMULSION INTRAVENOUS PRN
Status: DISCONTINUED | OUTPATIENT
Start: 2024-04-11 | End: 2024-04-11

## 2024-04-11 RX ORDER — ONDANSETRON 4 MG/1
4 TABLET, ORALLY DISINTEGRATING ORAL EVERY 6 HOURS PRN
Status: DISCONTINUED | OUTPATIENT
Start: 2024-04-11 | End: 2024-04-14 | Stop reason: HOSPADM

## 2024-04-11 RX ORDER — FENTANYL CITRATE 50 UG/ML
25 INJECTION, SOLUTION INTRAMUSCULAR; INTRAVENOUS EVERY 5 MIN PRN
Status: DISCONTINUED | OUTPATIENT
Start: 2024-04-11 | End: 2024-04-11 | Stop reason: HOSPADM

## 2024-04-11 RX ORDER — FENTANYL CITRATE 50 UG/ML
50 INJECTION, SOLUTION INTRAMUSCULAR; INTRAVENOUS EVERY 5 MIN PRN
Status: DISCONTINUED | OUTPATIENT
Start: 2024-04-11 | End: 2024-04-11 | Stop reason: HOSPADM

## 2024-04-11 RX ORDER — OXYCODONE HYDROCHLORIDE 10 MG/1
10 TABLET ORAL
Status: DISCONTINUED | OUTPATIENT
Start: 2024-04-11 | End: 2024-04-11 | Stop reason: HOSPADM

## 2024-04-11 RX ORDER — LIDOCAINE HYDROCHLORIDE 20 MG/ML
INJECTION, SOLUTION INFILTRATION; PERINEURAL PRN
Status: DISCONTINUED | OUTPATIENT
Start: 2024-04-11 | End: 2024-04-11

## 2024-04-11 RX ORDER — SODIUM CHLORIDE, SODIUM LACTATE, POTASSIUM CHLORIDE, CALCIUM CHLORIDE 600; 310; 30; 20 MG/100ML; MG/100ML; MG/100ML; MG/100ML
INJECTION, SOLUTION INTRAVENOUS CONTINUOUS PRN
Status: DISCONTINUED | OUTPATIENT
Start: 2024-04-11 | End: 2024-04-11

## 2024-04-11 RX ORDER — SODIUM CHLORIDE, SODIUM LACTATE, POTASSIUM CHLORIDE, CALCIUM CHLORIDE 600; 310; 30; 20 MG/100ML; MG/100ML; MG/100ML; MG/100ML
INJECTION, SOLUTION INTRAVENOUS CONTINUOUS
Status: DISCONTINUED | OUTPATIENT
Start: 2024-04-11 | End: 2024-04-12

## 2024-04-11 RX ORDER — INDOMETHACIN 50 MG/1
SUPPOSITORY RECTAL PRN
Status: DISCONTINUED | OUTPATIENT
Start: 2024-04-11 | End: 2024-04-11 | Stop reason: HOSPADM

## 2024-04-11 RX ADMIN — PIPERACILLIN AND TAZOBACTAM 3.38 G: 3; .375 INJECTION, POWDER, LYOPHILIZED, FOR SOLUTION INTRAVENOUS at 11:30

## 2024-04-11 RX ADMIN — FENTANYL CITRATE 50 MCG: 50 INJECTION INTRAMUSCULAR; INTRAVENOUS at 18:20

## 2024-04-11 RX ADMIN — FENTANYL CITRATE 50 MCG: 50 INJECTION INTRAMUSCULAR; INTRAVENOUS at 17:19

## 2024-04-11 RX ADMIN — PROCHLORPERAZINE MALEATE 5 MG: 5 TABLET ORAL at 21:09

## 2024-04-11 RX ADMIN — SODIUM CHLORIDE, POTASSIUM CHLORIDE, SODIUM LACTATE AND CALCIUM CHLORIDE: 600; 310; 30; 20 INJECTION, SOLUTION INTRAVENOUS at 17:13

## 2024-04-11 RX ADMIN — INSULIN ASPART 1 UNITS: 100 INJECTION, SOLUTION INTRAVENOUS; SUBCUTANEOUS at 09:55

## 2024-04-11 RX ADMIN — ONDANSETRON 4 MG: 2 INJECTION INTRAMUSCULAR; INTRAVENOUS at 19:49

## 2024-04-11 RX ADMIN — ACETAMINOPHEN 650 MG: 325 TABLET, FILM COATED ORAL at 07:58

## 2024-04-11 RX ADMIN — INSULIN ASPART 2 UNITS: 100 INJECTION, SOLUTION INTRAVENOUS; SUBCUTANEOUS at 23:19

## 2024-04-11 RX ADMIN — Medication 50 MG: at 17:19

## 2024-04-11 RX ADMIN — SUGAMMADEX 200 MG: 100 INJECTION, SOLUTION INTRAVENOUS at 18:20

## 2024-04-11 RX ADMIN — PANTOPRAZOLE SODIUM 40 MG: 40 INJECTION, POWDER, FOR SOLUTION INTRAVENOUS at 08:03

## 2024-04-11 RX ADMIN — SODIUM CHLORIDE, POTASSIUM CHLORIDE, SODIUM LACTATE AND CALCIUM CHLORIDE: 600; 310; 30; 20 INJECTION, SOLUTION INTRAVENOUS at 08:14

## 2024-04-11 RX ADMIN — VANCOMYCIN HYDROCHLORIDE 1250 MG: 10 INJECTION, POWDER, LYOPHILIZED, FOR SOLUTION INTRAVENOUS at 05:00

## 2024-04-11 RX ADMIN — ONDANSETRON 4 MG: 2 INJECTION INTRAMUSCULAR; INTRAVENOUS at 18:20

## 2024-04-11 RX ADMIN — FENTANYL CITRATE 50 MCG: 50 INJECTION INTRAMUSCULAR; INTRAVENOUS at 18:09

## 2024-04-11 RX ADMIN — DEXAMETHASONE SODIUM PHOSPHATE 4 MG: 4 INJECTION, SOLUTION INTRA-ARTICULAR; INTRALESIONAL; INTRAMUSCULAR; INTRAVENOUS; SOFT TISSUE at 17:30

## 2024-04-11 RX ADMIN — VANCOMYCIN HYDROCHLORIDE 1250 MG: 10 INJECTION, POWDER, LYOPHILIZED, FOR SOLUTION INTRAVENOUS at 17:27

## 2024-04-11 RX ADMIN — MIDAZOLAM 2 MG: 1 INJECTION INTRAMUSCULAR; INTRAVENOUS at 17:10

## 2024-04-11 RX ADMIN — PIPERACILLIN AND TAZOBACTAM 3.38 G: 3; .375 INJECTION, POWDER, LYOPHILIZED, FOR SOLUTION INTRAVENOUS at 17:28

## 2024-04-11 RX ADMIN — PROPOFOL 200 MG: 10 INJECTION, EMULSION INTRAVENOUS at 17:19

## 2024-04-11 RX ADMIN — PIPERACILLIN AND TAZOBACTAM 3.38 G: 3; .375 INJECTION, POWDER, LYOPHILIZED, FOR SOLUTION INTRAVENOUS at 04:23

## 2024-04-11 RX ADMIN — FENTANYL CITRATE 50 MCG: 50 INJECTION INTRAMUSCULAR; INTRAVENOUS at 17:39

## 2024-04-11 RX ADMIN — Medication 1 LOZENGE: at 20:24

## 2024-04-11 RX ADMIN — LIDOCAINE HYDROCHLORIDE 100 MG: 20 INJECTION, SOLUTION INFILTRATION; PERINEURAL at 17:19

## 2024-04-11 ASSESSMENT — ACTIVITIES OF DAILY LIVING (ADL)
ADLS_ACUITY_SCORE: 38
ADLS_ACUITY_SCORE: 42
ADLS_ACUITY_SCORE: 38
ADLS_ACUITY_SCORE: 41
ADLS_ACUITY_SCORE: 38
ADLS_ACUITY_SCORE: 42
ADLS_ACUITY_SCORE: 38
ADLS_ACUITY_SCORE: 38

## 2024-04-11 ASSESSMENT — LIFESTYLE VARIABLES: TOBACCO_USE: 1

## 2024-04-11 NOTE — CONSULTS
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 Represented with fevers, acute blood loss anemia with melena and displaced stents on CT scan    # Fevers, displaced transpapillary stent on CT c/f cholangitis 2/2 obstruction  # Acute blood loss anemia   # Melena  # Hepatic hypodensity seen on CT scan ? abscess    He had melenic stools within 2-3 days after ERCP which persisted and led to his presentation. He had fevers since Sunday, displaced transpapillary stent on CT and likely an abscess on CT     Recommendations:  -Would recommend protonix 40 mg IV BID boluses  -Continue Zosyn for cholangitis  -Bcx X 2  -NPO for now  -EGD+ERCP today  -Supportive transfusions as needed to keep Hb >7    Gastroenterology outpatient follow up recommendations: TBD    Thank you for involving us in this patient's care. Please do not hesitate to contact the GI service with any questions or concerns.     Pt care plan discussed with Dr. Ferguson, GI staff physician.    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     His LFTs were downtrending post procedure. Yesterday, he presented to ER with   5 days of undulating fever (Tmax at home to 102.1) with cold sweats at night with new melena.  He also notes changes in his stool. He reports being constipated for weeks prior to starting having bowel movements daily starting on Sunday 4/7 after starting daily Miralax. Describes stool as dark and hard. Not sticky, no obvious change in odor. He notes his melena has improved. Has had episodic changes to urine color, overall becoming less dark. Denies chest pain, dyspnea. Notes recent weight loss (>10 lbs) in setting of low PO intake, this is not new and has been over the course of the last few months. He denies difficulty drinking. Has not taken aspirin since surgery, no recent NSAID use. Has not had alcohol over the last month.          Data:   Key relevant labs:     Key relevant imaging:           Previous Endoscopy:   ERCP 4/3 - Selective biliary cannulation                          - Cholangiogram showed common hepatic duct stricture                          likely due to extrinsic compression from the hepatic                          artery aneurysm. Dilated intrahepatics likely due to                          obstruction from this stricture. Cystic duct and                          gallbladder filled inferior to the extrinsice                          obstruction with a relatively low cystic duct                          insertion.                          - Biliary sphincterotomy performed.                          - Cholelithiasis as well as stones in the distal                          common bile duct and in the left intrahepatics                          proximal to the common hepatic duct stricture,                          extracted with a 12mm balloon.                          - A  10Fr x 7 cm stent placed across the area of                          extrinsic compression in the common hepatic duct.          Medications:     Current Facility-Administered Medications   Medication Dose Route Frequency Provider Last Rate Last Admin    acetaminophen (TYLENOL) tablet 650 mg  650 mg Oral Q6H PRN Margarita Prajapati MD   650 mg at 04/11/24 0758    glucose gel 15-30 g  15-30 g Oral Q15 Min PRN Abhi Díaz MD        Or    dextrose 50 % injection 25-50 mL  25-50 mL Intravenous Q15 Min PRN Abhi Díaz MD        Or    glucagon injection 1 mg  1 mg Subcutaneous Q15 Min PRN Abhi Díaz MD        insulin aspart (NovoLOG) injection (RAPID ACTING)  1-7 Units Subcutaneous Q4H Abhi Díaz MD   1 Units at 04/11/24 0955    lactated ringers infusion   Intravenous Continuous Margarita Prajapati  mL/hr at 04/11/24 1212 Restarted at 04/11/24 1212    lidocaine (LMX4) cream   Topical Q1H PRN Abhi Díaz MD        lidocaine (LMX4) cream   Topical Q1H PRN Abhi Díaz MD        lidocaine 1 % 0.1-1 mL  0.1-1 mL Other Q1H PRN Abhi Díaz MD        lidocaine 1 % 0.1-1 mL  0.1-1 mL Other Q1H PRN Abhi Díaz MD        pantoprazole (PROTONIX) IV push injection 40 mg  40 mg Intravenous BID Abhi Díaz MD   40 mg at 04/11/24 0803    Patient is already receiving mechanical prophylaxis   Does not apply Continuous PRN Abhi Díaz MD        piperacillin-tazobactam (ZOSYN) 3.375 g vial to attach to  mL bag  3.375 g Intravenous Q6H Abhi Díaz MD   Stopped at 04/11/24 1208    prochlorperazine (COMPAZINE) injection 5 mg  5 mg Intravenous Q6H PRN Abhi Díaz MD        Or    prochlorperazine (COMPAZINE) tablet 5 mg  5 mg Oral Q6H PRN Abhi Díaz MD        Or    prochlorperazine (COMPAZINE) suppository 25 mg  25 mg Rectal Q12H PRN Abhi Díaz MD        sodium chloride (PF) 0.9% PF flush 3 mL  3 mL Intracatheter Q8H Abhi Díaz,  MD   3 mL at 04/11/24 0759    sodium chloride (PF) 0.9% PF flush 3 mL  3 mL Intracatheter q1 min prn Abhi Díaz MD        sodium chloride (PF) 0.9% PF flush 3 mL  3 mL Intracatheter Q8H Abhi Díaz MD   3 mL at 04/11/24 0759    sodium chloride (PF) 0.9% PF flush 3 mL  3 mL Intracatheter q1 min prn Abhi Díaz MD        vancomycin (VANCOCIN) 1,250 mg in 0.9% NaCl 250 mL intermittent infusion  1,250 mg Intravenous Q12H Zach Suarez MD 0 mL/hr at 04/10/24 1902 1,250 mg at 04/11/24 0500     Current Outpatient Medications   Medication Sig Dispense Refill    amLODIPine (NORVASC) 10 MG tablet Take 1 tablet (10 mg) by mouth daily 30 tablet 0    cloNIDine (CATAPRES) 0.1 MG tablet Take 1 tablet (0.1 mg) by mouth 2 times daily 60 tablet 0    hydrochlorothiazide (HYDRODIURIL) 25 MG tablet Take 1 tablet (25 mg) by mouth daily 30 tablet 0    insulin aspart (NOVOLOG PEN) 100 UNIT/ML pen Inject 5 Units Subcutaneous 3 times daily (with meals) 15 mL 0    insulin glargine (LANTUS PEN) 100 UNIT/ML pen Inject 30 Units Subcutaneous at bedtime      metoprolol tartrate (LOPRESSOR) 50 MG tablet Take 1 tablet (50 mg) by mouth 2 times daily 60 tablet 0    prochlorperazine (COMPAZINE) 10 MG tablet Take 1 tablet (10 mg) by mouth every 6 hours as needed for nausea or vomiting 15 tablet 0    BD LINO U/F 32G X 4 MM insulin pen needle       blood glucose monitoring (SOFTCLIX) lancets       Blood Glucose Monitoring Suppl (ACCU-CHEK GUIDE ME) w/Device KIT       insulin aspart (NOVOLOG PEN) 100 UNIT/ML pen Inject 0-12 Units Subcutaneous 3 times daily (before meals) Blood Glucose             Dose  < 70                               see hypoglycemia protocol                             no dose, give prandial insulin  150-199                         2 units  200-249                         4 units  250-299                         6 units  300-349                         8 units  350-399                         10 units  400  "and greater             12 units and call MD      OPTIUM TEST STRIPS test strip Dispense item covered by pt ins. E11.65 NIDDM type II, uncontrolled - Test 4 times/day. Reason: High A1C               Physical Exam:   /74   Pulse 76   Temp 98.4  F (36.9  C) (Oral)   Resp 18   Ht 1.626 m (5' 4\")   Wt 82.5 kg (181 lb 12.8 oz)   SpO2 100%   BMI 31.21 kg/m    Wt:   Wt Readings from Last 2 Encounters:   04/11/24 82.5 kg (181 lb 12.8 oz)   01/06/24 94.3 kg (207 lb 14.3 oz)      Constitutional: cooperative, pleasant, not dyspneic/diaphoretic, no acute distress  Eyes: Sclera anicteric/injected  Ears/nose/mouth/throat: Normal oropharynx without ulcers or exudate, mucus membranes moist, hearing intact  Neck: supple, thyroid normal size  CV: No edema  Respiratory: Unlabored breathing  Lymph: No axillary, submandibular, supraclavicular or inguinal lymphadenopathy  Abd:  Nondistended, +bs, no hepatosplenomegaly, nontender, no peritoneal signs  Skin: warm, perfused, no jaundice  Neuro: AAO x 3, No asterixis  Psych: Normal affect  MSK: No gross deformities          Past Medical History:   Reviewed and edited as appropriate  Past Medical History:   Diagnosis Date    Diabetes mellitus, type 2 (H)     Gall bladder stones     Hypertension             Past Surgical History:   Reviewed and edited as appropriate   Past Surgical History:   Procedure Laterality Date    ABDOMEN SURGERY      ENDOSCOPIC RETROGRADE CHOLANGIOPANCREATOGRAM N/A 04/03/2024    Procedure: ENDOSCOPIC RETROGRADE CHOLANGIOPANCREATOGRAPHY, biliary sphincterotomy, stone removal, stent placement;  Surgeon: Harsha Ferguson MD;  Location:  OR            Social History:            Family History:   Reviewed and edited as appropriate  History reviewed. No pertinent family history.   No known history of colorectal cancer, liver disease, or inflammatory bowel disease.         Allergies:   Reviewed and edited as appropriate   No Known Allergies           Review of " Systems:     A complete 10 point review of systems was performed and is negative except as noted in the HPI

## 2024-04-11 NOTE — PROVIDER NOTIFICATION
Time of notification: 5:16 AM  Provider notified: Dr. Chet Arnett  Patient status: Critical result R arm blood culture gram negative bacilli and gram positive cocci in pairs and chains. L arm blood culture gram negative bacilli and gram positive cocci in pairs and chains. Lab is running iXpert   Temp:  [99.3  F (37.4  C)-100.1  F (37.8  C)] 99.3  F (37.4  C)  Pulse:  [] 80  Resp:  [14-16] 14  BP: ()/(61-82) 113/65  SpO2:  [95 %-100 %] 98 %  Orders received: No new orders at this time

## 2024-04-11 NOTE — ANESTHESIA PROCEDURE NOTES
Airway       Patient location during procedure: OR       Procedure Start/Stop Times: 4/11/2024 5:23 PM  Staff -        Anesthesiologist:  Justen Booth MD       CRNA: Neo Sol APRN CRNA       Performed By: CRNA  Consent for Airway        Urgency: elective  Indications and Patient Condition       Indications for airway management: oneil-procedural       Induction type:intravenous       Mask difficulty assessment: 2 - vent by mask + OA or adjuvant +/- NMBA    Final Airway Details       Final airway type: endotracheal airway       Successful airway: ETT - single and Oral  Endotracheal Airway Details        ETT size (mm): 7.5       Cuffed: yes       Successful intubation technique: direct laryngoscopy       DL Blade Type: MAC 3       Grade View of Cords: 1       Adjucts: stylet       Position: Right       Measured from: gums/teeth       Secured at (cm): 23       Bite block used: None    Post intubation assessment        Placement verified by: capnometry, equal breath sounds and chest rise        Number of attempts at approach: 1       Number of other approaches attempted: 0       Secured with: tape       Ease of procedure: easy       Dentition: Intact and Unchanged (Unchanged from prior condition)    Medication(s) Administered   Medication Administration Time: 4/11/2024 5:23 PM

## 2024-04-11 NOTE — ANESTHESIA CARE TRANSFER NOTE
Patient: Jong Quiroga    Procedure: Procedure(s):  ENDOSCOPIC RETROGRADE CHOLANGIOPANCREATOGRAPHY with stent exchange       Diagnosis: Melena [K92.1]  Biliary obstruction (H28) [K83.1]  Diagnosis Additional Information: No value filed.    Anesthesia Type:   General     Note:    Oropharynx: oropharynx clear of all foreign objects and spontaneously breathing  Level of Consciousness: awake  Oxygen Supplementation: room air    Independent Airway: airway patency satisfactory and stable  Dentition: dentition unchanged  Vital Signs Stable: post-procedure vital signs reviewed and stable  Report to RN Given: handoff report given  Patient transferred to: PACU    Handoff Report: Identifed the Patient, Identified the Reponsible Provider, Reviewed the pertinent medical history, Discussed the surgical course, Reviewed Intra-OP anesthesia mangement and issues during anesthesia, Set expectations for post-procedure period and Allowed opportunity for questions and acknowledgement of understanding      Vitals:  Vitals Value Taken Time   /84 04/11/24 1841   Temp     Pulse 97 04/11/24 1844   Resp 27 04/11/24 1844   SpO2 96 % 04/11/24 1844   Vitals shown include unfiled device data.    Electronically Signed By: CAROLINE Bruner CRNA  April 11, 2024  6:45 PM

## 2024-04-11 NOTE — PLAN OF CARE
Goal Outcome Evaluation:      Plan of Care Reviewed With: patient    Overall Patient Progress: no changeOverall Patient Progress: no change    Outcome Evaluation: see RD note 4/11    Zonia Maher MS, RD, LD, [facility]    6C (beds 8055-4608) + 7C (beds 7570-8357) + ED   Available in Vocera by name or unit dietitian  No longer available via pager

## 2024-04-11 NOTE — PROGRESS NOTES
Pt febrile 103.1. triggered sepsis, refusing lab draws. Called ronald BOGGS. Placing PRN orders, okay with no labs at this time.

## 2024-04-11 NOTE — PROGRESS NOTES
[facility]    Progress Note - Medicine Service, [facility] TEAM 4       Date of Admission:  4/10/2024    Assessment & Plan   Jong Quiroga is a 44 year old male admitted on 4/10/2024. 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 He was recently admitted for choledocholithiasis with cholecystitis s/p ERCP with stone removal and stent placement 4/3, presenting with fever, melena, found to have acute blood loss anemia, leukocytosis, downtrending LFTs from prior discharge, and hepatic biliary dilatation on CTA with concern for cholangitis as well as a possible sphincterotomy/UGI bleed.      Today:  -Cont Vanc/Zosyn, growing strep anginosus and GN bacilli   -Received 1 unit PRBCs for downtrending hgb at 7.2   -GI to take for ERCP/EGD this evening      Acute blood loss anemia  C/f UGI Bleed  Melena  Multiple days of dark stool, had 2 weeks of constipation with black stool after first BM 4/6, hard consistency, not convincingly melena. CTA abdomen without signs of GI bleed. Possible that he has a post-sphincterotomy bleed s/p ERCP 4/3, though would be an atypically delayed presentation per GI. Hepatic artery aneurysm stable, but causing mass effect of the biliary system and pancreatic head.  - GI consult, appreciate recs  - S/p 1x 80mg protonix, continue IV PPI BID  - S/p NS bolus x1  - 2 large bore Ivs  - Type and screen complete  - Continue to trend HgB, transfused to maintain HgB > 7   -1u PRBCs 4/11  - Avoid NSAIDs  - NPO for EGD/ERCP 4/11, mIVF 100ml/hr      C/f Cholangitis  Biliary obstruction (see below) s/p ERCP   Fevers  Leukocytosis  Did not meet sepsis criteria at admission, clinically looks well. LFTs overall downtrending from discharge 4/4, however alk phos increased. Stent appears to be slightly below (caudal) to the level of obstruction on  imaging. While he is not experiencing abdominal pain, he has had fevers and jaundice with leukocytosis. Bcx growing strep anginosus and GN bacilli.   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 He was evaluated by vascular surgery, IR, rhematology, and hepatology during hospitalization 1/4-1/7/2024. No suitable surgical / endovascular target. Extensive workup revealed negative Hep B, C, HIV, histoplasma, quant gold, treponemia, WHIT, ANCA, DsDNA, normal C3 and C4. However, Anti-smooth muscle antibody was positive at 1:160 titer and F-actin was mildly positive at 22. Pt was supposed to follow up with GI outpatient but was feeling well and declined.  -Vascular surgery asked to review CTA from 4/1/24, no significant changes or new recommendations. Still not endovascular candidate   -HOLD aspirin 81 mg once daily, per patient has been held since ERCP.     Hypotension, resolved  HTN, resistant  With systolic BP's low in the 90's with baseline BP's needing multiple anti-hypertensive to achieve normotension. S/p 1L NS in the ED.  - CTM     Hyponatremia, hypovolemic  Na 132 at admission. Likely 2/2 malnutrition with poor PO intake over last 2-3 months.   - s/p 1L NS  -Hold PTA amlodipine 10 mg daily  -Hold PTA clonidine 0.1 mg twice daily  -Hold PTA hydrochlorothiazide 25 mg daily  -Hold PTA metoprolol 50 mg twice daily     Hypokalemia  Likely 2/2 malnutrition. Received 20mEq in ED  - CTM     Anorexia  Malnutrition  Patient reports 7-10 lbs weight loss in last week. Endorses weeks  "of inability to tolerate PO. Unclear why he is finding eating difficult, says he just can't. Denies N/V.   - Nutrition consult placed     Type 2 diabetes mellitus  HbA1c on 2/14/2024 is 9.2. In light of poor PO intake, was doing sliding scale with Lantus at home for Bgs > 150. 4/1-4/4 hospitalization was on Lantus and sliding scale insulin and 20U glargine while inpatient. Home regimen recommended at last discharge 4/4:  glargine at a lower dose 30 U daily (previously 48U), 5U Lantus with meals.  - Holding home Lantus and mealtime Aspart  - MSSI  - Hypoglycemia protocol     Substance use history  - Formerly smoked 1-2 ppd. Reduced tobacco, still daily marijuana use. Denies alcohol over last month.           Diet: Regular Diet Adult  NPO per Anesthesia Guidelines for Procedure/Surgery Except for: Meds    DVT Prophylaxis: None  Tran Catheter: Not present  Fluids: 100ml/hr LR  Lines: None     Cardiac Monitoring: None  Code Status: Full Code        Disposition Plan   Expected Discharge Date: 04/12/2024                The patient's care was discussed with the Attending Physician, Dr. Burger .    Margarita Prajapati MD  Medicine Service, [facility] TEAM [address] [place] [address]  Securely message with YouLike (more info)  Text page via [employer] Paging/Directory   See signed in provider for up to date coverage information  ______________________________________________________________________    Interval History   Denies abdominal pain this AM. Feeling \"okay\". Tmax 103 this AM and diaphoretic but vitally stable. Has low energy which has persisted for many days.     Physical Exam   Vital Signs: Temp: 98.4  F (36.9  C) Temp src: Oral BP: 116/74 Pulse: 76   Resp: 18 SpO2: 100 % O2 Device: None (Room air)    Weight: 181 lbs 12.8 oz    General Appearance:  Laying in bed. NAD. Non-toxic. Diaphoretic  Respiratory: Normal WOB on RA. CTAB bilaterally no crackles or wheezes appreciated.  Cardiovascular: " RRR, no murmurs appreciated.   GI: Soft, distended with Hepatomegaly. No tenderness to palpation of all four quadrants. No guarding or rebound tenderness.  Skin: No rashes on visualized skin.  Other:  Alert and oriented to conversation.      Data    Latest Reference Range & Units 04/11/24 06:42 04/11/24 09:54   Sodium 135 - 145 mmol/L 136    Potassium 3.4 - 5.3 mmol/L 5.0    Chloride 98 - 107 mmol/L 105    Carbon Dioxide (CO2) 22 - 29 mmol/L 15 (L)    Urea Nitrogen 6.0 - 20.0 mg/dL 13.5    Creatinine 0.67 - 1.17 mg/dL 0.79    GFR Estimate >60 mL/min/1.73m2 >90    Calcium 8.6 - 10.0 mg/dL 7.1 (L)    Anion Gap 7 - 15 mmol/L 16 (H)    Albumin 3.5 - 5.2 g/dL 2.9 (L)    Protein Total 6.4 - 8.3 g/dL 6.3 (L)    Alkaline Phosphatase  See Comment    ALT  See Comment    AST  See Comment    Bilirubin Total <=1.2 mg/dL 1.9 (H)    CRP Inflammation <5.00 mg/L 158.00 (H)    Glucose 70 - 99 mg/dL 113 (H)    GLUCOSE BY METER POCT 70 - 99 mg/dL  142 (H)   WBC 4.0 - 11.0 10e3/uL 9.7    Hemoglobin 13.3 - 17.7 g/dL 7.2 (L)    Hematocrit 40.0 - 53.0 % 23.1 (L)    Platelet Count 150 - 450 10e3/uL 233    RBC Count 4.40 - 5.90 10e6/uL 2.57 (L)    MCV 78 - 100 fL 90    MCH 26.5 - 33.0 pg 28.0    MCHC 31.5 - 36.5 g/dL 31.2 (L)    RDW 10.0 - 15.0 % 16.3 (H)

## 2024-04-11 NOTE — PROVIDER NOTIFICATION
Time of notification: 6:54 AM  Provider notified: Dr. Chet Arnett  Patient status: Pt temp 102.9 are you able to order an antipyretic?  Temp:  [99.3  F (37.4  C)-102.9  F (39.4  C)] 102.9  F (39.4  C)  Pulse:  [] 112  Resp:  [14-16] 16  BP: ()/(61-82) 142/67  SpO2:  [95 %-100 %] 100 %  Orders received:

## 2024-04-11 NOTE — PROGRESS NOTES
Shift [id number]    VSS on RA, AOx4, NPO, independent, febrile in AM max oral temp 103.1 PRN tylenol x1 remained afebrile rest of shift. No BM this shift. triggered sepsis @0715, refused blood draws, vitals taken,team aware and okay with refusal. ALT/AST and alk phos hemolyzed, team notified. Blood cultures resulted from left arm with gram positive strep cocci anginosis group, team notified. Hgb 7.2, consent obtained at bedside, 1 unit PRBC transfused, no reaction. Pre-op checklist completed: CHG wipes x2, bg 130, vitals, 1700 vanco, insulin pen and consent sent with patient to . Pt transported via wheelchair to  with personal belongings.

## 2024-04-11 NOTE — PROGRESS NOTES
"CLINICAL NUTRITION SERVICES - ASSESSMENT NOTE     Nutrition Prescription    RECOMMENDATIONS FOR MDs/PROVIDERS TO ORDER:  Consider checking CRP and Zn status with reports of dysgeusia  Diet progression as appropriate  If pt unable to meet at least 50% of nutritional needs through PO intake, consider nutrition support.  Malnutrition Status:    Severe malnutrition in the context of acute illness/disease    Recommendations already ordered by Registered Dietitian (RD):  Ensure clear with meals once diet progressed  Encourage small, frequent Po attempts with protein sources    Future/Additional Recommendations:  Monitor Po intake, meds, labs, wts, GI symptoms, I/O's, skin integrity  Encourage trying alternative supplements as tolerated/per pt preference for additional protein/kcals  Consider MVI+ minerals daily pending PO adequacy     REASON FOR ASSESSMENT  Jong Quiroga is a/an 44 year old male assessed by the dietitian for Provider Order - Pt with 2 months of anorexia and poor appetite    Patient admitted for fever, melena, found to have acute blood loss anemia, leukocytosis, downtrending LFTs from prior discharge, and hepatic biliary dilatation on CTA with concern for cholangitis as well as a possible sphincterotomy/UGI bleed.     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 He was recently admitted for choledocholithiasis with cholecystitis s/p ERCP with stone removal and stent placement 4/3     NUTRITION HISTORY  Met with pt at bedside. Pt reports poor appetite for the last couple months. Denies N/V. Reports intermittent diarrhea/constipation. Reports \"off tastes.\" States he will typically get 1-2 bites into a meal and lose all interest in eating. Unable to explain what is \"off\" about taste. Reports trouble swallowing but denies feeling like food is getting stuck in throat or going into airway. Denies coughing with " "intake. Smells do not seem to cause issues with tolerance to food. Primarily has been tolerating foods such as rice, pickles, oranges, grapes and water. Reports 7-10lb wt loss over the last week. Only tolerating ~1-2 bites of food at a time. Pt would like to try Ensure Clear. Discussed alternative options for supplements as well but pt would like to start with Ensure clear once diet progressed. Reviewed nutrition needs. Pt did not report any issues tolerating foods containing higher fat.    CURRENT NUTRITION ORDERS  Diet: NPO potential EGD or ERCP 4/11     Intake/Tolerance: No documented intakes to assess.    LABS  CO2 15  Anion gap 16  Total bili 1.9  Glu 113  Hgb 7.2  Hematocrit 23.1    MEDICATIONS  Insulin  Protonix  Abx  LR @ 125 ml/hr    ANTHROPOMETRICS  Height: 162.6 cm (5' 4\")  Most Recent Weight: 82.5 kg (181 lb 12.8 oz)    IBW: 59.1 kg  Body mass index is 31.21 kg/m . BMI Category: Obesity Grade I BMI 30-34.9  Weight History:  11.8 kg (13%) weight loss over ~ 3 months.  Wt Readings from Last 20 Encounters:   04/11/24 82.5 kg (181 lb 12.8 oz)   01/06/24 94.3 kg (207 lb 14.3 oz)         ASSESSED NUTRITION NEEDS  Dosing Weight: 65 kg (Adjusted BW)   Estimated Energy Needs: 1453-5529 kcals/day (25 - 30 kcals/kg)  Justification: Maintenance  Estimated Protein Needs: 65-78 grams protein/day (1 - 1.2 grams of pro/kg)  Justification: Increased needs  Estimated Fluid Needs:  (1 mL/kcal)   Justification: Maintenance    PHYSICAL FINDINGS  See malnutrition section below.    Froilan Score: 21  Per EMR: Skin  Skin WDL: WDL  Skin WDL: .WDL except, moisture  Skin Moisture: diaphoretic  Device Skin Interventions Taken: No adjustments needed    MALNUTRITION  % Intake: </= 50% for >/= 5 days (severe)  % Weight Loss: > 7.5% in 3 months (severe malnutrition)  Subcutaneous Fat Loss: None observed   Muscle Loss: None observed  Fluid Accumulation/Edema: None noted  Malnutrition Diagnosis: Severe malnutrition in the context of acute " illness/disease    NUTRITION DIAGNOSIS  Inadequate oral intake related to dysgeusia, poor appetite and intermittent diarrhea/constipation as evidenced by patient report.       INTERVENTIONS  Implementation  Nutrition Education: will be provided if nutrition education needs arise.  and Nutrition Education: Introduced role of RD   Medical food supplement therapy  Multivitamin/mineral supplement therapy     Goals  Diet adv v nutrition support within 2-3 days.        Monitoring/Evaluation  Progress toward goals will be monitored and evaluated per protocol.  Zonia Maher MS, RD, LD, CNSC    6C (beds 5888-3514) + 7C (beds 0769-8600) + ED   Available in Vocera by name or unit dietitian  No longer available via pager

## 2024-04-11 NOTE — BRIEF OP NOTE
[facility]    Brief Operative Note    Pre-operative diagnosis: Melena [K92.1]  Biliary obstruction (H28) [K83.1]  Post-operative diagnosis Same as pre-operative diagnosis    Procedure: ENDOSCOPIC RETROGRADE CHOLANGIOPANCREATOGRAPHY with stent exchange, N/A - Mouth    Surgeon: Surgeons and Role:     * Harsha Ferguson MD - Primary     * Elizabeth Canales MD - Fellow - Assisting  Anesthesia: General   Estimated Blood Loss: None    Drains: None  Specimens: * No specimens in log *  Findings:   None.  Complications: None.  Implants:   Implant Name Type Inv. Item Serial No.  Lot No. LRB No. Used Action   STENT COTTON-BREEN [place]STER[place]AM [id number] - [id number] Stent STENT COTTON-BREEN [place] [id number]  [employer] [id number] N/A 1 Explanted   STENT ZIMMON BILIARY [id number] DBL PIGTAIL - [id number] Stent STENT ZIMMON BILIARY [id number] DBL PIGTAIL  [employer] [id number] N/A 1 Wasted   STENT ZIMMON BILIARY [id number] DBL PIGTAIL - [id number] Stent STENT ZIMMON BILIARY [id number] DBL PIGTAIL  [employer] [id number]  1 Used as a Supply   STENT JOHLIN PANCREA WEDGE [id number] W/INTRO [id number] - [id number] Stent STENT JOHLIN PANCREA WEDGE [id number] W/INTRO [id number]  [facility] [employer] [id number] N/A 1 Implanted     Findings:  - The previous 10Fr Sofflex stent had migrated downward, likely due to extrinsic compression from the hepatic artery aneurysm.    - No active/ongoing bleeding, given clinical picture, melena likely due to resolved biliary sphincterotomy bleeding.  - A 7Fr double-pigtailed Zimmon stent and a 10Fr intraductal Johlin stent were placed into the bile duct to decrease chance of recurrent migration.      Recommendations:  - Return patient to hospital [facility] for ongoing care.   - Would continue antibiotics per primary team.  - Would repeat ERCP in 2-3 months for stent exchange, and continue treatment  as long as aneurysm causes biliary compression. Will still need to discuss long-term solution to address extrinsic obstruction of bile duct from aneurysm.  - Avoid anticoagulation and antiplatelet for 72 hours.  - Inpatient panc-bili team to continue to follow.

## 2024-04-11 NOTE — ANESTHESIA PREPROCEDURE EVALUATION
Anesthesia Pre-Procedure Evaluation    Patient: Jong Quiroga   MRN: [id number] : 1980        Procedure : Procedure(s):  ENDOSCOPIC RETROGRADE CHOLANGIOPANCREATOGRAPHY          Past Medical History:   Diagnosis Date     Diabetes mellitus, type 2 (H)      Gall bladder stones      Hypertension       Past Surgical History:   Procedure Laterality Date     ABDOMEN SURGERY       ENDOSCOPIC RETROGRADE CHOLANGIOPANCREATOGRAM N/A 2024    Procedure: ENDOSCOPIC RETROGRADE CHOLANGIOPANCREATOGRAPHY, biliary sphincterotomy, stone removal, stent placement;  Surgeon: Harsha Ferguson MD;  Location: [facility] OR      No Known Allergies   Social History     Tobacco Use     Smoking status: Every Day     Types: Cigarettes     Passive exposure: Current     Smokeless tobacco: Never   Substance Use Topics     Alcohol use: Not Currently      Wt Readings from Last 1 Encounters:   24 82.5 kg (181 lb 12.8 oz)        Anesthesia Evaluation   Pt has had prior anesthetic.     No history of anesthetic complications       ROS/MED HX  ENT/Pulmonary:     (+)                tobacco use, Current use, -1 packs/day,                      Neurologic:  - neg neurologic ROS     Cardiovascular:     (+)  hypertension- -   -  - -                                      METS/Exercise Tolerance: >4 METS    Hematologic:     (+)      anemia, history of blood transfusion, no previous transfusion reaction, Known PRBC Anitbodies:No       Musculoskeletal:  - neg musculoskeletal ROS     GI/Hepatic: Comment: Segmental arterial mediolysis   H/O Complex celiac artery dissection  H/O Aneurysmal hepatic artery (stable)  Increased intrahepatic biliary duct dilatation likely secondary to aneurysmal hepatic artery  Worsening transaminitis, Elevated ALP  Direct Hyperbilirubinemia  Cholelithiasis and choledocholithiasis w/o s/o cholecystis     cholodocolithiasis with cholecystitis s/p ERCP with stone removal (4/3/24)        Renal/Genitourinary:  - neg Renal ROS     Endo:   "   (+)  type II DM,                    Psychiatric/Substance Use: Comment: Tobacco use  Marijuana use  EtOH use       (+)     Recreational drug usage: Cannabis.    Infectious Disease: Comment: Empiric Vanco/Zosyn for Ascending Cholangitis      Malignancy:  - neg malignancy ROS     Other:            Physical Exam    Airway        Mallampati: I   TM distance: > 3 FB   Neck ROM: full   Mouth opening: > 3 cm    Respiratory Devices and Support         Dental     Comment: Teeth examined without any significant abnormality, patient denies loose, missing or chipped teeth or anything removable.     (+) Modest Abnormalities - crowns, retainers, 1 or 2 missing teeth      Cardiovascular          Rhythm and rate: regular and normal     Pulmonary   pulmonary exam normal        breath sounds clear to auscultation         OUTSIDE LABS:  CBC:   Lab Results   Component Value Date    WBC 9.7 04/11/2024    WBC 10.9 04/10/2024    HGB 7.2 (L) 04/11/2024    HGB 8.9 (L) 04/11/2024    HCT 23.1 (L) 04/11/2024    HCT 25.7 (L) 04/10/2024     04/11/2024     04/10/2024     BMP:   Lab Results   Component Value Date     04/11/2024     04/10/2024    POTASSIUM 5.0 04/11/2024    POTASSIUM 3.4 04/10/2024    POTASSIUM 3.6 04/10/2024    CHLORIDE 105 04/11/2024    CHLORIDE 98 04/10/2024    CO2 15 (L) 04/11/2024    CO2 22 04/10/2024    BUN 13.5 04/11/2024    BUN 18.0 04/10/2024    CR 0.79 04/11/2024    CR 1.01 04/10/2024     (H) 04/11/2024     (H) 04/11/2024     COAGS:   Lab Results   Component Value Date    INR 1.24 (H) 04/11/2024     POC: No results found for: \"BGM\", \"HCG\", \"HCGS\"  HEPATIC:   Lab Results   Component Value Date    ALBUMIN 2.9 (L) 04/11/2024    PROTTOTAL 6.3 (L) 04/11/2024    ALT  04/11/2024      Comment:      Unsatisfactory specimen - hemolyzed    Reference intervals for this test were updated on 6/12/2023 to more accurately reflect our healthy population. There may be differences in the flagging " of prior results with similar values performed with this method. Interpretation of those prior results can be made in the context of the updated reference intervals.      AST  04/11/2024      Comment:      Unsatisfactory specimen - hemolyzed    Reference intervals for this test were updated on 6/12/2023 to more accurately reflect our healthy population. There may be differences in the flagging of prior results with similar values performed with this method. Interpretation of those prior results can be made in the context of the updated reference intervals.    ALKPHOS  04/11/2024      Comment:      Unsatisfactory specimen - hemolyzed    Reference intervals for this test were updated on 11/14/2023 to more accurately reflect our healthy population. There may be differences in the flagging of prior results with similar values performed with this method. Interpretation of those prior results can be made in the context of the updated reference intervals.    BILITOTAL 1.9 (H) 04/11/2024     OTHER:   Lab Results   Component Value Date    LACT 1.0 04/10/2024    LUIS 7.1 (L) 04/11/2024    LIPASE 23 04/10/2024    TSH 0.23 (L) 04/01/2024    T4 1.37 04/01/2024       Anesthesia Plan    ASA Status:  3    NPO Status:  NPO Appropriate    Anesthesia Type: General.     - Airway: ETT   Induction: Intravenous, Propofol.   Maintenance: Balanced.        Consents    Anesthesia Plan(s) and associated risks, benefits, and realistic alternatives discussed. Questions answered and patient/representative(s) expressed understanding.     - Discussed:     - Discussed with:  Patient      - Extended Intubation/Ventilatory Support Discussed: No.      - Patient is DNR/DNI Status: No     Use of blood products discussed: No .     Postoperative Care    Pain management: Oral pain medications, IV analgesics, Multi-modal analgesia.   PONV prophylaxis: Ondansetron (or other 5HT-3), Dexamethasone or Solumedrol     Comments:    Other Comments: Discussed plan for  general anesthetic with Oral ETT. Discussed risks of sore throat, post op pain/nausea, oropharyngeal damage, rare major complications.             Justen Booth MD    I have reviewed the pertinent notes and labs in the chart from the past 30 days and (re)examined the patient.  Any updates or changes from those notes are reflected in this note.

## 2024-04-12 LAB
ACANTHOCYTES BLD QL SMEAR: ABNORMAL
ALBUMIN SERPL BCG-MCNC: 3.7 G/DL (ref 3.5–5.2)
ALP SERPL-CCNC: 568 U/L (ref 40–150)
ALT SERPL W P-5'-P-CCNC: 170 U/L (ref 0–70)
ANION GAP SERPL CALCULATED.3IONS-SCNC: 12 MMOL/L (ref 7–15)
AST SERPL W P-5'-P-CCNC: 44 U/L (ref 0–45)
AUER BODIES BLD QL SMEAR: ABNORMAL
BASO STIPL BLD QL SMEAR: ABNORMAL
BILIRUB SERPL-MCNC: 2 MG/DL
BITE CELLS BLD QL SMEAR: ABNORMAL
BLISTER CELLS BLD QL SMEAR: ABNORMAL
BUN SERPL-MCNC: 15.3 MG/DL (ref 6–20)
BURR CELLS BLD QL SMEAR: SLIGHT
CALCIUM SERPL-MCNC: 8.8 MG/DL (ref 8.6–10)
CHLORIDE SERPL-SCNC: 98 MMOL/L (ref 98–107)
CREAT SERPL-MCNC: 0.83 MG/DL (ref 0.67–1.17)
DACRYOCYTES BLD QL SMEAR: ABNORMAL
DEPRECATED HCO3 PLAS-SCNC: 23 MMOL/L (ref 22–29)
EGFRCR SERPLBLD CKD-EPI 2021: >90 ML/MIN/1.73M2
ELLIPTOCYTES BLD QL SMEAR: ABNORMAL
ERCP: NORMAL
ERYTHROCYTE [DISTWIDTH] IN BLOOD BY AUTOMATED COUNT: 15.8 % (ref 10–15)
ERYTHROCYTE [DISTWIDTH] IN BLOOD BY AUTOMATED COUNT: 15.9 % (ref 10–15)
FRAGMENTS BLD QL SMEAR: ABNORMAL
GLUCOSE BLDC GLUCOMTR-MCNC: 156 MG/DL (ref 70–99)
GLUCOSE BLDC GLUCOMTR-MCNC: 158 MG/DL (ref 70–99)
GLUCOSE BLDC GLUCOMTR-MCNC: 174 MG/DL (ref 70–99)
GLUCOSE BLDC GLUCOMTR-MCNC: 212 MG/DL (ref 70–99)
GLUCOSE SERPL-MCNC: 169 MG/DL (ref 70–99)
HCT VFR BLD AUTO: 28.5 % (ref 40–53)
HCT VFR BLD AUTO: 32 % (ref 40–53)
HGB BLD-MCNC: 10.6 G/DL (ref 13.3–17.7)
HGB BLD-MCNC: 9.7 G/DL (ref 13.3–17.7)
HGB C CRYSTALS: ABNORMAL
HOLD SPECIMEN: NORMAL
HOWELL-JOLLY BOD BLD QL SMEAR: ABNORMAL
INR PPP: 1.14 (ref 0.85–1.15)
LACTATE SERPL-SCNC: 1.9 MMOL/L (ref 0.7–2)
MCH RBC QN AUTO: 27.9 PG (ref 26.5–33)
MCH RBC QN AUTO: 28.3 PG (ref 26.5–33)
MCHC RBC AUTO-ENTMCNC: 33.1 G/DL (ref 31.5–36.5)
MCHC RBC AUTO-ENTMCNC: 34 G/DL (ref 31.5–36.5)
MCV RBC AUTO: 83 FL (ref 78–100)
MCV RBC AUTO: 84 FL (ref 78–100)
NEUTS HYPERSEG BLD QL SMEAR: ABNORMAL
PLAT MORPH BLD: ABNORMAL
PLATELET # BLD AUTO: 380 10E3/UL (ref 150–450)
PLATELET # BLD AUTO: 438 10E3/UL (ref 150–450)
POLYCHROMASIA BLD QL SMEAR: SLIGHT
POTASSIUM SERPL-SCNC: 3.1 MMOL/L (ref 3.4–5.3)
PROT SERPL-MCNC: 7.2 G/DL (ref 6.4–8.3)
RBC # BLD AUTO: 3.43 10E6/UL (ref 4.4–5.9)
RBC # BLD AUTO: 3.8 10E6/UL (ref 4.4–5.9)
RBC AGGLUT BLD QL: ABNORMAL
RBC MORPH BLD: ABNORMAL
ROULEAUX BLD QL SMEAR: ABNORMAL
SICKLE CELLS BLD QL SMEAR: ABNORMAL
SMUDGE CELLS BLD QL SMEAR: ABNORMAL
SODIUM SERPL-SCNC: 133 MMOL/L (ref 135–145)
SPHEROCYTES BLD QL SMEAR: ABNORMAL
STOMATOCYTES BLD QL SMEAR: ABNORMAL
TARGETS BLD QL SMEAR: ABNORMAL
TOXIC GRANULES BLD QL SMEAR: ABNORMAL
VARIANT LYMPHS BLD QL SMEAR: ABNORMAL
WBC # BLD AUTO: 14.6 10E3/UL (ref 4–11)
WBC # BLD AUTO: 17.7 10E3/UL (ref 4–11)

## 2024-04-12 PROCEDURE — 258N000003 HC RX IP 258 OP 636: Performed by: INTERNAL MEDICINE

## 2024-04-12 PROCEDURE — 250N000013 HC RX MED GY IP 250 OP 250 PS 637: Performed by: INTERNAL MEDICINE

## 2024-04-12 PROCEDURE — 120N000002 HC R&B MED SURG/OB UMMC

## 2024-04-12 PROCEDURE — 0F768DZ DILATION OF LEFT HEPATIC DUCT WITH INTRALUMINAL DEVICE, VIA NATURAL OR ARTIFICIAL OPENING ENDOSCOPIC: ICD-10-PCS | Performed by: INTERNAL MEDICINE

## 2024-04-12 PROCEDURE — 99233 SBSQ HOSP IP/OBS HIGH 50: CPT | Mod: GC | Performed by: INTERNAL MEDICINE

## 2024-04-12 PROCEDURE — 0FC68ZZ EXTIRPATION OF MATTER FROM LEFT HEPATIC DUCT, VIA NATURAL OR ARTIFICIAL OPENING ENDOSCOPIC: ICD-10-PCS | Performed by: INTERNAL MEDICINE

## 2024-04-12 PROCEDURE — 83605 ASSAY OF LACTIC ACID: CPT | Performed by: INTERNAL MEDICINE

## 2024-04-12 PROCEDURE — 0FPB8DZ REMOVAL OF INTRALUMINAL DEVICE FROM HEPATOBILIARY DUCT, VIA NATURAL OR ARTIFICIAL OPENING ENDOSCOPIC: ICD-10-PCS | Performed by: INTERNAL MEDICINE

## 2024-04-12 PROCEDURE — 85610 PROTHROMBIN TIME: CPT | Performed by: INTERNAL MEDICINE

## 2024-04-12 PROCEDURE — 84630 ASSAY OF ZINC: CPT | Performed by: INTERNAL MEDICINE

## 2024-04-12 PROCEDURE — 36415 COLL VENOUS BLD VENIPUNCTURE: CPT | Performed by: INTERNAL MEDICINE

## 2024-04-12 PROCEDURE — 250N000011 HC RX IP 250 OP 636: Performed by: INTERNAL MEDICINE

## 2024-04-12 PROCEDURE — 250N000012 HC RX MED GY IP 250 OP 636 PS 637

## 2024-04-12 PROCEDURE — 85018 HEMOGLOBIN: CPT

## 2024-04-12 PROCEDURE — 250N000013 HC RX MED GY IP 250 OP 250 PS 637

## 2024-04-12 PROCEDURE — 80053 COMPREHEN METABOLIC PANEL: CPT | Performed by: INTERNAL MEDICINE

## 2024-04-12 PROCEDURE — 85027 COMPLETE CBC AUTOMATED: CPT | Performed by: INTERNAL MEDICINE

## 2024-04-12 PROCEDURE — 999N000128 HC STATISTIC PERIPHERAL IV START W/O US GUIDANCE

## 2024-04-12 PROCEDURE — 0F798DZ DILATION OF COMMON BILE DUCT WITH INTRALUMINAL DEVICE, VIA NATURAL OR ARTIFICIAL OPENING ENDOSCOPIC: ICD-10-PCS | Performed by: INTERNAL MEDICINE

## 2024-04-12 PROCEDURE — 87040 BLOOD CULTURE FOR BACTERIA: CPT | Performed by: INTERNAL MEDICINE

## 2024-04-12 PROCEDURE — C9113 INJ PANTOPRAZOLE SODIUM, VIA: HCPCS | Performed by: INTERNAL MEDICINE

## 2024-04-12 PROCEDURE — 99255 IP/OBS CONSLTJ NEW/EST HI 80: CPT | Performed by: INTERNAL MEDICINE

## 2024-04-12 RX ORDER — POTASSIUM CHLORIDE 1500 MG/1
40 TABLET, EXTENDED RELEASE ORAL EVERY 4 HOURS
Qty: 4 TABLET | Refills: 0 | Status: COMPLETED | OUTPATIENT
Start: 2024-04-12 | End: 2024-04-12

## 2024-04-12 RX ORDER — AMPICILLIN AND SULBACTAM 2; 1 G/1; G/1
3 INJECTION, POWDER, FOR SOLUTION INTRAMUSCULAR; INTRAVENOUS EVERY 6 HOURS
Status: DISCONTINUED | OUTPATIENT
Start: 2024-04-12 | End: 2024-04-14

## 2024-04-12 RX ORDER — HYDRALAZINE HYDROCHLORIDE 20 MG/ML
10 INJECTION INTRAMUSCULAR; INTRAVENOUS EVERY 6 HOURS PRN
Status: DISCONTINUED | OUTPATIENT
Start: 2024-04-12 | End: 2024-04-14 | Stop reason: HOSPADM

## 2024-04-12 RX ORDER — CLONIDINE HYDROCHLORIDE 0.1 MG/1
0.1 TABLET ORAL 2 TIMES DAILY
Status: DISCONTINUED | OUTPATIENT
Start: 2024-04-12 | End: 2024-04-14 | Stop reason: HOSPADM

## 2024-04-12 RX ORDER — HYDROCHLOROTHIAZIDE 25 MG/1
25 TABLET ORAL DAILY
Status: DISCONTINUED | OUTPATIENT
Start: 2024-04-12 | End: 2024-04-14 | Stop reason: HOSPADM

## 2024-04-12 RX ORDER — METOPROLOL TARTRATE 50 MG
50 TABLET ORAL 2 TIMES DAILY
Status: DISCONTINUED | OUTPATIENT
Start: 2024-04-12 | End: 2024-04-14 | Stop reason: HOSPADM

## 2024-04-12 RX ORDER — AMLODIPINE BESYLATE 10 MG/1
10 TABLET ORAL DAILY
Status: DISCONTINUED | OUTPATIENT
Start: 2024-04-12 | End: 2024-04-14 | Stop reason: HOSPADM

## 2024-04-12 RX ADMIN — PIPERACILLIN AND TAZOBACTAM 3.38 G: 3; .375 INJECTION, POWDER, LYOPHILIZED, FOR SOLUTION INTRAVENOUS at 10:28

## 2024-04-12 RX ADMIN — SODIUM CHLORIDE, POTASSIUM CHLORIDE, SODIUM LACTATE AND CALCIUM CHLORIDE: 600; 310; 30; 20 INJECTION, SOLUTION INTRAVENOUS at 07:04

## 2024-04-12 RX ADMIN — ONDANSETRON 4 MG: 4 TABLET, ORALLY DISINTEGRATING ORAL at 00:22

## 2024-04-12 RX ADMIN — METOPROLOL TARTRATE 50 MG: 50 TABLET, FILM COATED ORAL at 21:47

## 2024-04-12 RX ADMIN — INSULIN ASPART 1 UNITS: 100 INJECTION, SOLUTION INTRAVENOUS; SUBCUTANEOUS at 10:38

## 2024-04-12 RX ADMIN — PANTOPRAZOLE SODIUM 40 MG: 40 INJECTION, POWDER, FOR SOLUTION INTRAVENOUS at 08:01

## 2024-04-12 RX ADMIN — POTASSIUM CHLORIDE 40 MEQ: 1500 TABLET, EXTENDED RELEASE ORAL at 14:28

## 2024-04-12 RX ADMIN — POTASSIUM CHLORIDE 40 MEQ: 1500 TABLET, EXTENDED RELEASE ORAL at 10:28

## 2024-04-12 RX ADMIN — VANCOMYCIN HYDROCHLORIDE 1250 MG: 10 INJECTION, POWDER, LYOPHILIZED, FOR SOLUTION INTRAVENOUS at 09:12

## 2024-04-12 RX ADMIN — PROCHLORPERAZINE MALEATE 5 MG: 5 TABLET ORAL at 06:57

## 2024-04-12 RX ADMIN — INSULIN ASPART 1 UNITS: 100 INJECTION, SOLUTION INTRAVENOUS; SUBCUTANEOUS at 21:58

## 2024-04-12 RX ADMIN — INSULIN ASPART 1 UNITS: 100 INJECTION, SOLUTION INTRAVENOUS; SUBCUTANEOUS at 06:32

## 2024-04-12 RX ADMIN — INSULIN ASPART 2 UNITS: 100 INJECTION, SOLUTION INTRAVENOUS; SUBCUTANEOUS at 17:14

## 2024-04-12 RX ADMIN — AMPICILLIN SODIUM AND SULBACTAM SODIUM 3 G: 2; 1 INJECTION, POWDER, FOR SOLUTION INTRAMUSCULAR; INTRAVENOUS at 21:44

## 2024-04-12 RX ADMIN — PIPERACILLIN AND TAZOBACTAM 3.38 G: 3; .375 INJECTION, POWDER, LYOPHILIZED, FOR SOLUTION INTRAVENOUS at 00:03

## 2024-04-12 RX ADMIN — PANTOPRAZOLE SODIUM 40 MG: 40 INJECTION, POWDER, FOR SOLUTION INTRAVENOUS at 21:54

## 2024-04-12 RX ADMIN — PROCHLORPERAZINE EDISYLATE 5 MG: 5 INJECTION INTRAMUSCULAR; INTRAVENOUS at 21:42

## 2024-04-12 RX ADMIN — INSULIN GLARGINE 10 UNITS: 100 INJECTION, SOLUTION SUBCUTANEOUS at 21:58

## 2024-04-12 RX ADMIN — VANCOMYCIN HYDROCHLORIDE 1250 MG: 10 INJECTION, POWDER, LYOPHILIZED, FOR SOLUTION INTRAVENOUS at 17:09

## 2024-04-12 RX ADMIN — PIPERACILLIN AND TAZOBACTAM 3.38 G: 3; .375 INJECTION, POWDER, LYOPHILIZED, FOR SOLUTION INTRAVENOUS at 05:15

## 2024-04-12 RX ADMIN — PIPERACILLIN AND TAZOBACTAM 3.38 G: 3; .375 INJECTION, POWDER, LYOPHILIZED, FOR SOLUTION INTRAVENOUS at 15:36

## 2024-04-12 ASSESSMENT — ACTIVITIES OF DAILY LIVING (ADL)
ADLS_ACUITY_SCORE: 38
ADLS_ACUITY_SCORE: 37
ADLS_ACUITY_SCORE: 38

## 2024-04-12 NOTE — PROVIDER NOTIFICATION
[id number] anderson mason FYI lactic was 2.3, vss and no major changes. pt has diaphoresis and sputum. looks like recheck is ordered for 0000. let me know if there's anything else you'd like me to do. thanks! rosamaria [phone]

## 2024-04-12 NOTE — ANESTHESIA POSTPROCEDURE EVALUATION
Patient: Jong Quiroga    Procedure: Procedure(s):  ENDOSCOPIC RETROGRADE CHOLANGIOPANCREATOGRAPHY with stent exchange       Anesthesia Type:  General    Note:  Disposition: Inpatient   Postop Pain Control: Uneventful            Sign Out: Well controlled pain   PONV: No   Neuro/Psych: Uneventful            Sign Out: Acceptable/Baseline neuro status   Airway/Respiratory: Uneventful            Sign Out: Acceptable/Baseline resp. status   CV/Hemodynamics: Uneventful            Sign Out: Acceptable CV status; No obvious hypovolemia; No obvious fluid overload   Other NRE: NONE   DID A NON-ROUTINE EVENT OCCUR? No           Last vitals:  Vitals Value Taken Time   /93 04/11/24 1915   Temp     Pulse 91 04/11/24 1917   Resp 30 04/11/24 1917   SpO2 98 % 04/11/24 1917   Vitals shown include unfiled device data.    Electronically Signed By: Mark Baldwin MD  April 11, 2024  7:18 PM

## 2024-04-12 NOTE — PROGRESS NOTES
[facility]    GASTROENTEROLOGY PROGRESS NOTE    ASSESSMENT:  44 year old male with a history of HTN (on 4 antihypertensives), DM2, polysubstance use disorder (marijuana, alcohol and tobacco), complex celiac artery dissection and hepatic artery aneurysm 2/2 possible segmental arterial mediolysis who was recently hospitalized for biliary obstruction r/t mass effect from hepatic artery aneurysm s/p sphincterotomy and transpapillary stent placement. Represented with fevers, acute blood loss anemia with melena and displaced stents on CT scan     # Fevers, displaced transpapillary stent on CT c/f cholangitis 2/2 obstruction  # Acute blood loss anemia/melena 2/2 post sphincterotomy bleeding or ? bilioarterial fistula which sealed spontaneously  # Hepatic hypodensity seen on CT scan ? Abscess--likely too small to drain    ERCP 4/11:   - The previous 10Fr Sofflex stent had migrated downward, likely due to extrinsic compression from the hepatic artery aneurysm.    - No active/ongoing bleeding, given clinical picture, melena likely due to resolved biliary sphincterotomy bleeding.  - A 7Fr double-pigtailed Zimmon stent and a 10Fr intraductal Johlin stent were placed into the bile duct to decrease chance of recurrent migration    Microbiology: H.influenza, Strep anginosus bacteremia     Recommendations:  -continue protonix 40 mg daily  -Surveillance cultures are pending  -Antibiotics per ID, medicine team      Outpatient:   - Would repeat ERCP in 2-3 months for stent exchange, and continue treatment as long as aneurysm causes biliary compression. Will still need to discuss long-term solution to address extrinsic obstruction of bile duct from aneurysm  -Noted an appointment with radiology and vascular medicine in April/May for management of  pseudoaneurysm  -Outpatient colonoscopy to rule out colon malignancy  Aliya Stallings Koziel J. The Clinical View on  "Streptococcus anginosus Group - Opportunistic Pathogens Coming Out of Hiding. Front Microbiol. 2022 Jul 8;13:159473. doi: 10.3389/fmicb.2022.805254.    [email or web address]    The patient was discussed and plan agreed upon with GI staff, Dr. Marty Zepeda MD  PGY4  P:[phone]    _______________________________________________________________  S: No acute events overnight. He under went ERCP with stent replacement. No changes in Hb or black stools now. Tolerating diet now    O:  Blood pressure 136/68, pulse 82, temperature 97.9  F (36.6  C), temperature source Oral, resp. rate 16, height 1.626 m (5' 4\"), weight 82.5 kg (181 lb 12.8 oz), SpO2 100%.    Constitutional: cooperative, pleasant, not dyspneic/diaphoretic, no acute distress  Eyes: Sclera anicteric/injected  Ears/nose/mouth/throat: Normal oropharynx without ulcers or exudate, mucus membranes moist, hearing intact  Neck: supple, thyroid normal size  CV: No edema  Respiratory: Unlabored breathing  Lymph: No axillary, submandibular, supraclavicular or inguinal lymphadenopathy  Abd:  Nondistended, +bs, no hepatosplenomegaly, nontender, no peritoneal signs  Skin: warm, perfused, no jaundice  Neuro: AAO x 3, No asterixis  Psych: Normal affect  MSK: No gross deformities      LABS:  BMP  Recent Labs   Lab 04/12/24  1032 04/12/24  0630 04/12/24  0620 04/11/24  2247 04/11/24  0954 04/11/24  0642 04/10/24  2137 04/10/24  1920 04/10/24  1637 04/10/24  1532   NA  --   --  133*  --   --  136  --  135  --  133*   POTASSIUM  --   --  3.1*  --   --  5.0  --  3.6  3.4  --  3.2*   CHLORIDE  --   --  98  --   --  105  --  98  --  92*   LUIS  --   --  8.8  --   --  7.1*  --  8.3*  --  8.7   CO2  --   --  23  --   --  15*  --  22  --  28   BUN  --   --  15.3  --   --  13.5  --  18.0  --  19.3   CR  --   --  0.83  --   --  0.79  --  1.01  --  1.00   * 174* 169* " 201*   < > 113*   < > 127*   < > 125*    < > = values in this interval not displayed.     CBC  Recent Labs   Lab 24  0620 24  0330 24  0642 04/10/24  2202 04/10/24  1820   WBC 14.6* 17.7* 9.7  --  10.9   RBC 3.43* 3.80* 2.57*  --  3.12*   HGB 9.7* 10.6* 7.2*   < > 8.9*   HCT 28.5* 32.0* 23.1*  --  25.7*   MCV 83 84 90  --  82   MCH 28.3 27.9 28.0  --  28.5   MCHC 34.0 33.1 31.2*  --  34.6   RDW 15.8* 15.9* 16.3*  --  15.8*    438 233  --  350    < > = values in this interval not displayed.     INR  Recent Labs   Lab 24  0620 24  1429 04/10/24  1059   INR 1.14 1.24* 1.15     LFTs  Recent Labs   Lab 24  0620 24  0642 04/10/24  1920 04/10/24  1059   ALKPHOS 568*  --  543* 678*   AST 44  --  68* 82*   *  --  276* 362*   BILITOTAL 2.0* 1.9* 1.9* 2.1*   PROTTOTAL 7.2 6.3* 6.9 8.1   ALBUMIN 3.7 2.9* 3.6 4.1      PANC  Recent Labs   Lab 04/10/24  1059   LIPASE 23       IMAGIN.  No convincing evidence of acute GI bleed.  2.  Interval placement of a biliary catheter with persistent/slightly  increased intrahepatic biliary dilatation. The common bile duct  obstruction is at the level of the common hepatic artery aneurysm and  the biliary stent is slightly caudal to the level of obstruction.   3.  New rounded hepatic hypodensity not seen on CTA 2024. This  could represent biloma from possible biliary ischemia versus focal  cystic intrahepatic biliary dilatation.  4.  Stable hepatic artery aneurysm causing mass effect of the biliary  system and pancreatic head.  5.  Hepatomegaly.    ENDOSCOPY:  ERCP   - The previous 10Fr Sofflex stent had migrated downward, likely due to extrinsic compression from the hepatic artery aneurysm.    - No active/ongoing bleeding, given clinical picture, melena likely due to resolved biliary sphincterotomy bleeding.  - A 7Fr double-pigtailed Zimmon stent and a 10Fr intraductal Johlin stent were placed into the bile duct to decrease  chance of recurrent migration.

## 2024-04-12 NOTE — CONSULTS
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 It could be due to his biliary obstruction. S anginosus causes abscesses. If he has persistent bacteremia or has recurrence, would recommend CT of chest and head. Since both organisms are susceptible to ampicillin-sulbactam, would tailor the therapy. I would avoid ceftriaxone for now due to high alk phos.     I wonder what is the cause of the aneurysm. We would add additional ID workup if we think it is necessary to rule out infectious aneurysm.     Recommendations:  Stop vanco and piptazo (ordered).   Start IV ampicillin-sulbactam 3g every 6 hours (ordered).     Thank you for this consult. ID will continue to follow this patient. Please feel free to call with any questions.     Daisy Malcolm MD    MDM: >3 notes and tests reviewed, discussed with primary team, life threatening illness.        History of Present Illness:   44 year old man with a history hepatic/celiac artery aneurysm and recent dx of choledocholithiasis s/p ERCP on 4/3 with removal and stenting who presents back to hospital on 4/10 with fevers, night sweats, and melena found to have H. Influenza + S. Anginosus bacteremia    He went for ERCP with stent exchange 4/11. The previous stent was found  "to have migrated downwards felt to be due to extrinsic compression of the hepatic artery aneurysm. No bleeding noted - felt to be biliary sphincterotomy bleeding. He had multiple biliary stents replaced.    I saw him at bedside. He does not have abdominal pain or diarrhea. No fevers. He said it's the first time he had bacteria in his blood.     Antimicrobials:  Vanc + pip/tazo 4/10 to present             Physical Exam:   /68   Pulse 82   Temp 97.9  F (36.6  C) (Oral)   Resp 16   Ht 1.626 m (5' 4\")   Wt 82.5 kg (181 lb 12.8 oz)   SpO2 100%   BMI 31.21 kg/m     Exam:  GENERAL:   in no acute distress.   ENT:  [place] sclera   LUNGS:  Clear to auscultation.  CARDIOVASCULAR:  Regular rate and rhythm with no murmurs  ABDOMEN:  Normal bowel sounds, soft, nontender.  EXT: Extremities warm and without edema.           Laboratory Data:     WBC 14.6  Estimated Creatinine Clearance: 111.2 mL/min (based on SCr of 0.83 mg/dL).    CRP   4/11 158       Pertinent Recent Microbiology Data:     4/12 Bcx -   4/10 MRSA - neg   4/10 Bcx - 3/4 Streptococcus anginosus, Haemophilus influenzae   1/2024 syphilis - neg   12/2023 HIV - neg          Imaging:     Recent Results (from the past 48 hour(s))   CTA Abdomen Pelvis with Contrast    Narrative    EXAMINATION: CTA ABDOMEN PELVIS WITH CONTRAST, 4/10/2024 1:26 PM    INDICATION: procedure, GI bleed    COMPARISON STUDY: CTA 4/1/2024    TECHNIQUE: CT scan of the abdomen and pelvis was performed on  multidetector CT scanner using volumetric acquisition technique, and  images were reconstructed in multiple planes with variable thickness  and reviewed on dedicated workstations. CT scan radiation dose is  optimized to minimum requisite dose using automated dose modulation  techniques. Patient was scanned in noncontrast, late arterial, and  portal venous phase.    CONTRAST: 87 mL Isovue-370.     FINDINGS:  Gastrointestinal tract: There is diffuse hyperattenuation of the  intraluminal " border of the entire colon likely representing residual  contrast from the ERCP dated 4/3/2024. No increased intraluminal  attenuation is seen on the arterial and delayed phases. Normal caliber  small and large bowel. Colonic diverticulosis. Unremarkable appendix.       Lower Chest:   Dependent atelectasis.     Abdomen and Pelvis:  Liver: Liver measures 25 cm in the midclavicular line, similar to  abdominal MR 4/2/2024. There is a focal peripheral hypodensity within  segment 5/8 not seen on 4/1/2024 CTA (series 10, image 93).    Biliary System: There is hyperattenuating material within the  gallbladder that are similar in all phases that was not seen on the  4/1/2024 CTA, likely from the ERCP 4/3/2024. New common bile duct  stent with distal tip in the second/third part of the duodenum.  Relative to the 4/1/2024 CTA, there is similar to slightly increased  intrahepatic biliary dilatation. As seen on prior MRI there is abrupt  termination of the common bile duct at the level of the common hepatic  artery aneurysm (series 16, image 113). The common bile duct stent in  slightly caudal to the level of the obstruction.    Pancreas: The hepatic artery aneurysm causes mass effect on the  pancreatic head.    Adrenal glands: No mass or nodules    Spleen: Normal.    Kidneys/Ureters/Bladder: Lobulated renal surface. No hydronephrosis.  Distended bladder.    Mesentery/peritoneum/retroperitoneum: No mass. No free fluid or air.    Lymph nodes: No significant lymphadenopathy.    Vasculature: Dilated common hepatic, left hepatic, and right hepatic  arteries with greatest diameter of a left hepatic artery branch  measuring 1.6 cm, stable.    Pelvis: Pelvic phleboliths.    Osseous structures: No aggressive or acute osseous lesion.      Soft tissues: Within normal limits.        Impression    IMPRESSION:   1.  No convincing evidence of acute GI bleed.  2.  Interval placement of a biliary catheter with persistent/slightly  increased  intrahepatic biliary dilatation. The common bile duct  obstruction is at the level of the common hepatic artery aneurysm and  the biliary stent is slightly caudal to the level of obstruction.   3.  New rounded hepatic hypodensity not seen on CTA 4/1/2024. This  could represent biloma from possible biliary ischemia versus focal  cystic intrahepatic biliary dilatation.  4.  Stable hepatic artery aneurysm causing mass effect of the biliary  system and pancreatic head.  5.  Hepatomegaly.    I have personally reviewed the examination and initial interpretation  and I agree with the findings.    JORDANA GARCIA MD         SYSTEM ID:  [id number]   XR Surgery GIAN G/T 5 Min Fluoro w Stills    Narrative    This exam was marked as non-reportable because it will not be read by a   radiologist or a [place] non-radiologist provider.

## 2024-04-12 NOTE — PROGRESS NOTES
Antimicrobial Stewardship Team Note    Antimicrobial Stewardship Program - A joint venture between [facility] and  Physicians to optimize antibiotic management.  NOT a formal consult - Restricted Antimicrobial Review     Patient: Jong Quiroga  MRN: [id number]  Allergies: Patient has no known allergies.    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     History of Presenting Illness:   The patient was recently hospitalized at [facility] from 4/1 to 4/4 due to choledocholithiasis with cholecystitis, undergoing ERCP with stone removal and stent placement on 4/3. Upon presenting to [facility], the patient reported fever, jaundice, and dark stools for several days. Vitals were stable with blood pressure of 112/69, heart rate of 75 BPM, no fever, and oxygen saturations at 100% on RA. Labs revealed a normal WBC count of 11, creatinine of 1 (baseline 0.8-1), elevated LFTs (AlkP 678, , AST 82, Tbili 2.1), though these were improved from previous admission, and a low Hgb of 9.8. A CTA abdomen/pelvis showed no signs of an acute GI bleed, but noted persistent/slightly increased intrahepatic biliary dilation, a potentially displaced stent, a stable hepatic artery aneurysm impacting the biliary system/pancreatic head, and a new hypodensity possibly indicative of biloma or focal cystic biliary dilation. The patient was started on vancomycin and piperacillin/tazobactam for suspected cholangitis. Blood cultures from that time revealed growth of GNR (unidentified on Verigene) and GPC in pairs and gain (Streptococcus anginosus group on Verigene). MRSA/MSSA PCR was negative.     On 4/11, the patient had a repeat ERCP with stent exchange. Preoperatively, the patient was  normotensive at 123/65 but had a fever of 103 F. Significant lab findings included a CRP of 158 and lactic acid of 2.3. Currently, the patient's blood pressure has varaied from hypertensive (170/105) to normotensive (131/69), and they have no fever, though they declined temperature measurement today. Labs indicate a new leukocytosis at 14.6, lactic acid of 1.9, hemoglobin of 9.7, and stable/improving LFTs (AlkP 568, , AST 44, Tbili 2.0). Repeat blood cultures are pending.          Active Anti-infective Medications   (From admission, onward)                 Start     Stop    04/10/24 1700  vancomycin  1,250 mg,   Intravenous,   EVERY 12 HOURS        Intra-Abdominal Infection, Sepsis       --    04/10/24 1620  piperacillin-tazobactam  3.375 g,   Intravenous,   EVERY 6 HOURS        Intra-Abdominal Infection       --                  Assessment: Polymicrobial (GNR & Strep anginosus) bacteremia possibly 2/2 cholangitis   The patient is hemodynamically stable and afebrile but exhibits leukocytosis, potentially related to the stress from the ERCP on 4/11. Blood cultures have identified GNR and Streptococcus anginosus group. The negative MRSA nares PCR is notable, with a 96.5% negative predictive value for blood stream infections, suggesting a low likelihood of MRSA involvement.1 Given the absence of MRSA growth in current cultures, recommend discontinuing vancomycin. Agree with continuing piperacillin/tazobactam as this will provide broad for cholangitis and gram negative pathogens, as well as Streptococcus anginosus group.     Recommendations:  1) Primary team has requested an ID consult while AST was rounding on the patient. Defer antimicrobial management to ID.     References:   Jahaira KA, Carly KE, Sana CAMPBELL, et al. Determining the Utility of Methicillin-Resistant Staphylococcus aureus Nares Screening in Antimicrobial Stewardship. Clin Infect Dis. 2020 Aug 22;71(5):1968-3977. PMID: [id number].    Pharmacy  took the following actions: Electronic note created.    Discussed with ID Staff CECE Persaud; Candice Eastman, PharmD, BCIDP    Smith Ludwig, BrendenD  PGY-1 Pharmacy Resident    Vital Signs/Clinical Features:  Vitals         04/10 0700 04/11 0659 04/11 0700  04/12 0659 04/12 0700  04/12 1208   Most Recent      Temp ( F) 99.3 -  102.9    98.4 -  103.1      97.9     97.9 (36.6) 04/12 0940    Pulse 66 -  112    76 -  113      82     82 04/12 0940    Resp 14 -  16    17 -  27      16     16 04/12 0940    BP 93/64 -  142/67    110/70 -  170/105      136/68     136/68 04/12 0940    SpO2 (%) 95 -  100    94 -  100      100     100 04/12 0940            Labs  Estimated Creatinine Clearance: 111.2 mL/min (based on SCr of 0.83 mg/dL).  Recent Labs   Lab Test 04/04/24  0822 04/10/24  1059 04/10/24  1532 04/10/24  1920 04/11/24  0642 04/12/24  0620   CR 0.84 1.15 1.00 1.01 0.79 0.83       Recent Labs   Lab Test 04/10/24  1059 04/10/24  1506 04/10/24  1820 04/10/24  2202 04/11/24  0343 04/11/24  0642 04/12/24  0330 04/12/24  0620   WBC 11.9* 11.0 10.9  --   --  9.7 17.7* 14.6*   HGB 11.0* 9.8*  9.8* 8.9* 9.2* 8.9* 7.2* 10.6* 9.7*   HCT 31.9* 28.3* 25.7*  --   --  23.1* 32.0* 28.5*   MCV 84 83 82  --   --  90 84 83    380 350  --   --  233 438 380       Recent Labs   Lab Test 04/02/24  0646 04/03/24  0637 04/04/24  0822 04/10/24  1059 04/10/24  1920 04/11/24  0642 04/12/24  0620   BILITOTAL 8.7* 9.4* 4.3* 2.1* 1.9* 1.9* 2.0*   ALKPHOS 557* 547* 558* 678* 543*  --  568*   ALBUMIN 4.5 4.2 4.6 4.1 3.6 2.9* 3.7   * 288* 261* 82* 68*  --  44   * 538* 521* 362* 276*  --  170*       Recent Labs   Lab Test 04/01/24  2050 04/02/24  0646 04/10/24  1059 04/10/24  1506 04/11/24  0642 04/11/24  2111 04/12/24  0330   LACT 1.7  --  2.1* 1.0  --  2.3* 1.9   CRPI  --  15.00*  --   --  158.00*  --   --              Culture Results:  7-Day Micro Results       Procedure Component Value Units Date/Time    Blood Culture  Hand, Right [[id number]] Collected: 04/12/24 0630    Order Status: Resulted Lab Status: In process Updated: 04/12/24 0644    Specimen: Blood from Hand, Right     Blood Culture Hand, Left [[id number]] Collected: 04/12/24 0620    Order Status: Resulted Lab Status: In process Updated: 04/12/24 0644    Specimen: Blood from Hand, Left     MRSA MSSA PCR, Nasal Swab [[id number]] Collected: 04/10/24 1809    Order Status: Completed Lab Status: Final result Updated: 04/10/24 1950    Specimen: Swab from Nares, Bilateral      MRSA Target DNA Negative     SA Target DNA Negative    Narrative:      The [employer]  Xpert SA Nasal Complete assay performed in the [employer]  Dx System is a qualitative in vitro diagnostic test designed for rapid detection of Staphylococcus aureus (SA) and methicillin-resistant Staphylococcus aureus (MRSA) from nasal swabs in patients at risk for nasal colonization. The test utilizes automated real-time polymerase chain reaction (PCR) to detect MRSA/SA DNA. The Xpert SA Nasal Complete assay is intended to aid in the prevention and control of MRSA/SA infections in healthcare settings. The assay is not intended to diagnose, guide or monitor treatment for MRSA/SA infections, or provide results of susceptibility to methicillin. A negative result does not preclude MRSA/SA nasal colonization.     Blood Culture Arm, Left [[id number]]  (Abnormal) Collected: 04/10/24 1059    Order Status: Completed Lab Status: Preliminary result Updated: 04/11/24 0509    Specimen: Blood from Arm, Left      Culture Positive on the 1st day of incubation      Gram positive cocci in pairs and chains     Comment: 2 of 2 bottles         Gram negative bacilli     Comment: 1 of 2 bottles       Blood Culture Arm, Right [[id number]]  (Abnormal) Collected: 04/10/24 1059    Order Status: Completed Lab Status: Preliminary result Updated: 04/12/24 1148    Specimen: Blood from Arm, Right      Culture Positive on the 1st day of  incubation      Haemophilus influenzae     Comment: 1 of 2 bottles         Streptococcus anginosus     Comment: 2 of 2 bottles       Verigene GP Panel [[id number]]  (Abnormal) Collected: 04/10/24 1059    Order Status: Completed Lab Status: Final result Updated: 04/11/24 0740    Specimen: Blood from Arm, Left      Staphylococcus species Not Detected     Staphylococcus aureus Not Detected     Staphylococcus epidermidis Not Detected     Staphylococcus lugdunensis Not Detected     Enterococcus faecalis Not Detected     Enterococcus faecium Not Detected     Streptococcus agalactiae Not Detected     Streptococcus anginosus group Detected     Comment: Positive for Streptococcus anginosus group by [employer] multiplex nucleic acid test. Final identification and antimicrobial susceptibility testing will be verified by standard methods.        Streptococcus pneumoniae Not Detected     Streptococcus pyogenes Not Detected     Listeria species Not Detected    Narrative:      Specimen tested with [employer] multiplex, gram-positive blood culture nucleic acid test for the following targets: Staphylococcus aureus, Staphylococcus epidermidis, Staphylococcus lugdunensis, other Staphylococcus species, Enterococcus faecalis, Enterococcus faecium, Streptococcus species, Streptococcus agalactiae, Streptococcus anginosus group, Streptococcus pneumoniae, Streptococcus pyogenes, Listeria species, mecA (methicillin resistance), and Nando/vanB (vancomycin resistance).    [employer] GN Panel [[id number]]  (Normal) Collected: 04/10/24 1059    Order Status: Completed Lab Status: Final result Updated: 04/11/24 0709    Specimen: Blood from Arm, Left      Acinetobacter species Not Detected     Citrobacter species Not Detected     Enterobacter species Not Detected     Proteus species Not Detected     Escherichia coli Not Detected     Klebsiella pneumoniae Not Detected     Klebsiella oxytoca Not Detected     Pseudomonas aeruginosa Not Detected     CTX-M  NA     KPC NA     NDM NA     VIM NA     IMP NA     OXA NA    Narrative:      Specimen tested with Verigene multiplex, gram-negative blood culture nucleic acid test for the following targets: Acinetobacter species, Citrobacter species, Enterobacter species, Proteus species, Escherichia coli, Klebsiella pneumoniae, Klebsiella oxytoca, Pseudomonas aeruginosa, and the following resistance markers: CTX-M, KPC, NDM, VIM, IMP and OXA.            Recent Labs   Lab Test 01/06/24  1543 04/01/24 2054   URINEPH 5.0 6.0   NITRITE Negative Negative   LEUKEST Negative Negative   WBCU 1 3                         Imaging: XR Surgery GIAN G/T 5 Min Fluoro w Stills    Result Date: 4/11/2024  This exam was marked as non-reportable because it will not be read by a radiologist or a [place] non-radiologist provider.     CTA Abdomen Pelvis with Contrast    Result Date: 4/10/2024  EXAMINATION: CTA ABDOMEN PELVIS WITH CONTRAST, 4/10/2024 1:26 PM INDICATION: procedure, GI bleed COMPARISON STUDY: CTA 4/1/2024 TECHNIQUE: CT scan of the abdomen and pelvis was performed on multidetector CT scanner using volumetric acquisition technique, and images were reconstructed in multiple planes with variable thickness and reviewed on dedicated workstations. CT scan radiation dose is optimized to minimum requisite dose using automated dose modulation techniques. Patient was scanned in noncontrast, late arterial, and portal venous phase. CONTRAST: 87 mL Isovue-370. FINDINGS: Gastrointestinal tract: There is diffuse hyperattenuation of the intraluminal border of the entire colon likely representing residual contrast from the ERCP dated 4/3/2024. No increased intraluminal attenuation is seen on the arterial and delayed phases. Normal caliber small and large bowel. Colonic diverticulosis. Unremarkable appendix. Lower Chest: Dependent atelectasis. Abdomen and Pelvis: Liver: Liver measures 25 cm in the midclavicular line, similar to abdominal MR 4/2/2024. There  is a focal peripheral hypodensity within segment 5/8 not seen on 4/1/2024 CTA (series 10, image 93). Biliary System: There is hyperattenuating material within the gallbladder that are similar in all phases that was not seen on the 4/1/2024 CTA, likely from the ERCP 4/3/2024. New common bile duct stent with distal tip in the second/third part of the duodenum. Relative to the 4/1/2024 CTA, there is similar to slightly increased intrahepatic biliary dilatation. As seen on prior MRI there is abrupt termination of the common bile duct at the level of the common hepatic artery aneurysm (series 16, image 113). The common bile duct stent in slightly caudal to the level of the obstruction. Pancreas: The hepatic artery aneurysm causes mass effect on the pancreatic head. Adrenal glands: No mass or nodules Spleen: Normal. Kidneys/Ureters/Bladder: Lobulated renal surface. No hydronephrosis. Distended bladder. Mesentery/peritoneum/retroperitoneum: No mass. No free fluid or air. Lymph nodes: No significant lymphadenopathy. Vasculature: Dilated common hepatic, left hepatic, and right hepatic arteries with greatest diameter of a left hepatic artery branch measuring 1.6 cm, stable. Pelvis: Pelvic phleboliths. Osseous structures: No aggressive or acute osseous lesion.   Soft tissues: Within normal limits.     IMPRESSION: 1.  No convincing evidence of acute GI bleed. 2.  Interval placement of a biliary catheter with persistent/slightly increased intrahepatic biliary dilatation. The common bile duct obstruction is at the level of the common hepatic artery aneurysm and the biliary stent is slightly caudal to the level of obstruction. 3.  New rounded hepatic hypodensity not seen on CTA 4/1/2024. This could represent biloma from possible biliary ischemia versus focal cystic intrahepatic biliary dilatation. 4.  Stable hepatic artery aneurysm causing mass effect of the biliary system and pancreatic head. 5.  Hepatomegaly. I have personally  reviewed the examination and initial interpretation and I agree with the findings. JORDANA GARCIA MD   SYSTEM ID:  [id number]

## 2024-04-12 NOTE — PROGRESS NOTES
[facility]    Progress Note - Medicine Service, [facility] TEAM 4       Date of Admission:  4/10/2024    Assessment & Plan   Jong Quiroga is a 44 year old male admitted on 4/10/2024. 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 He was recently admitted for choledocholithiasis with cholecystitis s/p ERCP with stone removal and stent placement 4/3, presenting with acute cholangitis with bacteremia and acute blood loss anemia with melena likely from resolved biliary sphincterotomy bleeding.     Today:  -Cont Vanc/Zosyn, growing strep anginosus and GN bacilli   -Doing well s/p stent placement during ERCP  -ID consult for GP bacteremia  -Restart PTA metoprolol BID  -Restart Glargine at bedtime 10 units (patient previously on 48u)   -Discontinue mIVF      Acute blood loss anemia  C/f UGI Bleed  Melena  Multiple days of dark stool, had 2 weeks of constipation with black stool after first BM 4/6, hard consistency, not convincingly melena. CTA abdomen without signs of GI bleed. Possible that he has a post-sphincterotomy bleed s/p ERCP 4/3, though would be an atypically delayed presentation per GI. Hepatic artery aneurysm stable, but causing mass effect of the biliary system and pancreatic head. EGD/ERCP 4/11 negative for acute bleed.   - GI consult, appreciate recs  - S/p 1x 80mg protonix, continue IV PPI BID  - 2 large bore Ivs  - Type and screen complete  - s/p 1u PRBCs 4/11 for Hgb 7.2   - Avoid NSAIDs  -Daily CBC now that showing Hgb stability      C/f Cholangitis  Biliary obstruction (see below) s/p ERCP   Fevers  Leukocytosis  Did not meet sepsis criteria at admission, clinically looks well. LFTs overall downtrending from discharge 4/4, however alk phos increased. Stent appears to be slightly below (caudal) to the level of obstruction on imaging. While he  is not experiencing abdominal pain, he has had fevers and jaundice with leukocytosis. Bcx growing strep anginosus and GN bacilli. ERCP 4/11 with downward displaced prior stent, this stent was removed and new stent placed.  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 He was evaluated by vascular surgery, IR, rhematology, and hepatology during hospitalization 1/4-1/7/2024. No suitable surgical / endovascular target. Extensive workup revealed negative Hep B, C, HIV, histoplasma, quant gold, treponemia, WHIT, ANCA, DsDNA, normal C3 and C4. However, Anti-smooth muscle antibody was positive at 1:160 titer and F-actin was mildly positive at 22. Pt was supposed to follow up with GI outpatient but was feeling well and declined.  -Vascular surgery asked to review CTA from 4/1/24, no significant changes or new recommendations. Still not endovascular candidate   -HOLD aspirin 81 mg once daily, per patient has been held since ERCP.     Hypotension, resolved  Admitted with systolic BP's low in the 90's with baseline BP's needing multiple anti-hypertensive to achieve normotension. Resolved with fluid resuscitation        Hypertension  -Hold PTA amlodipine 10 mg daily  -Hold PTA clonidine 0.1 mg twice daily  -Hold PTA hydrochlorothiazide 25 mg daily  -Restart PTA metoprolol 50 mg twice daily 4/12     Hypokalemia, intermittent  Likely 2/2 malnutrition. Required intermittent supplementation during this hospitalization  - CTM, repleat for K<3.5     Anorexia  Severe  Malnutrition  Patient reports 7-10 lbs weight loss in last week. Endorses weeks of inability to tolerate PO/poor appetite. Unclear why he is finding eating difficult, says he just can't. Denies N/V.   - Nutrition consulted     Type 2 diabetes mellitus  HbA1c on 2/14/2024 is 9.2. In light of poor PO intake, was doing sliding scale with Lantus at home for Bgs > 150. 4/1-4/4 hospitalization was on Lantus and sliding scale insulin and 20U glargine while inpatient. Home regimen recommended at last discharge 4/4:  glargine at a lower dose 30 U daily (previously 48U), 5U Lantus with meals.  - Restart Lantus 10u nightly  - MSSI  - Hypoglycemia protocol     Substance use history  - Formerly smoked 1-2 ppd. Reduced tobacco, still daily marijuana use. Denies alcohol over last month.           Diet: Regular Diet Adult  NPO per Anesthesia Guidelines for Procedure/Surgery Except for: Meds    DVT Prophylaxis: None  Tran Catheter: Not present  Fluids: 100ml/hr LR  Lines: None     Cardiac Monitoring: None  Code Status: Full Code      Disposition Plan      Expected Discharge Date: 04/13/2024                The patient's care was discussed with the Attending Physician, Dr. Burger .    Margarita Prajapati MD  Medicine Service, [facility] TEAM [address] [place] [address]  Securely message with [employer] (more info)  Text page via [facility] Paging/Directory   See signed in provider for up to date coverage information  ______________________________________________________________________    Interval History   NAEO. Feeling well this AM. No chills/fevers. Denies abdominal pain. Did have black stool this AM though not large volume and this is the first stool in several days. Overall pt feeling well.     Physical Exam   Vital Signs: Temp: 97.9  F (36.6  C) Temp src: Oral BP: 136/68 Pulse: 82   Resp: 16 SpO2: 100 % O2 Device: None (Room air)    Weight: 181 lbs 12.8 oz    General Appearance:  Laying in bed. NAD.  Non-toxic. Diaphoretic  Respiratory: Normal WOB on RA. CTAB bilaterally no crackles or wheezes appreciated.  Cardiovascular: RRR, no murmurs appreciated.   GI: Soft, distended with Hepatomegaly. No tenderness to palpation of all four quadrants. No guarding or rebound tenderness.  Skin: No rashes on visualized skin.  Other:  Alert and oriented to conversation.      Data    Latest Reference Range & Units 04/12/24 03:30 04/12/24 06:20 04/12/24 06:30 04/12/24 10:32   Sodium 135 - 145 mmol/L  133 (L)     Potassium 3.4 - 5.3 mmol/L  3.1 (L)     Chloride 98 - 107 mmol/L  98     Carbon Dioxide (CO2) 22 - 29 mmol/L  23     Urea Nitrogen 6.0 - 20.0 mg/dL  15.3     Creatinine 0.67 - 1.17 mg/dL  0.83     GFR Estimate >60 mL/min/1.73m2  >90     Calcium 8.6 - 10.0 mg/dL  8.8     Anion Gap 7 - 15 mmol/L  12     Albumin 3.5 - 5.2 g/dL  3.7     Protein Total 6.4 - 8.3 g/dL  7.2     Alkaline Phosphatase 40 - 150 U/L  568 (H)     ALT 0 - 70 U/L  170 (H)     AST 0 - 45 U/L  44     Bilirubin Total <=1.2 mg/dL  2.0 (H)     Glucose 70 - 99 mg/dL  169 (H)     Lactic Acid 0.7 - 2.0 mmol/L 1.9      GLUCOSE BY METER POCT 70 - 99 mg/dL   174 (H) 158 (H)   WBC 4.0 - 11.0 10e3/uL 17.7 (H) 14.6 (H)     Hemoglobin 13.3 - 17.7 g/dL 10.6 (L) 9.7 (L)     Hematocrit 40.0 - 53.0 % 32.0 (L) 28.5 (L)     Platelet Count 150 - 450 10e3/uL 438 380     RBC Count 4.40 - 5.90 10e6/uL 3.80 (L) 3.43 (L)     MCV 78 - 100 fL 84 83     MCH 26.5 - 33.0 pg 27.9 28.3     MCHC 31.5 - 36.5 g/dL 33.1 34.0     RDW 10.0 - 15.0 % 15.9 (H) 15.8 (H)     RBC Morphology  Confirmed RBC Indices      Platelet Morphology Automated Count Confirmed. Platelet morphology is normal.  Automated Count Confirmed. Platelet morphology is normal.      Polychromasia None Seen  Slight !      Target Cells None Seen  Moderate !      Redstone Cells None Seen  Slight !      INR 0.85 - 1.15   1.14     BLOOD CULTURE   Rpt (IP) Rpt (IP)

## 2024-04-12 NOTE — PLAN OF CARE
"Goal Outcome Evaluation:    A&Ox4, on room air. Denies chest pain and SOB. Hypertensive this shift, MD notified. Triggered sepsis @ 2115, MD notified, orders obtained. Lactate 2.3, diaphoresis. Lactate rechecked @ 0330 1.9. PIV x1, Zosyn 3.375g infused per order. Vancomycin due time moved to 0800 due to med unavailability. LR infusing @ 125mL/hr.  @ 2245 and @ 174 @ 0645, NovoLOG administered. BM x1 dark per patient report, urinating spontaneously without difficulty. Clear liquid diet.       Vital signs:  BP (!) 170/105   Pulse 88   Temp 98.7  F (37.1  C) (Oral)   Resp 23   Ht 1.626 m (5' 4\")   Wt 82.5 kg (181 lb 12.8 oz)   SpO2 94%   BMI 31.21 kg/m         "

## 2024-04-12 NOTE — PLAN OF CARE
Vitals:    24 2120 24 2130 24 0519 24 0940   BP: (!) 164/110 (!) 169/99 (!) 170/105 136/68   BP Location:    Left arm   Patient Position:    Supine   Cuff Size:    Adult Regular   Pulse: 94 88  82   Resp: 17   16   Temp:    97.9  F (36.6  C)   TempSrc:    Oral   SpO2: 100% 94%  100%   Weight:       Height:           Neuro: alert and oriented    VS: afebrile vitally stable satin 100% on room air     B - 158 with sliding scale     Cardiac: 82    Pain/Nausea: denies pain denies nausea     Lines/Drains: old IV was infiltrated, new IV line placed, SL between Antibiotic      GI/: voiding not saved, +BS passing gas and had BM    Diet/Appetite: tolerating regular diet     Activity: up independently ambulated     Plan: continue with plan of care

## 2024-04-13 LAB
ALBUMIN SERPL BCG-MCNC: 3.7 G/DL (ref 3.5–5.2)
ALP SERPL-CCNC: 491 U/L (ref 40–150)
ALT SERPL W P-5'-P-CCNC: 139 U/L (ref 0–70)
ANION GAP SERPL CALCULATED.3IONS-SCNC: 11 MMOL/L (ref 7–15)
AST SERPL W P-5'-P-CCNC: 44 U/L (ref 0–45)
BILIRUB SERPL-MCNC: 1.6 MG/DL
BUN SERPL-MCNC: 10.8 MG/DL (ref 6–20)
CALCIUM SERPL-MCNC: 8.8 MG/DL (ref 8.6–10)
CHLORIDE SERPL-SCNC: 101 MMOL/L (ref 98–107)
CREAT SERPL-MCNC: 0.79 MG/DL (ref 0.67–1.17)
DEPRECATED HCO3 PLAS-SCNC: 24 MMOL/L (ref 22–29)
EGFRCR SERPLBLD CKD-EPI 2021: >90 ML/MIN/1.73M2
ERYTHROCYTE [DISTWIDTH] IN BLOOD BY AUTOMATED COUNT: 16 % (ref 10–15)
GLUCOSE BLDC GLUCOMTR-MCNC: 116 MG/DL (ref 70–99)
GLUCOSE BLDC GLUCOMTR-MCNC: 128 MG/DL (ref 70–99)
GLUCOSE BLDC GLUCOMTR-MCNC: 139 MG/DL (ref 70–99)
GLUCOSE BLDC GLUCOMTR-MCNC: 153 MG/DL (ref 70–99)
GLUCOSE BLDC GLUCOMTR-MCNC: 153 MG/DL (ref 70–99)
GLUCOSE SERPL-MCNC: 125 MG/DL (ref 70–99)
HCT VFR BLD AUTO: 30.2 % (ref 40–53)
HGB BLD-MCNC: 10.1 G/DL (ref 13.3–17.7)
INR PPP: 1.11 (ref 0.85–1.15)
MCH RBC QN AUTO: 27.9 PG (ref 26.5–33)
MCHC RBC AUTO-ENTMCNC: 33.4 G/DL (ref 31.5–36.5)
MCV RBC AUTO: 83 FL (ref 78–100)
PLATELET # BLD AUTO: 461 10E3/UL (ref 150–450)
POTASSIUM SERPL-SCNC: 3.4 MMOL/L (ref 3.4–5.3)
PROT SERPL-MCNC: 7.2 G/DL (ref 6.4–8.3)
RBC # BLD AUTO: 3.62 10E6/UL (ref 4.4–5.9)
SODIUM SERPL-SCNC: 136 MMOL/L (ref 135–145)
WBC # BLD AUTO: 12.9 10E3/UL (ref 4–11)

## 2024-04-13 PROCEDURE — 85027 COMPLETE CBC AUTOMATED: CPT | Performed by: INTERNAL MEDICINE

## 2024-04-13 PROCEDURE — 87040 BLOOD CULTURE FOR BACTERIA: CPT | Performed by: INTERNAL MEDICINE

## 2024-04-13 PROCEDURE — 250N000011 HC RX IP 250 OP 636: Performed by: INTERNAL MEDICINE

## 2024-04-13 PROCEDURE — 250N000013 HC RX MED GY IP 250 OP 250 PS 637: Performed by: INTERNAL MEDICINE

## 2024-04-13 PROCEDURE — 99233 SBSQ HOSP IP/OBS HIGH 50: CPT | Performed by: INTERNAL MEDICINE

## 2024-04-13 PROCEDURE — 80053 COMPREHEN METABOLIC PANEL: CPT | Performed by: INTERNAL MEDICINE

## 2024-04-13 PROCEDURE — 36415 COLL VENOUS BLD VENIPUNCTURE: CPT | Performed by: INTERNAL MEDICINE

## 2024-04-13 PROCEDURE — 999N000128 HC STATISTIC PERIPHERAL IV START W/O US GUIDANCE

## 2024-04-13 PROCEDURE — 999N000007 HC SITE CHECK

## 2024-04-13 PROCEDURE — 120N000002 HC R&B MED SURG/OB UMMC

## 2024-04-13 PROCEDURE — 85610 PROTHROMBIN TIME: CPT | Performed by: INTERNAL MEDICINE

## 2024-04-13 PROCEDURE — C9113 INJ PANTOPRAZOLE SODIUM, VIA: HCPCS | Performed by: INTERNAL MEDICINE

## 2024-04-13 PROCEDURE — 250N000013 HC RX MED GY IP 250 OP 250 PS 637

## 2024-04-13 RX ORDER — PANTOPRAZOLE SODIUM 40 MG/1
40 TABLET, DELAYED RELEASE ORAL
Status: DISCONTINUED | OUTPATIENT
Start: 2024-04-13 | End: 2024-04-14

## 2024-04-13 RX ADMIN — CLONIDINE HYDROCHLORIDE 0.1 MG: 0.1 TABLET ORAL at 08:28

## 2024-04-13 RX ADMIN — AMPICILLIN SODIUM AND SULBACTAM SODIUM 3 G: 2; 1 INJECTION, POWDER, FOR SOLUTION INTRAMUSCULAR; INTRAVENOUS at 04:39

## 2024-04-13 RX ADMIN — AMPICILLIN SODIUM AND SULBACTAM SODIUM 3 G: 2; 1 INJECTION, POWDER, FOR SOLUTION INTRAMUSCULAR; INTRAVENOUS at 16:33

## 2024-04-13 RX ADMIN — PROCHLORPERAZINE EDISYLATE 5 MG: 5 INJECTION INTRAMUSCULAR; INTRAVENOUS at 08:18

## 2024-04-13 RX ADMIN — PANTOPRAZOLE SODIUM 40 MG: 40 TABLET, DELAYED RELEASE ORAL at 16:33

## 2024-04-13 RX ADMIN — METOPROLOL TARTRATE 50 MG: 50 TABLET, FILM COATED ORAL at 08:28

## 2024-04-13 RX ADMIN — AMLODIPINE BESYLATE 10 MG: 10 TABLET ORAL at 08:28

## 2024-04-13 RX ADMIN — INSULIN GLARGINE 10 UNITS: 100 INJECTION, SOLUTION SUBCUTANEOUS at 21:35

## 2024-04-13 RX ADMIN — METOPROLOL TARTRATE 50 MG: 50 TABLET, FILM COATED ORAL at 21:36

## 2024-04-13 RX ADMIN — INSULIN ASPART 1 UNITS: 100 INJECTION, SOLUTION INTRAVENOUS; SUBCUTANEOUS at 21:35

## 2024-04-13 RX ADMIN — AMPICILLIN SODIUM AND SULBACTAM SODIUM 3 G: 2; 1 INJECTION, POWDER, FOR SOLUTION INTRAMUSCULAR; INTRAVENOUS at 11:02

## 2024-04-13 RX ADMIN — PANTOPRAZOLE SODIUM 40 MG: 40 INJECTION, POWDER, FOR SOLUTION INTRAVENOUS at 08:18

## 2024-04-13 RX ADMIN — INSULIN ASPART 1 UNITS: 100 INJECTION, SOLUTION INTRAVENOUS; SUBCUTANEOUS at 11:02

## 2024-04-13 RX ADMIN — AMPICILLIN SODIUM AND SULBACTAM SODIUM 3 G: 2; 1 INJECTION, POWDER, FOR SOLUTION INTRAMUSCULAR; INTRAVENOUS at 21:39

## 2024-04-13 RX ADMIN — CLONIDINE HYDROCHLORIDE 0.1 MG: 0.1 TABLET ORAL at 21:35

## 2024-04-13 ASSESSMENT — ACTIVITIES OF DAILY LIVING (ADL)
ADLS_ACUITY_SCORE: 37

## 2024-04-13 NOTE — PROGRESS NOTES
[facility]    Medicine Progress Note - Medicine Service, [facility] TEAM 4    Date of Admission:  4/10/2024    Assessment & Plan      Jong Quiroga is a 44 year old male admitted on 4/10/2024. 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 He was recently admitted for choledocholithiasis with cholecystitis s/p ERCP with stone removal and stent placement 4/3, who presented with acute cholangitis with bacteremia and acute blood loss anemia with melena likely from resolved biliary sphincterotomy bleeding.     Today:  -Continue unasyn per ID recs 4/12. Awaiting final plan  - Restart home clonidine and amlodipine     Sepsis due to cholangitis and mixed gram positive and gram negative bacteremia  Biliary obstruction (see below) s/p ERCP   Did not meet sepsis criteria at admission, but the morning after admission developed fever to 103, tachycardia to 110s, leukocytosis to 17.7, and tachypnea to 22. Started on vancomycin and zosyn. At admission, LFTs overall downtrending from discharge 4/4, however alk phos increased. S/p ERCP (see below) with replacement of displaced stent. Blood culture grew strep anginosus and H flu.     ID consulted 4/12 and recommended stopping vanco and switchin zosyn to unasyn. Awaiting recs for longer term plan. Discussed 4/13 and they are awaiting more information.   - repeat cultures 4/14  - Trend LFTs   - -Repeat ERCP in 2-3 months for stent exchange     Microbiology  Blood cultures:  4/10: positive for strep anginosus and haemophilus influenzae in 2 of 2  4/12: NGTD  4/13: NGTD   MRSA nares: negative    Antimicrobials:   - Vancomycin 4/10 - 4/12  - Zosyn 4/10 - 4/12  - Unasyn  4/12 - present        Acute blood loss anemia  C/f UGI Bleed  Melena  Presented with multiple days of dark stool, had 2 weeks of constipation with black stool  after first BM 4/6, hard consistency, not convincingly melena. CTA abdomen without signs of GI bleed. Hepatic artery aneurysm stable, but causing mass effect of the biliary system and pancreatic head. Transfused 1 unit PRBCs on 4/11.    EGD/ERCP 4/11 negative for acute bleed, but suspected that bleeding was from sphincterotomy and had stopped prior to procedure.   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 He was evaluated by vascular surgery, IR, rhematology, and hepatology during hospitalization 1/4-1/7/2024. No suitable surgical / endovascular target. Extensive workup revealed negative Hep B, C, HIV, histoplasma, quant gold, treponemia, WHIT, ANCA, DsDNA, normal C3 and C4. However, Anti-smooth muscle antibody was positive at 1:160 titer and F-actin was mildly positive at 22. Pt was supposed to follow up with GI outpatient but was feeling well and declined.  -Vascular surgery asked to review CTA from 4/1/24, no significant changes or new recommendations. Still not endovascular candidate   -HOLD aspirin 81 mg once daily, per patient has been held since ERCP.     Hypotension, resolved   Hypertension  Admitted with systolic BP's low in the 90's with baseline BP's needing multiple anti-hypertensive to achieve normotension. Resolved with fluid resuscitation   -Restarted PTA metoprolol 50 mg twice daily 4/12  -Restarted PTA amlodipine 10 mg daily on 4/13  -Restarted PTA clonidine 0.1 mg twice daily on 4/13  -Hold PTA hydrochlorothiazide 25 mg daily    Hypokalemia, intermittent  Likely 2/2 malnutrition. Required  "intermittent supplementation during this hospitalization  - CTM, repleat for K<3.5     Anorexia  Severe Malnutrition  Patient reports 7-10 lbs weight loss in last week. Endorses weeks of inability to tolerate PO/poor appetite. Unclear why he is finding eating difficult, says he just can't. Denies N/V.   - Nutrition consulted     Type 2 diabetes mellitus  HbA1c on 2/14/2024 is 9.2. In light of poor PO intake, was doing sliding scale with Lantus at home for Bgs > 150. 4/1-4/4 hospitalization was on Lantus and sliding scale insulin and 20U glargine while inpatient. Home regimen recommended at last discharge 4/4:  glargine at a lower dose 30 U daily (previously 48U), 5U Lantus with meals.  - Restart Lantus 10u nightly  - MSSI  - Hypoglycemia protocol     Substance use history  - Formerly smoked 1-2 ppd. Reduced tobacco, still daily marijuana use. Denies alcohol over last month.        Diet: Snacks/Supplements Adult: Ensure Clear; With Meals  Regular Diet Adult    DVT Prophylaxis: Low Risk/Ambulatory with no VTE prophylaxis indicated  Tran Catheter: Not present  Lines: None     Cardiac Monitoring: None  Code Status: Full Code      Clinically Significant Risk Factors        # Hypokalemia: Lowest K = 3.1 mmol/L in last 2 days, will replace as needed       # Hypoalbuminemia: Lowest albumin = 2.9 g/dL at 4/11/2024  6:42 AM, will monitor as appropriate            # Obesity: Estimated body mass index is 31.21 kg/m  as calculated from the following:    Height as of this encounter: 1.626 m (5' 4\").    Weight as of this encounter: 82.5 kg (181 lb 12.8 oz)., PRESENT ON ADMISSION  # Severe Malnutrition: based on nutrition assessment, PRESENT ON ADMISSION          Disposition Plan     Medically Ready for Discharge: Anticipated in 2-4 Days             Jonathan Burger MD  Medicine Service, [facility] TEAM [address] [place] [address]  Securely message with [employer] (more info)  Text page via [employer] " Paging/Directory   See signed in provider for up to date coverage information  ______________________________________________________________________    Interval History   No acute events. Feels well this am. Denies pain. Tolerating diet. Still has some dark stools but they are improving. No fevers.     Physical Exam   Vital Signs: Temp: 97.8  F (36.6  C) Temp src: Oral BP: (!) 158/105 Pulse: 67   Resp: 18 SpO2: 100 % O2 Device: None (Room air)    Weight: 181 lbs 12.8 oz    Gen: Awake, alert, NAD  ENT: MMM  GI: Soft, NT, ND, NABS      Medical Decision Making       50 MINUTES SPENT BY ME on the date of service doing chart review, history, exam, documentation & further activities per the note.      Data

## 2024-04-13 NOTE — PLAN OF CARE
"Goal Outcome Evaluation:      Plan of Care Reviewed With: patient    Overall Patient Progress: no changeOverall Patient Progress: no change  BP (!) 158/113 (BP Location: Left arm, Patient Position: Sitting, Cuff Size: Adult Regular)   Pulse 77   Temp 97.2  F (36.2  C) (Oral)   Resp 20   Ht 1.626 m (5' 4\")   Wt 82.5 kg (181 lb 12.8 oz)   SpO2 100%   BMI 31.21 kg/m       Neuro: AOx4, pleasant and cooperative with cares  Cardiac: WDL  Respiratory: on RA  GI/: voiding adequaely, BM this shift, passing flatus   Diet/Appetite: Regular, c/o nausea, compazine given  Skin: no new changes  LDA: PIV-SL with intermittent abx  Activity: up ad doris  Pain: denies pain  Plan: abx, continue POC           "

## 2024-04-13 NOTE — PLAN OF CARE
Goal Outcome Evaluation:      Plan of Care Reviewed With: patient    Overall Patient Progress: improvingOverall Patient Progress: improving         A&O x4, on RA. Denies pain. Nausea intermittent ealry this shift managed with Compazine x1 with relief. Up independently. Voiding without difficulty, +BS, no BM this shift. Regular diet, tolerating. BG within parameters. No acute changes this shift. Continue with POC.

## 2024-04-14 VITALS
TEMPERATURE: 97.4 F | DIASTOLIC BLOOD PRESSURE: 95 MMHG | BODY MASS INDEX: 31.04 KG/M2 | RESPIRATION RATE: 18 BRPM | HEART RATE: 66 BPM | OXYGEN SATURATION: 100 % | SYSTOLIC BLOOD PRESSURE: 139 MMHG | HEIGHT: 64 IN | WEIGHT: 181.8 LBS

## 2024-04-14 LAB
ALBUMIN SERPL BCG-MCNC: 3.6 G/DL (ref 3.5–5.2)
ALP SERPL-CCNC: 393 U/L (ref 40–150)
ALT SERPL W P-5'-P-CCNC: 109 U/L (ref 0–70)
ANION GAP SERPL CALCULATED.3IONS-SCNC: 11 MMOL/L (ref 7–15)
AST SERPL W P-5'-P-CCNC: 37 U/L (ref 0–45)
BILIRUB SERPL-MCNC: 1.4 MG/DL
BUN SERPL-MCNC: 9.6 MG/DL (ref 6–20)
CALCIUM SERPL-MCNC: 8.7 MG/DL (ref 8.6–10)
CHLORIDE SERPL-SCNC: 103 MMOL/L (ref 98–107)
CREAT SERPL-MCNC: 0.83 MG/DL (ref 0.67–1.17)
DEPRECATED HCO3 PLAS-SCNC: 24 MMOL/L (ref 22–29)
EGFRCR SERPLBLD CKD-EPI 2021: >90 ML/MIN/1.73M2
ERYTHROCYTE [DISTWIDTH] IN BLOOD BY AUTOMATED COUNT: 16.1 % (ref 10–15)
GLUCOSE BLDC GLUCOMTR-MCNC: 113 MG/DL (ref 70–99)
GLUCOSE BLDC GLUCOMTR-MCNC: 99 MG/DL (ref 70–99)
GLUCOSE SERPL-MCNC: 101 MG/DL (ref 70–99)
HAV AB SER QL IA: REACTIVE
HCT VFR BLD AUTO: 30.1 % (ref 40–53)
HCV AB SERPL QL IA: NONREACTIVE
HGB BLD-MCNC: 9.8 G/DL (ref 13.3–17.7)
INR PPP: 1.12 (ref 0.85–1.15)
MCH RBC QN AUTO: 27.8 PG (ref 26.5–33)
MCHC RBC AUTO-ENTMCNC: 32.6 G/DL (ref 31.5–36.5)
MCV RBC AUTO: 85 FL (ref 78–100)
PLATELET # BLD AUTO: 499 10E3/UL (ref 150–450)
POTASSIUM SERPL-SCNC: 3.5 MMOL/L (ref 3.4–5.3)
PROT SERPL-MCNC: 6.7 G/DL (ref 6.4–8.3)
RBC # BLD AUTO: 3.53 10E6/UL (ref 4.4–5.9)
SODIUM SERPL-SCNC: 138 MMOL/L (ref 135–145)
WBC # BLD AUTO: 12 10E3/UL (ref 4–11)
ZINC SERPL-MCNC: 67.2 UG/DL

## 2024-04-14 PROCEDURE — 85610 PROTHROMBIN TIME: CPT | Performed by: INTERNAL MEDICINE

## 2024-04-14 PROCEDURE — 250N000013 HC RX MED GY IP 250 OP 250 PS 637: Performed by: INTERNAL MEDICINE

## 2024-04-14 PROCEDURE — 36415 COLL VENOUS BLD VENIPUNCTURE: CPT | Performed by: INTERNAL MEDICINE

## 2024-04-14 PROCEDURE — 250N000013 HC RX MED GY IP 250 OP 250 PS 637

## 2024-04-14 PROCEDURE — 87040 BLOOD CULTURE FOR BACTERIA: CPT | Performed by: INTERNAL MEDICINE

## 2024-04-14 PROCEDURE — 86803 HEPATITIS C AB TEST: CPT | Performed by: STUDENT IN AN ORGANIZED HEALTH CARE EDUCATION/TRAINING PROGRAM

## 2024-04-14 PROCEDURE — 250N000013 HC RX MED GY IP 250 OP 250 PS 637: Performed by: STUDENT IN AN ORGANIZED HEALTH CARE EDUCATION/TRAINING PROGRAM

## 2024-04-14 PROCEDURE — 86708 HEPATITIS A ANTIBODY: CPT | Performed by: STUDENT IN AN ORGANIZED HEALTH CARE EDUCATION/TRAINING PROGRAM

## 2024-04-14 PROCEDURE — 99239 HOSP IP/OBS DSCHRG MGMT >30: CPT | Mod: GC | Performed by: INTERNAL MEDICINE

## 2024-04-14 PROCEDURE — 85027 COMPLETE CBC AUTOMATED: CPT | Performed by: INTERNAL MEDICINE

## 2024-04-14 PROCEDURE — 80053 COMPREHEN METABOLIC PANEL: CPT | Performed by: INTERNAL MEDICINE

## 2024-04-14 PROCEDURE — 250N000011 HC RX IP 250 OP 636: Performed by: INTERNAL MEDICINE

## 2024-04-14 PROCEDURE — 99232 SBSQ HOSP IP/OBS MODERATE 35: CPT | Performed by: STUDENT IN AN ORGANIZED HEALTH CARE EDUCATION/TRAINING PROGRAM

## 2024-04-14 RX ORDER — PANTOPRAZOLE SODIUM 40 MG/1
40 TABLET, DELAYED RELEASE ORAL
Qty: 45 TABLET | Refills: 0 | Status: ON HOLD | OUTPATIENT
Start: 2024-04-15 | End: 2024-05-13

## 2024-04-14 RX ORDER — PANTOPRAZOLE SODIUM 40 MG/1
40 TABLET, DELAYED RELEASE ORAL
Status: DISCONTINUED | OUTPATIENT
Start: 2024-04-14 | End: 2024-04-14 | Stop reason: HOSPADM

## 2024-04-14 RX ADMIN — AMLODIPINE BESYLATE 10 MG: 10 TABLET ORAL at 10:50

## 2024-04-14 RX ADMIN — AMPICILLIN SODIUM AND SULBACTAM SODIUM 3 G: 2; 1 INJECTION, POWDER, FOR SOLUTION INTRAMUSCULAR; INTRAVENOUS at 02:36

## 2024-04-14 RX ADMIN — AMPICILLIN SODIUM AND SULBACTAM SODIUM 3 G: 2; 1 INJECTION, POWDER, FOR SOLUTION INTRAMUSCULAR; INTRAVENOUS at 10:49

## 2024-04-14 RX ADMIN — CLONIDINE HYDROCHLORIDE 0.1 MG: 0.1 TABLET ORAL at 10:50

## 2024-04-14 RX ADMIN — PANTOPRAZOLE SODIUM 40 MG: 40 TABLET, DELAYED RELEASE ORAL at 10:49

## 2024-04-14 RX ADMIN — METOPROLOL TARTRATE 50 MG: 50 TABLET, FILM COATED ORAL at 10:50

## 2024-04-14 RX ADMIN — AMOXICILLIN AND CLAVULANATE POTASSIUM 1 TABLET: 875; 125 TABLET, FILM COATED ORAL at 12:49

## 2024-04-14 ASSESSMENT — ACTIVITIES OF DAILY LIVING (ADL)
ADLS_ACUITY_SCORE: 37

## 2024-04-14 NOTE — PROGRESS NOTES
GENERAL ID SERVICE: Progress Note    Patient:  Jong Quiroga, Date of birth 1980, Medical record number [id number]  Date of Admission: 4/10/2024  Date of Visit:  4/12/2024  Requesting Provider: Jonathan Burger         Assessment and Recommendations:     Strep anginosus, H influenzae bloodstream infection, possibly secondary to cholangitis, s/p ERCP stent exchange Apr 11, 2024  Both Amp S  History of biliary obstruction (external compression), s/p ERCP, biliary sphincterotomy, stone removal, stent placement Apr 3, 2024  Hepatic/celiac artery aneurysm, possibly secondary to segmental arterial mediolysis  T2DM  Nicotine use disorder     Jong Quiroga is a 44 year old male who presented with High-grade Strep anginosus, H influenzae bloodstream infection. It could be due to his biliary obstruction. S anginosus causes abscesses. If he has persistent bacteremia or has recurrence, would recommend CT of chest and head. Since both organisms are susceptible to ampicillin-sulbactam, would tailor the therapy. I would avoid ceftriaxone for now due to high alk phos.     I wonder what is the cause of the aneurysm. We would add additional ID workup if we think it is necessary to rule out infectious aneurysm.     Recommendations:  StopIV ampicillin-sulbactam 3g every 6 hours  Start oral amox/clav 875 TIID x 14 days total  Pt inquired about STI screening. No sx or exposure, just thought It'd be a good idea. Sending HIV, HCV Ab, HAV total Ab, gonorrhea and chlamydia PCR. Has fairly recently HBV testing not c/w infection or immunity. Pt states his PCP can access records here, he'll follow up with his PCP about these. If non immune to hepatitis A would vaccinate, needs hepatitis B vaccination as well but can hold off today and do HAV/HBV combo if also HAV non-immune.     Harrison Farley MD    Division of Infectious Diseases and International Medicine    D/w Dr. Burger       History of Present Illness:   44 year  "old man with a history hepatic/celiac artery aneurysm and recent dx of choledocholithiasis s/p ERCP on 4/3 with removal and stenting who presents back to hospital on 4/10 with fevers, night sweats, and melena found to have H. Influenza + S. Anginosus bacteremia from 4/10 blood cultures    He went for ERCP with stent exchange 4/11. The previous stent was found to have migrated downwards felt to be due to extrinsic compression of the hepatic artery aneurysm. No bleeding noted - felt to be biliary sphincterotomy bleeding. He had multiple biliary stents replaced.  Subsequently his haemophilus influenza and strep anginosus of both susceptible to ampicillin, the MARS for strep anginosus was 0.12.  He has been afebrile since the afternoon of 4/11 (about 72 hours now  Repeat blood cultures from 4/12 early a.m. are negative to date  WBC has come down from 17.7 on admission to 12.0 today      He does not have abdominal pain or diarrhea. No fevers. Feels well. Breathing fine. No n/v. No HA.     Antimicrobials:  Vanc + pip/tazo 4/10 to 4/12  Amp/sulbactam 4/12: Present             Physical Exam:   BP (!) 139/95 (BP Location: Left arm)   Pulse 66   Temp 97.4  F (36.3  C) (Oral)   Resp 18   Ht 1.626 m (5' 4\")   Wt 82.5 kg (181 lb 12.8 oz)   SpO2 100%   BMI 31.21 kg/m     Exam:  GENERAL:   in no acute distress.   ENT: no scleral icterus  LUNGS:  Clear to auscultation.  CARDIOVASCULAR:  Regular rate and rhythm with no murmurs  ABDOMEN:  Normal bowel sounds, soft, nontender.  EXT: Extremities warm and without edema         Laboratory Data:     Reviewed       Pertinent Recent Microbiology Data:     4/12 Bcx -negative to date  4/10 MRSA - neg   4/10 Bcx - 3/4 Streptococcus anginosus, Haemophilus influenzae   1/2024 syphilis - neg   12/2023 HIV - neg          Imaging:   Reviewed  "

## 2024-04-14 NOTE — PLAN OF CARE
"[id number]    /80 (BP Location: Left arm)   Pulse 67   Temp 97.4  F (36.3  C) (Oral)   Resp 18   Ht 1.626 m (5' 4\")   Wt 82.5 kg (181 lb 12.8 oz)   SpO2 99%   BMI 31.21 kg/m        Goal Outcome Evaluation:       Plan of Care Reviewed with: Patient    Overall Patient Progress: improving    PT noted to be comfortable on this shift, VSS, denies having pain and discomfort, denies N/V. Voiding spon, no bm on this shift, BS check done, sliding scale insulin administer. PT is on IV ABX, no adverse effects noted. No acute events noted.                 "

## 2024-04-14 NOTE — DISCHARGE SUMMARY
[facility]  Discharge Summary - Medicine & Pediatrics       Date of Admission:  4/10/2024  Date of Discharge:  4/14/2024  Discharging Provider: Dr. Rickie Burger  Discharge Service: Medicine Service, LUIS TEAM 4    Discharge Diagnoses   Sepsis due to cholangitis and mixed gram positive and gram negative bacteremia  Biliary obstruction s/p ERCP  Acute blood loss anemia  C/f UGI Bleed from Sphincterotomy   Melena  Segmental arterial mediolysis   H/O Complex celiac artery dissection  H/O Aneurysmal hepatic artery (stable)  Increased intrahepatic biliary duct dilatation likely secondary to aneurysmal hepatic arter  Hypotension, resolved   Hypertension  Hypokalemia, intermittent   Anorexia  Severe Malnutrition  Type 2 Diabetes  Substance use History         Follow-ups Needed After Discharge   Repeat CMP, CBC at next PCP appointment. PCP to address BGL control on adjusted insulin regimen  Vascular Surgery 5/10/24  Gastroenterology follow up ERCP 6/20/24    Unresulted Labs Ordered in the Past 30 Days of this Admission       Date and Time Order Name Status Description    4/14/2024 12:01 AM Blood Culture Arm, Left In process     4/14/2024 12:01 AM Blood Culture Arm, Right In process     4/13/2024 12:01 AM Blood Culture Arm, Right Preliminary     4/13/2024 12:01 AM Blood Culture Arm, Left Preliminary     4/12/2024  5:00 AM Blood Culture Hand, Left Preliminary     4/12/2024  5:00 AM Blood Culture Hand, Right Preliminary     4/10/2024 10:35 AM Blood Culture Arm, Right Preliminary     4/10/2024 10:35 AM Blood Culture Arm, Left Preliminary         These results will be followed up by hospital medicine service.     Discharge Disposition   Discharged to home  Condition at discharge: Improved    Hospital Course   Jong Quiroga is a 44 year old male admitted on 4/10/2024. 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 He was recently admitted for choledocholithiasis with cholecystitis s/p ERCP with stone removal and stent placement 4/3. He re-presented 4/10/2024 with acute cholangitis with bacteremia and acute blood loss anemia with melena likely from resolved biliary sphincterotomy bleeding.    Sepsis due to cholangitis and mixed gram positive and gram negative bacteremia  Biliary obstruction (see below) s/p ERCP   Did not meet sepsis criteria at admission, but the morning after admission developed fever to 103, tachycardia to 110s, leukocytosis to 17.7, and tachypnea to 22. Started on vancomycin and zosyn. At admission, LFTs overall downtrending from discharge 4/4, however alk phos increased. S/p ERCP (see below) with replacement of displaced stent. Blood culture grew strep anginosus and H flu.      ID consulted 4/12 and recommended stopping vanco and switchin zosyn to unasyn. This was then transitioned to Augmentin for plan for total 14 day treatment course from last negative Cx.    -Repeat ERCP in 2-3 months for stent exchange      Microbiology  Blood cultures:  4/10: positive for strep anginosus and haemophilus influenzae in 2 of 2  4/12: NGTD  4/13: NGTD   4/14: Pending  MRSA nares: negative     Antimicrobials:   - Vancomycin 4/10 - 4/12  - Zosyn 4/10 - 4/12  - Unasyn  4/12 - 4/14  - Augmentin 4/14- 4/24         Acute blood loss anemia  C/f UGI Bleed  Melena  Presented with multiple days of dark stool, had 2 weeks of constipation with black stool after first BM 4/6, hard consistency, not convincingly melena. CTA abdomen without signs of GI bleed. Hepatic artery aneurysm stable, but causing mass effect of the biliary system and pancreatic head. Transfused 1 unit PRBCs on 4/11.    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 He was evaluated by vascular surgery, IR, rhematology, and hepatology during hospitalization 1/4-1/7/2024. No suitable surgical / endovascular target. Extensive workup revealed negative Hep B, C, HIV, histoplasma, quant gold, treponemia, WHIT, ANCA, DsDNA, normal C3 and C4. However, Anti-smooth muscle antibody was positive at 1:160 titer and F-actin was mildly positive at 22. Pt was supposed to follow up with GI outpatient but was feeling well and declined.  -Vascular surgery asked to review CTA from 4/1/24, no significant changes or new recommendations. Still not endovascular candidate   -HOLD aspirin 81 mg daily  -Pt has vascular surgery follow up appointment 5/10/24     Hypotension, resolved   Hypertension  Admitted with systolic BP's low in the 90's with baseline BP's needing multiple anti-hypertensive to achieve normotension. Resolved with fluid resuscitation   -Cont PTA metoprolol 50 mg twice daily   -Cont PTA amlodipine 10 mg daily  -Cont PTA clonidine 0.1 mg twice daily  -Cont PTA hydrochlorothiazide 25 mg daily     Hypokalemia, intermittent  Likely 2/2 malnutrition. Required intermittent supplementation during this hospitalization  -CMP at PCP outpatient follow up     Anorexia  Severe Malnutrition  Patient reports 7-10 lbs weight loss in last several weeks. Endorses weeks of inability to tolerate PO/poor appetite. Unclear why he is finding eating difficult, says he just can't. Denies N/V. Nutrition consulted this hospitalization and recommended high protein diet.   -PCP to follow up if continued weight loss     Type 2 diabetes  mellitus  HbA1c on 2/14/2024 is 9.2. In light of poor PO intake, was doing mealtime coverage of 5 units aspart+sliding scale with 48 units Lantus each evening. Has needed lower and lower doses of short and long acting with each hospitalization. Was well controlled on 10 units lantus at night and only sliding scale coverage at mealtime. Recommend pt continue this outpatient with PCP to re-evaluate continued need for insulin vs other DM2 medications.   - Continue Lantus 10u nightly  - sliding scale insulin at mealtime       Substance use history  Formerly smoked 1-2 ppd. Reduced tobacco, still daily marijuana use. Denies alcohol over last month.  -Continue to encourage cessation outpatient.      Diet: Snacks/Supplements Adult: Ensure Clear; With Meals  Regular Diet Adult    DVT Prophylaxis: Low Risk/Ambulatory with no VTE prophylaxis indicated  Tran Catheter: Not present  Lines: None     Cardiac Monitoring: None  Code Status: Full Code      Consultations This Hospital Stay   PHARMACY TO DOSE VANCO  PHARMACY TO DOSE VANCO  NUTRITION SERVICES ADULT IP CONSULT  NURSING TO CONSULT FOR VASCULAR ACCESS CARE IP CONSULT  NURSING TO CONSULT FOR VASCULAR ACCESS CARE IP CONSULT  INFECTIOUS DISEASE GENERAL ADULT IP CONSULT  NURSING TO CONSULT FOR VASCULAR ACCESS CARE IP CONSULT  NURSING TO CONSULT FOR VASCULAR ACCESS CARE IP CONSULT    Code Status   Full Code       The patient was discussed with Dr. Tao Prajapati MD  [address] [address]  Phone: [phone]  ______________________________________________________________________    Physical Exam   Vital Signs: Temp: 97.4  F (36.3  C) Temp src: Oral BP: (!) 139/95 (Nurse notified.) Pulse: 66   Resp: 18 SpO2: 100 % O2 Device: None (Room air)    Weight: 181 lbs 12.8 oz  General Appearance:  Laying in bed. Comfortable appearing  Respiratory: Normal WOB on RA. CTAB bilaterally no crackles or  wheezes appreciated.  Cardiovascular: RRR, no murmurs appreciated.   GI: Soft, distended with Hepatomegaly. No tenderness to palpation of all four quadrants. No guarding or rebound tenderness.  Skin: No rashes on visualized skin.  Other:  A&Ox3, moving all extremities equally      Primary Care Physician   Storm Armenta    Discharge Orders   No discharge procedures on file.    Significant Results and Procedures   Most Recent 3 CBC's:  Recent Labs   Lab Test 04/14/24  0658 04/13/24  0651 04/12/24  0620   WBC 12.0* 12.9* 14.6*   HGB 9.8* 10.1* 9.7*   MCV 85 83 83   * 461* 380     Most Recent 3 BMP's:  Recent Labs   Lab Test 04/14/24  0658 04/14/24  0608 04/14/24  0236 04/13/24  1036 04/13/24  0651 04/12/24  0630 04/12/24  0620     --   --   --  136  --  133*   POTASSIUM 3.5  --   --   --  3.4  --  3.1*   CHLORIDE 103  --   --   --  101  --  98   CO2 24  --   --   --  24  --  23   BUN 9.6  --   --   --  10.8  --  15.3   CR 0.83  --   --   --  0.79  --  0.83   ANIONGAP 11  --   --   --  11  --  12   LUIS 8.7  --   --   --  8.8  --  8.8   * 99 113*   < > 125*   < > 169*    < > = values in this interval not displayed.     Most Recent 2 LFT's:  Recent Labs   Lab Test 04/14/24 0658 04/13/24  0651   AST 37 44   * 139*   ALKPHOS 393* 491*   BILITOTAL 1.4* 1.6*     Most Recent 3 INR's:  Recent Labs   Lab Test 04/14/24  0658 04/13/24  0651 04/12/24  0620   INR 1.12 1.11 1.14     7-Day Micro Results       Collected Updated Procedure Result Status      04/14/2024 0700 04/14/2024 0715 Blood Culture Arm, Left [[id number]]   Blood from Arm, Left    In process Component Value   No component results               04/14/2024 0658 04/14/2024 0714 Blood Culture Arm, Right [[id number]]   Blood from Arm, Right    In process Component Value   No component results               04/13/2024 0654 04/14/2024 0848 Blood Culture Arm, Left [[id number]]   Blood from Arm, Left    Preliminary result Component Value    Culture No growth after 1 day  [P]                04/13/2024 0651 04/14/2024 0848 Blood Culture Arm, Right [[id number]]   Blood from Arm, Right    Preliminary result Component Value   Culture No growth after 1 day  [P]                04/12/2024 0630 04/14/2024 0702 Blood Culture Hand, Right [[id number]]   Blood from Hand, Right    Preliminary result Component Value   Culture No growth after 2 days  [P]                04/12/2024 0620 04/14/2024 0702 Blood Culture Hand, Left [[id number]]   Blood from Hand, Left    Preliminary result Component Value   Culture No growth after 2 days  [P]                04/10/2024 1809 04/10/2024 1950 MRSA MSSA PCR, Nasal Swab [[id number]]    Swab from Nares, Bilateral    Final result Component Value   MRSA Target DNA Negative   SA Target DNA Negative            04/10/2024 1059 04/13/2024 0819 Blood Culture Arm, Left [[id number]]   (Abnormal)   Blood from Arm, Left    Preliminary result Component Value   Culture Positive on the 1st day of incubation  [P]     Streptococcus anginosus  [P]     2 of 2 bottles  Susceptibilities done on previous cultures    Haemophilus influenzae not type B  [P]     1 of 2 bottles  Susceptibilities done on previous cultures  Sent to [place] [facility] for serotyping, report to follow.               04/10/2024 1059 04/14/2024 0751 Blood Culture Arm, Right [[id number]]    (Abnormal)   Blood from Arm, Right    Preliminary result Component Value   Culture Positive on the 1st day of incubation  [P]     Haemophilus influenzae not type B  [P]     1 of 2 bottles  Sent to [place] [facility] for serotyping, report to follow.    Streptococcus anginosus  [P]     2 of 2 bottles        Susceptibility        Haemophilus influenzae not type B      MARS (Preliminary)      Ampicillin 0.50 ug/mL Susceptible      Ceftriaxone <=0.016 ug/mL Susceptible      Levofloxacin 0.032 ug/mL Susceptible      Meropenem 0.125 ug/mL Susceptible                       Susceptibility        Streptococcus anginosus      MARS      Ampicillin 0.12 ug/mL Susceptible      Cefotaxime <=0.25 ug/mL Susceptible      Ceftriaxone <=0.25 ug/mL Susceptible      Clindamycin <=0.06 ug/mL Susceptible      Meropenem <=0.06 ug/mL Susceptible      Penicillin 0.06 ug/mL Susceptible      Vancomycin 0.5 ug/mL Susceptible                           04/10/2024 1059 04/11/2024 0740 Verigene GP Panel [[id number]]    (Abnormal)   Blood from Arm, Left    Final result Component Value   Staphylococcus species Not Detected   Staphylococcus aureus Not Detected   Staphylococcus epidermidis Not Detected   Staphylococcus lugdunensis Not Detected   Enterococcus faecalis Not Detected   Enterococcus faecium Not Detected   Streptococcus agalactiae Not Detected   Streptococcus anginosus group Detected   Positive for Streptococcus anginosus group by Stand In multiplex nucleic acid test. Final identification and antimicrobial susceptibility testing will be verified by standard methods.   Streptococcus pneumoniae Not Detected   Streptococcus pyogenes Not Detected   Listeria species Not Detected            04/10/2024 1059 04/11/2024 0709 Verigene GN Panel [[id number]]    Blood from Arm, Left    Final result Component Value   Acinetobacter species Not Detected   Citrobacter species Not Detected   Enterobacter species Not Detected   Proteus species Not Detected   Escherichia coli Not Detected   Klebsiella pneumoniae Not Detected   Klebsiella oxytoca Not Detected   Pseudomonas aeruginosa Not Detected   CTX-M NA   KPC NA   NDM NA   VIM NA   IMP NA   OXA NA                ,   Results for orders placed or performed during the hospital encounter of 04/10/24   CTA Abdomen Pelvis with Contrast    Narrative    EXAMINATION: CTA ABDOMEN PELVIS WITH CONTRAST, 4/10/2024 1:26 PM    INDICATION: procedure, GI bleed    COMPARISON STUDY: CTA 4/1/2024    TECHNIQUE: CT scan of the abdomen and pelvis was performed on  multidetector CT  scanner using volumetric acquisition technique, and  images were reconstructed in multiple planes with variable thickness  and reviewed on dedicated workstations. CT scan radiation dose is  optimized to minimum requisite dose using automated dose modulation  techniques. Patient was scanned in noncontrast, late arterial, and  portal venous phase.    CONTRAST: 87 mL Isovue-370.     FINDINGS:  Gastrointestinal tract: There is diffuse hyperattenuation of the  intraluminal border of the entire colon likely representing residual  contrast from the ERCP dated 4/3/2024. No increased intraluminal  attenuation is seen on the arterial and delayed phases. Normal caliber  small and large bowel. Colonic diverticulosis. Unremarkable appendix.       Lower Chest:   Dependent atelectasis.     Abdomen and Pelvis:  Liver: Liver measures 25 cm in the midclavicular line, similar to  abdominal MR 4/2/2024. There is a focal peripheral hypodensity within  segment 5/8 not seen on 4/1/2024 CTA (series 10, image 93).    Biliary System: There is hyperattenuating material within the  gallbladder that are similar in all phases that was not seen on the  4/1/2024 CTA, likely from the ERCP 4/3/2024. New common bile duct  stent with distal tip in the second/third part of the duodenum.  Relative to the 4/1/2024 CTA, there is similar to slightly increased  intrahepatic biliary dilatation. As seen on prior MRI there is abrupt  termination of the common bile duct at the level of the common hepatic  artery aneurysm (series 16, image 113). The common bile duct stent in  slightly caudal to the level of the obstruction.    Pancreas: The hepatic artery aneurysm causes mass effect on the  pancreatic head.    Adrenal glands: No mass or nodules    Spleen: Normal.    Kidneys/Ureters/Bladder: Lobulated renal surface. No hydronephrosis.  Distended bladder.    Mesentery/peritoneum/retroperitoneum: No mass. No free fluid or air.    Lymph nodes: No significant  lymphadenopathy.    Vasculature: Dilated common hepatic, left hepatic, and right hepatic  arteries with greatest diameter of a left hepatic artery branch  measuring 1.6 cm, stable.    Pelvis: Pelvic phleboliths.    Osseous structures: No aggressive or acute osseous lesion.      Soft tissues: Within normal limits.        Impression    IMPRESSION:   1.  No convincing evidence of acute GI bleed.  2.  Interval placement of a biliary catheter with persistent/slightly  increased intrahepatic biliary dilatation. The common bile duct  obstruction is at the level of the common hepatic artery aneurysm and  the biliary stent is slightly caudal to the level of obstruction.   3.  New rounded hepatic hypodensity not seen on CTA 4/1/2024. This  could represent biloma from possible biliary ischemia versus focal  cystic intrahepatic biliary dilatation.  4.  Stable hepatic artery aneurysm causing mass effect of the biliary  system and pancreatic head.  5.  Hepatomegaly.    I have personally reviewed the examination and initial interpretation  and I agree with the findings.    JORDANA GARCIA MD         SYSTEM ID:  [id number]   XR Surgery GIAN G/T 5 Min Fluoro w Stills    Narrative    This exam was marked as non-reportable because it will not be read by a   radiologist or a [facility] non-radiologist provider.             Discharge Medications   Current Discharge Medication List        CONTINUE these medications which have NOT CHANGED    Details   amLODIPine (NORVASC) 10 MG tablet Take 1 tablet (10 mg) by mouth daily  Qty: 30 tablet, Refills: 0    Associated Diagnoses: Hepatic artery dissection (H24); Essential hypertension      cloNIDine (CATAPRES) 0.1 MG tablet Take 1 tablet (0.1 mg) by mouth 2 times daily  Qty: 60 tablet, Refills: 0    Associated Diagnoses: Hepatic artery dissection (H24); Essential hypertension      hydrochlorothiazide (HYDRODIURIL) 25 MG tablet Take 1 tablet (25 mg) by mouth daily  Qty: 30 tablet, Refills: 0     Associated Diagnoses: Hepatic artery dissection (H24); Essential hypertension      !! insulin aspart (NOVOLOG PEN) 100 UNIT/ML pen Inject 5 Units Subcutaneous 3 times daily (with meals)  Qty: 15 mL, Refills: 0    Associated Diagnoses: Type 2 diabetes mellitus with hyperglycemia, unspecified whether long term insulin use (H)      insulin glargine (LANTUS PEN) 100 UNIT/ML pen Inject 30 Units Subcutaneous at bedtime    Comments: If Lantus is not covered by insurance, may substitute Basaglar or Semglee or other insulin glargine product per insurance preference at same dose and frequency.    Associated Diagnoses: Type 2 diabetes mellitus with hyperglycemia, unspecified whether long term insulin use (H)      metoprolol tartrate (LOPRESSOR) 50 MG tablet Take 1 tablet (50 mg) by mouth 2 times daily  Qty: 60 tablet, Refills: 0    Associated Diagnoses: Hepatic artery dissection (H24); Essential hypertension      prochlorperazine (COMPAZINE) 10 MG tablet Take 1 tablet (10 mg) by mouth every 6 hours as needed for nausea or vomiting  Qty: 15 tablet, Refills: 0    Associated Diagnoses: Nausea and vomiting, unspecified vomiting type      BD LINO U/F 32G X 4 MM insulin pen needle       blood glucose monitoring (SOFTCLIX) lancets       Blood Glucose Monitoring Suppl (ACCU-CHEK GUIDE ME) w/Device KIT       !! insulin aspart (NOVOLOG PEN) 100 UNIT/ML pen Inject 0-12 Units Subcutaneous 3 times daily (before meals) Blood Glucose             Dose  < 70                               see hypoglycemia protocol                             no dose, give prandial insulin  150-199                         2 units  200-249                         4 units  250-299                         6 units  300-349                         8 units  350-399                         10 units  400 and greater             12 units and call MD MORALES TEST STRIPS test strip Dispense item covered by pt ins. E11.65 NIDDM type II, uncontrolled - Test 4  times/day. Reason: High A1C       !! - Potential duplicate medications found. Please discuss with provider.        Allergies   No Known Allergies

## 2024-04-15 NOTE — PLAN OF CARE
Goal Outcome Evaluation:  Discharge  A&Ox4. Discharge instruction is revised with patient and discharge paper work given to pt.Pt stated understanding of discharge instruction. PIV removed, tip of catheter was intact.  Pt to continue care per discharge instruction.

## 2024-04-17 LAB
BACTERIA BLD CULT: NO GROWTH
BACTERIA BLD CULT: NO GROWTH

## 2024-04-17 NOTE — RESULT ENCOUNTER NOTE
Result(s) reviewed by SOC triage MD. Blood cultures from 4/14/2024 and 4/13/2024 are NGTD. No further action needed at this time. Blood cultures from 4/12/2024 are no growth (FINAL). No further action needed at this time.

## 2024-04-18 LAB
BACTERIA BLD CULT: ABNORMAL
BACTERIA BLD CULT: NO GROWTH
BACTERIA BLD CULT: NO GROWTH

## 2024-04-19 LAB
BACTERIA BLD CULT: ABNORMAL
BACTERIA BLD CULT: NO GROWTH
BACTERIA BLD CULT: NO GROWTH

## 2024-04-20 NOTE — RESULT ENCOUNTER NOTE
Result(s) reviewed by SOC triage MD. Blood culture from 4/10/2024 growing Strep anginosus and H influenzae. Per discharge summary, this was known at discharge, and patient was discharged on augmentin to complete 14 day course of abx per the recommendations from ID. Susceptibilities show that augmentin should be adequate coverage. Blood cultures from 4/14/2024 are no growth (FINAL). No further action needed for any of these results at this time.

## 2024-04-25 ENCOUNTER — TRANSCRIBE ORDERS (OUTPATIENT)
Dept: OTHER | Age: 44
End: 2024-04-25

## 2024-04-25 DIAGNOSIS — E11.9 TYPE 2 DIABETES MELLITUS WITHOUT COMPLICATION, WITH LONG TERM CURRENT USE OF INSULIN PUMP (H): Primary | ICD-10-CM

## 2024-04-25 DIAGNOSIS — Z96.41 TYPE 2 DIABETES MELLITUS WITHOUT COMPLICATION, WITH LONG TERM CURRENT USE OF INSULIN PUMP (H): Primary | ICD-10-CM

## 2024-05-08 ENCOUNTER — APPOINTMENT (OUTPATIENT)
Dept: GENERAL RADIOLOGY | Facility: CLINIC | Age: 44
End: 2024-05-08
Attending: INTERNAL MEDICINE
Payer: COMMERCIAL

## 2024-05-08 ENCOUNTER — HOSPITAL ENCOUNTER (INPATIENT)
Facility: CLINIC | Age: 44
LOS: 6 days | Discharge: HOME | End: 2024-05-14
Attending: EMERGENCY MEDICINE | Admitting: INTERNAL MEDICINE
Payer: COMMERCIAL

## 2024-05-08 ENCOUNTER — ANESTHESIA EVENT (OUTPATIENT)
Dept: SURGERY | Facility: CLINIC | Age: 44
End: 2024-05-08
Payer: COMMERCIAL

## 2024-05-08 ENCOUNTER — ANESTHESIA (OUTPATIENT)
Dept: SURGERY | Facility: CLINIC | Age: 44
End: 2024-05-08
Payer: COMMERCIAL

## 2024-05-08 ENCOUNTER — APPOINTMENT (OUTPATIENT)
Dept: CT IMAGING | Facility: CLINIC | Age: 44
End: 2024-05-08
Attending: STUDENT IN AN ORGANIZED HEALTH CARE EDUCATION/TRAINING PROGRAM
Payer: COMMERCIAL

## 2024-05-08 DIAGNOSIS — A41.9 SEPSIS, DUE TO UNSPECIFIED ORGANISM, UNSPECIFIED WHETHER ACUTE ORGAN DYSFUNCTION PRESENT (H): ICD-10-CM

## 2024-05-08 DIAGNOSIS — E87.20 LACTIC ACIDOSIS: ICD-10-CM

## 2024-05-08 DIAGNOSIS — K92.1 MELENA: ICD-10-CM

## 2024-05-08 DIAGNOSIS — R00.0 SINUS TACHYCARDIA: Primary | ICD-10-CM

## 2024-05-08 DIAGNOSIS — K92.2 UPPER GI BLEED: ICD-10-CM

## 2024-05-08 DIAGNOSIS — K92.0 HEMATEMESIS WITH NAUSEA: ICD-10-CM

## 2024-05-08 LAB
ABO/RH(D): NORMAL
ACINETOBACTER SPECIES: NOT DETECTED
ALBUMIN SERPL BCG-MCNC: 3.7 G/DL (ref 3.5–5.2)
ALBUMIN SERPL BCG-MCNC: 4.3 G/DL (ref 3.5–5.2)
ALBUMIN UR-MCNC: 20 MG/DL
ALP SERPL-CCNC: 509 U/L (ref 40–150)
ALP SERPL-CCNC: 612 U/L (ref 40–150)
ALT SERPL W P-5'-P-CCNC: 257 U/L (ref 0–70)
ALT SERPL W P-5'-P-CCNC: 294 U/L (ref 0–70)
ANION GAP SERPL CALCULATED.3IONS-SCNC: 12 MMOL/L (ref 7–15)
ANION GAP SERPL CALCULATED.3IONS-SCNC: 13 MMOL/L (ref 7–15)
ANTIBODY SCREEN: NEGATIVE
APPEARANCE UR: CLEAR
AST SERPL W P-5'-P-CCNC: 240 U/L (ref 0–45)
AST SERPL W P-5'-P-CCNC: 327 U/L (ref 0–45)
ATRIAL RATE - MUSE: 130 BPM
BASE EXCESS BLDV CALC-SCNC: 2 MMOL/L (ref -3–3)
BASOPHILS # BLD AUTO: 0 10E3/UL (ref 0–0.2)
BASOPHILS NFR BLD AUTO: 0 %
BILIRUB SERPL-MCNC: 3.4 MG/DL
BILIRUB SERPL-MCNC: 3.5 MG/DL
BILIRUB UR QL STRIP: NEGATIVE
BLD PROD TYP BPU: NORMAL
BLD PROD TYP BPU: NORMAL
BLOOD COMPONENT TYPE: NORMAL
BLOOD COMPONENT TYPE: NORMAL
BUN SERPL-MCNC: 20.7 MG/DL (ref 6–20)
BUN SERPL-MCNC: 27.8 MG/DL (ref 6–20)
CA-I BLD-MCNC: 4.5 MG/DL (ref 4.4–5.2)
CALCIUM SERPL-MCNC: 8.3 MG/DL (ref 8.6–10)
CALCIUM SERPL-MCNC: 9.5 MG/DL (ref 8.6–10)
CHLORIDE SERPL-SCNC: 103 MMOL/L (ref 98–107)
CHLORIDE SERPL-SCNC: 106 MMOL/L (ref 98–107)
CITROBACTER SPECIES: NOT DETECTED
CODING SYSTEM: NORMAL
CODING SYSTEM: NORMAL
COLOR UR AUTO: YELLOW
CPB POCT: NO
CREAT SERPL-MCNC: 1.01 MG/DL (ref 0.67–1.17)
CREAT SERPL-MCNC: 1.07 MG/DL (ref 0.67–1.17)
CROSSMATCH: NORMAL
CROSSMATCH: NORMAL
CRP SERPL-MCNC: 110 MG/L
CTX-M: NOT DETECTED
DEPRECATED HCO3 PLAS-SCNC: 22 MMOL/L (ref 22–29)
DEPRECATED HCO3 PLAS-SCNC: 26 MMOL/L (ref 22–29)
DIASTOLIC BLOOD PRESSURE - MUSE: NORMAL MMHG
EGFRCR SERPLBLD CKD-EPI 2021: 88 ML/MIN/1.73M2
EGFRCR SERPLBLD CKD-EPI 2021: >90 ML/MIN/1.73M2
ENTEROBACTER SPECIES: DETECTED
ENTEROCOCCUS FAECALIS: NOT DETECTED
ENTEROCOCCUS FAECIUM: NOT DETECTED
EOSINOPHIL # BLD AUTO: 0 10E3/UL (ref 0–0.7)
EOSINOPHIL NFR BLD AUTO: 0 %
ERCP: NORMAL
ERYTHROCYTE [DISTWIDTH] IN BLOOD BY AUTOMATED COUNT: 15.7 % (ref 10–15)
ERYTHROCYTE [DISTWIDTH] IN BLOOD BY AUTOMATED COUNT: 16.5 % (ref 10–15)
ESCHERICHIA COLI: NOT DETECTED
GLUCOSE BLD-MCNC: 239 MG/DL (ref 70–99)
GLUCOSE BLDC GLUCOMTR-MCNC: 164 MG/DL (ref 70–99)
GLUCOSE BLDC GLUCOMTR-MCNC: 170 MG/DL (ref 70–99)
GLUCOSE BLDC GLUCOMTR-MCNC: 175 MG/DL (ref 70–99)
GLUCOSE SERPL-MCNC: 185 MG/DL (ref 70–99)
GLUCOSE SERPL-MCNC: 222 MG/DL (ref 70–99)
GLUCOSE UR STRIP-MCNC: NEGATIVE MG/DL
HCO3 BLDV-SCNC: 24 MMOL/L (ref 21–28)
HCT VFR BLD AUTO: 25.7 % (ref 40–53)
HCT VFR BLD AUTO: 35.8 % (ref 40–53)
HCT VFR BLD CALC: 37 % (ref 40–53)
HGB BLD-MCNC: 11.7 G/DL (ref 13.3–17.7)
HGB BLD-MCNC: 12.6 G/DL (ref 13.3–17.7)
HGB BLD-MCNC: 8.8 G/DL (ref 13.3–17.7)
HGB UR QL STRIP: NEGATIVE
HYALINE CASTS: 5 /LPF
IMM GRANULOCYTES # BLD: 0.2 10E3/UL
IMM GRANULOCYTES NFR BLD: 1 %
IMP: NOT DETECTED
INR PPP: 1.24 (ref 0.85–1.15)
INR PPP: 1.24 (ref 0.85–1.15)
INTERPRETATION ECG - MUSE: NORMAL
ISSUE DATE AND TIME: NORMAL
ISSUE DATE AND TIME: NORMAL
KETONES UR STRIP-MCNC: ABNORMAL MG/DL
KLEBSIELLA OXYTOCA: NOT DETECTED
KLEBSIELLA PNEUMONIAE: NOT DETECTED
KPC: NOT DETECTED
LACTATE SERPL-SCNC: 1.7 MMOL/L (ref 0.7–2)
LACTATE SERPL-SCNC: 4.1 MMOL/L (ref 0.7–2)
LEUKOCYTE ESTERASE UR QL STRIP: NEGATIVE
LISTERIA SPECIES (DETECTED/NOT DETECTED): NOT DETECTED
LYMPHOCYTES # BLD AUTO: 0.8 10E3/UL (ref 0.8–5.3)
LYMPHOCYTES NFR BLD AUTO: 5 %
MCH RBC QN AUTO: 27.9 PG (ref 26.5–33)
MCH RBC QN AUTO: 28.9 PG (ref 26.5–33)
MCHC RBC AUTO-ENTMCNC: 32.7 G/DL (ref 31.5–36.5)
MCHC RBC AUTO-ENTMCNC: 34.2 G/DL (ref 31.5–36.5)
MCV RBC AUTO: 82 FL (ref 78–100)
MCV RBC AUTO: 88 FL (ref 78–100)
MONOCYTES # BLD AUTO: 1 10E3/UL (ref 0–1.3)
MONOCYTES NFR BLD AUTO: 6 %
MRSA DNA SPEC QL NAA+PROBE: NEGATIVE
MUCOUS THREADS #/AREA URNS LPF: PRESENT /LPF
NDM: NOT DETECTED
NEUTROPHILS # BLD AUTO: 15.3 10E3/UL (ref 1.6–8.3)
NEUTROPHILS NFR BLD AUTO: 88 %
NITRATE UR QL: NEGATIVE
NRBC # BLD AUTO: 0 10E3/UL
NRBC BLD AUTO-RTO: 0 /100
OXA (DETECTED/NOT DETECTED): NOT DETECTED
P AXIS - MUSE: 76 DEGREES
PCO2 BLDV: 28 MM HG (ref 40–50)
PH BLDV: 7.54 [PH] (ref 7.32–7.43)
PH UR STRIP: 5.5 [PH] (ref 5–7)
PLATELET # BLD AUTO: 312 10E3/UL (ref 150–450)
PLATELET # BLD AUTO: 345 10E3/UL (ref 150–450)
PO2 BLDV: 48 MM HG (ref 25–47)
POTASSIUM BLD-SCNC: 3.8 MMOL/L (ref 3.4–5.3)
POTASSIUM SERPL-SCNC: 3.2 MMOL/L (ref 3.4–5.3)
POTASSIUM SERPL-SCNC: 3.3 MMOL/L (ref 3.4–5.3)
PR INTERVAL - MUSE: 126 MS
PROCALCITONIN SERPL IA-MCNC: 3.75 NG/ML
PROT SERPL-MCNC: 6.7 G/DL (ref 6.4–8.3)
PROT SERPL-MCNC: 7.9 G/DL (ref 6.4–8.3)
PROTEUS SPECIES: NOT DETECTED
PSEUDOMONAS AERUGINOSA: NOT DETECTED
QRS DURATION - MUSE: 78 MS
QT - MUSE: 334 MS
QTC - MUSE: 491 MS
R AXIS - MUSE: -15 DEGREES
RBC # BLD AUTO: 3.15 10E6/UL (ref 4.4–5.9)
RBC # BLD AUTO: 4.05 10E6/UL (ref 4.4–5.9)
RBC URINE: <1 /HPF
SA TARGET DNA: NEGATIVE
SAO2 % BLDV: 89 % (ref 70–75)
SODIUM BLD-SCNC: 140 MMOL/L (ref 135–145)
SODIUM SERPL-SCNC: 141 MMOL/L (ref 135–145)
SODIUM SERPL-SCNC: 141 MMOL/L (ref 135–145)
SP GR UR STRIP: 1.03 (ref 1–1.03)
SPECIMEN EXPIRATION DATE: NORMAL
STAPHYLOCOCCUS AUREUS: NOT DETECTED
STAPHYLOCOCCUS EPIDERMIDIS: NOT DETECTED
STAPHYLOCOCCUS LUGDUNENSIS: NOT DETECTED
STAPHYLOCOCCUS SPECIES: NOT DETECTED
STREPTOCOCCUS AGALACTIAE: NOT DETECTED
STREPTOCOCCUS ANGINOSUS GROUP: NOT DETECTED
STREPTOCOCCUS PNEUMONIAE: NOT DETECTED
STREPTOCOCCUS PYOGENES: NOT DETECTED
STREPTOCOCCUS SPECIES: DETECTED
SYSTOLIC BLOOD PRESSURE - MUSE: NORMAL MMHG
T AXIS - MUSE: 80 DEGREES
TROPONIN T SERPL HS-MCNC: 20 NG/L
UNIT ABO/RH: NORMAL
UNIT ABO/RH: NORMAL
UNIT NUMBER: NORMAL
UNIT NUMBER: NORMAL
UNIT STATUS: NORMAL
UNIT STATUS: NORMAL
UNIT TYPE ISBT: 5100
UNIT TYPE ISBT: 5100
UROBILINOGEN UR STRIP-MCNC: NORMAL MG/DL
VENTRICULAR RATE- MUSE: 130 BPM
VIM: NOT DETECTED
WBC # BLD AUTO: 13 10E3/UL (ref 4–11)
WBC # BLD AUTO: 17.3 10E3/UL (ref 4–11)
WBC URINE: 5 /HPF

## 2024-05-08 PROCEDURE — 83605 ASSAY OF LACTIC ACID: CPT

## 2024-05-08 PROCEDURE — P9016 RBC LEUKOCYTES REDUCED: HCPCS

## 2024-05-08 PROCEDURE — 258N000003 HC RX IP 258 OP 636: Performed by: EMERGENCY MEDICINE

## 2024-05-08 PROCEDURE — 81001 URINALYSIS AUTO W/SCOPE: CPT

## 2024-05-08 PROCEDURE — 87149 DNA/RNA DIRECT PROBE: CPT | Performed by: EMERGENCY MEDICINE

## 2024-05-08 PROCEDURE — 99291 CRITICAL CARE FIRST HOUR: CPT | Mod: 25 | Performed by: EMERGENCY MEDICINE

## 2024-05-08 PROCEDURE — 250N000009 HC RX 250: Performed by: INTERNAL MEDICINE

## 2024-05-08 PROCEDURE — 86923 COMPATIBILITY TEST ELECTRIC: CPT

## 2024-05-08 PROCEDURE — 250N000011 HC RX IP 250 OP 636

## 2024-05-08 PROCEDURE — 85027 COMPLETE CBC AUTOMATED: CPT

## 2024-05-08 PROCEDURE — 86900 BLOOD TYPING SEROLOGIC ABO: CPT | Performed by: EMERGENCY MEDICINE

## 2024-05-08 PROCEDURE — C1874 STENT, COATED/COV W/DEL SYS: HCPCS | Performed by: INTERNAL MEDICINE

## 2024-05-08 PROCEDURE — 43260 ERCP W/SPECIMEN COLLECTION: CPT | Performed by: ANESTHESIOLOGY

## 2024-05-08 PROCEDURE — 93005 ELECTROCARDIOGRAM TRACING: CPT | Performed by: EMERGENCY MEDICINE

## 2024-05-08 PROCEDURE — 255N000002 HC RX 255 OP 636: Performed by: INTERNAL MEDICINE

## 2024-05-08 PROCEDURE — 84460 ALANINE AMINO (ALT) (SGPT): CPT | Performed by: INTERNAL MEDICINE

## 2024-05-08 PROCEDURE — 360N000083 HC SURGERY LEVEL 3 W/ FLUORO, PER MIN: Performed by: INTERNAL MEDICINE

## 2024-05-08 PROCEDURE — C1726 CATH, BAL DIL, NON-VASCULAR: HCPCS | Performed by: INTERNAL MEDICINE

## 2024-05-08 PROCEDURE — 370N000017 HC ANESTHESIA TECHNICAL FEE, PER MIN: Performed by: INTERNAL MEDICINE

## 2024-05-08 PROCEDURE — 84145 PROCALCITONIN (PCT): CPT

## 2024-05-08 PROCEDURE — 87641 MR-STAPH DNA AMP PROBE: CPT

## 2024-05-08 PROCEDURE — 250N000025 HC SEVOFLURANE, PER MIN: Performed by: INTERNAL MEDICINE

## 2024-05-08 PROCEDURE — 36415 COLL VENOUS BLD VENIPUNCTURE: CPT

## 2024-05-08 PROCEDURE — 250N000011 HC RX IP 250 OP 636: Performed by: INTERNAL MEDICINE

## 2024-05-08 PROCEDURE — 85610 PROTHROMBIN TIME: CPT | Performed by: EMERGENCY MEDICINE

## 2024-05-08 PROCEDURE — 85025 COMPLETE CBC W/AUTO DIFF WBC: CPT | Performed by: EMERGENCY MEDICINE

## 2024-05-08 PROCEDURE — 84155 ASSAY OF PROTEIN SERUM: CPT | Performed by: INTERNAL MEDICINE

## 2024-05-08 PROCEDURE — 87086 URINE CULTURE/COLONY COUNT: CPT

## 2024-05-08 PROCEDURE — 710N000010 HC RECOVERY PHASE 1, LEVEL 2, PER MIN: Performed by: INTERNAL MEDICINE

## 2024-05-08 PROCEDURE — C9113 INJ PANTOPRAZOLE SODIUM, VIA: HCPCS | Performed by: EMERGENCY MEDICINE

## 2024-05-08 PROCEDURE — 120N000002 HC R&B MED SURG/OB UMMC

## 2024-05-08 PROCEDURE — 250N000011 HC RX IP 250 OP 636: Performed by: EMERGENCY MEDICINE

## 2024-05-08 PROCEDURE — 99223 1ST HOSP IP/OBS HIGH 75: CPT | Mod: GC | Performed by: INTERNAL MEDICINE

## 2024-05-08 PROCEDURE — 250N000009 HC RX 250

## 2024-05-08 PROCEDURE — C9113 INJ PANTOPRAZOLE SODIUM, VIA: HCPCS | Performed by: INTERNAL MEDICINE

## 2024-05-08 PROCEDURE — 258N000003 HC RX IP 258 OP 636

## 2024-05-08 PROCEDURE — 74174 CTA ABD&PLVS W/CONTRAST: CPT

## 2024-05-08 PROCEDURE — 96374 THER/PROPH/DIAG INJ IV PUSH: CPT | Performed by: EMERGENCY MEDICINE

## 2024-05-08 PROCEDURE — 83605 ASSAY OF LACTIC ACID: CPT | Performed by: EMERGENCY MEDICINE

## 2024-05-08 PROCEDURE — 74174 CTA ABD&PLVS W/CONTRAST: CPT | Mod: 26 | Performed by: RADIOLOGY

## 2024-05-08 PROCEDURE — 82330 ASSAY OF CALCIUM: CPT

## 2024-05-08 PROCEDURE — 999N000179 XR SURGERY CARM FLUORO LESS THAN 5 MIN W STILLS: Mod: TC

## 2024-05-08 PROCEDURE — 0F798DZ DILATION OF COMMON BILE DUCT WITH INTRALUMINAL DEVICE, VIA NATURAL OR ARTIFICIAL OPENING ENDOSCOPIC: ICD-10-PCS | Performed by: INTERNAL MEDICINE

## 2024-05-08 PROCEDURE — C1769 GUIDE WIRE: HCPCS | Performed by: INTERNAL MEDICINE

## 2024-05-08 PROCEDURE — 85610 PROTHROMBIN TIME: CPT | Performed by: INTERNAL MEDICINE

## 2024-05-08 PROCEDURE — 999N000127 HC STATISTIC PERIPHERAL IV START W US GUIDANCE

## 2024-05-08 PROCEDURE — 43260 ERCP W/SPECIMEN COLLECTION: CPT

## 2024-05-08 PROCEDURE — 84484 ASSAY OF TROPONIN QUANT: CPT | Performed by: EMERGENCY MEDICINE

## 2024-05-08 PROCEDURE — 93010 ELECTROCARDIOGRAM REPORT: CPT | Performed by: EMERGENCY MEDICINE

## 2024-05-08 PROCEDURE — 999N000141 HC STATISTIC PRE-PROCEDURE NURSING ASSESSMENT: Performed by: INTERNAL MEDICINE

## 2024-05-08 PROCEDURE — 36415 COLL VENOUS BLD VENIPUNCTURE: CPT | Performed by: EMERGENCY MEDICINE

## 2024-05-08 PROCEDURE — 82947 ASSAY GLUCOSE BLOOD QUANT: CPT | Performed by: EMERGENCY MEDICINE

## 2024-05-08 PROCEDURE — 86140 C-REACTIVE PROTEIN: CPT

## 2024-05-08 PROCEDURE — 258N000003 HC RX IP 258 OP 636: Performed by: INTERNAL MEDICINE

## 2024-05-08 PROCEDURE — 87186 SC STD MICRODIL/AGAR DIL: CPT | Performed by: EMERGENCY MEDICINE

## 2024-05-08 PROCEDURE — 272N000001 HC OR GENERAL SUPPLY STERILE: Performed by: INTERNAL MEDICINE

## 2024-05-08 PROCEDURE — 87640 STAPH A DNA AMP PROBE: CPT

## 2024-05-08 PROCEDURE — 0FPB8DZ REMOVAL OF INTRALUMINAL DEVICE FROM HEPATOBILIARY DUCT, VIA NATURAL OR ARTIFICIAL OPENING ENDOSCOPIC: ICD-10-PCS | Performed by: INTERNAL MEDICINE

## 2024-05-08 PROCEDURE — 99207 PR NO BILLABLE SERVICE THIS VISIT: CPT | Performed by: RADIOLOGY

## 2024-05-08 DEVICE — IMPLANTABLE DEVICE
Type: IMPLANTABLE DEVICE | Site: BILE DUCT | Status: NON-FUNCTIONAL
Removed: 2024-07-03

## 2024-05-08 RX ORDER — FENTANYL CITRATE 50 UG/ML
25 INJECTION, SOLUTION INTRAMUSCULAR; INTRAVENOUS EVERY 5 MIN PRN
Status: DISCONTINUED | OUTPATIENT
Start: 2024-05-08 | End: 2024-05-08 | Stop reason: HOSPADM

## 2024-05-08 RX ORDER — DEXTROSE MONOHYDRATE 25 G/50ML
25-50 INJECTION, SOLUTION INTRAVENOUS
Status: DISCONTINUED | OUTPATIENT
Start: 2024-05-08 | End: 2024-05-14 | Stop reason: HOSPADM

## 2024-05-08 RX ORDER — ESMOLOL HYDROCHLORIDE 10 MG/ML
INJECTION INTRAVENOUS PRN
Status: DISCONTINUED | OUTPATIENT
Start: 2024-05-08 | End: 2024-05-08

## 2024-05-08 RX ORDER — FLUMAZENIL 0.1 MG/ML
0.2 INJECTION, SOLUTION INTRAVENOUS
Status: ACTIVE | OUTPATIENT
Start: 2024-05-08 | End: 2024-05-09

## 2024-05-08 RX ORDER — INDOMETHACIN 50 MG/1
SUPPOSITORY RECTAL PRN
Status: DISCONTINUED | OUTPATIENT
Start: 2024-05-08 | End: 2024-05-08 | Stop reason: HOSPADM

## 2024-05-08 RX ORDER — ONDANSETRON 2 MG/ML
INJECTION INTRAMUSCULAR; INTRAVENOUS PRN
Status: DISCONTINUED | OUTPATIENT
Start: 2024-05-08 | End: 2024-05-08

## 2024-05-08 RX ORDER — LIDOCAINE 40 MG/G
CREAM TOPICAL
Status: DISCONTINUED | OUTPATIENT
Start: 2024-05-08 | End: 2024-05-10

## 2024-05-08 RX ORDER — IOPAMIDOL 755 MG/ML
112 INJECTION, SOLUTION INTRAVASCULAR ONCE
Status: COMPLETED | OUTPATIENT
Start: 2024-05-08 | End: 2024-05-08

## 2024-05-08 RX ORDER — LABETALOL HYDROCHLORIDE 5 MG/ML
10 INJECTION, SOLUTION INTRAVENOUS
Status: DISCONTINUED | OUTPATIENT
Start: 2024-05-08 | End: 2024-05-08 | Stop reason: HOSPADM

## 2024-05-08 RX ORDER — FENTANYL CITRATE 50 UG/ML
INJECTION, SOLUTION INTRAMUSCULAR; INTRAVENOUS PRN
Status: DISCONTINUED | OUTPATIENT
Start: 2024-05-08 | End: 2024-05-08

## 2024-05-08 RX ORDER — ONDANSETRON 2 MG/ML
4 INJECTION INTRAMUSCULAR; INTRAVENOUS EVERY 6 HOURS PRN
Status: DISCONTINUED | OUTPATIENT
Start: 2024-05-08 | End: 2024-05-08

## 2024-05-08 RX ORDER — HYDRALAZINE HYDROCHLORIDE 20 MG/ML
2.5-5 INJECTION INTRAMUSCULAR; INTRAVENOUS EVERY 10 MIN PRN
Status: DISCONTINUED | OUTPATIENT
Start: 2024-05-08 | End: 2024-05-08 | Stop reason: HOSPADM

## 2024-05-08 RX ORDER — IOPAMIDOL 510 MG/ML
INJECTION, SOLUTION INTRAVASCULAR PRN
Status: DISCONTINUED | OUTPATIENT
Start: 2024-05-08 | End: 2024-05-08 | Stop reason: HOSPADM

## 2024-05-08 RX ORDER — PIPERACILLIN SODIUM, TAZOBACTAM SODIUM 3; .375 G/15ML; G/15ML
3.38 INJECTION, POWDER, LYOPHILIZED, FOR SOLUTION INTRAVENOUS EVERY 6 HOURS
Status: DISCONTINUED | OUTPATIENT
Start: 2024-05-08 | End: 2024-05-09

## 2024-05-08 RX ORDER — NALOXONE HYDROCHLORIDE 0.4 MG/ML
0.4 INJECTION, SOLUTION INTRAMUSCULAR; INTRAVENOUS; SUBCUTANEOUS
Status: DISCONTINUED | OUTPATIENT
Start: 2024-05-08 | End: 2024-05-10

## 2024-05-08 RX ORDER — ONDANSETRON 2 MG/ML
4 INJECTION INTRAMUSCULAR; INTRAVENOUS EVERY 30 MIN PRN
Status: DISCONTINUED | OUTPATIENT
Start: 2024-05-08 | End: 2024-05-08 | Stop reason: HOSPADM

## 2024-05-08 RX ORDER — ACETAMINOPHEN 325 MG/1
650 TABLET ORAL EVERY 4 HOURS PRN
Status: DISCONTINUED | OUTPATIENT
Start: 2024-05-08 | End: 2024-05-14 | Stop reason: HOSPADM

## 2024-05-08 RX ORDER — SODIUM CHLORIDE, SODIUM GLUCONATE, SODIUM ACETATE, POTASSIUM CHLORIDE AND MAGNESIUM CHLORIDE 526; 502; 368; 37; 30 MG/100ML; MG/100ML; MG/100ML; MG/100ML; MG/100ML
INJECTION, SOLUTION INTRAVENOUS CONTINUOUS PRN
Status: DISCONTINUED | OUTPATIENT
Start: 2024-05-08 | End: 2024-05-08

## 2024-05-08 RX ORDER — CLONIDINE HYDROCHLORIDE 0.1 MG/1
0.1 TABLET ORAL 2 TIMES DAILY
Status: DISCONTINUED | OUTPATIENT
Start: 2024-05-08 | End: 2024-05-13

## 2024-05-08 RX ORDER — PIPERACILLIN SODIUM, TAZOBACTAM SODIUM 4; .5 G/20ML; G/20ML
4.5 INJECTION, POWDER, LYOPHILIZED, FOR SOLUTION INTRAVENOUS ONCE
Status: COMPLETED | OUTPATIENT
Start: 2024-05-08 | End: 2024-05-08

## 2024-05-08 RX ORDER — CALCIUM CARBONATE 500 MG/1
1000 TABLET, CHEWABLE ORAL 4 TIMES DAILY PRN
Status: DISCONTINUED | OUTPATIENT
Start: 2024-05-08 | End: 2024-05-14 | Stop reason: HOSPADM

## 2024-05-08 RX ORDER — SODIUM CHLORIDE, SODIUM LACTATE, POTASSIUM CHLORIDE, CALCIUM CHLORIDE 600; 310; 30; 20 MG/100ML; MG/100ML; MG/100ML; MG/100ML
INJECTION, SOLUTION INTRAVENOUS CONTINUOUS PRN
Status: DISCONTINUED | OUTPATIENT
Start: 2024-05-08 | End: 2024-05-08

## 2024-05-08 RX ORDER — NALOXONE HYDROCHLORIDE 0.4 MG/ML
0.2 INJECTION, SOLUTION INTRAMUSCULAR; INTRAVENOUS; SUBCUTANEOUS
Status: DISCONTINUED | OUTPATIENT
Start: 2024-05-08 | End: 2024-05-10

## 2024-05-08 RX ORDER — ACETAMINOPHEN 325 MG/1
975 TABLET ORAL
Status: DISCONTINUED | OUTPATIENT
Start: 2024-05-08 | End: 2024-05-08 | Stop reason: HOSPADM

## 2024-05-08 RX ORDER — DIMENHYDRINATE 50 MG/ML
25 INJECTION, SOLUTION INTRAMUSCULAR; INTRAVENOUS
Status: DISCONTINUED | OUTPATIENT
Start: 2024-05-08 | End: 2024-05-08 | Stop reason: HOSPADM

## 2024-05-08 RX ORDER — INDOMETHACIN 50 MG/1
100 SUPPOSITORY RECTAL
Status: DISCONTINUED | OUTPATIENT
Start: 2024-05-08 | End: 2024-05-10

## 2024-05-08 RX ORDER — LIDOCAINE 40 MG/G
CREAM TOPICAL
Status: DISCONTINUED | OUTPATIENT
Start: 2024-05-08 | End: 2024-05-14 | Stop reason: HOSPADM

## 2024-05-08 RX ORDER — NICOTINE POLACRILEX 4 MG
15-30 LOZENGE BUCCAL
Status: DISCONTINUED | OUTPATIENT
Start: 2024-05-08 | End: 2024-05-14 | Stop reason: HOSPADM

## 2024-05-08 RX ORDER — PROPOFOL 10 MG/ML
INJECTION, EMULSION INTRAVENOUS PRN
Status: DISCONTINUED | OUTPATIENT
Start: 2024-05-08 | End: 2024-05-08

## 2024-05-08 RX ORDER — NALOXONE HYDROCHLORIDE 0.4 MG/ML
0.1 INJECTION, SOLUTION INTRAMUSCULAR; INTRAVENOUS; SUBCUTANEOUS
Status: DISCONTINUED | OUTPATIENT
Start: 2024-05-08 | End: 2024-05-08 | Stop reason: HOSPADM

## 2024-05-08 RX ORDER — FENTANYL CITRATE 50 UG/ML
50 INJECTION, SOLUTION INTRAMUSCULAR; INTRAVENOUS EVERY 5 MIN PRN
Status: DISCONTINUED | OUTPATIENT
Start: 2024-05-08 | End: 2024-05-08 | Stop reason: HOSPADM

## 2024-05-08 RX ORDER — HYDROCHLOROTHIAZIDE 25 MG/1
25 TABLET ORAL DAILY
Status: DISCONTINUED | OUTPATIENT
Start: 2024-05-08 | End: 2024-05-14 | Stop reason: HOSPADM

## 2024-05-08 RX ORDER — ONDANSETRON 4 MG/1
4 TABLET, ORALLY DISINTEGRATING ORAL EVERY 6 HOURS PRN
Status: DISCONTINUED | OUTPATIENT
Start: 2024-05-08 | End: 2024-05-10

## 2024-05-08 RX ORDER — POLYETHYLENE GLYCOL 3350 17 G/17G
17 POWDER, FOR SOLUTION ORAL DAILY PRN
Status: DISCONTINUED | OUTPATIENT
Start: 2024-05-08 | End: 2024-05-14 | Stop reason: HOSPADM

## 2024-05-08 RX ORDER — ONDANSETRON 4 MG/1
4 TABLET, ORALLY DISINTEGRATING ORAL EVERY 6 HOURS PRN
Status: DISCONTINUED | OUTPATIENT
Start: 2024-05-08 | End: 2024-05-08

## 2024-05-08 RX ORDER — HALOPERIDOL 5 MG/ML
1 INJECTION INTRAMUSCULAR
Status: DISCONTINUED | OUTPATIENT
Start: 2024-05-08 | End: 2024-05-08 | Stop reason: HOSPADM

## 2024-05-08 RX ORDER — ONDANSETRON 4 MG/1
4 TABLET, ORALLY DISINTEGRATING ORAL EVERY 30 MIN PRN
Status: DISCONTINUED | OUTPATIENT
Start: 2024-05-08 | End: 2024-05-08 | Stop reason: HOSPADM

## 2024-05-08 RX ORDER — VANCOMYCIN HYDROCHLORIDE 1 G/200ML
1000 INJECTION, SOLUTION INTRAVENOUS EVERY 12 HOURS
Status: DISCONTINUED | OUTPATIENT
Start: 2024-05-08 | End: 2024-05-09

## 2024-05-08 RX ORDER — LIDOCAINE HYDROCHLORIDE 20 MG/ML
INJECTION, SOLUTION INFILTRATION; PERINEURAL PRN
Status: DISCONTINUED | OUTPATIENT
Start: 2024-05-08 | End: 2024-05-08

## 2024-05-08 RX ORDER — ONDANSETRON 2 MG/ML
4 INJECTION INTRAMUSCULAR; INTRAVENOUS EVERY 6 HOURS PRN
Status: DISCONTINUED | OUTPATIENT
Start: 2024-05-08 | End: 2024-05-10

## 2024-05-08 RX ORDER — SODIUM CHLORIDE, SODIUM LACTATE, POTASSIUM CHLORIDE, CALCIUM CHLORIDE 600; 310; 30; 20 MG/100ML; MG/100ML; MG/100ML; MG/100ML
INJECTION, SOLUTION INTRAVENOUS CONTINUOUS
Status: DISCONTINUED | OUTPATIENT
Start: 2024-05-08 | End: 2024-05-08 | Stop reason: HOSPADM

## 2024-05-08 RX ORDER — HYDROMORPHONE HCL IN WATER/PF 6 MG/30 ML
0.2 PATIENT CONTROLLED ANALGESIA SYRINGE INTRAVENOUS EVERY 5 MIN PRN
Status: DISCONTINUED | OUTPATIENT
Start: 2024-05-08 | End: 2024-05-08 | Stop reason: HOSPADM

## 2024-05-08 RX ORDER — AMLODIPINE BESYLATE 10 MG/1
10 TABLET ORAL DAILY
Status: DISCONTINUED | OUTPATIENT
Start: 2024-05-08 | End: 2024-05-14 | Stop reason: HOSPADM

## 2024-05-08 RX ORDER — ALBUTEROL SULFATE 0.83 MG/ML
2.5 SOLUTION RESPIRATORY (INHALATION)
Status: DISCONTINUED | OUTPATIENT
Start: 2024-05-08 | End: 2024-05-08 | Stop reason: HOSPADM

## 2024-05-08 RX ORDER — HYDROMORPHONE HCL IN WATER/PF 6 MG/30 ML
0.4 PATIENT CONTROLLED ANALGESIA SYRINGE INTRAVENOUS EVERY 5 MIN PRN
Status: DISCONTINUED | OUTPATIENT
Start: 2024-05-08 | End: 2024-05-08 | Stop reason: HOSPADM

## 2024-05-08 RX ORDER — METOPROLOL TARTRATE 50 MG
50 TABLET ORAL 2 TIMES DAILY
Status: DISCONTINUED | OUTPATIENT
Start: 2024-05-08 | End: 2024-05-14 | Stop reason: HOSPADM

## 2024-05-08 RX ADMIN — Medication 30 MG: at 15:05

## 2024-05-08 RX ADMIN — IOPAMIDOL 112 ML: 755 INJECTION, SOLUTION INTRAVENOUS at 10:59

## 2024-05-08 RX ADMIN — PIPERACILLIN SODIUM,TAZOBACTAM SODIUM 4.5 G: 4; .5 INJECTION, POWDER, FOR SOLUTION INTRAVENOUS at 08:29

## 2024-05-08 RX ADMIN — FENTANYL CITRATE 50 MCG: 50 INJECTION INTRAMUSCULAR; INTRAVENOUS at 14:55

## 2024-05-08 RX ADMIN — SODIUM CHLORIDE 1000 ML: 9 INJECTION, SOLUTION INTRAVENOUS at 10:37

## 2024-05-08 RX ADMIN — ONDANSETRON 4 MG: 2 INJECTION INTRAMUSCULAR; INTRAVENOUS at 15:36

## 2024-05-08 RX ADMIN — SODIUM CHLORIDE, PRESERVATIVE FREE 87 ML: 5 INJECTION INTRAVENOUS at 10:59

## 2024-05-08 RX ADMIN — SUCCINYLCHOLINE CHLORIDE 100 MG: 20 INJECTION, SOLUTION INTRAMUSCULAR; INTRAVENOUS; PARENTERAL at 14:55

## 2024-05-08 RX ADMIN — PROPOFOL 200 MG: 10 INJECTION, EMULSION INTRAVENOUS at 14:55

## 2024-05-08 RX ADMIN — SODIUM CHLORIDE, POTASSIUM CHLORIDE, SODIUM LACTATE AND CALCIUM CHLORIDE: 600; 310; 30; 20 INJECTION, SOLUTION INTRAVENOUS at 14:43

## 2024-05-08 RX ADMIN — FENTANYL CITRATE 50 MCG: 50 INJECTION INTRAMUSCULAR; INTRAVENOUS at 15:17

## 2024-05-08 RX ADMIN — SUGAMMADEX 100 MG: 100 INJECTION, SOLUTION INTRAVENOUS at 15:42

## 2024-05-08 RX ADMIN — SODIUM CHLORIDE 1000 ML: 9 INJECTION, SOLUTION INTRAVENOUS at 08:04

## 2024-05-08 RX ADMIN — ESMOLOL HYDROCHLORIDE 20 MG: 10 INJECTION, SOLUTION INTRAVENOUS at 15:19

## 2024-05-08 RX ADMIN — LIDOCAINE HYDROCHLORIDE 60 MG: 20 INJECTION, SOLUTION INFILTRATION; PERINEURAL at 14:55

## 2024-05-08 RX ADMIN — PIPERACILLIN AND TAZOBACTAM 3.38 G: 3; .375 INJECTION, POWDER, LYOPHILIZED, FOR SOLUTION INTRAVENOUS at 22:23

## 2024-05-08 RX ADMIN — MIDAZOLAM 1 MG: 1 INJECTION INTRAMUSCULAR; INTRAVENOUS at 14:39

## 2024-05-08 RX ADMIN — SODIUM CHLORIDE, SODIUM GLUCONATE, SODIUM ACETATE, POTASSIUM CHLORIDE AND MAGNESIUM CHLORIDE: 526; 502; 368; 37; 30 INJECTION, SOLUTION INTRAVENOUS at 15:23

## 2024-05-08 RX ADMIN — PANTOPRAZOLE SODIUM 40 MG: 40 INJECTION, POWDER, FOR SOLUTION INTRAVENOUS at 20:17

## 2024-05-08 RX ADMIN — PANTOPRAZOLE SODIUM 40 MG: 40 INJECTION, POWDER, FOR SOLUTION INTRAVENOUS at 07:54

## 2024-05-08 RX ADMIN — VANCOMYCIN HYDROCHLORIDE 1750 MG: 1 INJECTION, POWDER, LYOPHILIZED, FOR SOLUTION INTRAVENOUS at 12:04

## 2024-05-08 ASSESSMENT — ACTIVITIES OF DAILY LIVING (ADL)
ADLS_ACUITY_SCORE: 37

## 2024-05-08 ASSESSMENT — LIFESTYLE VARIABLES: TOBACCO_USE: 1

## 2024-05-08 ASSESSMENT — COLUMBIA-SUICIDE SEVERITY RATING SCALE - C-SSRS
1. IN THE PAST MONTH, HAVE YOU WISHED YOU WERE DEAD OR WISHED YOU COULD GO TO SLEEP AND NOT WAKE UP?: NO
2. HAVE YOU ACTUALLY HAD ANY THOUGHTS OF KILLING YOURSELF IN THE PAST MONTH?: NO
6. HAVE YOU EVER DONE ANYTHING, STARTED TO DO ANYTHING, OR PREPARED TO DO ANYTHING TO END YOUR LIFE?: NO

## 2024-05-08 NOTE — CONSULTS
"  INTERVENTIONAL RADIOLOGY CONSULT NOTE    Reason for referral: Possible hepatic artery angiogram and possible stent graft placement/embolization.      History:     Jong Quiroga is a 44 year old male with a history of hypertension, complex celiac artery dissection and hepatic artery aneurysm 2/2 possible segmental arterial mediolysis, and recent biliary obstruction in setting of mass effect from hepatic artery aneusrym s/p ERCP 4/3 with biliary stent across hepatic duct stricture complicated by readmission for post sphincterotomy bleed and cholangitis 2/2 stent migration (repeat ERCP 4/11). Patient presented with hematemesis and bright red stools for two days . known hepatic artery aneurysm with biliary obstruction, presented today with hematemesis, underwent ERCP this afternoon with fistula identified . We are consulted for possible hepatic artery stenting.         Labs:  Lab Results   Component Value Date    HGB 12.6 05/08/2024    HGB 11.7 05/08/2024     Lab Results   Component Value Date     05/08/2024     Lab Results   Component Value Date    WBC 17.3 05/08/2024       Lab Results   Component Value Date    INR 1.24 05/08/2024       Lab Results   Component Value Date    PROTTOTAL 7.9 05/08/2024      Lab Results   Component Value Date    ALBUMIN 4.3 05/08/2024     Lab Results   Component Value Date    BILITOTAL 3.4 05/08/2024     No results found for: \"BILICONJ\"   Lab Results   Component Value Date    ALKPHOS 612 05/08/2024     Lab Results   Component Value Date     05/08/2024     Lab Results   Component Value Date     05/08/2024       Lab Results   Component Value Date    CR 1.07 05/08/2024     Lab Results   Component Value Date    BUN 27.8 05/08/2024       Imaging:         Assessment:     Jong Quiroga is a 44 year old male with a history of hypertension, complex celiac artery dissection , known hepatic artery aneurysm with biliary obstruction, presented today with hematemesis, underwent " ERCP this afternoon with 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 We are consulted for possible hepatic artery stenting.     Regarding tortuosity of the hepatic artery and not enough landing zone, Placement of the stent graft is not a  feasible option.     The patient status post successful biliary stent placement by GI team  today and the bleeding stopped.  The patient currently is stable,     We can potentially consider Hepatic angiogram and embolization of the aneurysmal right hepatic artery , however considering post embolization risks including ischemic biliary changes/strictures, surgical evaluation/consult for possible bypass graft/repair of hepatic artery is recommended.     Our assessment was discussed with Dr. Lehman.

## 2024-05-08 NOTE — MEDICATION SCRIBE - ADMISSION MEDICATION HISTORY
Medication Scribe Admission Medication History    Admission medication history is complete. The information provided in this note is only as accurate as the sources available at the time of the update.    Information Source(s): Family member via in-person    Pertinent Information: Patient's spouse assisted in medication reconciliation.    Changes made to PTA medication list:  Added: None  Deleted: None  Changed: None    Allergies reviewed with patient and updates made in EHR: yes    Medication History Completed By: Analilia Dupont 5/8/2024 10:46 AM    PTA Med List   Medication Sig Note Last Dose    amLODIPine (NORVASC) 10 MG tablet Take 1 tablet (10 mg) by mouth daily 5/8/2024: Patient taking BID 5/7/2024 at PM    BD LINO U/F 32G X 4 MM insulin pen needle       blood glucose monitoring (SOFTCLIX) lancets       Blood Glucose Monitoring Suppl (ACCU-CHEK GUIDE ME) w/Device KIT       cloNIDine (CATAPRES) 0.1 MG tablet Take 1 tablet (0.1 mg) by mouth 2 times daily  5/7/2024 at AM    hydrochlorothiazide (HYDRODIURIL) 25 MG tablet Take 1 tablet (25 mg) by mouth daily  5/7/2024 at AM    insulin aspart (NOVOLOG PEN) 100 UNIT/ML pen Inject 0-12 Units Subcutaneous 3 times daily (before meals) Blood Glucose             Dose  < 70                               see hypoglycemia protocol                             no insulin  150-199                         2 units  200-249                         4 units  250-299                         6 units  300-349                         8 units  350-399                         10 units  400 and greater             12 units and call MD  Past Week at Few days ago    insulin glargine (LANTUS PEN) 100 UNIT/ML pen Inject 10 Units Subcutaneous at bedtime  5/6/2024 at PM    metoprolol tartrate (LOPRESSOR) 50 MG tablet Take 1 tablet (50 mg) by mouth 2 times daily  5/7/2024 at PM    OPTIUM TEST STRIPS test strip Dispense item covered by pt ins. E11.65 NIDDM type II, uncontrolled - Test 4  times/day. Reason: High A1C  5/7/2024 at PM    pantoprazole (PROTONIX) 40 MG EC tablet Take 1 tablet (40 mg) by mouth every morning (before breakfast)  5/7/2024 at AM    prochlorperazine (COMPAZINE) 10 MG tablet Take 1 tablet (10 mg) by mouth every 6 hours as needed for nausea or vomiting  Past Month at Unknown

## 2024-05-08 NOTE — ANESTHESIA PREPROCEDURE EVALUATION
Anesthesia Pre-Procedure Evaluation    Patient: Jong Quiroga   MRN: [id number] : 1980        Procedure : Procedure(s):  ENDOSCOPIC RETROGRADE CHOLANGIOPANCREATOGRAPHY  ESOPHAGOGASTRODUODENOSCOPY          Past Medical History:   Diagnosis Date    Diabetes mellitus, type 2 (H)     Gall bladder stones     Hypertension       Past Surgical History:   Procedure Laterality Date    ABDOMEN SURGERY      ENDOSCOPIC RETROGRADE CHOLANGIOPANCREATOGRAM N/A 2024    Procedure: ENDOSCOPIC RETROGRADE CHOLANGIOPANCREATOGRAPHY, biliary sphincterotomy, stone removal, stent placement;  Surgeon: Harsha Ferguson MD;  Location: [facility] OR    ENDOSCOPIC RETROGRADE CHOLANGIOPANCREATOGRAM N/A 2024    Procedure: ENDOSCOPIC RETROGRADE CHOLANGIOPANCREATOGRAPHY with stent exchange;  Surgeon: Harsha Ferguson MD;  Location: [facility] OR      No Known Allergies   Social History     Tobacco Use    Smoking status: Every Day     Types: Cigarettes     Passive exposure: Current    Smokeless tobacco: Never   Substance Use Topics    Alcohol use: Not Currently      Wt Readings from Last 1 Encounters:   24 82.6 kg (182 lb)        Anesthesia Evaluation   Pt has had prior anesthetic.     No history of anesthetic complications       ROS/MED HX  ENT/Pulmonary:     (+)                tobacco use, Current use, -1 packs/day,                      Neurologic:  - neg neurologic ROS     Cardiovascular:     (+)  hypertension- -   -  - -                                      METS/Exercise Tolerance: >4 METS    Hematologic:     (+)      anemia, history of blood transfusion, no previous transfusion reaction, Known PRBC Anitbodies:No       Musculoskeletal:  - neg musculoskeletal ROS     GI/Hepatic: Comment: Segmental arterial mediolysis   H/O Complex celiac artery dissection  H/O Aneurysmal hepatic artery (stable)  Increased intrahepatic biliary duct dilatation likely secondary to aneurysmal hepatic artery  Worsening transaminitis, Elevated ALP  Direct  "Hyperbilirubinemia  Cholelithiasis and choledocholithiasis w/o s/o cholecystis     cholodocolithiasis with cholecystitis s/p ERCP with stone removal (4/3/24)        Renal/Genitourinary:  - neg Renal ROS     Endo:     (+)  type II DM,                    Psychiatric/Substance Use: Comment: Tobacco use  Marijuana use  EtOH use       (+)     Recreational drug usage: Cannabis.    Infectious Disease: Comment: Empiric Vanco/Zosyn for Ascending Cholangitis      Malignancy:  - neg malignancy ROS     Other:            Physical Exam    Airway        Mallampati: II   TM distance: > 3 FB   Neck ROM: full   Mouth opening: > 3 cm    Respiratory Devices and Support         Dental       (+) Completely normal teeth      Cardiovascular          Rhythm and rate: regular and normal     Pulmonary           breath sounds clear to auscultation           OUTSIDE LABS:  CBC:   Lab Results   Component Value Date    WBC 17.3 (H) 05/08/2024    WBC 12.0 (H) 04/14/2024    HGB 12.6 (L) 05/08/2024    HGB 11.7 (L) 05/08/2024    HCT 37 (L) 05/08/2024    HCT 35.8 (L) 05/08/2024     05/08/2024     (H) 04/14/2024     BMP:   Lab Results   Component Value Date     05/08/2024     05/08/2024    POTASSIUM 3.2 (L) 05/08/2024    POTASSIUM 3.8 05/08/2024    CHLORIDE 103 05/08/2024    CHLORIDE 103 04/14/2024    CO2 26 05/08/2024    CO2 24 04/14/2024    BUN 27.8 (H) 05/08/2024    BUN 9.6 04/14/2024    CR 1.07 05/08/2024    CR 0.83 04/14/2024     (H) 05/08/2024     (H) 05/08/2024     COAGS:   Lab Results   Component Value Date    INR 1.24 (H) 05/08/2024     POC: No results found for: \"BGM\", \"HCG\", \"HCGS\"  HEPATIC:   Lab Results   Component Value Date    ALBUMIN 4.3 05/08/2024    PROTTOTAL 7.9 05/08/2024     (H) 05/08/2024     (H) 05/08/2024    ALKPHOS 612 (H) 05/08/2024    BILITOTAL 3.4 (H) 05/08/2024     OTHER:   Lab Results   Component Value Date    LACT 1.7 05/08/2024    LUIS 9.5 05/08/2024    LIPASE 23 " "04/10/2024    TSH 0.23 (L) 04/01/2024    T4 1.37 04/01/2024       Anesthesia Plan    ASA Status:  3    NPO Status:  NPO Appropriate    Anesthesia Type: General.     - Airway: ETT   Induction: Intravenous.   Maintenance: Balanced.        Consents    Anesthesia Plan(s) and associated risks, benefits, and realistic alternatives discussed. Questions answered and patient/representative(s) expressed understanding.     - Discussed: Risks, Benefits and Alternatives for BOTH SEDATION and the PROCEDURE were discussed     - Discussed with:  Patient      - Extended Intubation/Ventilatory Support Discussed: No.      - Patient is DNR/DNI Status: No     Use of blood products discussed: No .     Postoperative Care    Pain management: IV analgesics.   PONV prophylaxis: Ondansetron (or other 5HT-3), Dexamethasone or Solumedrol     Comments:               Andrés Jean DO    I have reviewed the pertinent notes and labs in the chart from the past 30 days and (re)examined the patient.  Any updates or changes from those notes are reflected in this note.    # Hypokalemia: Lowest K = 3.2 mmol/L in last 2 days, will replace as needed  # Hyponatremia: Lowest Na = 132 mmol/L in last 30 days, will monitor as appropriate      # Hypoalbuminemia: Lowest albumin = 2.9 g/dL in the past 30 days , will monitor as appropriate   # Coagulation Defect: INR = 1.24 (Ref range: 0.85 - 1.15) and/or PTT = N/A, will monitor for bleeding   # Obesity: Estimated body mass index is 31.24 kg/m  as calculated from the following:    Height as of 4/10/24: 1.626 m (5' 4\").    Weight as of this encounter: 82.6 kg (182 lb).      "

## 2024-05-08 NOTE — ANESTHESIA POSTPROCEDURE EVALUATION
Patient: Jong Quiroga    Procedure: Procedure(s):  ENDOSCOPIC RETROGRADE CHOLANGIOPANCREATOGRAPHY with biliary stent exchange, biliary shpincterotomy, and clot removal       Anesthesia Type:  General    Note:  Disposition: Admission; Inpatient   Postop Pain Control: Uneventful            Sign Out: Well controlled pain   PONV: No   Neuro/Psych: Uneventful            Sign Out: Acceptable/Baseline neuro status   Airway/Respiratory: Uneventful            Sign Out: Acceptable/Baseline resp. status   CV/Hemodynamics: Uneventful            Sign Out: Acceptable CV status; No obvious hypovolemia; No obvious fluid overload   Other NRE: NONE   DID A NON-ROUTINE EVENT OCCUR? No           Last vitals:  Vitals Value Taken Time   BP     Temp     Pulse 107 05/08/24 1557   Resp 20 05/08/24 1557   SpO2 100 % 05/08/24 1557   Vitals shown include unfiled device data.    Electronically Signed By: Roberth Reyna MD  May 8, 2024  3:57 PM

## 2024-05-08 NOTE — OP NOTE
ERCP 05/08/2024  2:28 PM [address] [place] [address] [phone]     Endoscopy Department  _______________________________________________________________________________  Patient Name: Jong Quiroga             Procedure Date: 5/8/2024 2:28 PM  MRN: [id number]                       Account Number: [id number]  YOB: 1980              Admit Type: Inpatient  Age: 44                               Room:  OR   Gender: Male                          Note Status: Finalized  Attending MD: CAMILLA BECK MD,   Total Sedation Time:  _______________________________________________________________________________     Procedure:             ERCP  Indications:           Abnormal liver function test, Bile duct stricture  Providers:             CAMILLA BECK MD, PAL ZAVALA MD  Patient Profile:       Mr Quiroga is a 45yo gentleman with a known aneurysm of                         the common hepatic artery who has had interval                         evidence of biliary obstruction through secondary to                         related stenosis of the common duct. He now returns                         with evidence of hematemsis and rising liver studies                         despite in situ plastic biliary stents.  Referring MD:          Storm Armenta MD  Medicines:             General Anesthesia, Indomethacin 100 mg NH  Complications:         No immediate complications.  _______________________________________________________________________________  Procedure:             Pre-Anesthesia Assessment:                         - Prior to the procedure, a History and Physical was                         performed, and patient medications and allergies were                         reviewed. The patient is competent. The risks and                         benefits of the procedure and the sedation options and                          risks were discussed with the patient. All questions                         were answered and informed consent was obtained.                         Patient identification and proposed procedure were                         verified by the nurse in the pre-procedure area.                         Mental Status Examination: alert and oriented. Airway                         Examination: Mallampati Class II (the uvula but not                         tonsillar pillars visualized). Respiratory                         Examination: clear to auscultation. CV Examination:                         normal. ASA Grade Assessment: III - A patient with                         severe systemic disease. After reviewing the risks and                         benefits, the patient was deemed in satisfactory                         condition to undergo the procedure. The anesthesia                         plan was to use general anesthesia. Immediately prior                         to administration of medications, the patient was                         re-assessed for adequacy to receive sedatives. The                         heart rate, respiratory rate, oxygen saturations,                         blood pressure, adequacy of pulmonary ventilation, and                         response to care were monitored throughout the                         procedure. The physical status of the patient was                         re-assessed after the procedure. After obtaining                         informed consent, the scope was passed under direct                         vision. Throughout the procedure, the patient's blood                         pressure, pulse, and oxygen saturations were monitored                         continuously. The duodenoscope was introduced through                         the mouth, and used to inject contrast into and used                         to inject contrast into the bile duct. The ERCP was                 "         accomplished without difficulty. The patient tolerated                         the procedure well.                                                                                   Findings:       The patient was prone under general and a 190 was used. A  film of       the right upper quadrant demonstated two biliary stents. was normal.       Limited white light imaging of the foregut was notable for two well       positioned biliary stents which were occluded with clot. There was       evidence of clot also at the biliary orifice though not related to the       sphincterotom. The two stents were removed from the biliary tree using a       snare. This resulted in significant active, pusating bleeding that did       not spontaneously resolve. The bile duct was rapidly deeply cannulated       with the short-nosed traction sphincterotome in concert with an 0.025\"       Visiglide wire. Contrast was injected and I personally interpreted the       bile duct images. Ductal flow of contrast was adequate, image quality       was excellent and contrast extended throughout the intrahepatics however       complete opacification was not fasible due to rapid washout with the       bleeding. What we could make out were numeous odd filling defects within       the intrahepatics. A 4 mm biliary sphincterotomy was made with a       monofilament traction (standard) sphincterotome using ERBE       electrocautery. There was no post-sphincterotomy bleeding. A 10 mm by 8       cm covered WallFlex stent was placed across the entire common extending       into the duodenum. This managed the bleeding which immediately ceased       and bile flowed through the stent. The ventral pancreatic duct and       orifice were selectively not interrogated.                                                                                   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                         - Filling defect seen throughout the biliary system                         which likely represents clot and sloughing biliary                         epithelium                         - Uncomplicated biliary sphincterotomy extension to                         accomidate placment of a 10mm covered metal stent                         placed across the entire length of the common  Recommendation:        - General anesthesia recovery with return to the floor                         when appropriate                         - Avoid antithrombotics for at least three days, other                         medications may resume                         - Nil per os until a plan is determined, likely he                         will not require an emergent IR procedure this                         afternoon                         - Case reviewed with Dr Silva for consideration of                         coiling or stenting of the hepatic artery; we will                         also involve an [facility] surgeon for their opinion                         - If his biliary drainage is sufficient, we will plan                         to leave our covered metal stent in place at least                         until after a defiitive intervention to the hepatic                         artery; would be prudent to repeat ERCP before                         discharge to clean the biliary system and place                         plastic stents                         -  Maintain excellent access, multiple units type and                         cross and follow hemoglobin                         - The findings and recommendations were discussed with                         the patient and their family                                                                                     electronically signed by VONNIE Lehman

## 2024-05-08 NOTE — ANESTHESIA CARE TRANSFER NOTE
Patient: Jong Quiroga    Procedure: Procedure(s):  ENDOSCOPIC RETROGRADE CHOLANGIOPANCREATOGRAPHY with biliary stent exchange, biliary shpincterotomy, and clot removal       Diagnosis: Hematemesis with nausea [K92.0]  Sepsis, due to unspecified organism, unspecified whether acute organ dysfunction present (H) [A41.9]  Diagnosis Additional Information: No value filed.    Anesthesia Type:   General     Note:    Oropharynx: oropharynx clear of all foreign objects and spontaneously breathing  Level of Consciousness: awake  Oxygen Supplementation: face mask  Level of Supplemental Oxygen (L/min / FiO2): 8  Independent Airway: airway patency satisfactory and stable  Dentition: dentition unchanged  Vital Signs Stable: post-procedure vital signs reviewed and stable  Report to RN Given: handoff report given  Patient transferred to: PACU    Handoff Report: Identifed the Patient, Identified the Reponsible Provider, Reviewed the pertinent medical history, Discussed the surgical course, Reviewed Intra-OP anesthesia mangement and issues during anesthesia, Set expectations for post-procedure period and Allowed opportunity for questions and acknowledgement of understanding      Vitals:  Vitals Value Taken Time   /100 05/08/24 1600   Temp     Pulse 104 05/08/24 1612   Resp 29 05/08/24 1612   SpO2 100 % 05/08/24 1612   Vitals shown include unfiled device data.    Electronically Signed By: CAROLINE Powell CRNA  May 8, 2024  4:12 PM

## 2024-05-08 NOTE — ANESTHESIA PROCEDURE NOTES
Airway       Patient location during procedure: OR       Procedure Start/Stop Times: 5/8/2024 2:57 PM  Staff -        CRNA: Connor Casillas APRN CRNA       Performed By: CRNA  Consent for Airway        Urgency: elective  Indications and Patient Condition       Indications for airway management: oneil-procedural       Induction type:intravenous       Mask difficulty assessment: 0 - not attempted    Final Airway Details       Final airway type: endotracheal airway       Successful airway: ETT - single and Oral  Endotracheal Airway Details        ETT size (mm): 7.5       Successful intubation technique: direct laryngoscopy       DL Blade Type: MAC 3       Grade View of Cords: 2       Adjucts: stylet       Position: Right       Measured from: lips       Secured at (cm): 21       Bite block used: None    Post intubation assessment        Placement verified by: capnometry and equal breath sounds        Number of attempts at approach: 1       Number of other approaches attempted: 0       Secured with: tape       Ease of procedure: easy       Dentition: Unchanged    Medication(s) Administered   Medication Administration Time: 5/8/2024 2:57 PM

## 2024-05-08 NOTE — ED PROVIDER NOTES
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  Patient reports that yesterday 1 episode of hematemesis and 1 episode of bright red stools. This morning he had 2 episodes of hematemesis and 2 episodes of bright red stools.  He reports this is similar to his previous episode (per chart review at that time was thought to be due to bleeding from his sphincterotomy site).  He does endorse some right upper quadrant pain that started along with this that he states had resolved after his previous hospitalization and did not recur until tonight.  He endorses some chills but no other infectious symptoms such as fever, cough, URI symptoms etc.  He states he feels diffusely weak but denies chest pain, syncope or lightheadedness.  Denies heavy alcohol use, denies heavy ibuprofen use.     Per admission note, patient was admitted to [facility] from 4/10 - 4/14/2024 with acute cholangitis with bacteremia and acute blood loss anemia with melena likely from resolved biliary sphincterectomy bleeding.  The morning after the patient's admission he became tachycardic to the 110s, fever to 103 and leukocytosis to 17.7.  Patient was started on vancomycin and Zosyn.  Blood cultures drawn that resulted strep anginosus and H. influenzae.  ID was consulted and on 4/12 recommended ceasing vancomycin and switching Zosyn to Unasyn which was eventually transitioned to Augmentin for plan for 14-day treatment course from last negative blood culture.  Additionally, CTA abdomen without signs of GI bleed which was confirmed from  EGD/ERCP on 4/11.  Patient received 1 unit PRBCs on 4/11.  Patient was recommended to continue Protonix 40 mg daily.      Past Medical History  Past Medical History:   Diagnosis Date    Diabetes mellitus, type 2 (H)     Gall bladder stones     Hypertension      Past Surgical History:   Procedure Laterality Date    ABDOMEN SURGERY      ENDOSCOPIC RETROGRADE CHOLANGIOPANCREATOGRAM N/A 04/03/2024    Procedure: ENDOSCOPIC RETROGRADE CHOLANGIOPANCREATOGRAPHY, biliary sphincterotomy, stone removal, stent placement;  Surgeon: Harsha Ferguson MD;  Location: [facility] OR    ENDOSCOPIC RETROGRADE CHOLANGIOPANCREATOGRAM N/A 4/11/2024    Procedure: ENDOSCOPIC RETROGRADE CHOLANGIOPANCREATOGRAPHY with stent exchange;  Surgeon: Harsha Ferguson MD;  Location: [facility] OR     amLODIPine (NORVASC) 10 MG tablet  BD LINO U/F 32G X 4 MM insulin pen needle  blood glucose monitoring (SOFTCLIX) lancets  Blood Glucose Monitoring Suppl (ACCU-CHEK GUIDE ME) w/Device KIT  cloNIDine (CATAPRES) 0.1 MG tablet  hydrochlorothiazide (HYDRODIURIL) 25 MG tablet  insulin aspart (NOVOLOG PEN) 100 UNIT/ML pen  insulin glargine (LANTUS PEN) 100 UNIT/ML pen  metoprolol tartrate (LOPRESSOR) 50 MG tablet  OPTIUM TEST STRIPS test strip  pantoprazole (PROTONIX) 40 MG EC tablet  prochlorperazine (COMPAZINE) 10 MG tablet      No Known Allergies  Family History  No family history on file.  Social History   Social History     Tobacco Use    Smoking status: Every Day     Types: Cigarettes     Passive exposure: Current    Smokeless tobacco: Never   Substance Use Topics    Alcohol use: Not Currently    Drug use: Yes     Types: Marijuana         A medically appropriate review of systems was performed with pertinent positives and negatives noted in the HPI, and all other systems negative.    Physical Exam   BP: 124/84  Pulse: (!) 127  Temp: 98.6  F (37  C)  Resp: 18  Weight: 82.6 kg (182 lb)  SpO2: 100 %  Physical Exam  General: awake, alert, no acute distress  Head: normal  cephalic  HEENT: pupils equal, conjugate gaze intact  Neck: Supple  CV: Tachycardic rate, no murmur appreciated  Lungs: clear to auscultation  Abd: soft, right upper quadrant tender to palpation the remainder of the abdomen is non-tender, no guarding, no peritoneal signs  EXT: lower extremities without swelling or edema  Neuro: awake, answers questions appropriately. No focal deficits noted         ED Course, Procedures, & Data      Procedures            EKG Interpretation:      Interpreted by Hai Alvarado MD  Time reviewed: 7:30 AM  Symptoms at time of EKG: Rectal bleeding and hematemesis   Rhythm: sinus tachycardia  Rate: 130 bpm  Axis: normal  Ectopy: none  Conduction: normal  ST Segments/ T Waves: No ST-T wave changes  Q Waves: none  Comparison to prior: previous T wave inversions from 4/1/24 EKG    Clinical Impression: sinus tachycardia with no sign of acute ischemia       Results for orders placed or performed during the hospital encounter of 05/08/24   Comprehensive metabolic panel     Status: Abnormal   Result Value Ref Range    Sodium 141 135 - 145 mmol/L    Potassium 3.2 (L) 3.4 - 5.3 mmol/L    Carbon Dioxide (CO2) 26 22 - 29 mmol/L    Anion Gap 12 7 - 15 mmol/L    Urea Nitrogen 27.8 (H) 6.0 - 20.0 mg/dL    Creatinine 1.07 0.67 - 1.17 mg/dL    GFR Estimate 88 >60 mL/min/1.73m2    Calcium 9.5 8.6 - 10.0 mg/dL    Chloride 103 98 - 107 mmol/L    Glucose 222 (H) 70 - 99 mg/dL    Alkaline Phosphatase 612 (H) 40 - 150 U/L     (H) 0 - 45 U/L     (H) 0 - 70 U/L    Protein Total 7.9 6.4 - 8.3 g/dL    Albumin 4.3 3.5 - 5.2 g/dL    Bilirubin Total 3.4 (H) <=1.2 mg/dL   Troponin T, High Sensitivity     Status: Normal   Result Value Ref Range    Troponin T, High Sensitivity 20 <=22 ng/L   Lactic acid whole blood     Status: Abnormal   Result Value Ref Range    Lactic Acid 4.1 (HH) 0.7 - 2.0 mmol/L   CBC with platelets and differential     Status: Abnormal   Result Value Ref Range    WBC Count 17.3 (H) 4.0  - 11.0 10e3/uL    RBC Count 4.05 (L) 4.40 - 5.90 10e6/uL    Hemoglobin 11.7 (L) 13.3 - 17.7 g/dL    Hematocrit 35.8 (L) 40.0 - 53.0 %    MCV 88 78 - 100 fL    MCH 28.9 26.5 - 33.0 pg    MCHC 32.7 31.5 - 36.5 g/dL    RDW 16.5 (H) 10.0 - 15.0 %    Platelet Count 345 150 - 450 10e3/uL    % Neutrophils 88 %    % Lymphocytes 5 %    % Monocytes 6 %    % Eosinophils 0 %    % Basophils 0 %    % Immature Granulocytes 1 %    NRBCs per 100 WBC 0 <1 /100    Absolute Neutrophils 15.3 (H) 1.6 - 8.3 10e3/uL    Absolute Lymphocytes 0.8 0.8 - 5.3 10e3/uL    Absolute Monocytes 1.0 0.0 - 1.3 10e3/uL    Absolute Eosinophils 0.0 0.0 - 0.7 10e3/uL    Absolute Basophils 0.0 0.0 - 0.2 10e3/uL    Absolute Immature Granulocytes 0.2 <=0.4 10e3/uL    Absolute NRBCs 0.0 10e3/uL   iStat Gases Electrolytes ICA Glucose Venous, POCT     Status: Abnormal   Result Value Ref Range    CPB Applied No     Hematocrit POCT 37 (L) 40 - 53 %    Calcium, Ionized Whole Blood POCT 4.5 4.4 - 5.2 mg/dL    Glucose Whole Blood POCT 239 (H) 70 - 99 mg/dL    Bicarbonate Venous POCT 24 21 - 28 mmol/L    Hemoglobin POCT 12.6 (L) 13.3 - 17.7 g/dL    Potassium POCT 3.8 3.4 - 5.3 mmol/L    Sodium POCT 140 135 - 145 mmol/L    pCO2 Venous POCT 28 (L) 40 - 50 mm Hg    pO2 Venous POCT 48 (H) 25 - 47 mm Hg    pH Venous POCT 7.54 (H) 7.32 - 7.43    O2 Sat, Venous POCT 89 (H) 70 - 75 %    Base Excess/Deficit (+/-) POCT 2.0 -3.0 - 3.0 mmol/L   INR     Status: Abnormal   Result Value Ref Range    INR 1.24 (H) 0.85 - 1.15   EKG 12-lead, tracing only     Status: None (Preliminary result)   Result Value Ref Range    Systolic Blood Pressure  mmHg    Diastolic Blood Pressure  mmHg    Ventricular Rate 130 BPM    Atrial Rate 130 BPM    IN Interval 126 ms    QRS Duration 78 ms     ms    QTc 491 ms    P Axis 76 degrees    R AXIS -15 degrees    T Axis 80 degrees    Interpretation ECG       Sinus tachycardia  Possible Left atrial enlargement  Borderline ECG     Adult Type and Screen      Status: None   Result Value Ref Range    ABO/RH(D) O POS     Antibody Screen Negative Negative    SPECIMEN EXPIRATION DATE [id number]    CBC with platelets differential     Status: Abnormal    Narrative    The following orders were created for panel order CBC with platelets differential.  Procedure                               Abnormality         Status                     ---------                               -----------         ------                     CBC with platelets and d...[[id number]]  Abnormal            Final result                 Please view results for these tests on the individual orders.   ABO/Rh type and screen     Status: None    Narrative    The following orders were created for panel order ABO/Rh type and screen.  Procedure                               Abnormality         Status                     ---------                               -----------         ------                     Adult Type and Screen[[id number]]                            Final result                 Please view results for these tests on the individual orders.     Medications   amLODIPine (NORVASC) tablet 10 mg ( Oral Automatically Held 5/11/24 0800)   cloNIDine (CATAPRES) tablet 0.1 mg ( Oral Automatically Held 5/11/24 2000)   hydrochlorothiazide (HYDRODIURIL) tablet 25 mg ( Oral Automatically Held 5/11/24 0800)   insulin glargine (LANTUS PEN) injection 10 Units ( Subcutaneous Automatically Held 5/11/24 2200)   metoprolol tartrate (LOPRESSOR) tablet 50 mg ( Oral Automatically Held 5/11/24 2000)   lidocaine 1 % 0.1-1 mL (has no administration in time range)   lidocaine (LMX4) cream (has no administration in time range)   sodium chloride (PF) 0.9% PF flush 3 mL (3 mLs Intracatheter Not Given 5/8/24 0909)   sodium chloride (PF) 0.9% PF flush 3 mL (has no administration in time range)   calcium carbonate (TUMS) chewable tablet 1,000 mg (has no administration in time range)   acetaminophen (TYLENOL) tablet 650 mg (has  no administration in time range)   ondansetron (ZOFRAN ODT) ODT tab 4 mg (has no administration in time range)     Or   ondansetron (ZOFRAN) injection 4 mg (has no administration in time range)   polyethylene glycol (MIRALAX) Packet 17 g (has no administration in time range)   piperacillin-tazobactam (ZOSYN) 3.375 g vial to attach to  mL bag (has no administration in time range)   pantoprazole (PROTONIX) IV push injection 40 mg (40 mg Intravenous $Given 5/8/24 5989)   sodium chloride 0.9% BOLUS 1,000 mL (0 mLs Intravenous Stopped 5/8/24 0907)   piperacillin-tazobactam (ZOSYN) 4.5 g vial to attach to  mL bag (0 g Intravenous Stopped 5/8/24 0907)     Labs Ordered and Resulted from Time of ED Arrival to Time of ED Departure   COMPREHENSIVE METABOLIC PANEL - Abnormal       Result Value    Sodium 141      Potassium 3.2 (*)     Carbon Dioxide (CO2) 26      Anion Gap 12      Urea Nitrogen 27.8 (*)     Creatinine 1.07      GFR Estimate 88      Calcium 9.5      Chloride 103      Glucose 222 (*)     Alkaline Phosphatase 612 (*)      (*)      (*)     Protein Total 7.9      Albumin 4.3      Bilirubin Total 3.4 (*)    LACTIC ACID WHOLE BLOOD - Abnormal    Lactic Acid 4.1 (*)    CBC WITH PLATELETS AND DIFFERENTIAL - Abnormal    WBC Count 17.3 (*)     RBC Count 4.05 (*)     Hemoglobin 11.7 (*)     Hematocrit 35.8 (*)     MCV 88      MCH 28.9      MCHC 32.7      RDW 16.5 (*)     Platelet Count 345      % Neutrophils 88      % Lymphocytes 5      % Monocytes 6      % Eosinophils 0      % Basophils 0      % Immature Granulocytes 1      NRBCs per 100 WBC 0      Absolute Neutrophils 15.3 (*)     Absolute Lymphocytes 0.8      Absolute Monocytes 1.0      Absolute Eosinophils 0.0      Absolute Basophils 0.0      Absolute Immature Granulocytes 0.2      Absolute NRBCs 0.0     ISTAT GASES ELECTROLYTES ICA GLUCOSE VENOUS POCT - Abnormal    CPB Applied No      Hematocrit POCT 37 (*)     Calcium, Ionized Whole Blood  "POCT 4.5      Glucose Whole Blood POCT 239 (*)     Bicarbonate Venous POCT 24      Hemoglobin POCT 12.6 (*)     Potassium POCT 3.8      Sodium POCT 140      pCO2 Venous POCT 28 (*)     pO2 Venous POCT 48 (*)     pH Venous POCT 7.54 (*)     O2 Sat, Venous POCT 89 (*)     Base Excess/Deficit (+/-) POCT 2.0     INR - Abnormal    INR 1.24 (*)    TROPONIN T, HIGH SENSITIVITY - Normal    Troponin T, High Sensitivity 20     ROUTINE UA WITH MICROSCOPIC   CRP INFLAMMATION   PROCALCITONIN   LACTIC ACID WHOLE BLOOD   TYPE AND SCREEN, ADULT    ABO/RH(D) O POS      Antibody Screen Negative      SPECIMEN EXPIRATION DATE [id number]     BLOOD CULTURE   BLOOD CULTURE   URINE CULTURE   MRSA MSSA PCR, NASAL SWAB   ABO/RH TYPE AND SCREEN     CTA Abdomen Pelvis with Contrast    (Results Pending)        If one the following conditions is present, a 30 mL/kg bolus is recommended as part of the 6 hour bundle (IBW can be used for BMI >30, or document refusal/contraindication):      2.  Lactate >4.      The patient has signs of Severe Sepsis as evidenced by:    1. 2 SIRS criteria, AND  2. Suspected infection, AND   3. Organ dysfunction: Lactic Acidosis with value >2.0 and Bilirubin > 2 due to infection    Time severe sepsis diagnosis confirmed: 07:35  05/08/24 as this was the time when Lactate resulted, and the level was > 2.0    3 Hour Severe Sepsis Bundle Completion:    1. Initial Lactic Acid Result:   Recent Labs   Lab Test 05/08/24  0735 04/12/24  0330 04/11/24  2111   LACT 4.1* 1.9 2.3*     2. Blood Cultures before Antibiotics: Yes  3. Broad Spectrum Antibiotics Administered:  yes       Anti-infectives (From admission through now)      Start     Dose/Rate Route Frequency Ordered Stop    05/08/24 0810  piperacillin-tazobactam (ZOSYN) 4.5 g vial to attach to  mL bag        Note to Pharmacy: For [facility], [facility] and [facility]: For Zosyn-naive patients, use the \"Zosyn initial dose + extended infusion\" order panel.    4.5 g  over 30 Minutes " Intravenous ONCE 05/08/24 0809 05/08/24 0907            4. Is initial hypotension present?     No (IV fluid bolus NOT required). IV Fluid volume administered: 1L      Critical care was performed the patient presented with an elevated lactic acid, elevated white count and tachycardia and had signs of: Sepsis.  Time was spent reviewing labs, treating with IV fluids and IV antibiotics, discussing case with admitting team and consultants.  Reassessing patient's vital signs mental status and documentation.  Total critical care time was 60 minutes.    Assessment & Plan    Jong is a 44-year-old male with complicated past medical history including recent GI bleed from presumed sphincterotomy site from ERCP per chart review presents with recurrent signs and symptoms of brisk upper GI bleed with hematemesis and bright red blood per rectum.    On exam he has right upper quadrant tenderness on exam without guarding peritoneal signs.  He is has tachycardia and reports multiple episodes of bright red blood and hematemesis since this morning.  Initial evaluation will include type and cross, i-STAT hemoglobin, EKG troponin and emergent GI consultation.  Will also start with IV Protonix as it is what they had him on during his last hospitalization.    Initial laboratory studies note normal hemoglobin and elevated lactic acid.  Given concern this could represent ascending cholangitis or some other source of sepsis will add on blood cultures and antibiotics.  Per chart review patient was on Zosyn for ascending colon [place] will restart this.  Patient does have an elevated white count.  LFTs and bilirubin are also elevated furthering the concern for ascending cholangitis.  Patient be admitted to the medicine service.  GI has been consulted.  Still awaiting GI recommendations; patient be admitted to the hospital service.  They can follow-up on GI recommendations.      I have reviewed the nursing notes. I have reviewed the findings,  diagnosis, plan and need for follow up with the patient.    New Prescriptions    No medications on file       Final diagnoses:   Hematemesis with nausea   Lactic acidosis   Sepsis, due to unspecified organism, unspecified whether acute organ dysfunction present (H)       Hai Alvarado  [facility] EMERGENCY DEPARTMENT  5/8/2024           Hai Alvarado MD  05/08/24 1027       Hai Alvarado MD  05/08/24 1023

## 2024-05-08 NOTE — PHARMACY-VANCOMYCIN DOSING SERVICE
"Pharmacy Vancomycin Initial Note  Date of Service May 8, 2024  Patient's  1980  44 year old, male    Indication: Intra-abdominal infection    Current estimated CrCl = Estimated Creatinine Clearance: 86.4 mL/min (based on SCr of 1.07 mg/dL).    Creatinine for last 3 days  2024:  8:28 AM Creatinine 1.07 mg/dL    Recent Vancomycin Level(s) for last 3 days  No results found for requested labs within last 3 days.      Vancomycin IV Administrations (past 72 hours)        No vancomycin orders with administrations in past 72 hours.                    Nephrotoxins and other renal medications (From now, onward)      Start     Dose/Rate Route Frequency Ordered Stop    24 2330  vancomycin (VANCOCIN) 1,000 mg in 200 mL dextrose intermittent infusion         1,000 mg  200 mL/hr over 1 Hours Intravenous EVERY 12 HOURS 24 1044      24 1630  piperacillin-tazobactam (ZOSYN) 3.375 g vial to attach to  mL bag        Note to Pharmacy: For [facility], [facility] and [facility]: For Zosyn-naive patients, use the \"Zosyn initial dose + extended infusion\" order panel.    3.375 g  over 30 Minutes Intravenous EVERY 6 HOURS 24 0906      24 1115  vancomycin (VANCOCIN) 1,750 mg in sodium chloride 0.9 % 500 mL intermittent infusion         1,750 mg  over 120 Minutes Intravenous ONCE 24 1043              Contrast Orders - past 72 hours (72h ago, onward)      None            [employer] Prediction of Planned Initial Vancomycin Regimen  Loading dose: 1750 mg IV x 1  Regimen: 1000 mg IV every 12 hours.  Start time: 10:40 on 2024  Exposure target: AUC24 (range)400-600 mg/L.hr   AUC24,ss: 482 mg/L.hr  Probability of AUC24 > 400: 70 %  Ctrough,ss: 15.2 mg/L  Probability of Ctrough,ss > 20: 26 %  Probability of nephrotoxicity (Lodise TYLER ): 10 %          Plan:  Start vancomycin 1750 mg IV x 1 THEN 1000 mg IV q12h.   Vancomycin monitoring method: AUC  Vancomycin therapeutic monitoring goal: 400-600 mg*h/L  Pharmacy " will check vancomycin levels as appropriate in 1-3 Days.    Serum creatinine levels will be ordered daily for the first week of therapy and at least twice weekly for subsequent weeks.      Hawa Alegria RPH

## 2024-05-08 NOTE — CONSULTS
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 Now presenting with concerns of upper GI bleed and ascending cholangitis.     # C/f ascending cholangitis   # Elevated LFTs  # Sepsis (tachycardic, leukocytosis)   # Elevated lactate  # H/o biliary obstruction from hepatic artery aneurysm causing mass effect, s/p ERCP with biliary stent   One day of chills, diaphoresis and RUQ pain along with coffee ground emesis. Found to have leukocytosis, elevated lactate and elevated LFTs concerning for biliary source. ERCP is warranted and possible upper GI bleed can be assessed during this as well. Give history of complex celiac artery dissection and hepatic artery aneurysm, we have ordered stat CTA prior to procedure to assess these findings along with possible other sources of infection such as a hepatic abscess.       # Coffee ground emesis and melena   # Recent post sphincterotomy bleed   Presenting with 4 episodes of coffee ground emesis along with melena over the past 24 hours. Hgb stable at 11.7 which is above recent baseline, suspect some hemoconcentration in setting of above sepsis. He had ulceration around sphincterotomy during last admission (4/10) which  was thought to cause of previous melena. I suspect the above sepsis is driving denton of the lactic acidosis and tachycardia given stable Hgb.  Sources of upper GI blood loss will be assessed during ERCP today but include PUD, gastritis, esophagitis, ongoing ulceration around previous sphincterotomy site.       Recommendations  - STAT CTA abdomen/pelvis ordered   - Plan for ERCP today to assess for upper GI bleed and biliary obstruction   - ensure 2 large bore IVs  - IV PPI BID  - Continue antibiotics for intraabdominal coverage  - blood cultures pending   - fluid resuscitation and trend lactate   - Primary team to trend Hgb and transfuse if Hgb < 7 from GI standpoint or hemodynamically significant bleeding.     Thank you for involving us in this patient's care. Please do not hesitate to contact the GI service with any questions or concerns.     Pt care plan discussed with Dr. Lehman, GI staff physician.    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    Then admitted again for ascending cholangitis and melena. Repeat ERCP showed the previous stent had migrated. No active bleed but previous sphincterotomy site was ulcerate and though to be source of recent blood loss. A 7Fr double-pigtailed Zimmon stent and a 10Fr x 16 cm intraductal Haley stent were placed into the bile   duct to decrease chance of recurrent migration.     Patient now presenting with coffee ground emesis, melena and abdominal pain. Patient reports he was feeling well up until  yesterday when he had vomiting with dark blood in it along with melena. Also having chills, sweats, and RUQ abdominal pain. He has had 4 episodes of coffee ground emesis since yesterday with the last time occurring this morning. Difficult to quantify amount but denies bright red blood in vomit.                Past Medical History:   Reviewed and edited as appropriate  Past Medical History:   Diagnosis Date    Diabetes mellitus, type 2 (H)     Gall bladder stones     Hypertension             Past Surgical History:   Reviewed and edited as appropriate   Past Surgical History:   Procedure Laterality Date    ABDOMEN SURGERY      ENDOSCOPIC RETROGRADE CHOLANGIOPANCREATOGRAM N/A 04/03/2024    Procedure: ENDOSCOPIC RETROGRADE CHOLANGIOPANCREATOGRAPHY, biliary sphincterotomy, stone removal, stent placement;  Surgeon: Harsha Ferguson MD;  Location: [facility] OR    ENDOSCOPIC RETROGRADE CHOLANGIOPANCREATOGRAM N/A 4/11/2024    Procedure: ENDOSCOPIC RETROGRADE CHOLANGIOPANCREATOGRAPHY with stent exchange;  Surgeon: Harsha Ferguson MD;  Location: [facility] OR              Social History:   Reviewed and edited as appropriate  Social History     Socioeconomic History    Marital status: Single     Spouse name: Not on file    Number of children: Not on file    Years of education: Not on file    Highest education level: Not on file   Occupational History    Not on file   Tobacco Use    Smoking status: Every Day     Types: Cigarettes     Passive exposure: Current    Smokeless tobacco: Never   Substance and Sexual Activity    Alcohol use: Not Currently    Drug use: Yes     Types: Marijuana    Sexual activity: Not on file   Other Topics Concern    Not on file   Social History Narrative    Not on file     Social Determinants of Health     Financial Resource Strain: Not on file   Food Insecurity: Not on file   Transportation Needs: Not on file   Physical Activity: Not on file   Stress: Not on file   Social Connections: Unknown (11/22/2023)    Received  from [facility], [facility]    Social Connections     Frequency of Communication with Friends and Family: Not on file   Interpersonal Safety: Not on file   Housing Stability: Not on file            Family History:   Reviewed and edited as appropriate  No family history on file.         Allergies:   Reviewed and edited as appropriate   No Known Allergies         Review of Systems:   A complete 10 point review of systems was obtained.  Please see the HPI for pertinent positives and negatives.    All other systems were reviewed and were found to be negative.            Physical Exam:   BP (!) 163/96   Pulse 111   Temp 98.6  F (37  C) (Temporal)   Resp 18   Wt 82.6 kg (182 lb)   SpO2 100%   BMI 31.24 kg/m    Wt:   Wt Readings from Last 2 Encounters:   05/08/24 82.6 kg (182 lb)   04/11/24 82.5 kg (181 lb 12.8 oz)      Constitutional: appears ill, mildly diaphoretic   Eyes: mild scleral icterus   Ears/nose/mouth/throat: Normal oropharynx without ulcers or exudate, mucus membranes moist, hearing intact  CV: tachycardic   Respiratory: Unlabored breathing  Abd: soft, tender in RUQ, no peritoneal signs.   Skin: warm, perfused, no jaundice  Neuro: AAO x 3, No asterixis  Psych: Normal affect  MSK: No gross deformities         Data:   Labs and imaging below were independently reviewed and interpreted    BMP  Recent Labs   Lab 05/08/24  0828 05/08/24  0756    140   POTASSIUM 3.2* 3.8   CHLORIDE 103  --    LUIS 9.5  --    CO2 26  --    BUN 27.8*  --    CR 1.07  --    * 239*     CBC  Recent Labs   Lab 05/08/24  0756 05/08/24  0735   WBC  --  17.3*   RBC  --  4.05*   HGB 12.6* 11.7*   HCT 37* 35.8*   MCV  --  88   MCH  --  28.9   MCHC  --  32.7   RDW  --  16.5*   PLT  --  345     INR  Recent Labs   Lab 05/08/24  0851   INR 1.24*     LFTs  Recent Labs   Lab 05/08/24  0828   ALKPHOS 612*   *   *   BILITOTAL 3.4*   PROTTOTAL 7.9    ALBUMIN 4.3      PANCNo lab results found in last 7 days.

## 2024-05-08 NOTE — H&P
[facility]    History and Physical - Medicine Service, [facility] TEAM 1       Date of Admission:  5/8/2024    Assessment & Plan      Jong Quiroga is a 44 year old male admitted on 5/8/2024. 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    GI Bleed  Hx of Biliary Obstruction s/p Stent placement 4/3, re-vision 4/11  Presenting with 2 days of BRBPR and hematemesis. Hgb stable on admission, however could be falsley elevated in the setting of dehydration. He remainds HDS.   - GI consult, appreciate assistance in this evaluation      - STAT CTA Ab/Pelvis      - ERCP following imaging      - IV PPI BID      - continue IV Abx  - trend Hgb, transfuse if Hgb <7  - Type and Screen: 5/8    Severe Sepsis  Leukocytosis  Lactic Acidosis  Lactic acid 4.1 and WBC 17 in ED. Endorses chills. Remains HDS and afebrile. CRP and Procalcitonin significantly elevated.  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 He was evaluated by vascular surgery, IR, rhematology, and hepatology during hospitalization 1/4-1/7/2024. No suitable surgical / endovascular target. Pt schedule to see Vascular Surgery as an outpatient on 5/10/24.  - CTA abdomen/pelvis    HTN  - HOLD PTA anti-hypertensive regimen in the setting of GI bleed - will likely re-start post ERCP       -PTA metoprolol 50 mg twice daily        -PTA amlodipine  "10 mg daily       -PTA clonidine 0.1 mg twice daily       -PTA hydrochlorothiazide 25 mg daily    T2DM  - HOLD Lantus 10u at bedtime given NPO  - HOLD MDSSI while NPO  - Hypoglycemia protocol      Polysubstance Use History  Formerly smoked 1-2 ppd. Reduced tobacco, still daily marijuana use. Denies alcohol over last month.           Diet: NPO for Medical/Clinical Reasons Except for: Meds    DVT Prophylaxis: Pneumatic Compression Devices  Tran Catheter: Not present  Fluids: None  Lines: None     Cardiac Monitoring: None  Code Status: Full Code      Clinically Significant Risk Factors Present on Admission        # Hypokalemia: Lowest K = 3.2 mmol/L in last 2 days, will replace as needed        # Coagulation Defect: INR = 1.24 (Ref range: 0.85 - 1.15) and/or PTT = N/A, will monitor for bleeding    # Hypertension: Home medication list includes antihypertensive(s)      # Obesity: Estimated body mass index is 31.24 kg/m  as calculated from the following:    Height as of 4/10/24: 1.626 m (5' 4\").    Weight as of this encounter: 82.6 kg (182 lb).              Disposition Plan      Expected Discharge Date: 05/12/2024                The patient's care was discussed with the Attending Physician, Dr. Pantoja .      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     Per ED provider, \"presents with hematemesis and bright red stools x 2 days.  Patient reports that yesterday 1 episode of hematemesis and 1 episode of " "bright red stools. This morning he had 2 episodes of hematemesis and 2 episodes of bright red stools.  He reports this is similar to his previous episode (per chart review at that time was thought to be due to bleeding from his sphincterotomy site).  He does endorse some right upper quadrant pain that started along with this that he states had resolved after his previous hospitalization and did not recur until tonight.  He endorses some chills but no other infectious symptoms such as fever, cough, URI symptoms etc.  He states he feels diffusely weak but denies chest pain, syncope or lightheadedness.  Denies heavy alcohol use, denies heavy ibuprofen use. \"    On further evaluation, Jong confirms story of dark red blood in emesis and stool. Endorses chills and fever to 101. Endorses decreased PO intake over the last couple of days.       In the ED, tachycardic and hypertensive. Labs notable for leukocytosis, stable hemoglobin, elevated LFTs and bilirubin, respiratory alkalosis, elevated lactic acid. Given protonix, zosyn, 1L NS bolus x2. GI consulted in the ED who recommended stat CTA abdomen/pelvis, ECRP, Vanc and Zosyn.      Past Medical History    Past Medical History:   Diagnosis Date    Diabetes mellitus, type 2 (H)     Gall bladder stones     Hypertension        Past Surgical History   Past Surgical History:   Procedure Laterality Date    ABDOMEN SURGERY      ENDOSCOPIC RETROGRADE CHOLANGIOPANCREATOGRAM N/A 04/03/2024    Procedure: ENDOSCOPIC RETROGRADE CHOLANGIOPANCREATOGRAPHY, biliary sphincterotomy, stone removal, stent placement;  Surgeon: Harsha Ferguson MD;  Location: [facility] OR    ENDOSCOPIC RETROGRADE CHOLANGIOPANCREATOGRAM N/A 4/11/2024    Procedure: ENDOSCOPIC RETROGRADE CHOLANGIOPANCREATOGRAPHY with stent exchange;  Surgeon: Harsha Ferguson MD;  Location: [facility] OR       Prior to Admission Medications   Prior to Admission Medications   Prescriptions Last Dose Informant Patient Reported? Taking?   BD LINO U/F " 32G X 4 MM insulin pen needle  Self Yes Yes   Blood Glucose Monitoring Suppl (ACCU-CHEK GUIDE ME) w/Device KIT  Self Yes Yes   OPTIUM TEST STRIPS test strip 5/7/2024 at PM Self Yes Yes   Sig: Dispense item covered by pt ins. E11.65 NIDDM type II, uncontrolled - Test 4 times/day. Reason: High A1C   amLODIPine (NORVASC) 10 MG tablet 5/7/2024 at PM Self No Yes   Sig: Take 1 tablet (10 mg) by mouth daily   blood glucose monitoring (SOFTCLIX) lancets  Self Yes Yes   cloNIDine (CATAPRES) 0.1 MG tablet 5/7/2024 at AM Self No Yes   Sig: Take 1 tablet (0.1 mg) by mouth 2 times daily   hydrochlorothiazide (HYDRODIURIL) 25 MG tablet 5/7/2024 at AM Self No Yes   Sig: Take 1 tablet (25 mg) by mouth daily   insulin aspart (NOVOLOG PEN) 100 UNIT/ML pen Past Week at Few days ago  Yes Yes   Sig: Inject 0-12 Units Subcutaneous 3 times daily (before meals) Blood Glucose             Dose  < 70                               see hypoglycemia protocol                             no insulin  150-199                         2 units  200-249                         4 units  250-299                         6 units  300-349                         8 units  350-399                         10 units  400 and greater             12 units and call MD   insulin glargine (LANTUS PEN) 100 UNIT/ML pen 5/6/2024 at PM  No Yes   Sig: Inject 10 Units Subcutaneous at bedtime   metoprolol tartrate (LOPRESSOR) 50 MG tablet 5/7/2024 at PM Self No Yes   Sig: Take 1 tablet (50 mg) by mouth 2 times daily   pantoprazole (PROTONIX) 40 MG EC tablet 5/7/2024 at AM  No Yes   Sig: Take 1 tablet (40 mg) by mouth every morning (before breakfast)   prochlorperazine (COMPAZINE) 10 MG tablet Past Month at Unknown Self No Yes   Sig: Take 1 tablet (10 mg) by mouth every 6 hours as needed for nausea or vomiting      Facility-Administered Medications: None           Physical Exam   Vital Signs: Temp: 98.6  F (37  C) Temp src: Temporal BP: (!) 143/81 Pulse: 115   Resp: 18  SpO2: 100 % O2 Device: None (Room air)    Weight: 182 lbs 0 oz    General Appearance: lying in bed, calm, answering questions appropriately  Eyes: EOMI, sclera mildly icteric  HEENT: MMM, full neck ROM  Respiratory: CTAB, no wheezing appreciated  Cardiovascular: RRR, no murmurs appreciated   GI: soft, mildly distended, tender in RUQ, active bowel sounds  Skin: warm and well perfused, no rashes or lesions noted  Musculoskeletal: moving all extremities spontaneously, no edema  Neurologic: NFD, cranial nerves grossly intact      Medical Decision Making       Please see A&P for additional details of medical decision making.      Data     I have personally reviewed the following data over the past 24 hrs:    17.3 (H)  \   12.6 (L)   / 345     141 103 27.8 (H) /  222 (H)   3.2 (L) 26 1.07 \     ALT: 294 (H) AST: 327 (H) AP: 612 (H) TBILI: 3.4 (H)   ALB: 4.3 TOT PROTEIN: 7.9 LIPASE: N/A     Trop: 20 BNP: N/A     Procal: N/A CRP: 110.00 (H) Lactic Acid: 4.1 (HH)       INR:  1.24 (H) PTT:  N/A   D-dimer:  N/A Fibrinogen:  N/A       Imaging results reviewed over the past 24 hrs:   No results found for this or any previous visit (from the past 24 hour(s)).

## 2024-05-08 NOTE — ED TRIAGE NOTES
Ambulatory to triage for rectal bleeding and hematemesis that started yesterday  Dark red  Recent stent placement 4/3       Triage Assessment (Adult)       Row Name 05/08/24 0796          Triage Assessment    Airway WDL WDL        Respiratory WDL    Respiratory WDL WDL        Skin Circulation/Temperature WDL    Skin Circulation/Temperature WDL WDL        Cardiac WDL    Cardiac WDL WDL        Peripheral/Neurovascular WDL    Peripheral Neurovascular WDL WDL        Cognitive/Neuro/Behavioral WDL    Cognitive/Neuro/Behavioral WDL WDL

## 2024-05-09 LAB
ALBUMIN SERPL BCG-MCNC: 3.6 G/DL (ref 3.5–5.2)
ALP SERPL-CCNC: 434 U/L (ref 40–150)
ALT SERPL W P-5'-P-CCNC: 194 U/L (ref 0–70)
ANION GAP SERPL CALCULATED.3IONS-SCNC: 12 MMOL/L (ref 7–15)
AST SERPL W P-5'-P-CCNC: 106 U/L (ref 0–45)
BILIRUB SERPL-MCNC: 1.6 MG/DL
BUN SERPL-MCNC: 20 MG/DL (ref 6–20)
CALCIUM SERPL-MCNC: 8.9 MG/DL (ref 8.6–10)
CHLORIDE SERPL-SCNC: 108 MMOL/L (ref 98–107)
CREAT SERPL-MCNC: 0.94 MG/DL (ref 0.67–1.17)
DEPRECATED HCO3 PLAS-SCNC: 24 MMOL/L (ref 22–29)
EGFRCR SERPLBLD CKD-EPI 2021: >90 ML/MIN/1.73M2
ERYTHROCYTE [DISTWIDTH] IN BLOOD BY AUTOMATED COUNT: 15.8 % (ref 10–15)
GLUCOSE SERPL-MCNC: 136 MG/DL (ref 70–99)
HCT VFR BLD AUTO: 24 % (ref 40–53)
HGB BLD-MCNC: 7.2 G/DL (ref 13.3–17.7)
HGB BLD-MCNC: 7.3 G/DL (ref 13.3–17.7)
HGB BLD-MCNC: 7.6 G/DL (ref 13.3–17.7)
LACTATE SERPL-SCNC: 0.9 MMOL/L (ref 0.7–2)
MCH RBC QN AUTO: 27.4 PG (ref 26.5–33)
MCHC RBC AUTO-ENTMCNC: 31.7 G/DL (ref 31.5–36.5)
MCV RBC AUTO: 87 FL (ref 78–100)
PLATELET # BLD AUTO: 283 10E3/UL (ref 150–450)
POTASSIUM SERPL-SCNC: 3.4 MMOL/L (ref 3.4–5.3)
PROT SERPL-MCNC: 6.8 G/DL (ref 6.4–8.3)
RBC # BLD AUTO: 2.77 10E6/UL (ref 4.4–5.9)
SODIUM SERPL-SCNC: 144 MMOL/L (ref 135–145)
WBC # BLD AUTO: 9.7 10E3/UL (ref 4–11)

## 2024-05-09 PROCEDURE — 85027 COMPLETE CBC AUTOMATED: CPT | Performed by: STUDENT IN AN ORGANIZED HEALTH CARE EDUCATION/TRAINING PROGRAM

## 2024-05-09 PROCEDURE — 85018 HEMOGLOBIN: CPT

## 2024-05-09 PROCEDURE — 250N000011 HC RX IP 250 OP 636

## 2024-05-09 PROCEDURE — 80053 COMPREHEN METABOLIC PANEL: CPT | Performed by: STUDENT IN AN ORGANIZED HEALTH CARE EDUCATION/TRAINING PROGRAM

## 2024-05-09 PROCEDURE — 250N000011 HC RX IP 250 OP 636: Performed by: INTERNAL MEDICINE

## 2024-05-09 PROCEDURE — 36415 COLL VENOUS BLD VENIPUNCTURE: CPT | Performed by: STUDENT IN AN ORGANIZED HEALTH CARE EDUCATION/TRAINING PROGRAM

## 2024-05-09 PROCEDURE — C9113 INJ PANTOPRAZOLE SODIUM, VIA: HCPCS

## 2024-05-09 PROCEDURE — 83605 ASSAY OF LACTIC ACID: CPT | Performed by: INTERNAL MEDICINE

## 2024-05-09 PROCEDURE — 120N000002 HC R&B MED SURG/OB UMMC

## 2024-05-09 PROCEDURE — 99418 PROLNG IP/OBS E/M EA 15 MIN: CPT | Performed by: PHYSICIAN ASSISTANT

## 2024-05-09 PROCEDURE — 36415 COLL VENOUS BLD VENIPUNCTURE: CPT | Performed by: INTERNAL MEDICINE

## 2024-05-09 PROCEDURE — 36415 COLL VENOUS BLD VENIPUNCTURE: CPT | Performed by: SURGERY

## 2024-05-09 PROCEDURE — 85018 HEMOGLOBIN: CPT | Performed by: SURGERY

## 2024-05-09 PROCEDURE — 36415 COLL VENOUS BLD VENIPUNCTURE: CPT

## 2024-05-09 PROCEDURE — 99233 SBSQ HOSP IP/OBS HIGH 50: CPT | Mod: GC | Performed by: INTERNAL MEDICINE

## 2024-05-09 PROCEDURE — 99233 SBSQ HOSP IP/OBS HIGH 50: CPT | Performed by: PHYSICIAN ASSISTANT

## 2024-05-09 PROCEDURE — C9113 INJ PANTOPRAZOLE SODIUM, VIA: HCPCS | Performed by: INTERNAL MEDICINE

## 2024-05-09 RX ORDER — CEFEPIME HYDROCHLORIDE 2 G/1
2 INJECTION, POWDER, FOR SOLUTION INTRAVENOUS EVERY 8 HOURS
Status: DISCONTINUED | OUTPATIENT
Start: 2024-05-09 | End: 2024-05-10

## 2024-05-09 RX ORDER — CLONIDINE HYDROCHLORIDE 0.1 MG/1
0.1 TABLET ORAL 2 TIMES DAILY
Qty: 60 TABLET | Refills: 0 | OUTPATIENT
Start: 2024-05-09

## 2024-05-09 RX ORDER — AMLODIPINE BESYLATE 10 MG/1
10 TABLET ORAL DAILY
Qty: 30 TABLET | Refills: 0 | OUTPATIENT
Start: 2024-05-09

## 2024-05-09 RX ORDER — HYDROCHLOROTHIAZIDE 25 MG/1
25 TABLET ORAL DAILY
Qty: 30 TABLET | Refills: 0 | OUTPATIENT
Start: 2024-05-09

## 2024-05-09 RX ORDER — METOPROLOL TARTRATE 50 MG
50 TABLET ORAL 2 TIMES DAILY
Qty: 60 TABLET | Refills: 0 | OUTPATIENT
Start: 2024-05-09

## 2024-05-09 RX ORDER — ASPIRIN 81 MG/1
81 TABLET, CHEWABLE ORAL DAILY
Qty: 30 TABLET | Refills: 0 | OUTPATIENT
Start: 2024-05-09

## 2024-05-09 RX ADMIN — VANCOMYCIN HYDROCHLORIDE 1000 MG: 1 INJECTION, SOLUTION INTRAVENOUS at 00:45

## 2024-05-09 RX ADMIN — CEFEPIME HYDROCHLORIDE 2 G: 2 INJECTION, POWDER, FOR SOLUTION INTRAVENOUS at 23:55

## 2024-05-09 RX ADMIN — PANTOPRAZOLE SODIUM 40 MG: 40 INJECTION, POWDER, FOR SOLUTION INTRAVENOUS at 19:43

## 2024-05-09 RX ADMIN — PIPERACILLIN AND TAZOBACTAM 3.38 G: 3; .375 INJECTION, POWDER, LYOPHILIZED, FOR SOLUTION INTRAVENOUS at 04:29

## 2024-05-09 RX ADMIN — PANTOPRAZOLE SODIUM 40 MG: 40 INJECTION, POWDER, FOR SOLUTION INTRAVENOUS at 09:13

## 2024-05-09 RX ADMIN — PIPERACILLIN AND TAZOBACTAM 3.38 G: 3; .375 INJECTION, POWDER, LYOPHILIZED, FOR SOLUTION INTRAVENOUS at 11:21

## 2024-05-09 RX ADMIN — VANCOMYCIN HYDROCHLORIDE 1000 MG: 1 INJECTION, SOLUTION INTRAVENOUS at 11:59

## 2024-05-09 RX ADMIN — CEFEPIME HYDROCHLORIDE 2 G: 2 INJECTION, POWDER, FOR SOLUTION INTRAVENOUS at 15:56

## 2024-05-09 ASSESSMENT — ACTIVITIES OF DAILY LIVING (ADL)
ADLS_ACUITY_SCORE: 21
ADLS_ACUITY_SCORE: 20
ADLS_ACUITY_SCORE: 21
ADLS_ACUITY_SCORE: 21
ADLS_ACUITY_SCORE: 20
ADLS_ACUITY_SCORE: 21
ADLS_ACUITY_SCORE: 37
ADLS_ACUITY_SCORE: 20
ADLS_ACUITY_SCORE: 20
ADLS_ACUITY_SCORE: 21
ADLS_ACUITY_SCORE: 21

## 2024-05-09 NOTE — PROGRESS NOTES
[facility]    Progress Note - Medicine Service, MAROON TEAM 1       Date of Admission:  5/8/2024    Assessment & Plan   Jong Quiroga is a 44 year old male admitted on 5/8/2024. 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 Found to have arterial bleeding in hepatic vasculature and occlusion of previous biliary stents.    Changes today  - discuss with GI, IR, Transplant surgery re: management of hepatic artery  - continue NPO    GI Bleed  Elevated LFTs, obstructive pattern  Hx of Biliary Obstruction s/p Stent placement 4/3, re-vision 4/11  Presenting with 2 days of BRBPR and hematemesis. Hgb stable on admission, however could be falsley elevated in the setting of dehydration. He remainds HDS. Found to have arterial bleediung of hepatic artery during ERCP. Bleeding resolved and metal stent placed. LFTs improving. Hgb stable.   - GI consult, appreciate assistance in this evaluation      - IR and Transplant Surgery Consult for definitive management of hepatic artery.       - IV PPI BID   - trend Hgb, transfuse if Hgb <7  - Type and Screen: 5/8     Severe Sepsis  Leukocytosis  Lactic Acidosis  Lactic acid 4.1 and WBC 17 in ED. Endorses chills. Remains HDS and afebrile. CRP and Procalcitonin significantly elevated. Lactic acid returned to wnl with IVF and Abx, likely.   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 He was evaluated by vascular surgery, IR, rhematology, and hepatology during hospitalization 1/4-1/7/2024. No suitable surgical / endovascular target. Pt schedule to see Vascular Surgery as an outpatient on 5/10/24. Found to have hepatic artery bleeding on ERCP 5/9  - IR and Transplant Surgery consult to decide a definitive plan for management       HTN  - HOLD PTA anti-hypertensive regimen as NPO       -PTA metoprolol 50 mg twice daily        -PTA amlodipine 10 mg daily       -PTA clonidine 0.1 mg twice daily       -PTA hydrochlorothiazide 25 mg daily     T2DM  - HOLD Lantus 10u at bedtime given NPO  - HOLD MDSSI while NPO  - Hypoglycemia protocol        Polysubstance Use History  Formerly smoked 1-2 ppd. Reduced tobacco, still daily marijuana use. Denies alcohol over last month.          Diet: NPO for Medical/Clinical Reasons Except for: No Exceptions    DVT Prophylaxis: Pneumatic Compression Devices  Tran Catheter: Not present  Fluids: None  Lines: None     Cardiac Monitoring: None  Code Status: Full Code      Clinically Significant Risk Factors        # Hypokalemia: Lowest K = 3.2 mmol/L in last 2 days, will replace as needed   # Hypocalcemia: Lowest Ca = 8.3 mg/dL in last 2 days, will monitor and replace as appropriate      # Coagulation Defect: INR = 1.24 (Ref range: 0.85 - 1.15) and/or PTT = N/A, will monitor for bleeding           # Obesity: Estimated body mass index is 31.24 kg/m  as calculated from the following:    Height as of 4/10/24: 1.626 m (5' 4\").    Weight as of this encounter: 82.6 kg (182 lb)., PRESENT ON ADMISSION            Disposition Plan     Expected Discharge Date: 05/12/2024                The patient's care was discussed with the Attending Physician, Dr. Pantoja .    Marielle Phillip MD  Internal Medicine - Pediatrics Resident, PGY-3  [facility]  Available on Vocera    ______________________________________________________________________    Interval History   NAEON. " "Nursing notes reviewed. Pt with continued abdominal pain but it has slightly improved. Complains he is hungry. He states he overall feels better just wants to eat. Denies bloody stool, states \"it is lightening up\". Updated on plan of care.     Physical Exam   Vital Signs: Temp: 98.2  F (36.8  C) Temp src: Oral BP: (!) 146/88 Pulse: 102   Resp: 24 SpO2: 99 % O2 Device: None (Room air) Oxygen Delivery: 5 LPM  Weight: 182 lbs 0 oz    General Appearance: Lying in bed, appears comfortable  Respiratory: CTAB, no wheezing  Cardiovascular: RRR, no murmurs  GI: soft, slightly distended, tender for RUQ  Skin: warm and well perfused, no edema  Other: NFD, cranial nerves grossly intact      Medical Decision Making       Please see A&P for additional details of medical decision making.      Data     I have personally reviewed the following data over the past 24 hrs:    9.7  \   7.6 (L)   / 283     144 108 (H) 20.0 /  136 (H)   3.4 24 0.94 \     ALT: 194 (H) AST: 106 (H) AP: 434 (H) TBILI: 1.6 (H)   ALB: 3.6 TOT PROTEIN: 6.8 LIPASE: N/A     Trop: N/A BNP: N/A     Procal: N/A CRP: N/A Lactic Acid: 1.7       INR:  1.24 (H) PTT:  N/A   D-dimer:  N/A Fibrinogen:  N/A       Imaging results reviewed over the past 24 hrs:   Recent Results (from the past 24 hour(s))   CTA Abdomen Pelvis with Contrast    Narrative    Exam: Computed tomographic angiography of the abdomen and pelvis  without and with contrast, including 3D reformations dated 5/8/2024  11:28 AM    Clinical information:  h/o celiac and hepatic artery anersym causing  biliary obstruction s/p ERCP - now with sepsis, abdominal pain,  elevated LFTs, and coffee ground emesis.    Technique: Helical scans through the abdomen and pelvis obtained  before the administration of intravenous contrast media and following  the injection of contrast media  in the arterial phase. Source images  reviewed as well as 3D and multi-planar reconstructions.    Contrast: 112    DLP: 941 " mGy*cm    Comparison study: 4/10/2024    Findings:      Internal biliary stents are in place. Again demonstrated is diffuse  aneurysmal dilatation of the common hepatic artery extending  continuously through the proper hepatic, right hepatic and proximal  branches of the right hepatic artery. Left hepatic artery is fed  through a collateral from the pancreaticoduodenal branches. Overall  the size and appearance is unchanged.    Mild intrahepatic biliary dilatation persists bilaterally. 2 cm  hypodensity in segment 5/8. Gallstones identified. No pericholecystic  fluid or color wall thickening.    No definite contrast extravasation in the bowel or stomach. Lobulated  contour of the kidneys persist. Prominence of the intrarenal  collecting system without hydroureter persists and is stable. The  bowel is not abnormally dilated. The spleen and pancreas are normal.  The adrenal glands demonstrate thickening bilaterally which may be  secondary to hyperplasia.    No ascites.    No abnormal lymph nodes in the abdomen or pelvis.    In the partially visualized lower chest, no acute pulmonary opacities.  No pleural or pericardial effusions.    No suspicious osseous abnormality.      Impression    Impression:    1. No significant change in diffuse aneurysmal configuration extending  from the common hepatic artery through the right intrahepatic  branches.    2. No evidence of active bleeding.    3. Internal biliary stents are in place however there is persistent  mild to moderate diffuse intrahepatic biliary dilatation. 2 cm  hypodensity in segments 5/8 of the liver persists. Initial tiny  hypodensity in the same segment.    4. Cholelithiasis.    JERSON PIRES         SYSTEM ID:  [id number]   XR Surgery GIAN L/T 5 Min Fluoro w Stills    Narrative    This exam was marked as non-reportable because it will not be read by a   radiologist or a [place] non-radiologist provider.

## 2024-05-09 NOTE — PROVIDER NOTIFICATION
7B Rm 1448 Kimber Sunshine. Critical lab value for blood culture. Positive for Streptococcus Anginosus and Enterobacter.     Also pt asking for ice chips. Can he get some?    Thanks,  KARON Leal  [phone]

## 2024-05-09 NOTE — CONSULTS
[facility] Transplant Surgery Consultation    Jong Quiroga MRN# [id number]   Age: 44 year old YOB: 1980     Date of Admission:  5/8/2024    Date of Consult:   5/08/24    Reason for consult: Hemobilia       Requesting service: GI                   Assessment and Plan:   Assessment:   45 yo man with celiac dissection extending into right hepatic artery who presents with hemobilia        Plan:         Chief Complaint:   GI bleed         History of Present Illness:   Jong is a 44 year old male who presents to 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 The patient was seen by transplant surgery in January for his dissection at which point blood pressure management was recommended. The patient noted upper abdominal pain/bloating/tarry and bright red blood per rectum. ERCP was performed yesterday and patient was noted to have significant hemobilia. CTA prior to ERCP identified no active extravazation. Patient has had decreasing hemoglobins since this time               Past Medical History:     Past Medical History:   Diagnosis Date    Diabetes mellitus, type 2 (H)     Gall bladder stones     Hypertension              Past Surgical History:     Past Surgical History:   Procedure Laterality Date    ABDOMEN SURGERY      ENDOSCOPIC RETROGRADE CHOLANGIOPANCREATOGRAM N/A 04/03/2024    Procedure: ENDOSCOPIC RETROGRADE CHOLANGIOPANCREATOGRAPHY, biliary sphincterotomy, stone removal, stent placement;  Surgeon: Harsha Ferguson MD;  Location: [facility] OR    ENDOSCOPIC RETROGRADE CHOLANGIOPANCREATOGRAM N/A 4/11/2024    Procedure: ENDOSCOPIC RETROGRADE CHOLANGIOPANCREATOGRAPHY with stent exchange;   Surgeon: Harsha Ferguson MD;  Location: [facility] OR    ENDOSCOPIC RETROGRADE CHOLANGIOPANCREATOGRAM N/A 5/8/2024    Procedure: ENDOSCOPIC RETROGRADE CHOLANGIOPANCREATOGRAPHY with biliary stent exchange, biliary shpincterotomy, and clot removal;  Surgeon: Westley Lehman MD;  Location: [facility] OR             Social History:     Social History     Tobacco Use    Smoking status: Every Day     Types: Cigarettes     Passive exposure: Current    Smokeless tobacco: Never   Substance Use Topics    Alcohol use: Not Currently             Family History:   History reviewed. No pertinent family history.             Allergies:   No Known Allergies          Medications:     Current Facility-Administered Medications   Medication Dose Route Frequency Provider Last Rate Last Admin    acetaminophen (TYLENOL) tablet 650 mg  650 mg Oral Q4H PRN Elizabeth Canales MD        [Held by provider] amLODIPine (NORVASC) tablet 10 mg  10 mg Oral Daily Elizabeth Canales MD        calcium carbonate (TUMS) chewable tablet 1,000 mg  1,000 mg Oral 4x Daily PRN Elizabeth Canales MD        [Held by provider] cloNIDine (CATAPRES) tablet 0.1 mg  0.1 mg Oral BID Elizabeth Canales MD        glucose gel 15-30 g  15-30 g Oral Q15 Min PRN Marielle Phillip MD        Or    dextrose 50 % injection 25-50 mL  25-50 mL Intravenous Q15 Min PRN Marielle Phillip MD        Or    glucagon injection 1 mg  1 mg Subcutaneous Q15 Min PRN Marielle Phillip MD        [Held by provider] hydrochlorothiazide (HYDRODIURIL) tablet 25 mg  25 mg Oral Daily Elizabeth Canales MD        indomethacin (INDOCIN) 50 MG Suppository 100 mg  100 mg Rectal Once PRN Elizabeth Canales MD        [Held by provider] insulin glargine (LANTUS PEN) injection 10 Units  10 Units Subcutaneous At Bedtime Elizabeth Canales MD        lidocaine (LMX4) cream   Topical Q1H PRN Elizabeth Canales MD        lidocaine (LMX4) cream   Topical Q1H PRN Elizabeth Canales  MD        lidocaine 1 % 0.1-1 mL  0.1-1 mL Other Q1H PRN Elizabeth Canales MD        lidocaine 1 % 0.1-1 mL  0.1-1 mL Other Q1H PRN Elizabeth Canales MD        [Held by provider] metoprolol tartrate (LOPRESSOR) tablet 50 mg  50 mg Oral BID Elizabeth Canales MD        naloxone (NARCAN) injection 0.2 mg  0.2 mg Intravenous Q2 Min PRN Elizabeth Canales MD        naloxone (NARCAN) injection 0.2 mg  0.2 mg Intramuscular Q2 Min PRN Elizabeth Canales MD        naloxone (NARCAN) injection 0.4 mg  0.4 mg Intravenous Q2 Min PRN Elizabeth Canales MD        naloxone (NARCAN) injection 0.4 mg  0.4 mg Intramuscular Q2 Min PRN Elizabeth Canales MD        ondansetron (ZOFRAN ODT) ODT tab 4 mg  4 mg Oral Q6H PRN Elizabeth Canales MD        Or    ondansetron (ZOFRAN) injection 4 mg  4 mg Intravenous Q6H PRN Elizabeth Canales MD        pantoprazole (PROTONIX) IV push injection 40 mg  40 mg Intravenous BID Marielle Phillip MD   40 mg at 05/09/24 0913    piperacillin-tazobactam (ZOSYN) 3.375 g vial to attach to  mL bag  3.375 g Intravenous Q6H Marielle Phillip MD   3.375 g at 05/09/24 1121    polyethylene glycol (MIRALAX) Packet 17 g  17 g Oral Daily PRN Elizabeth Canales MD        sodium chloride (PF) 0.9% PF flush 3 mL  3 mL Intracatheter Q8H Elizabeth Canales MD   3 mL at 05/09/24 0913    sodium chloride (PF) 0.9% PF flush 3 mL  3 mL Intracatheter q1 min prn Elizabeth Canales MD        sodium chloride (PF) 0.9% PF flush 3 mL  3 mL Intracatheter Q8H Elizabeth Canales MD   3 mL at 05/09/24 0920    sodium chloride (PF) 0.9% PF flush 3 mL  3 mL Intracatheter q1 min prn Elizabeth Canales MD        vancomycin (VANCOCIN) 1,000 mg in 200 mL dextrose intermittent infusion  1,000 mg Intravenous Q12H Marielle Phillip  mL/hr at 05/09/24 1159 1,000 mg at 05/09/24 1159               Review of Systems:   Pertinent positives outlined in HPI         Physical  Exam:   All vitals have been reviewed  Temp:  [98.1  F (36.7  C)-99.5  F (37.5  C)] 98.8  F (37.1  C)  Pulse:  [] 106  Resp:  [14-35] 20  BP: (113-161)/() 157/90  SpO2:  [96 %-100 %] 99 %    Intake/Output Summary (Last 24 hours) at 5/9/2024 1338  Last data filed at 5/9/2024 0422  Gross per 24 hour   Intake 1000 ml   Output 425 ml   Net 575 ml     Physical Exam:  General - nondistressed  Neuro - alert, oriented  Abdomen - soft, nd, nt  MSK - no gross abnormalities  Skin - no lesions, no jaundie          Data:   All laboratory data reviewed    Results:  BMP  Recent Labs   Lab 05/09/24  0717 05/08/24 2015 05/08/24  1607 05/08/24  1604 05/08/24  1408 05/08/24  0828 05/08/24  0756     --  141  --   --  141 140   POTASSIUM 3.4  --  3.3*  --   --  3.2* 3.8   CHLORIDE 108*  --  106  --   --  103  --    CO2 24  --  22  --   --  26  --    BUN 20.0  --  20.7*  --   --  27.8*  --    CR 0.94  --  1.01  --   --  1.07  --    * 170* 185* 175*   < > 222* 239*    < > = values in this interval not displayed.     CBC  Recent Labs   Lab 05/09/24  1245 05/09/24 0717 05/08/24 1607 05/08/24  0756 05/08/24  0735   WBC  --  9.7 13.0*  --  17.3*   HGB 7.2* 7.6* 8.8* 12.6* 11.7*   PLT  --  283 312  --  345     LFT  Recent Labs   Lab 05/09/24 0717 05/08/24 1607 05/08/24  0851 05/08/24  0828   * 240*  --  327*   * 257*  --  294*   ALKPHOS 434* 509*  --  612*   BILITOTAL 1.6* 3.5*  --  3.4*   ALBUMIN 3.6 3.7  --  4.3   INR  --  1.24* 1.24*  --      Recent Labs   Lab 05/09/24  0717 05/08/24 2015 05/08/24  1607 05/08/24  1604 05/08/24  1408 05/08/24  0828   * 170* 185* 175* 164* 222*       Imaging:  CXR:  CT:         Zach Napier MD        I have reviewed history, examined patient and discussed plan with the fellow/resident/SHLOMO.  I concur with the findings in this note.

## 2024-05-09 NOTE — PROGRESS NOTES
A&Ox4; calling appropriately. HTN but within parameters. Home BP meds currently held. Diet advanced to clears; tolerating well. Denies pain. One BM reported today; still maroon in color but pt reports it is getting better. Hgb down trending 7.6 to 7.2. Next recheck tomorrow AM. Up independently. (L) PIV- intermittent abx (Zosyn and Vanco). Transplant surgery consult and seen today. Continue to monitor for signs of bleeding and follow plan of care.

## 2024-05-09 NOTE — PLAN OF CARE
Goal Outcome Evaluation:      Plan of Care Reviewed With: patient    Overall Patient Progress: no changeOverall Patient Progress: no change    POD #1. A&Ox4. On RA. Vital signs within parameter. Denies cardiac chest pain, SOB, N/V. L PIV TKO/ABX. R PIV saline locked. Distended abdominal, active bowel sounds. BM 5/7. Urinating spontaneously without difficulty. NPO. Critical lab values for blood cultures, MD notified. Continue POC.      Vital signs:  BP (!) 146/88 (BP Location: Left arm)   Pulse 102   Temp 98.2  F (36.8  C) (Oral)   Resp 24   Wt 82.6 kg (182 lb)   SpO2 99%   BMI 31.24 kg/m

## 2024-05-09 NOTE — PROGRESS NOTES
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 Now presenting with concerns of upper GI bleed and ascending cholangitis.      # Ascending cholangitis r/t hemobilia/intraductal clots  # Hepatic artery aneurysm with biliary fistula, s/p metal biliary stenting  # Acute blood loss anemia  # H/o biliary obstruction from hepatic artery aneurysm causing mass effect, s/p ERCP with biliary stent   Presented to [facility] with one day of chills, diaphoresis and RUQ pain along with coffee ground emesis. Hgb 11.7 on admission and vitally stable. Given history of complex celiac artery dissection and hepatic artery aneurysm, CTA abd/pelv obtained prior to ERCP which was without significant change, no abscess or active bleeding.     Underwent ERCP 5/8 which demonstrated active arterial bleeding likely secondary to a fistulous connection with a branch of the hepatic artery. A covered metal stent was placed and no further bleeding at the end of the procedure. Hgb drop down to 7.6 this AM, 7.2 this afternoon. IR consulted urgently 5/8 and because of tortuous hepatic artery stenting unlikely to be possible so are potentially recommending coil/plug embolization of the entire common hepatic artery which would put him at risk for biliary ischemia - they recommended hepatobiliary surgery consultation for input (consulted and awaiting input).       # Elevated LFTs, improved  # Bacteremia presumed r/t cholangitis  # Sepsis (tachycardic, leukocytosis)  r/t above  Found to have leukocytosis, elevated lactate and elevated LFTs (Tbili 3.4 -> 1.6, Alk phos 612->434,  -> 194 and -> 106) on admission concerning for biliary source as above. Admission blood cultures growing Enterobacter cloacae and Strep pyogenes. Started on Zosyn/vanc on admission. Liver tests down trending after ERCP. Hemodynamically stable yet ongoing tachycardia.      RECOMMENDATIONS:  -- HPB/liver transplant surgery consultation  -- Await recs from IR  -- CLD ordered for now (IR not planning procedure today)  -- Monitor H/H and for s/s of continued blood loss.  -- Transfuse to maintain hgb >7.0  -- Repeat ERCP for stent removal timing TBD    Discussed with primary medicine team, GI will follow closely    The patient was discussed and plan agreed upon with GI staff, Dr Lehman.      Overall time spent on the date of this encounter preparing to see the patient (including chart review of available notes, clinical status events, imaging and labs); obtaining history; examining the patient, coordinating and/or ordering medications, tests and/or procedures; communicating with other health care professionals during extensive multidisciplinary GI/surgery conference this AM; and documenting the above clinical information in the electronic medical record was  80  minutes.      Keyona Patton PA-C  Advanced Endoscopy/Pancreaticobiliary GI Service  [facility]  Vocera   ______________________________________________________      Interval events since last evaluated: Nursing notes and 24hr events reviewed. NAEON, vitals stable. Reporting no pain.    Patient seen and examined at 1000. Patient reports that he is feeling OK today. Denies abdominal pain. No further emesis since PTA. Had a bloody stool (dark red blood) this morning. Has been NPO, feeling hungry.    ROS:   4 pt ROS negative unless noted in subjective.     Objective:  Blood pressure (!) 146/88, pulse 102,  temperature 98.2  F (36.8  C), temperature source Oral, resp. rate 24, weight 82.6 kg (182 lb), SpO2 99%.  Gen: Lying in bed with spouse. Appears comfortable  HEENT: NCAT. Conjunctiva clear. Sclera anicteric  Chest: breathing comfortably  Msk: no gross deformity  Skin:  no jaundice  Ext: warm, well perfused  Neuro: grossly normal  Mental status/Psych: A&O. Asks/answers questions appropriately     PROCEDURES:  ERCP 5/8 Dr. Lehman  - Significant arterial bleeding across the biliary orifice following removal of two occluded biliary stents from a patent biliary sphincterotomy                          - Complete opacification of the biliary tree was not                          feasible due to the rapid efflux of contrast in the                          setting of significant hemobilia, likely secondary to                          a fistulous connection with a branch of the hepatic                          artery                          - Filling defect seen throughout the biliary system                          which likely represents clot and sloughing biliary                          epithelium                          - Uncomplicated biliary sphincterotomy extension to                          accomidate placment of a 10mm covered metal stent                          placed across the entire length of the common     LABS:  BMP  Recent Labs   Lab 05/08/24 2015 05/08/24  1607 05/08/24  1604 05/08/24  1408 05/08/24  0828 05/08/24  0756   NA  --  141  --   --  141 140   POTASSIUM  --  3.3*  --   --  3.2* 3.8   CHLORIDE  --  106  --   --  103  --    LUIS  --  8.3*  --   --  9.5  --    CO2  --  22  --   --  26  --    BUN  --  20.7*  --   --  27.8*  --    CR  --  1.01  --   --  1.07  --    * 185* 175* 164* 222* 239*     CBC  Recent Labs   Lab 05/09/24  0717 05/08/24  1607 05/08/24  0756 05/08/24  0735   WBC 9.7 13.0*  --  17.3*   RBC 2.77* 3.15*  --  4.05*   HGB 7.6* 8.8* 12.6* 11.7*   HCT 24.0* 25.7* 37* 35.8*    MCV 87 82  --  88   MCH 27.4 27.9  --  28.9   MCHC 31.7 34.2  --  32.7   RDW 15.8* 15.7*  --  16.5*    312  --  345     INR  Recent Labs   Lab 05/08/24  1607 05/08/24  0851   INR 1.24* 1.24*     LFTs  Recent Labs   Lab 05/08/24  1607 05/08/24  0828   ALKPHOS 509* 612*   * 327*   * 294*   BILITOTAL 3.5* 3.4*   PROTTOTAL 6.7 7.9   ALBUMIN 3.7 4.3      PANCNo lab results found in last 7 days.      IMAGING:  (Personally reviewed)  ------------------------------------------------------------------  Exam: Computed tomographic angiography of the abdomen and pelvis  without and with contrast, including 3D reformations dated 5/8/2024  11:28 AM     Clinical information:  h/o celiac and hepatic artery anersym causing  biliary obstruction s/p ERCP - now with sepsis, abdominal pain,  elevated LFTs, and coffee ground emesis.     Technique: Helical scans through the abdomen and pelvis obtained  before the administration of intravenous contrast media and following  the injection of contrast media  in the arterial phase. Source images  reviewed as well as 3D and multi-planar reconstructions.     Contrast: 112     DLP: 941 mGy*cm     Comparison study: 4/10/2024     Findings:       Internal biliary stents are in place. Again demonstrated is diffuse  aneurysmal dilatation of the common hepatic artery extending  continuously through the proper hepatic, right hepatic and proximal  branches of the right hepatic artery. Left hepatic artery is fed  through a collateral from the pancreaticoduodenal branches. Overall  the size and appearance is unchanged.     Mild intrahepatic biliary dilatation persists bilaterally. 2 cm  hypodensity in segment 5/8. Gallstones identified. No pericholecystic  fluid or color wall thickening.     No definite contrast extravasation in the bowel or stomach. Lobulated  contour of the kidneys persist. Prominence of the intrarenal  collecting system without hydroureter persists and is stable.  The  bowel is not abnormally dilated. The spleen and pancreas are normal.  The adrenal glands demonstrate thickening bilaterally which may be  secondary to hyperplasia.     No ascites.     No abnormal lymph nodes in the abdomen or pelvis.     In the partially visualized lower chest, no acute pulmonary opacities.  No pleural or pericardial effusions.     No suspicious osseous abnormality.                                                                      Impression:     1. No significant change in diffuse aneurysmal configuration extending  from the common hepatic artery through the right intrahepatic  branches.     2. No evidence of active bleeding.     3. Internal biliary stents are in place however there is persistent  mild to moderate diffuse intrahepatic biliary dilatation. 2 cm  hypodensity in segments 5/8 of the liver persists. Initial tiny  hypodensity in the same segment.     4. Cholelithiasis.     ------------------------------------------------------------------

## 2024-05-09 NOTE — PROGRESS NOTES
Antimicrobial Stewardship Team Note    Antimicrobial Stewardship Program - A joint venture between [facility] and  Physicians to optimize antibiotic management.  NOT a formal consult - Restricted Antimicrobial Review     Patient: Jong Quiroga  MRN: [id number]  Allergies: Patient has no known allergies.    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    History of Present Illness:  Patient was admitted to [facility] from 4/10 - 4/14/2024 with acute cholangitis with bacteremia and acute blood loss anemia with melena likely from resolved biliary sphincterectomy bleeding. The morning after the patient's admission he became tachycardic to the 110s, fever to 103 and leukocytosis to 17.7. Patient was started on vancomycin and Zosyn.  Blood cultures drawn that resulted strep anginosus and H. influenzae.  ID was consulted and on 4/12 recommended ceasing vancomycin and switching Zosyn to Unasyn which was eventually transitioned to Augmentin for plan for 14-day treatment course from last negative blood culture.  Additionally, CTA abdomen without signs of GI bleed which was confirmed from EGD/ERCP on 4/11.  Patient received 1 unit PRBCs on 4/11.  Patient was recommended to continue Protonix 40 mg daily.     On 5/8, the patient does endorse some right upper quadrant pain that started along with this that he states had resolved after his previous hospitalization and did not recur until tonight. He endorses some chills but no other infectious symptoms such as fever, cough, URI symptoms. He states he feels diffusely weak but denies chest  pain, syncope or lightheadedness. Denies heavy alcohol use, denies heavy ibuprofen use. GI was consulted by ED physician and they recommended a stat CTAP with plan for ERCP. CTAP showed internal biliary stents are in place with persistent mild to moderate diffuse intrahepatic biliary dilatation. Vancomycin and Zosyn were started for IAI coverage with blood cultures taken with one culture with no growth to date and the other showing (2/2 bottles GNBs and 1/2 bottles GPCs in pairs and chains), MRSA nares (resolved negative), and verigene GP/GN panels (showing Streptococcus species and Enterobacter species), and a Urine culture (no growth to date). AST team was paged on verigene results showing Enterobacter species. Lactate was at 4.1 now down to 1.7 on repeat. Procalcitonin elevated at 3.75.         Active Anti-infective Medications   (From admission, onward)                 Start     Stop    05/08/24 2330  vancomycin  1,000 mg,   Intravenous,   200 mL/hr,   EVERY 12 HOURS        Intra-Abdominal Infection       --    05/08/24 1630  piperacillin-tazobactam  3.375 g,   Intravenous,   EVERY 6 HOURS        Intra-Abdominal Infection       --                  Assessment:   Vancomycin and Zosyn were started for IAI coverage with blood cultures taken with one culture with no growth to date and the other showing (2/2 bottles GNBs and 1/2 bottles GPCs in pairs and chains), MRSA nares (resolved negative), and verigene GP/GN panels (showing Streptococcus species and Enterobacter species), and a Urine culture (no growth to date).    The Cho II trial shows that among patients with bloodstream infection due to AmpC producers, piperacillin-tazobactam may lead to more microbiological failures, although fewer microbiological relapses were seen and provides justification for not using piperacillin-tazobactam for empiric coverage of Enterobacter species. Being that Enterobacter Species can be a AmpC , and the possibility of  pseudomonas being ruled out by Verigene GN, and cefepime having good empiric coverage against streptococcus groups we recommend empirically switching coverage from ZOSYN to cefepime 2 g q8h targeting 7 days of therapy starting today (to cover both Enterobacter and Strep bacteremia) followed by an additional 7 days of ceftriaxone (strep bacteremia targeted) for a total of 14 days of therapy. Day 1 of therapy would start from today given ERCP yesterday (5/8) as this would have achieved source control but pt was not receiving adequate coverage with previous ZOSYN regimen. We also recommend discontinuing vancomycin at this time due to empiric coverage of both enterobacter and streptococcus being covered by cefepime with no concern of S.aureus per verigene GP and MRSA nares negative.    Recommendations:  Recommend empirically switching coverage from ZOSYN to cefepime 2 g q8h targeting 7 days of therapy starting today (to cover both Enterobacter and Strep bacteremia) followed by an additional 7 days of ceftriaxone (strep bacteremia targeted) for a total of 14 days of therapy.  Recommend discontinuing vancomycin at this time.  Repeat blood cultures given source control via ERCP yesterday.    Pharmacy took the following actions: Called/paged provider.    Caesar Christy, PharmD  Discussed with ID Staff  MD Candice Andrews, PharmD, [facility], [facility]    Vital Signs/Clinical Features:  Vitals         05/07 0700  05/08 0659 05/08 0700  05/09 0659 05/09 0700  05/09 1227   Most Recent      Temp ( F)   98.1 -  99.5      98.8     98.8 (37.1) 05/09 1100    Pulse   91 -  127      106     106 05/09 1100    Resp   14 -  35      20     20 05/09 1100    BP   113/98 -  164/99      157/90     157/90  Comment: with activity 05/09 1100    SpO2 (%)   96 -  100      99     99 05/09 1100            Labs  Estimated Creatinine Clearance: 98.3 mL/min (based on SCr of 0.94 mg/dL).  Recent Labs   Lab Test 04/12/24  0620 04/13/24  0651  04/14/24  0658 05/08/24  0828 05/08/24  1607 05/09/24  0717   CR 0.83 0.79 0.83 1.07 1.01 0.94       Recent Labs   Lab Test 04/12/24  0620 04/13/24  0651 04/14/24  0658 05/08/24  0735 05/08/24  0756 05/08/24  1607 05/09/24  0717   WBC 14.6* 12.9* 12.0* 17.3*  --  13.0* 9.7   HGB 9.7* 10.1* 9.8* 11.7* 12.6* 8.8* 7.6*   HCT 28.5* 30.2* 30.1* 35.8* 37* 25.7* 24.0*   MCV 83 83 85 88  --  82 87    461* 499* 345  --  312 283       Recent Labs   Lab Test 04/12/24  0620 04/13/24  0651 04/14/24  0658 05/08/24  0828 05/08/24  1607 05/09/24  0717   BILITOTAL 2.0* 1.6* 1.4* 3.4* 3.5* 1.6*   ALKPHOS 568* 491* 393* 612* 509* 434*   ALBUMIN 3.7 3.7 3.6 4.3 3.7 3.6   AST 44 44 37 327* 240* 106*   * 139* 109* 294* 257* 194*       Recent Labs   Lab Test 04/02/24  0646 04/10/24  1059 04/10/24  1506 04/11/24  0642 04/11/24  2111 04/12/24  0330 05/08/24  0735 05/08/24  0828 05/08/24  1140   PCAL  --   --   --   --   --   --   --  3.75*  --    LACT  --  2.1* 1.0  --  2.3* 1.9 4.1*  --  1.7   CRPI 15.00*  --   --  158.00*  --   --   --  110.00*  --              Culture Results:  7-Day Micro Results       Procedure Component Value Units Date/Time    Urine Culture Aerobic Bacterial [[id number]] Collected: 05/08/24 1206    Order Status: Completed Lab Status: Preliminary result Updated: 05/09/24 1036    Specimen: Urine, Midstream      Culture No growth, less than 1 day    MRSA MSSA PCR, Nasal Swab [[id number]] Collected: 05/08/24 0914    Order Status: Completed Lab Status: Final result Updated: 05/08/24 1231    Specimen: Swab from Nares, Bilateral      MRSA Target DNA Negative     SA Target DNA Negative    Narrative:      The [employer]  Xpert SA Nasal Complete assay performed in the [employer]  Dx System is a qualitative in vitro diagnostic test designed for rapid detection of Staphylococcus aureus (SA) and methicillin-resistant Staphylococcus aureus (MRSA) from nasal swabs in patients at risk for nasal colonization. The test  utilizes automated real-time polymerase chain reaction (PCR) to detect MRSA/SA DNA. The Xpert SA Nasal Complete assay is intended to aid in the prevention and control of MRSA/SA infections in healthcare settings. The assay is not intended to diagnose, guide or monitor treatment for MRSA/SA infections, or provide results of susceptibility to methicillin. A negative result does not preclude MRSA/SA nasal colonization.     Blood Culture Peripheral Blood [[id number]]  (Normal) Collected: 05/08/24 0851    Order Status: Completed Lab Status: Preliminary result Updated: 05/09/24 1016    Specimen: Peripheral Blood      Culture No growth after 1 day    Blood Culture Peripheral Blood [[id number]]  (Abnormal) Collected: 05/08/24 0828    Order Status: Completed Lab Status: Preliminary result Updated: 05/09/24 1218    Specimen: Peripheral Blood      Culture Positive on the 1st day of incubation      Enterobacter cloacae complex     Comment: 2 of 2 bottles         Streptococcus pyogenes (Group A Streptococcus)     Comment: 1 of 2 bottles       Verigene GP Panel [[id number]]  (Abnormal) Collected: 05/08/24 0828    Order Status: Completed Lab Status: Final result Updated: 05/08/24 2339    Specimen: Peripheral Blood      Staphylococcus species Not Detected     Staphylococcus aureus Not Detected     Staphylococcus epidermidis Not Detected     Staphylococcus lugdunensis Not Detected     Enterococcus faecalis Not Detected     Enterococcus faecium Not Detected     Streptococcus species Detected     Comment: Positive for Streptococcus species other than Streptococcus pneumococcus, Streptococcus anginosus group, Streptococcus pyogenes and Streptococcus agalactiae. Performed using [employer] multiplex nucleic acid test. Final identification and antimicrobial susceptibility testing will be verified by standard methods.        Streptococcus agalactiae Not Detected     Streptococcus anginosus group Not Detected     Streptococcus  pneumoniae Not Detected     Streptococcus pyogenes Not Detected     Listeria species Not Detected    Narrative:      Specimen tested with Verigene multiplex, gram-positive blood culture nucleic acid test for the following targets: Staphylococcus aureus, Staphylococcus epidermidis, Staphylococcus lugdunensis, other Staphylococcus species, Enterococcus faecalis, Enterococcus faecium, Streptococcus species, Streptococcus agalactiae, Streptococcus anginosus group, Streptococcus pneumoniae, Streptococcus pyogenes, Listeria species, mecA (methicillin resistance), and Nando/vanB (vancomycin resistance).    [employer] GN Panel [[id number]]  (Abnormal) Collected: 05/08/24 0828    Order Status: Completed Lab Status: Final result Updated: 05/08/24 2527    Specimen: Peripheral Blood      Acinetobacter species Not Detected     Citrobacter species Not Detected     Enterobacter species Detected     Comment: Positive for Enterobacter species by Verigene multiplex nucleic acid test. Final identification and antimicrobial susceptibility testing will be verified by standard methods.        Proteus species Not Detected     Escherichia coli Not Detected     Klebsiella pneumoniae Not Detected     Klebsiella oxytoca Not Detected     Pseudomonas aeruginosa Not Detected     CTX-M Not Detected     KPC Not Detected     NDM Not Detected     VIM Not Detected     IMP Not Detected     OXA Not Detected    Narrative:      Specimen tested with Verigene multiplex, gram-negative blood culture nucleic acid test for the following targets: Acinetobacter species, Citrobacter species, Enterobacter species, Proteus species, Escherichia coli, Klebsiella pneumoniae, Klebsiella oxytoca, Pseudomonas aeruginosa, and the following resistance markers: CTX-M, KPC, NDM, VIM, IMP and OXA.            Recent Labs   Lab Test 01/06/24  1543 04/01/24 2054 05/08/24  1206   URINEPH 5.0 6.0 5.5   NITRITE Negative Negative Negative   LEUKEST Negative Negative Negative   WBCU  1 3 5                         Imaging: XR Surgery GIAN L/T 5 Min Fluoro w Stills    Result Date: 5/8/2024  This exam was marked as non-reportable because it will not be read by a radiologist or a [facility] non-radiologist provider.     CTA Abdomen Pelvis with Contrast    Result Date: 5/8/2024  Exam: Computed tomographic angiography of the abdomen and pelvis without and with contrast, including 3D reformations dated 5/8/2024 11:28 AM Clinical information:  h/o celiac and hepatic artery anersym causing biliary obstruction s/p ERCP - now with sepsis, abdominal pain, elevated LFTs, and coffee ground emesis. Technique: Helical scans through the abdomen and pelvis obtained before the administration of intravenous contrast media and following the injection of contrast media  in the arterial phase. Source images reviewed as well as 3D and multi-planar reconstructions. Contrast: 112 DLP: 941 mGy*cm Comparison study: 4/10/2024 Findings:  Internal biliary stents are in place. Again demonstrated is diffuse aneurysmal dilatation of the common hepatic artery extending continuously through the proper hepatic, right hepatic and proximal branches of the right hepatic artery. Left hepatic artery is fed through a collateral from the pancreaticoduodenal branches. Overall the size and appearance is unchanged. Mild intrahepatic biliary dilatation persists bilaterally. 2 cm hypodensity in segment 5/8. Gallstones identified. No pericholecystic fluid or color wall thickening. No definite contrast extravasation in the bowel or stomach. Lobulated contour of the kidneys persist. Prominence of the intrarenal collecting system without hydroureter persists and is stable. The bowel is not abnormally dilated. The spleen and pancreas are normal. The adrenal glands demonstrate thickening bilaterally which may be secondary to hyperplasia. No ascites. No abnormal lymph nodes in the abdomen or pelvis. In the partially visualized lower chest, no acute  pulmonary opacities. No pleural or pericardial effusions. No suspicious osseous abnormality.     Impression: 1. No significant change in diffuse aneurysmal configuration extending from the common hepatic artery through the right intrahepatic branches. 2. No evidence of active bleeding. 3. Internal biliary stents are in place however there is persistent mild to moderate diffuse intrahepatic biliary dilatation. 2 cm hypodensity in segments 5/8 of the liver persists. Initial tiny hypodensity in the same segment. 4. Cholelithiasis. JERSON PIRES   SYSTEM ID:  [id number]

## 2024-05-09 NOTE — PROVIDER NOTIFICATION
7B Rm [id number] Jong Quiroga. Critical lab results for blood culture: Positive for Gram negative Bacilli and Gram positive Cocci.    KARON Leal  [phone]

## 2024-05-10 LAB
ALBUMIN SERPL BCG-MCNC: 3.6 G/DL (ref 3.5–5.2)
ALP SERPL-CCNC: 331 U/L (ref 40–150)
ALT SERPL W P-5'-P-CCNC: 115 U/L (ref 0–70)
ANION GAP SERPL CALCULATED.3IONS-SCNC: 10 MMOL/L (ref 7–15)
AST SERPL W P-5'-P-CCNC: 41 U/L (ref 0–45)
BACTERIA UR CULT: NO GROWTH
BASOPHILS # BLD AUTO: 0 10E3/UL (ref 0–0.2)
BASOPHILS NFR BLD AUTO: 0 %
BILIRUB SERPL-MCNC: 0.7 MG/DL
BLD PROD TYP BPU: NORMAL
BLOOD COMPONENT TYPE: NORMAL
BUN SERPL-MCNC: 10.4 MG/DL (ref 6–20)
CALCIUM SERPL-MCNC: 8.5 MG/DL (ref 8.6–10)
CHLORIDE SERPL-SCNC: 106 MMOL/L (ref 98–107)
CODING SYSTEM: NORMAL
CREAT SERPL-MCNC: 0.85 MG/DL (ref 0.67–1.17)
CROSSMATCH: NORMAL
DEPRECATED HCO3 PLAS-SCNC: 25 MMOL/L (ref 22–29)
EGFRCR SERPLBLD CKD-EPI 2021: >90 ML/MIN/1.73M2
EOSINOPHIL # BLD AUTO: 0.1 10E3/UL (ref 0–0.7)
EOSINOPHIL NFR BLD AUTO: 1 %
ERYTHROCYTE [DISTWIDTH] IN BLOOD BY AUTOMATED COUNT: 15.9 % (ref 10–15)
GLUCOSE BLDC GLUCOMTR-MCNC: 127 MG/DL (ref 70–99)
GLUCOSE BLDC GLUCOMTR-MCNC: 128 MG/DL (ref 70–99)
GLUCOSE BLDC GLUCOMTR-MCNC: 167 MG/DL (ref 70–99)
GLUCOSE SERPL-MCNC: 115 MG/DL (ref 70–99)
HBA1C MFR BLD: 6.3 %
HCT VFR BLD AUTO: 19.7 % (ref 40–53)
HGB BLD-MCNC: 6.7 G/DL (ref 13.3–17.7)
HGB BLD-MCNC: 7.2 G/DL (ref 13.3–17.7)
IMM GRANULOCYTES # BLD: 0.1 10E3/UL
IMM GRANULOCYTES NFR BLD: 1 %
ISSUE DATE AND TIME: NORMAL
LYMPHOCYTES # BLD AUTO: 1.6 10E3/UL (ref 0.8–5.3)
LYMPHOCYTES NFR BLD AUTO: 22 %
MCH RBC QN AUTO: 28.5 PG (ref 26.5–33)
MCHC RBC AUTO-ENTMCNC: 34 G/DL (ref 31.5–36.5)
MCV RBC AUTO: 84 FL (ref 78–100)
MONOCYTES # BLD AUTO: 0.6 10E3/UL (ref 0–1.3)
MONOCYTES NFR BLD AUTO: 8 %
NEUTROPHILS # BLD AUTO: 5.2 10E3/UL (ref 1.6–8.3)
NEUTROPHILS NFR BLD AUTO: 68 %
NRBC # BLD AUTO: 0 10E3/UL
NRBC BLD AUTO-RTO: 0 /100
PLATELET # BLD AUTO: 253 10E3/UL (ref 150–450)
POTASSIUM SERPL-SCNC: 3 MMOL/L (ref 3.4–5.3)
PROT SERPL-MCNC: 6.6 G/DL (ref 6.4–8.3)
RBC # BLD AUTO: 2.35 10E6/UL (ref 4.4–5.9)
SODIUM SERPL-SCNC: 141 MMOL/L (ref 135–145)
UNIT ABO/RH: NORMAL
UNIT NUMBER: NORMAL
UNIT STATUS: NORMAL
UNIT TYPE ISBT: 5100
WBC # BLD AUTO: 7.6 10E3/UL (ref 4–11)

## 2024-05-10 PROCEDURE — 120N000002 HC R&B MED SURG/OB UMMC

## 2024-05-10 PROCEDURE — 36415 COLL VENOUS BLD VENIPUNCTURE: CPT

## 2024-05-10 PROCEDURE — 82040 ASSAY OF SERUM ALBUMIN: CPT

## 2024-05-10 PROCEDURE — 85041 AUTOMATED RBC COUNT: CPT

## 2024-05-10 PROCEDURE — 99232 SBSQ HOSP IP/OBS MODERATE 35: CPT | Mod: 4UV | Performed by: PHYSICIAN ASSISTANT

## 2024-05-10 PROCEDURE — 250N000011 HC RX IP 250 OP 636

## 2024-05-10 PROCEDURE — 99254 IP/OBS CNSLTJ NEW/EST MOD 60: CPT | Mod: GC | Performed by: STUDENT IN AN ORGANIZED HEALTH CARE EDUCATION/TRAINING PROGRAM

## 2024-05-10 PROCEDURE — 250N000013 HC RX MED GY IP 250 OP 250 PS 637: Performed by: INTERNAL MEDICINE

## 2024-05-10 PROCEDURE — 250N000013 HC RX MED GY IP 250 OP 250 PS 637

## 2024-05-10 PROCEDURE — 83036 HEMOGLOBIN GLYCOSYLATED A1C: CPT

## 2024-05-10 PROCEDURE — C9113 INJ PANTOPRAZOLE SODIUM, VIA: HCPCS

## 2024-05-10 PROCEDURE — 85018 HEMOGLOBIN: CPT

## 2024-05-10 PROCEDURE — 87040 BLOOD CULTURE FOR BACTERIA: CPT

## 2024-05-10 PROCEDURE — P9016 RBC LEUKOCYTES REDUCED: HCPCS

## 2024-05-10 PROCEDURE — 99233 SBSQ HOSP IP/OBS HIGH 50: CPT | Mod: GC | Performed by: INTERNAL MEDICINE

## 2024-05-10 RX ORDER — CEFEPIME HYDROCHLORIDE 2 G/1
2 INJECTION, POWDER, FOR SOLUTION INTRAVENOUS EVERY 8 HOURS
Status: DISCONTINUED | OUTPATIENT
Start: 2024-05-10 | End: 2024-05-11

## 2024-05-10 RX ORDER — POTASSIUM CHLORIDE 750 MG/1
10 TABLET, EXTENDED RELEASE ORAL ONCE
Status: COMPLETED | OUTPATIENT
Start: 2024-05-10 | End: 2024-05-10

## 2024-05-10 RX ORDER — POTASSIUM CHLORIDE 7.45 MG/ML
10 INJECTION INTRAVENOUS ONCE
Status: DISCONTINUED | OUTPATIENT
Start: 2024-05-10 | End: 2024-05-10

## 2024-05-10 RX ADMIN — CEFEPIME HYDROCHLORIDE 2 G: 2 INJECTION, POWDER, FOR SOLUTION INTRAVENOUS at 17:08

## 2024-05-10 RX ADMIN — METOPROLOL TARTRATE 50 MG: 50 TABLET, FILM COATED ORAL at 22:05

## 2024-05-10 RX ADMIN — CEFEPIME HYDROCHLORIDE 2 G: 2 INJECTION, POWDER, FOR SOLUTION INTRAVENOUS at 08:22

## 2024-05-10 RX ADMIN — POTASSIUM CHLORIDE 10 MEQ: 750 TABLET, EXTENDED RELEASE ORAL at 12:56

## 2024-05-10 RX ADMIN — PANTOPRAZOLE SODIUM 40 MG: 40 INJECTION, POWDER, FOR SOLUTION INTRAVENOUS at 08:23

## 2024-05-10 RX ADMIN — PANTOPRAZOLE SODIUM 40 MG: 40 INJECTION, POWDER, FOR SOLUTION INTRAVENOUS at 22:06

## 2024-05-10 ASSESSMENT — ACTIVITIES OF DAILY LIVING (ADL)
ADLS_ACUITY_SCORE: 20
ADLS_ACUITY_SCORE: 21
ADLS_ACUITY_SCORE: 20

## 2024-05-10 NOTE — PROGRESS NOTES
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 IR involved and planning further management of the bleeding Monday 5/13. Per chart review the patient is having ongoing blood in stools with slight hemoglobin drop this morning requiring 1u pRBC. No hypotension, is tachy to 90s. Blood cultures growing Enterobacter cloacae and Strep pyogenes, ID is consulting today. Liver tests continue to trend down.      Recommendations:  -- Please reach out to IR emergently over the weekend if he has hemodynamically unstable bleeding over the weekend  -- No further endoscopic intervention planned at this time for management of bleeding  -- Diet per procedural teams  -- Ensure 2 large bore IVs (18G or larger)  -- Ensure active Type & Screen  -- Transfuse to maintain hgb >7.0  -- Monitor H/H and for ongoing s/s GI bleeding  -- Hold anticoagulants, NSAIDs    GI panc-bili team will follow peripherally over the weekend, please call GI fellow listed in AMCOM with questions/acute status changes.       GI Follow up (we will arrange):  -- Will need ERCP for stent removal/replacement pending clinic course. Tentatively in ~6 weeks but can be done sooner if course dictates.      The patient was not seen today. This note is a product of chart review and discussion with IR team.       Above in collaboration/discussed with Dr. Lehman, GI attending    Keyona Patton PA-C  Advanced Endoscopy/Pancreaticobiliary GI Service  [facility]  [place]

## 2024-05-10 NOTE — PROGRESS NOTES
[facility]    Progress Note - Medicine Service, MAROON TEAM 1       Date of Admission:  5/8/2024    Assessment & Plan   Jong Quiroga is a 44 year old male admitted on 5/8/2024. 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 Found to have arterial bleeding in hepatic vasculature and occlusion of previous biliary stents.    Changes today  - Plan for IR procedure on Mon 5/13 for management of hepatic artery aneurysm      - NPO Sun night 5/12  - ID consult for guidance of antibiotic choice, duration, and further surveillance needed given gram positive infection in addition to gram negative  - replace K with PO potassium  - repeat Blood Cx with next blood draw  - 1u pRBCs for Hgb 6.7 -> repeat Hgb this afternoon    GI Bleed 2/2 Aneurysmal hepatic artery  Elevated LFTs, obstructive pattern, improving  Hx of Biliary Obstruction s/p Stent placement 4/3, re-vision 4/11  Presenting with 2 days of BRBPR and hematemesis. Hgb stable on admission, however could be falsley elevated in the setting of dehydration. He remainds HDS. Found to have arterial bleediung of hepatic artery during ERCP. Bleeding resolved and metal stent placed. LFTs improving. S/p 1u pRBC 5/10 for Hgb 6.7   - GI consult, appreciate assistance in this evaluation   - IV PPI BID   - IR consult for definitive management of hepatic artery   - will plan for procedure Mon 5/13. NPO Sun night 5/12  - Transplant Surgery Consulted for definitive management of hepatic artery     - nothing can be offered from their end, recommended IR eval  - trend Hgb, transfuse if Hgb <7  - Type and Screen: 5/8, will repeat 5/11     Severe Sepsis  Bacteremia 2/2 Cholangitis  Hx of Cholangitis 4/11  Lactic acid 4.1 and WBC 17 in ED. Endorsed chills  PTA. CRP and Procalcitonin significantly elevated on admission. Remains HDS and afebrile. Lactic acid returned to wnl with IVF and Abx.   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 He was evaluated by vascular surgery, IR, rhematology, and hepatology during hospitalization 1/4-1/7/2024. No suitable surgical / endovascular target. Pt schedule to see Vascular Surgery as an outpatient on 5/10/24. Found to have hepatic artery bleeding on ERCP 5/9  - Plan for IR procedure on Mon 5/13 for management of hepatic artery aneurysm      - NPO Sun night 5/12       HTN  - HOLD PTA anti-hypertensive regimen       -PTA metoprolol 50 mg twice daily        -PTA amlodipine 10 mg daily       -PTA clonidine 0.1 mg twice daily       -PTA hydrochlorothiazide 25 mg daily     T2DM  - Lantus 10u at bedtime  - MDSSI   - Hypoglycemia protocol        Polysubstance Use History  Formerly smoked 1-2 ppd. Reduced tobacco, still daily marijuana use. Denies alcohol over last month.          Diet: NPO per Anesthesia Guidelines for Procedure/Surgery Except for: Meds  Regular Diet Adult    DVT Prophylaxis: Pneumatic Compression Devices  Tran Catheter: Not present  Fluids: None  Lines: None     Cardiac Monitoring: None  Code Status: Full Code      Clinically Significant Risk Factors        # Hypokalemia: Lowest K = 3 mmol/L in last 2 days, will replace as needed   # Hypocalcemia: Lowest Ca = 8.3 mg/dL in last 2 days, will monitor and replace as appropriate        # Coagulation Defect: INR = 1.24 (Ref range:  0.85 - 1.15) and/or PTT = N/A, will monitor for bleeding                      Disposition Plan      Expected Discharge Date: 05/14/2024                The patient's care was discussed with the Attending Physician, Dr. Pantoja .    Marielle Phillip MD  Internal Medicine - Pediatrics Resident, PGY-3  [facility]  Available on Vocera    ______________________________________________________________________    Interval History   NAEON. Nursing notes reviewed. Pt with improved abdominal pain today, tolerating PO intake. States stools are now brown in color. Updated on Plan of care. Discussed reasons why care team thinks he should stay in the hospital until his procedure on Monday (concern for re-bleed of hepatic artery and bacteremia requiring IV antibiotics). He and GF, Darryn, stated they understood.    Physical Exam   Vital Signs: Temp: (P) 98.3  F (36.8  C) Temp src: Oral BP: (!) 134/94 Pulse: (P) 95   Resp: (P) 17 SpO2: 100 % O2 Device: (P) None (Room air)    Weight: 182 lbs 0 oz    General Appearance: Lying in bed, appears comfortable  Respiratory: CTAB, no wheezing  Cardiovascular: RRR, no murmurs  GI: soft, non-tender, non-distended  Skin: warm and well perfused, no edema  Other: NFD, cranial nerves grossly intact      Medical Decision Making       Please see A&P for additional details of medical decision making.      Data     I have personally reviewed the following data over the past 24 hrs:    7.6  \   6.7 (LL)   / 253     141 106 10.4 /  115 (H)   3.0 (L) 25 0.85 \     ALT: 115 (H) AST: 41 AP: 331 (H) TBILI: 0.7   ALB: 3.6 TOT PROTEIN: 6.6 LIPASE: N/A     Procal: N/A CRP: N/A Lactic Acid: 0.9         Imaging results reviewed over the past 24 hrs:   No results found for this or any previous visit (from the past 24 hour(s)).

## 2024-05-10 NOTE — PLAN OF CARE
BP (!) 159/92 (BP Location: Right arm)   Pulse 98   Temp 98.5  F (36.9  C) (Oral)   Resp 17   Wt 82.6 kg (182 lb)   SpO2 100%   BMI 31.24 kg/m      Hypertensive and tachycardic, denied pain and nausea, no numbness/tingling, states that he continues to have maroon/black stools, lungs clear, up independently, cefepime given as ordered, L PIV saline locked, appeared to be asleep most of night, continue with plan of care

## 2024-05-10 NOTE — CONSULTS
IR follow-up note.    Please see IR note from 5/8 4:10 pm. Surgery reports they will not offer surgical intervention and recommend IR angiogram. IR will plan to proceed with angiogram and embolization 5/13 givne staffing (orders placed). Pt is HDS at this time.    Case discussed with Dr. Crawford from IR, Keyona Patton PA-C and Dr. Phillip. If patient experiencing hemodynamically significant bleeding over the weekend, please call IR on-call pager a [phone] and discuss.     Swathi Ruiz DNP, APRN  Interventional Radiology   IR on-call pager: [phone]

## 2024-05-10 NOTE — PLAN OF CARE
Vitals:    05/10/24 1015 05/10/24 1021 05/10/24 1157 05/10/24 1200   BP: (!) 140/89 (!) 134/94 (!) 149/92 (!) 149/92   BP Location:   Right arm Right arm   Pulse: 92 95 96    Resp: 18 17 17 18   Temp: 98.4  F (36.9  C) 98.3  F (36.8  C) 98.6  F (37  C) 98.1  F (36.7  C)   TempSrc:   Oral Oral   SpO2: 100% 100% 99% 100%   Weight:           Neuro: alert and oriented     VS: afebrile hypertensive and tachycardic, sating 100% on room air     B    Cardiac: 96    Pain/Nausea: denies nausea, no pain     Lines/Drains: PIV SL between antibiotic ,     GI/: voiding adequate, had BM, no blood in the stool    Diet/Appetite: regular diet, tolerating intake     Activity: up independently ambulated, goes out to smoke a lot,    Plan: Hgb 6.7, one unit RBC infused, recheck at 1600, ID consult placed for sepsis found to have entrobacter and strep pyogenesis grown in the blood,   Plan for IR Viscral angiogram per MD, continue with plan of care.

## 2024-05-10 NOTE — CONSULTS
[facility] ID Service: Initial Consultation     Patient:  Jong Quiroga, Date of birth 1980, Medical record number [id number]  Date of Visit:  May 10, 2024  Consult Requested by:Kenji Palomares MD         Assessment and Recommendations:   ID PROBLEM LIST:  1.   GI Bleed 2/2 Aneurysmal hepatic artery, Elevated LFTs, obstructive pattern, improving, Hx of Biliary Obstruction   A.s/p Stent placement 4/3, re-vision 4/11,IR procedure scehduled on Monday 5/13.  2.     Severe Sepsis   A. Blood Cx 5/8/24 - Growing Strep pyogenes and Enterobacter cloacae   3.     Segmental arterial mediolysis , H/O Complex celiac artery dissection  4.     HTN  5.     T2DM  6.     Polysubstance use history    RECOMMENDATIONS:  1. Would recommend IV Cefepime 2g Q8H   2. Repeat blood cultures (already ordered for today)  3.  Anticipate 7 days for E cloacae and eventual switch to Ceftriaxone for total 2 weeks for Strep.       DISCUSSION:  Jong Quiroga is a 44 year old male admitted on 5/8/2024,ID was consulted as blood cultures from 5/8/24 grew Enterobacter cloacae and Streptococcus pyogenes asking for recommendations for antibiotic regimen, duration, need for surveillance like ECHO.     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 Found to have arterial bleeding in hepatic vasculature and occlusion of previous biliary stents.     The blood cultures from 5/8/24 grew Enterobacter cloacae which had moderate to high levels of inducible AmpC beta lactamase expression hence would recommend Cefepime IV. Given there are no signs of persistent bacteremia thus would like hold any further imaging such as ECHO for now.     Thank you for this consult. ID will continue to follow. Don't hesitate to call with questions.     Patient was discussed with  the primary team today.     Dr Liu Bell will be on service over the weekend and Dr Rufus Duenas will be resuming services from Monday 5/13/24.    Jeffry Barnes ID fellow      Attending attestation    I personally examined and evaluated this patient on 5/10/24. I discussed the patient with the medical student and agree with the assessment and plan of care as documented in the student's note of 5/10/24.I have verified the history and personally performed the physical exam and medical decision making.I personally reviewed chart including vital signs, medications, labs, imaging. Key findings: Will continue cefepime, anticipate 7 days for E cloacae and eventual switch to Ceftriaxone for total 2 weeks for Strep.     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      Patient admitted in [facility] on 5/8/24 and he reported to have had 1 episode of hematemesis and 1 episode of bright red stools on 5/7/24 and on 5/8/24 he had 2 episodes of hematemesis and 2 episodes of bright red stools.  He reports this is similar to his previous episode (per chart review at that time was thought to be due to bleeding from his sphincterotomy site).  He did endorse some right upper quadrant pain.  He endorses some chills but no other infectious symptoms such as fever, cough, URI symptoms etc.  He states he feels diffusely weak but denies chest pain, syncope or lightheadedness.  Denies heavy alcohol use, denies heavy ibuprofen use.      Per  admission note, patient was admitted to [facility] from 4/10 - 4/14/2024 with acute cholangitis with bacteremia and acute blood loss anemia with melena likely from resolved biliary sphincterectomy bleeding.  The morning after the patient's admission he became tachycardic to the 110s, fever to 103 and leukocytosis to 17.7.  Patient was started on vancomycin and Zosyn.  Blood cultures drawn that resulted strep anginosus and H. influenzae.  ID was consulted and on 4/12 recommended ceasing vancomycin and switching Zosyn to Unasyn which was eventually transitioned to Augmentin for plan for 14-day treatment course from last negative blood culture.       Patient reviewed bedside today, wife and children present(5/10/24). Patient is doing well and is tolerating liquids. He hasn't had any episodes of hematemesis or blood in stools today. He has no reports of abdominal pain, nausea or vomiting. No other symptoms of chest pain, cough, fever or congestion.         Review of Systems:   Full 12 point ROS obtained, pertinent positives and negatives as above.       Past Medical History:     Past Medical History:   Diagnosis Date     Diabetes mellitus, type 2 (H)      Gall bladder stones      Hypertension      Past Surgical History:   Procedure Laterality Date     ABDOMEN SURGERY       ENDOSCOPIC RETROGRADE CHOLANGIOPANCREATOGRAM N/A 04/03/2024    Procedure: ENDOSCOPIC RETROGRADE CHOLANGIOPANCREATOGRAPHY, biliary sphincterotomy, stone removal, stent placement;  Surgeon: Harsha Ferguson MD;  Location:  OR     ENDOSCOPIC RETROGRADE CHOLANGIOPANCREATOGRAM N/A 4/11/2024    Procedure: ENDOSCOPIC RETROGRADE CHOLANGIOPANCREATOGRAPHY with stent exchange;  Surgeon: Harsha Ferguson MD;  Location:  OR     ENDOSCOPIC RETROGRADE CHOLANGIOPANCREATOGRAM N/A 5/8/2024    Procedure: ENDOSCOPIC RETROGRADE CHOLANGIOPANCREATOGRAPHY with biliary stent exchange, biliary shpincterotomy, and clot removal;  Surgeon: Westley Lehman MD;  Location:  OR          Allergies:    No Known Allergies         Current Antimicrobials:   Cefipime 2g IV Q8H    Prior  Vancomycin, Piperacillin-Tazobactam     Family History:   Reviewed and noncontributory       Social History:     Social History     Socioeconomic History     Marital status: Single     Spouse name: Not on file     Number of children: Not on file     Years of education: Not on file     Highest education level: Not on file   Occupational History     Not on file   Tobacco Use     Smoking status: Every Day     Types: Cigarettes     Passive exposure: Current     Smokeless tobacco: Never   Substance and Sexual Activity     Alcohol use: Not Currently     Drug use: Yes     Types: Marijuana     Sexual activity: Not on file   Other Topics Concern     Not on file   Social History Narrative     Not on file     Social Determinants of Health     Financial Resource Strain: Not on file   Food Insecurity: Not on file   Transportation Needs: Not on file   Physical Activity: Not on file   Stress: Not on file   Social Connections: Unknown (11/22/2023)    [facility] from [facility] [employer] [facility]    Social Connections      Frequency of Communication with Friends and Family: Not on file   Interpersonal Safety: Not on file   Housing Stability: Not on file              Physical Exam:   Ranges forvital signs:  Temp:  [98.2  F (36.8  C)-99  F (37.2  C)] 98.6  F (37  C)  Pulse:  [] 96  Resp:  [17-20] 17  BP: (125-159)/(75-95) 149/92  SpO2:  [99 %-100 %] 99 %  GENERAL:  well-developed, well-nourished, sitting in bed in no acute distress.   ENT:  Head is normocephalic, atraumatic. Oropharynx is moist without exudates or ulcers.  EYES:  Eyes have anicteric sclerae.    NECK:  Supple.  LUNGS:  Breathing comfortably  SKIN:  No acute rashes.  NEUROLOGIC:  Grossly nonfocal.         Laboratory Data:   Reviewed.  Pertinent for:    Culture data:    5/8/24  Blood culture Preliminary  result  Enterobacter cloacae  Streptococcus pyogenes (Group A Streptococcus)    Urine culture 5/8/24  No growth    Routine UA with microscopy      Nasal swab MRSA MSSA  Negative    Color Urine (no units)   Date Value   05/08/2024 Yellow     Appearance Urine (no units)   Date Value   05/08/2024 Clear     Glucose Urine (mg/dL)   Date Value   05/08/2024 Negative     Bilirubin Urine (no units)   Date Value   05/08/2024 Negative     Ketones Urine (mg/dL)   Date Value   05/08/2024 Trace (A)     Specific Gravity Urine (no units)   Date Value   05/08/2024 1.034     pH Urine (no units)   Date Value   05/08/2024 5.5     Protein Albumin Urine (mg/dL)   Date Value   05/08/2024 20 (A)     Nitrite Urine (no units)   Date Value   05/08/2024 Negative     Leukocyte Esterase Urine (no units)   Date Value   05/08/2024 Negative           Imaging:   CTA ABDOMEN AND PELVIS 5/8/24  Impression:     1. No significant change in diffuse aneurysmal configuration extending  from the common hepatic artery through the right intrahepatic  branches.     2. No evidence of active bleeding.     3. Internal biliary stents are in place however there is persistent  mild to moderate diffuse intrahepatic biliary dilatation. 2 cm  hypodensity in segments 5/8 of the liver persists. Initial tiny  hypodensity in the same segment.     4. Cholelithiasis.

## 2024-05-10 NOTE — PLAN OF CARE
Vitals:    05/09/24 2015 05/09/24 2033 05/09/24 2034 05/09/24 2246   BP: (!) 152/88 129/83 125/79 (!) 159/92   BP Location:    Right arm   Pulse: 111 (P) 105 106 98   Resp:    17   Temp: 98.9  F (37.2  C)  98.8  F (37.1  C) 98.5  F (36.9  C)   TempSrc: Oral  Oral Oral   SpO2:   99% 100%   Weight:        Afebrile BP stable, remain tachycardic, sating 100% on room air, non labor breathing,   Triggered lactic, refused lab draw, goes out to smoke , PIV SL between antibiotic,  Hgb recheck 7.3, voiding not saved, had BM, pt stated that stool is maroon color,   tolerating clears, advanced to regular diet did well, no change this shift,  Continue with plan of care.

## 2024-05-11 LAB
ABO/RH(D): NORMAL
ALBUMIN SERPL BCG-MCNC: 3.8 G/DL (ref 3.5–5.2)
ALP SERPL-CCNC: 274 U/L (ref 40–150)
ALT SERPL W P-5'-P-CCNC: 80 U/L (ref 0–70)
ANION GAP SERPL CALCULATED.3IONS-SCNC: 10 MMOL/L (ref 7–15)
ANTIBODY SCREEN: NEGATIVE
AST SERPL W P-5'-P-CCNC: 24 U/L (ref 0–45)
BASOPHILS # BLD AUTO: 0 10E3/UL (ref 0–0.2)
BASOPHILS NFR BLD AUTO: 0 %
BILIRUB SERPL-MCNC: 0.7 MG/DL
BUN SERPL-MCNC: 6.8 MG/DL (ref 6–20)
CALCIUM SERPL-MCNC: 8.9 MG/DL (ref 8.6–10)
CHLORIDE SERPL-SCNC: 102 MMOL/L (ref 98–107)
CREAT SERPL-MCNC: 0.8 MG/DL (ref 0.67–1.17)
DEPRECATED HCO3 PLAS-SCNC: 25 MMOL/L (ref 22–29)
EGFRCR SERPLBLD CKD-EPI 2021: >90 ML/MIN/1.73M2
EOSINOPHIL # BLD AUTO: 0.2 10E3/UL (ref 0–0.7)
EOSINOPHIL NFR BLD AUTO: 2 %
ERYTHROCYTE [DISTWIDTH] IN BLOOD BY AUTOMATED COUNT: 15.8 % (ref 10–15)
GLUCOSE BLDC GLUCOMTR-MCNC: 100 MG/DL (ref 70–99)
GLUCOSE BLDC GLUCOMTR-MCNC: 110 MG/DL (ref 70–99)
GLUCOSE BLDC GLUCOMTR-MCNC: 114 MG/DL (ref 70–99)
GLUCOSE BLDC GLUCOMTR-MCNC: 130 MG/DL (ref 70–99)
GLUCOSE BLDC GLUCOMTR-MCNC: 130 MG/DL (ref 70–99)
GLUCOSE BLDC GLUCOMTR-MCNC: 134 MG/DL (ref 70–99)
GLUCOSE SERPL-MCNC: 119 MG/DL (ref 70–99)
HCT VFR BLD AUTO: 23.5 % (ref 40–53)
HGB BLD-MCNC: 7.8 G/DL (ref 13.3–17.7)
IMM GRANULOCYTES # BLD: 0.1 10E3/UL
IMM GRANULOCYTES NFR BLD: 1 %
LACTATE SERPL-SCNC: 0.6 MMOL/L (ref 0.7–2)
LACTATE SERPL-SCNC: 2.3 MMOL/L (ref 0.7–2)
LYMPHOCYTES # BLD AUTO: 2 10E3/UL (ref 0.8–5.3)
LYMPHOCYTES NFR BLD AUTO: 22 %
MCH RBC QN AUTO: 28.3 PG (ref 26.5–33)
MCHC RBC AUTO-ENTMCNC: 33.2 G/DL (ref 31.5–36.5)
MCV RBC AUTO: 85 FL (ref 78–100)
MONOCYTES # BLD AUTO: 0.7 10E3/UL (ref 0–1.3)
MONOCYTES NFR BLD AUTO: 7 %
NEUTROPHILS # BLD AUTO: 6.1 10E3/UL (ref 1.6–8.3)
NEUTROPHILS NFR BLD AUTO: 68 %
NRBC # BLD AUTO: 0 10E3/UL
NRBC BLD AUTO-RTO: 0 /100
PLATELET # BLD AUTO: 289 10E3/UL (ref 150–450)
POTASSIUM SERPL-SCNC: 3.2 MMOL/L (ref 3.4–5.3)
PROT SERPL-MCNC: 6.8 G/DL (ref 6.4–8.3)
RBC # BLD AUTO: 2.76 10E6/UL (ref 4.4–5.9)
SODIUM SERPL-SCNC: 137 MMOL/L (ref 135–145)
SPECIMEN EXPIRATION DATE: NORMAL
WBC # BLD AUTO: 9 10E3/UL (ref 4–11)

## 2024-05-11 PROCEDURE — 86900 BLOOD TYPING SEROLOGIC ABO: CPT

## 2024-05-11 PROCEDURE — 99232 SBSQ HOSP IP/OBS MODERATE 35: CPT | Mod: GC | Performed by: INTERNAL MEDICINE

## 2024-05-11 PROCEDURE — 83605 ASSAY OF LACTIC ACID: CPT

## 2024-05-11 PROCEDURE — 36415 COLL VENOUS BLD VENIPUNCTURE: CPT

## 2024-05-11 PROCEDURE — 120N000002 HC R&B MED SURG/OB UMMC

## 2024-05-11 PROCEDURE — 82040 ASSAY OF SERUM ALBUMIN: CPT

## 2024-05-11 PROCEDURE — 250N000011 HC RX IP 250 OP 636

## 2024-05-11 PROCEDURE — 250N000013 HC RX MED GY IP 250 OP 250 PS 637

## 2024-05-11 PROCEDURE — C9113 INJ PANTOPRAZOLE SODIUM, VIA: HCPCS

## 2024-05-11 PROCEDURE — 85025 COMPLETE CBC W/AUTO DIFF WBC: CPT

## 2024-05-11 RX ORDER — CEFTRIAXONE 2 G/1
2 INJECTION, POWDER, FOR SOLUTION INTRAMUSCULAR; INTRAVENOUS EVERY 24 HOURS
Status: DISCONTINUED | OUTPATIENT
Start: 2024-05-17 | End: 2024-05-14 | Stop reason: HOSPADM

## 2024-05-11 RX ORDER — CEFEPIME HYDROCHLORIDE 2 G/1
2 INJECTION, POWDER, FOR SOLUTION INTRAVENOUS EVERY 8 HOURS
Status: DISCONTINUED | OUTPATIENT
Start: 2024-05-11 | End: 2024-05-14 | Stop reason: HOSPADM

## 2024-05-11 RX ORDER — POTASSIUM CHLORIDE 750 MG/1
20 TABLET, EXTENDED RELEASE ORAL ONCE
Status: COMPLETED | OUTPATIENT
Start: 2024-05-11 | End: 2024-05-11

## 2024-05-11 RX ADMIN — METOPROLOL TARTRATE 50 MG: 50 TABLET, FILM COATED ORAL at 21:28

## 2024-05-11 RX ADMIN — CEFEPIME HYDROCHLORIDE 2 G: 2 INJECTION, POWDER, FOR SOLUTION INTRAVENOUS at 09:12

## 2024-05-11 RX ADMIN — CEFEPIME HYDROCHLORIDE 2 G: 2 INJECTION, POWDER, FOR SOLUTION INTRAVENOUS at 00:07

## 2024-05-11 RX ADMIN — CEFEPIME HYDROCHLORIDE 2 G: 2 INJECTION, POWDER, FOR SOLUTION INTRAVENOUS at 23:42

## 2024-05-11 RX ADMIN — PANTOPRAZOLE SODIUM 40 MG: 40 INJECTION, POWDER, FOR SOLUTION INTRAVENOUS at 09:10

## 2024-05-11 RX ADMIN — PANTOPRAZOLE SODIUM 40 MG: 40 INJECTION, POWDER, FOR SOLUTION INTRAVENOUS at 21:33

## 2024-05-11 RX ADMIN — METOPROLOL TARTRATE 50 MG: 50 TABLET, FILM COATED ORAL at 09:11

## 2024-05-11 RX ADMIN — CEFEPIME HYDROCHLORIDE 2 G: 2 INJECTION, POWDER, FOR SOLUTION INTRAVENOUS at 16:43

## 2024-05-11 RX ADMIN — POTASSIUM CHLORIDE 20 MEQ: 750 TABLET, EXTENDED RELEASE ORAL at 09:26

## 2024-05-11 ASSESSMENT — ACTIVITIES OF DAILY LIVING (ADL)
ADLS_ACUITY_SCORE: 20
ADLS_ACUITY_SCORE: 20
DEPENDENT_IADLS:: INDEPENDENT
ADLS_ACUITY_SCORE: 20

## 2024-05-11 NOTE — PLAN OF CARE
Goal Outcome Evaluation:      Plan of Care Reviewed With: patient    Overall Patient Progress: no changeOverall Patient Progress: no change  Neuro:AOx4, hypertensive due to BP meds on hold  Cardiac: denies chest pain, WDL  Respiratory: on RA, denies SOB  GI/: voiding adequately, no BM, passing flatus  Diet/Appetite: regular, denies N/V  Skin: no new deficits  LDA: PIV-SL with intermittent abx  Activity: up ad doris  Pain: denies pain  This Shift: pt trigger sepsis, lactic 2.3, sepsis code called, pt refused intervention. Provider notified.  Plan: monitor hgb & lactic, continue POC

## 2024-05-11 NOTE — PLAN OF CARE
BP (!) 145/92 (BP Location: Right arm)   Pulse 76   Temp 98  F (36.7  C) (Oral)   Resp 18   Wt 82.6 kg (182 lb)   SpO2 99%   BMI 31.24 kg/m      Neuro: A&Ox4.   Respiratory: WDL denies SOB  Cardiac: Pt hypertensive d/t hold on BP meds  GI/: Voiding spontaneously and not saving. One small dark brown stool this shift per pt, passing flatus.   Diet: Tolerating regular diet   Pain: denies   Incisions/Drains: NA  IV Access: L PIV infusing int abx   Labs: reviewed.   Activity: Up ind in room        New changes this shift: No acute changes   Plan:  Continue POC     [employer] 0.75 % LOTN   12/27/2012     Sig - Route: Apply 1 application topically daily      Last ov: 12/14/18  Last refill: 12/27/12 - outside provider    Anabel has not seen this patient for this condition. Was previously prescribed ny her now retired dermatologist. Patient would need to be seen in the clinic to discuss treatment. She does have an appt with Dr. Napoles on 2/4/19 and would be welcome to discuss with her if she does not want a separate appt.

## 2024-05-11 NOTE — CONSULTS
Care Management Initial Consult    General Information  Assessment completed with: Jong Duran  Type of CM/SW Visit: Initial Assessment    Primary Care Provider verified and updated as needed: Yes   Readmission within the last 30 days: no previous admission in last 30 days      Reason for Consult: discharge planning  Advance Care Planning: Advance Care Planning Reviewed: present on chart, verified with patient, no concerns identified          Communication Assessment  Patient's communication style: spoken language (English or Bilingual)    Hearing Difficulty or Deaf: no   Wear Glasses or Blind: no    Cognitive  Cognitive/Neuro/Behavioral: WDL                      Living Environment:   People in home: spouse, child(karen), dependent     Current living Arrangements: house      Able to return to prior arrangements: yes       Family/Social Support:  Care provided by: self  Provides care for: child(karen)  Marital Status:   Wife  Milka       Description of Support System: Supportive, Involved    Support Assessment: Adequate family and caregiver support    Current Resources:   Patient receiving home care services: No     Community Resources: None  Equipment currently used at home: glucometer  Supplies currently used at home: Diabetic Supplies    Employment/Financial:  Employment Status: unemployed        Financial Concerns: none   Referral to Financial Worker: No       Does the patient's insurance plan have a 3 day qualifying hospital stay waiver?  No    Lifestyle & Psychosocial Needs:  Social Determinants of Health     Food Insecurity: Not on file   Depression: Not at risk (4/23/2024)    Received from [facility]    PHQ-2     PHQ-2 Subtotal: 0   Housing Stability: Not on file   Tobacco Use: High Risk (4/10/2024)    Patient History     Smoking Tobacco Use: Every Day     Smokeless Tobacco Use: Never     Passive Exposure: Current   Financial Resource Strain: Not on file   Alcohol Use: Not on file    Transportation Needs: Not on file   Physical Activity: Not on file   Interpersonal Safety: Not on file   Stress: Not on file   Social Connections: Unknown (11/22/2023)    Received from [facility], [facility]    Social Connections     Frequency of Communication with Friends and Family: Not on file   Health Literacy: Not on file       Functional Status:  Prior to admission patient needed assistance:   Dependent ADLs:: Independent  Dependent IADLs:: Independent  Assesssment of Functional Status: At functional baseline    Mental Health Status:  Mental Health Status: No Current Concerns       Chemical Dependency Status:  Chemical Dependency Status: No Current Concerns             Values/Beliefs:  Spiritual, Cultural Beliefs, Bahai Practices, Values that affect care: no               Additional Information:  Met with patient at bedside to introduce role of RNCC, complete initial assessment and discuss discharge planning. Primary team advised RNCC that patient will require IV antibiotics at discharge.    Per MD note:  Jong Quiroga is a 44 year old male admitted on 5/8/2024. 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 Found to have arterial bleeding in hepatic vasculature, occlusion of previous biliary stents, and sepsis in the setting of bacteremia likely due to cholangitis.     Jong lives at home with his wife Milka, and his two children ages 10yo and 3yo. 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 He cares for his two minor children while his wife is at work.     RNCC discussed need for IV antibiotics at discharge and provided education on the coordination process.  Jong stated he felt confident that he and his wife could manage this at home. Advised Jong that benefits will not be confirmed over the weekend, but care coordination team will follow up with him on Monday once benefit information is obtained.    Sent home infusion referral information to [facility] via email.       Care coordination team will continue to follow for discharge planning needs.        Lalo Scott RN Care Coordinator  5/11/2024    Social Work and Care Management Department       SEARCHABLE in [facility] - search CARE COORDINATOR       [place] [facility] [place] ([phone]) Saturday & Sunday; [phone]) [facility] Recognized Holidays     Units: 5A Onc 5201 thru 5219 RNCC, 5A Onc 5220 thru 5240 RNCC, 5C OFFSERVICE [phone] RNCC & 5C OFF SERVICE [phone] RNCC Pager: [phone]    Units: 6B Vocera, 6C Card 6401 thru 6420 RNCC, 6C Card 6502 thru 6514 RNCC, & 6C Card 6515 thru 6519 RNCC  Pager: [phone]    Units: 7A SOT RNCC Vocera, 7B Med Surg Vocera, 7C Med Surg 7401 thru 7418 RNCC & 7C Med Surg 7502 thru 7521 RNCC Pager: [phone]    Units: 6A Vocera & 4A CVICU Vocera, 4C MICU Vocera, and 4E SICU Vocera   Pager: [phone]    Units: 5 Ortho Vocera & 5 Med Surg Vocera  Pager: [phone]    Units: 6 Med Surg Vocera & 8 Med Surg Vocera  Pager [phone]

## 2024-05-11 NOTE — PROGRESS NOTES
[facility]    Progress Note - Medicine Service, MAROON TEAM 1       Date of Admission:  5/8/2024    Assessment & Plan   Jong Quiroga is a 44 year old male admitted on 5/8/2024. 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 Found to have arterial bleeding in hepatic vasculature, occlusion of previous biliary stents, and sepsis in the setting of bacteremia likely due to cholangitis.     Changes today  - Plan for IR procedure on Mon 5/13 for management of hepatic artery aneurysm      - NPO Sun night 5/12  - ID consult     - continue IV cefepime x7 days total followed by CTX for 7 days total     - No need for ECHO     - continue to follow repeat cultures  - replace K with PO potassium      GI Bleed 2/2 Aneurysmal hepatic artery  Elevated LFTs, obstructive pattern, improving  Hx of Biliary Obstruction s/p Stent placement 4/3, re-vision 4/11  Presenting with 2 days of BRBPR and hematemesis. Hgb stable on admission, however could be falsley elevated in the setting of dehydration. He remainds HDS. Found to have arterial bleediung of hepatic artery during ERCP. Bleeding resolved and metal stent placed. LFTs improving. S/p 1u pRBC 5/10 for Hgb 6.7   - GI consult, appreciate assistance in this evaluation   - IV PPI BID   - IR consult for definitive management of hepatic artery   - will plan for procedure Mon 5/13. NPO Sun night 5/12  - Transplant Surgery Consulted for definitive management of hepatic artery     - nothing can be offered from their end, recommended IR eval  - trend Hgb, transfuse if Hgb <7  - Type and Screen: 5/11, will repeat 5/14     Severe Sepsis  Bacteremia 2/2 Cholangitis  Hx of Cholangitis 4/11  Lactic acid 4.1 and WBC 17 in ED. Endorsed chills PTA. CRP and Procalcitonin  significantly elevated on admission. Remains HDS and afebrile. Lactic acid returned to wnl with IVF and Abx.   - Micro:    - Blood Cx - Growing Strep pyogenes and Enterobacter cloacae      - UA/Ucx - NGTD  - Antimicrobials:     - Vanc (5/8 - 5/9 )     - Zosyn (5/8 - 5/9 )     - Cefepime (5/9 - ) - Likely total 7 day course to be followed by CTX for total 7 day course.     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 He was evaluated by vascular surgery, IR, rhematology, and hepatology during hospitalization 1/4-1/7/2024. No suitable surgical / endovascular target. Pt schedule to see Vascular Surgery as an outpatient on 5/10/24. Found to have hepatic artery bleeding on ERCP 5/9  - Plan for IR procedure on Mon 5/13 for management of hepatic artery aneurysm      - NPO Sun night 5/12       HTN  - HOLD PTA anti-hypertensive regimen given continued concern for re-bleed and sepsis; will plan to give IV anti-hypertensives PRN if blood pressure consistently elevated (SBP >190 or DBP >100)       -PTA metoprolol 50 mg twice daily        -PTA amlodipine 10 mg daily       -PTA clonidine 0.1 mg twice daily       -PTA hydrochlorothiazide 25 mg daily     T2DM  - Lantus 10u at bedtime  - MDSSI   - Hypoglycemia protocol        Polysubstance Use History  Formerly smoked 1-2 ppd. Reduced tobacco, still daily marijuana use. Denies alcohol over last month.          Diet: NPO per Anesthesia Guidelines for Procedure/Surgery Except for: Meds  Regular Diet Adult    DVT Prophylaxis: Pneumatic Compression Devices  Tran Catheter: Not present  Fluids: None  Lines: None     Cardiac Monitoring: None  Code Status: Full Code      Clinically Significant Risk Factors        # Hypokalemia: Lowest K = 3 mmol/L in last 2 days,  will replace as needed                               Disposition Plan     Expected Discharge Date: 05/14/2024                The patient's care was discussed with the Attending Physician, Dr. Pantoja .    Marielle Phillip MD  Internal Medicine - Pediatrics Resident, PGY-3  [facility]  Available on Vocera    ______________________________________________________________________    Interval History   RRT overnight as patient triggered sepsis protocol. Lactic Acid 2.6. Pt HDS, afebrile. Otherwise wise felt well. Lactic acid 0.6 this am without intervention. This am, states he feels well. Has mild abdominal pain still but this is improving. Tolerating PO intake. Ambulating independently. Voiding appropriately. Updated on plan of care.    Physical Exam   Vital Signs: Temp: 98.1  F (36.7  C) Temp src: Oral BP: (!) 150/102 Pulse: 94   Resp: 18 SpO2: 100 % O2 Device: None (Room air)    Weight: 182 lbs 0 oz    General Appearance: Lying in bed, appears comfortable  Respiratory: CTAB, no wheezing  Cardiovascular: RRR, no murmurs  GI: soft, mild tenderness, non-distended  Skin: warm and well perfused, no edema  Other: NFD, cranial nerves grossly intact      Medical Decision Making       Please see A&P for additional details of medical decision making.      Data     I have personally reviewed the following data over the past 24 hrs:    9.0  \   7.8 (L)   / 289     137 102 6.8 /  134 (H)   3.2 (L) 25 0.80 \     ALT: 80 (H) AST: 24 AP: 274 (H) TBILI: 0.7   ALB: 3.8 TOT PROTEIN: 6.8 LIPASE: N/A     TSH: N/A T4: N/A A1C: N/A     Procal: N/A CRP: N/A Lactic Acid: 0.6 (L)         Imaging results reviewed over the past 24 hrs:   No results found for this or any previous visit (from the past 24 hour(s)).

## 2024-05-11 NOTE — CODE/RAPID RESPONSE
05/11/24 0000   Call Information   Date of Call 05/11/24   Time of Call 0045   Name of person requesting the team Britney FRANK   Title of person requesting team RN   RRT Arrival time 0049   Time RRT ended 0100   Reason for call   Type of RRT Adult   Primary reason for call Sepsis suspected   Sepsis Suspected Elevated Lactate level   Was patient transferred from the ED, ICU, or PACU within last 24 hours prior to RRT call? 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 Found to have arterial bleeding in hepatic vasculature and occlusion of previous biliary stents.   Notable History/Conditions Diabetes;Hypertension   Assessment A&Ox4, denies new pain, states that he is feeling fine, drinking fluids. Overall he is frustrated with being woken up and that he can't eat food yet.   Interventions Labs   Patient Outcome   Patient Outcome Stabilized on unit   RRT Team   Attending/Primary/Covering Physician Philipp Campbell 1   Date Attending Physician notified 05/11/24   Physician(s) Emma Townsend PA-C   Lead RN Umang FRANK   RT Venkat   Post RRT Intervention Assessment   Post RRT Assessment Stable/Improved   Date Follow Up Done 05/11/24   Time Follow Up Done 0413   Comments resting comfortably. Lab recheck in the AM

## 2024-05-11 NOTE — CODE/RAPID RESPONSE
Rapid Response Team Note    Assessment   In assessment a rapid response was called on Jong Quiroga due to SIRS/Sepsis trigger.   -Lactic acid is only mildly elevated, 2.3  -Patient was very strong verbally complaining that he is being bothered unnecessarily, he wanted to rest and sleep, used explicit words towards staff, he is being asked same questions again and again, he is not happy that we are in the room.  Writer and nursing staff explained calmly why we are there and the benefits of sepsis codes and sepsis surveillance and acute decompensation of other causes  -I have not examined the patient given the refusal but overall he appeared very benign, fully oriented, denied new symptoms including any new pain that could be contributing to the current elevated blood pressure.  He is rather agitated and angry. no active bleeding.    Plan  -Main concern for him is ongoing suspected sepsis and or intra-abdominal bleed, vitally he is not hypotensive or tachycardic, last hemoglobin at 4:30 PM yesterday was 7.2 up from 6.7  -Ideally would have liked to repeat hemoglobin at this point and he may benefit from some fluid supplement given underlying infection and/or bleeding now with mildly elevated lactic acid.  Discussed as such with primary who already ordered 500 cc bolus but unfortunately patient refused.  He is a stable enough to be monitored for now, will repeat lactic acid with morning labs, and intervene as needed  -  Disposition: The patient will remain on the current unit. We will continue to monitor this patient closely.  -  Reassessment and plan follow-up will be performed by the primary team      Emma Townsend PA-C  [facility] RRT [facility] Job Code Contact #[phone]  [facility] Paging/Directory    Hospital Course   Brief Summary of events leading to rapid response:   Per chart, Jong Quiroga is a 44 year old male admitted on 5/8/2024. 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 Found to have arterial bleeding in hepatic vasculature and occlusion of previous biliary stents.   RRT called per sepsis protocol    Admission Diagnosis:   Lactic acidosis [E87.20]  Hematemesis with nausea [K92.0]  Sepsis, due to unspecified organism, unspecified whether acute organ dysfunction present (H) [A41.9]    Physical Exam   Temp: 98.4  F (36.9  C) Temp  Min: 98.1  F (36.7  C)  Max: 98.7  F (37.1  C)  Resp: 20 Resp  Min: 16  Max: 20  SpO2: 98 % SpO2  Min: 98 %  Max: 100 %  Pulse: 82 Pulse  Min: 79  Max: 96    No data recorded  BP: (!) 143/90 Systolic (24hrs), Av , Min:134 , Max:149   Diastolic (24hrs), Av, Min:75, Max:98     I/Os: I/O last 3 completed shifts:  In: 840 [P.O.:440; I.V.:100]  Out: -      Exam:   General: in no acute distress  Mental Status: AAOx4.  Exam was not done due to patient refusal    Significant Results and Procedures   Lactic Acid:   Recent Labs   Lab Test 24  0016 24  2132 24  1140   LACT 2.3* 0.9 1.7     CBC:   Recent Labs   Lab Test 05/10/24  1624 05/10/24  0613 24  1912 24  1245 24  0717 24  1607   WBC  --  7.6  --   --  9.7 13.0*   HGB 7.2* 6.7* 7.3*   < > 7.6* 8.8*   HCT  --  19.7*  --   --  24.0* 25.7*   PLT  --  253  --   --  283 312    < > = values in this interval not displayed.        Sepsis Evaluation   The patient is known to have an infection.  Jong Quiroga meets SIRS criteria but does NOT have a lactate >2 or other evidence of acute organ damage.  These vital sign, lab and physical exam findings constitute a diagnosis of SEPSIS.         Anti-infectives (From now, onward)      Start     Dose/Rate Route Frequency Ordered Stop    05/10/24 0830  ceFEPIme (MAXIPIME) 2 g vial to attach to  mL bag for ADULTS or 50 mL bag for PEDS         2 g  over 30 Minutes  Intravenous EVERY 8 HOURS 05/10/24 0820            Current antibiotic coverage is appropriate for source of infection.

## 2024-05-12 ENCOUNTER — HOME INFUSION (PRE-WILLOW HOME INFUSION) (OUTPATIENT)
Dept: PHARMACY | Facility: CLINIC | Age: 44
End: 2024-05-12
Payer: COMMERCIAL

## 2024-05-12 LAB
ALBUMIN SERPL BCG-MCNC: 3.8 G/DL (ref 3.5–5.2)
ALP SERPL-CCNC: 243 U/L (ref 40–150)
ALT SERPL W P-5'-P-CCNC: 59 U/L (ref 0–70)
ANION GAP SERPL CALCULATED.3IONS-SCNC: 11 MMOL/L (ref 7–15)
AST SERPL W P-5'-P-CCNC: 19 U/L (ref 0–45)
BACTERIA BLD CULT: ABNORMAL
BASOPHILS # BLD AUTO: 0 10E3/UL (ref 0–0.2)
BASOPHILS NFR BLD AUTO: 0 %
BILIRUB SERPL-MCNC: 0.6 MG/DL
BUN SERPL-MCNC: 8.6 MG/DL (ref 6–20)
CALCIUM SERPL-MCNC: 8.9 MG/DL (ref 8.6–10)
CHLORIDE SERPL-SCNC: 101 MMOL/L (ref 98–107)
CREAT SERPL-MCNC: 0.78 MG/DL (ref 0.67–1.17)
DEPRECATED HCO3 PLAS-SCNC: 25 MMOL/L (ref 22–29)
EGFRCR SERPLBLD CKD-EPI 2021: >90 ML/MIN/1.73M2
EOSINOPHIL # BLD AUTO: 0.2 10E3/UL (ref 0–0.7)
EOSINOPHIL NFR BLD AUTO: 2 %
ERYTHROCYTE [DISTWIDTH] IN BLOOD BY AUTOMATED COUNT: 15.6 % (ref 10–15)
GLUCOSE BLDC GLUCOMTR-MCNC: 115 MG/DL (ref 70–99)
GLUCOSE BLDC GLUCOMTR-MCNC: 122 MG/DL (ref 70–99)
GLUCOSE BLDC GLUCOMTR-MCNC: 130 MG/DL (ref 70–99)
GLUCOSE BLDC GLUCOMTR-MCNC: 141 MG/DL (ref 70–99)
GLUCOSE SERPL-MCNC: 117 MG/DL (ref 70–99)
HCT VFR BLD AUTO: 23.9 % (ref 40–53)
HGB BLD-MCNC: 7.9 G/DL (ref 13.3–17.7)
IMM GRANULOCYTES # BLD: 0.2 10E3/UL
IMM GRANULOCYTES NFR BLD: 1 %
LYMPHOCYTES # BLD AUTO: 2.5 10E3/UL (ref 0.8–5.3)
LYMPHOCYTES NFR BLD AUTO: 22 %
MCH RBC QN AUTO: 27.7 PG (ref 26.5–33)
MCHC RBC AUTO-ENTMCNC: 33.1 G/DL (ref 31.5–36.5)
MCV RBC AUTO: 84 FL (ref 78–100)
MONOCYTES # BLD AUTO: 0.7 10E3/UL (ref 0–1.3)
MONOCYTES NFR BLD AUTO: 6 %
NEUTROPHILS # BLD AUTO: 7.6 10E3/UL (ref 1.6–8.3)
NEUTROPHILS NFR BLD AUTO: 69 %
NRBC # BLD AUTO: 0 10E3/UL
NRBC BLD AUTO-RTO: 0 /100
PLATELET # BLD AUTO: 332 10E3/UL (ref 150–450)
POTASSIUM SERPL-SCNC: 3.5 MMOL/L (ref 3.4–5.3)
PROT SERPL-MCNC: 6.9 G/DL (ref 6.4–8.3)
RBC # BLD AUTO: 2.85 10E6/UL (ref 4.4–5.9)
SODIUM SERPL-SCNC: 137 MMOL/L (ref 135–145)
WBC # BLD AUTO: 11.2 10E3/UL (ref 4–11)

## 2024-05-12 PROCEDURE — 36415 COLL VENOUS BLD VENIPUNCTURE: CPT

## 2024-05-12 PROCEDURE — 999N000127 HC STATISTIC PERIPHERAL IV START W US GUIDANCE

## 2024-05-12 PROCEDURE — 85025 COMPLETE CBC W/AUTO DIFF WBC: CPT

## 2024-05-12 PROCEDURE — 250N000013 HC RX MED GY IP 250 OP 250 PS 637

## 2024-05-12 PROCEDURE — 82040 ASSAY OF SERUM ALBUMIN: CPT

## 2024-05-12 PROCEDURE — C9113 INJ PANTOPRAZOLE SODIUM, VIA: HCPCS

## 2024-05-12 PROCEDURE — 99232 SBSQ HOSP IP/OBS MODERATE 35: CPT | Mod: GC | Performed by: INTERNAL MEDICINE

## 2024-05-12 PROCEDURE — 250N000011 HC RX IP 250 OP 636

## 2024-05-12 PROCEDURE — 120N000002 HC R&B MED SURG/OB UMMC

## 2024-05-12 RX ADMIN — METOPROLOL TARTRATE 50 MG: 50 TABLET, FILM COATED ORAL at 08:53

## 2024-05-12 RX ADMIN — METOPROLOL TARTRATE 50 MG: 50 TABLET, FILM COATED ORAL at 20:37

## 2024-05-12 RX ADMIN — PANTOPRAZOLE SODIUM 40 MG: 40 INJECTION, POWDER, FOR SOLUTION INTRAVENOUS at 08:53

## 2024-05-12 RX ADMIN — CEFEPIME HYDROCHLORIDE 2 G: 2 INJECTION, POWDER, FOR SOLUTION INTRAVENOUS at 08:53

## 2024-05-12 RX ADMIN — CEFEPIME HYDROCHLORIDE 2 G: 2 INJECTION, POWDER, FOR SOLUTION INTRAVENOUS at 15:55

## 2024-05-12 RX ADMIN — PANTOPRAZOLE SODIUM 40 MG: 40 INJECTION, POWDER, FOR SOLUTION INTRAVENOUS at 20:37

## 2024-05-12 ASSESSMENT — ACTIVITIES OF DAILY LIVING (ADL)
ADLS_ACUITY_SCORE: 20

## 2024-05-12 NOTE — PROGRESS NOTES
Therapy: IV Abx  Insurance: [facility] Medicaid     Patient will have coverage at 100% with a possible copay per dispense for the drug (Could range from $0-$8)    Please contact Intake with any questions, [phone] or In Basket pool, [facility] ([phone]).

## 2024-05-12 NOTE — PLAN OF CARE
/75 (BP Location: Left arm)   Pulse 80   Temp 98.3  F (36.8  C) (Oral)   Resp 16   Wt 82.6 kg (182 lb)   SpO2 100%   BMI 31.24 kg/m       Neuro: A&Ox4. Calls appropriately.   Respiratory: WDL denies SOB   Cardiac: Hypertensive intermittently- within parameters. Came down following BP meds.   GI/: Voiding spontaneously and not saving. Passing flatus.   Diet: Tolerating regular diet   Pain: Denies  Incisions/Drains: NA  IV Access: R PIV   Labs: Hemoglobin 7.9. BG's in range- no insulin needed.   Activity: Up ind       New changes this shift: No acute changes   Plan:  Plan for pt to be NPO for IR procedure tomorrow AM.

## 2024-05-12 NOTE — PLAN OF CARE
Goal Outcome Evaluation:      Plan of Care Reviewed With: patient    Overall Patient Progress: no changeOverall Patient Progress: no change     Neuro:AOx4, hypertensive due to BP meds on hold  Cardiac: denies chest pain, WDL  Respiratory: on RA, denies SOB  GI/: voiding adequately, no BM, passing flatus  Diet/Appetite: regular, denies N/V  Skin: no new deficits  LDA: PIV-SL with intermittent abx  Activity: up ad doris  Pain: denies pain  Plan: 5/12 NPO MN for IR hepatic artery aneurysm, monitor hgb, continue POC

## 2024-05-12 NOTE — PROGRESS NOTES
[facility]    Progress Note - Medicine Service, MAROON TEAM 1       Date of Admission:  5/8/2024    Assessment & Plan   Jong Quiroga is a 44 year old male admitted on 5/8/2024. 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 Found to have arterial bleeding in hepatic vasculature, occlusion of previous biliary stents, and sepsis in the setting of bacteremia likely due to cholangitis.     Changes today  - Plan for IR procedure on Mon 5/13 for management of hepatic artery aneurysm      - NPO Sun night 5/12  - ID consult     - continue IV cefepime x7 days total followed by CTX for 7 days total     - No need for ECHO     - continue to follow repeat cultures  - awaiting insurance approval for home IV infusions      GI Bleed 2/2 Aneurysmal hepatic artery  Elevated LFTs, obstructive pattern, improving  Hx of Biliary Obstruction s/p Stent placement 4/3, re-vision 4/11  Presenting with 2 days of BRBPR and hematemesis. Hgb stable on admission, however could be falsley elevated in the setting of dehydration. He remainds HDS. Found to have arterial bleediung of hepatic artery during ERCP. Bleeding resolved and metal stent placed. LFTs improving. S/p 1u pRBC 5/10 for Hgb 6.7   - GI consult, appreciate assistance in this evaluation   - IV PPI BID   - IR consult for definitive management of hepatic artery   - will plan for procedure Mon 5/13. NPO Sun night 5/12  - Transplant Surgery Consulted for definitive management of hepatic artery     - nothing can be offered from their end, recommended IR eval  - trend Hgb, transfuse if Hgb <7  - Type and Screen: 5/11, will repeat 5/14     Severe Sepsis  Bacteremia 2/2 Cholangitis  Hx of Cholangitis 4/11  Lactic acid 4.1 and WBC 17 in ED. Endorsed chills PTA.  CRP and Procalcitonin significantly elevated on admission. Remains HDS and afebrile. Lactic acid returned to wnl with IVF and Abx.   - Micro:    - Blood Cx - Growing Strep pyogenes and Enterobacter cloacae      - UA/Ucx - NGTD  - Antimicrobials:     - Vanc (5/8 - 5/9 )     - Zosyn (5/8 - 5/9 )     - Cefepime (5/9 - ) - Likely total 7 day course to be followed by CTX for total 7 day course.     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 He was evaluated by vascular surgery, IR, rhematology, and hepatology during hospitalization 1/4-1/7/2024. No suitable surgical / endovascular target. Pt schedule to see Vascular Surgery as an outpatient on 5/10/24. Found to have hepatic artery bleeding on ERCP 5/9  - Plan for IR procedure on Mon 5/13 for management of hepatic artery aneurysm      - NPO Sun night 5/12       HTN  - HOLD PTA anti-hypertensive regimen given continued concern for re-bleed and sepsis; will plan to give IV anti-hypertensives PRN if blood pressure consistently elevated (SBP >190 or DBP >100)       -PTA metoprolol 50 mg twice daily        -PTA amlodipine 10 mg daily       -PTA clonidine 0.1 mg twice daily       -PTA hydrochlorothiazide 25 mg daily     T2DM  - Lantus 10u at bedtime  - MDSSI   - Hypoglycemia protocol        Polysubstance Use History  Formerly smoked 1-2 ppd. Reduced tobacco, still daily marijuana use. Denies alcohol over last month.          Diet: NPO per Anesthesia Guidelines for Procedure/Surgery Except for: Meds  Regular Diet Adult    DVT Prophylaxis: Pneumatic Compression Devices  Tran Catheter: Not present  Fluids: None  Lines: None     Cardiac Monitoring: None  Code Status: Full Code      Clinically Significant Risk Factors        # Hypokalemia: Lowest K =  3.2 mmol/L in last 2 days, will replace as needed                        # Financial/Environmental Concerns: none         Disposition Plan     Expected Discharge Date: 05/14/2024      Destination: home with help/services          The patient's care was discussed with the Attending Physician, Dr. Pantoja .    Marielle Phillip MD  Internal Medicine - Pediatrics Resident, PGY-3  [facility]  Available on Vocera    ______________________________________________________________________    Interval History   NAEON. Nursing notes reviewed. He states he is doing well this morning. He has no complaints. Updated on plan of care    Physical Exam   Vital Signs: Temp: 98.1  F (36.7  C) Temp src: Oral BP: (!) 148/98 Pulse: 83   Resp: 16 SpO2: 100 % O2 Device: None (Room air)    Weight: 182 lbs 0 oz    General Appearance: Lying in bed, appears comfortable  Respiratory: breathing comfortably on room air, no increased WOB  Cardiovascular: RRR, no edema  GI: soft, mild tenderness, non-distended  Skin: warm and well perfused, no edema  Other: NFD, cranial nerves grossly intact      Medical Decision Making       Please see A&P for additional details of medical decision making.      Data     I have personally reviewed the following data over the past 24 hrs:    11.2 (H)  \   7.9 (L)   / 332     137 101 8.6 /  141 (H)   3.5 25 0.78 \     ALT: 59 AST: 19 AP: 243 (H) TBILI: 0.6   ALB: 3.8 TOT PROTEIN: 6.9 LIPASE: N/A       Imaging results reviewed over the past 24 hrs:   No results found for this or any previous visit (from the past 24 hour(s)).

## 2024-05-13 ENCOUNTER — APPOINTMENT (OUTPATIENT)
Dept: INTERVENTIONAL RADIOLOGY/VASCULAR | Facility: CLINIC | Age: 44
End: 2024-05-13
Attending: NURSE PRACTITIONER
Payer: COMMERCIAL

## 2024-05-13 LAB
ALBUMIN SERPL BCG-MCNC: 3.8 G/DL (ref 3.5–5.2)
ALP SERPL-CCNC: 213 U/L (ref 40–150)
ALT SERPL W P-5'-P-CCNC: 44 U/L (ref 0–70)
ANION GAP SERPL CALCULATED.3IONS-SCNC: 10 MMOL/L (ref 7–15)
AST SERPL W P-5'-P-CCNC: 16 U/L (ref 0–45)
BASOPHILS # BLD AUTO: 0.1 10E3/UL (ref 0–0.2)
BASOPHILS NFR BLD AUTO: 1 %
BILIRUB SERPL-MCNC: 0.5 MG/DL
BUN SERPL-MCNC: 10.8 MG/DL (ref 6–20)
CALCIUM SERPL-MCNC: 8.9 MG/DL (ref 8.6–10)
CHLORIDE SERPL-SCNC: 105 MMOL/L (ref 98–107)
CREAT SERPL-MCNC: 0.82 MG/DL (ref 0.67–1.17)
DEPRECATED HCO3 PLAS-SCNC: 25 MMOL/L (ref 22–29)
EGFRCR SERPLBLD CKD-EPI 2021: >90 ML/MIN/1.73M2
EOSINOPHIL # BLD AUTO: 0.3 10E3/UL (ref 0–0.7)
EOSINOPHIL NFR BLD AUTO: 2 %
ERYTHROCYTE [DISTWIDTH] IN BLOOD BY AUTOMATED COUNT: 15.4 % (ref 10–15)
GLUCOSE BLDC GLUCOMTR-MCNC: 126 MG/DL (ref 70–99)
GLUCOSE BLDC GLUCOMTR-MCNC: 141 MG/DL (ref 70–99)
GLUCOSE SERPL-MCNC: 121 MG/DL (ref 70–99)
HCT VFR BLD AUTO: 25 % (ref 40–53)
HGB BLD-MCNC: 8.2 G/DL (ref 13.3–17.7)
HOLD SPECIMEN: NORMAL
IMM GRANULOCYTES # BLD: 0.3 10E3/UL
IMM GRANULOCYTES NFR BLD: 2 %
LYMPHOCYTES # BLD AUTO: 2.8 10E3/UL (ref 0.8–5.3)
LYMPHOCYTES NFR BLD AUTO: 22 %
MCH RBC QN AUTO: 28 PG (ref 26.5–33)
MCHC RBC AUTO-ENTMCNC: 32.8 G/DL (ref 31.5–36.5)
MCV RBC AUTO: 85 FL (ref 78–100)
MONOCYTES # BLD AUTO: 0.7 10E3/UL (ref 0–1.3)
MONOCYTES NFR BLD AUTO: 5 %
NEUTROPHILS # BLD AUTO: 8.9 10E3/UL (ref 1.6–8.3)
NEUTROPHILS NFR BLD AUTO: 68 %
NRBC # BLD AUTO: 0 10E3/UL
NRBC BLD AUTO-RTO: 0 /100
PLATELET # BLD AUTO: 408 10E3/UL (ref 150–450)
POTASSIUM SERPL-SCNC: 3.8 MMOL/L (ref 3.4–5.3)
PROT SERPL-MCNC: 6.9 G/DL (ref 6.4–8.3)
RBC # BLD AUTO: 2.93 10E6/UL (ref 4.4–5.9)
SODIUM SERPL-SCNC: 140 MMOL/L (ref 135–145)
WBC # BLD AUTO: 13.1 10E3/UL (ref 4–11)

## 2024-05-13 PROCEDURE — 250N000009 HC RX 250

## 2024-05-13 PROCEDURE — 250N000011 HC RX IP 250 OP 636

## 2024-05-13 PROCEDURE — C1769 GUIDE WIRE: HCPCS

## 2024-05-13 PROCEDURE — 99233 SBSQ HOSP IP/OBS HIGH 50: CPT | Performed by: STUDENT IN AN ORGANIZED HEALTH CARE EDUCATION/TRAINING PROGRAM

## 2024-05-13 PROCEDURE — 76937 US GUIDE VASCULAR ACCESS: CPT | Mod: 26 | Performed by: RADIOLOGY

## 2024-05-13 PROCEDURE — 82040 ASSAY OF SERUM ALBUMIN: CPT | Performed by: INTERNAL MEDICINE

## 2024-05-13 PROCEDURE — 99152 MOD SED SAME PHYS/QHP 5/>YRS: CPT | Mod: GC | Performed by: RADIOLOGY

## 2024-05-13 PROCEDURE — 37243 VASC EMBOLIZE/OCCLUDE ORGAN: CPT

## 2024-05-13 PROCEDURE — C1889 IMPLANT/INSERT DEVICE, NOC: HCPCS

## 2024-05-13 PROCEDURE — 272N000280 HC DEVICE COMPRESSION CR5

## 2024-05-13 PROCEDURE — 999N000127 HC STATISTIC PERIPHERAL IV START W US GUIDANCE

## 2024-05-13 PROCEDURE — 250N000009 HC RX 250: Performed by: STUDENT IN AN ORGANIZED HEALTH CARE EDUCATION/TRAINING PROGRAM

## 2024-05-13 PROCEDURE — 37242 VASC EMBOLIZE/OCCLUDE ARTERY: CPT | Mod: GC | Performed by: RADIOLOGY

## 2024-05-13 PROCEDURE — 250N000009 HC RX 250: Performed by: RADIOLOGY

## 2024-05-13 PROCEDURE — 272N000570 HC SHEATH CR7

## 2024-05-13 PROCEDURE — 120N000002 HC R&B MED SURG/OB UMMC

## 2024-05-13 PROCEDURE — 250N000013 HC RX MED GY IP 250 OP 250 PS 637

## 2024-05-13 PROCEDURE — 76937 US GUIDE VASCULAR ACCESS: CPT

## 2024-05-13 PROCEDURE — 272N000450 HC KIT 4FR POWER PICC SINGLE LUMEN

## 2024-05-13 PROCEDURE — 75726 ARTERY X-RAYS ABDOMEN: CPT

## 2024-05-13 PROCEDURE — 36247 INS CATH ABD/L-EXT ART 3RD: CPT | Mod: GC | Performed by: RADIOLOGY

## 2024-05-13 PROCEDURE — 85025 COMPLETE CBC W/AUTO DIFF WBC: CPT

## 2024-05-13 PROCEDURE — C9113 INJ PANTOPRAZOLE SODIUM, VIA: HCPCS

## 2024-05-13 PROCEDURE — 99233 SBSQ HOSP IP/OBS HIGH 50: CPT | Mod: GC | Performed by: INTERNAL MEDICINE

## 2024-05-13 PROCEDURE — 36247 INS CATH ABD/L-EXT ART 3RD: CPT

## 2024-05-13 PROCEDURE — B41B1ZZ FLUOROSCOPY OF OTHER INTRA-ABDOMINAL ARTERIES USING LOW OSMOLAR CONTRAST: ICD-10-PCS | Performed by: RADIOLOGY

## 2024-05-13 PROCEDURE — 36415 COLL VENOUS BLD VENIPUNCTURE: CPT

## 2024-05-13 PROCEDURE — C1887 CATHETER, GUIDING: HCPCS

## 2024-05-13 PROCEDURE — 36569 INSJ PICC 5 YR+ W/O IMAGING: CPT

## 2024-05-13 PROCEDURE — 272N000193 HC ACCESSORY CR20

## 2024-05-13 PROCEDURE — 250N000011 HC RX IP 250 OP 636: Performed by: STUDENT IN AN ORGANIZED HEALTH CARE EDUCATION/TRAINING PROGRAM

## 2024-05-13 PROCEDURE — 250N000011 HC RX IP 250 OP 636: Performed by: RADIOLOGY

## 2024-05-13 PROCEDURE — 272N000143 HC KIT CR3

## 2024-05-13 PROCEDURE — 04L33DZ OCCLUSION OF HEPATIC ARTERY WITH INTRALUMINAL DEVICE, PERCUTANEOUS APPROACH: ICD-10-PCS | Performed by: RADIOLOGY

## 2024-05-13 RX ORDER — SODIUM CHLORIDE 9 MG/ML
INJECTION, SOLUTION INTRAVENOUS CONTINUOUS
Status: DISCONTINUED | OUTPATIENT
Start: 2024-05-13 | End: 2024-05-14 | Stop reason: HOSPADM

## 2024-05-13 RX ORDER — PANTOPRAZOLE SODIUM 40 MG/1
40 TABLET, DELAYED RELEASE ORAL 2 TIMES DAILY
Qty: 60 TABLET | Refills: 3 | Status: SHIPPED | OUTPATIENT
Start: 2024-05-13

## 2024-05-13 RX ORDER — CEFTRIAXONE 2 G/1
2 INJECTION, POWDER, FOR SOLUTION INTRAMUSCULAR; INTRAVENOUS EVERY 24 HOURS
Qty: 140 ML | Refills: 0 | Status: ACTIVE | OUTPATIENT
Start: 2024-05-16 | End: 2024-05-14

## 2024-05-13 RX ORDER — PANTOPRAZOLE SODIUM 40 MG/1
40 TABLET, DELAYED RELEASE ORAL 2 TIMES DAILY
Qty: 60 TABLET | Refills: 2 | Status: SHIPPED | OUTPATIENT
Start: 2024-05-13 | End: 2024-07-02

## 2024-05-13 RX ORDER — CEFEPIME HYDROCHLORIDE 2 G/1
2 INJECTION, POWDER, FOR SOLUTION INTRAVENOUS EVERY 8 HOURS
Qty: 112.5 ML | Refills: 0 | Status: ACTIVE | OUTPATIENT
Start: 2024-05-13 | End: 2024-05-14

## 2024-05-13 RX ORDER — ONDANSETRON 2 MG/ML
4 INJECTION INTRAMUSCULAR; INTRAVENOUS
Status: DISCONTINUED | OUTPATIENT
Start: 2024-05-13 | End: 2024-05-13

## 2024-05-13 RX ORDER — HEPARIN SODIUM 200 [USP'U]/100ML
1 INJECTION, SOLUTION INTRAVENOUS EVERY 5 MIN PRN
Status: DISCONTINUED | OUTPATIENT
Start: 2024-05-13 | End: 2024-05-13

## 2024-05-13 RX ORDER — NALOXONE HYDROCHLORIDE 0.4 MG/ML
0.2 INJECTION, SOLUTION INTRAMUSCULAR; INTRAVENOUS; SUBCUTANEOUS
Status: DISCONTINUED | OUTPATIENT
Start: 2024-05-13 | End: 2024-05-13

## 2024-05-13 RX ORDER — NALOXONE HYDROCHLORIDE 0.4 MG/ML
0.4 INJECTION, SOLUTION INTRAMUSCULAR; INTRAVENOUS; SUBCUTANEOUS
Status: DISCONTINUED | OUTPATIENT
Start: 2024-05-13 | End: 2024-05-13

## 2024-05-13 RX ORDER — DOCUSATE SODIUM 100 MG/1
100 CAPSULE, LIQUID FILLED ORAL 2 TIMES DAILY
Status: DISCONTINUED | OUTPATIENT
Start: 2024-05-13 | End: 2024-05-14 | Stop reason: HOSPADM

## 2024-05-13 RX ORDER — FENTANYL CITRATE 50 UG/ML
25-50 INJECTION, SOLUTION INTRAMUSCULAR; INTRAVENOUS EVERY 5 MIN PRN
Status: DISCONTINUED | OUTPATIENT
Start: 2024-05-13 | End: 2024-05-13

## 2024-05-13 RX ORDER — HYDRALAZINE HYDROCHLORIDE 20 MG/ML
10-20 INJECTION INTRAMUSCULAR; INTRAVENOUS
Status: COMPLETED | OUTPATIENT
Start: 2024-05-13 | End: 2024-05-13

## 2024-05-13 RX ORDER — FLUMAZENIL 0.1 MG/ML
0.2 INJECTION, SOLUTION INTRAVENOUS
Status: DISCONTINUED | OUTPATIENT
Start: 2024-05-13 | End: 2024-05-13

## 2024-05-13 RX ORDER — LIDOCAINE 40 MG/G
CREAM TOPICAL
Status: DISCONTINUED | OUTPATIENT
Start: 2024-05-13 | End: 2024-05-14 | Stop reason: HOSPADM

## 2024-05-13 RX ORDER — PROCHLORPERAZINE 25 MG
25 SUPPOSITORY, RECTAL RECTAL EVERY 12 HOURS PRN
Status: DISCONTINUED | OUTPATIENT
Start: 2024-05-13 | End: 2024-05-14 | Stop reason: HOSPADM

## 2024-05-13 RX ORDER — ONDANSETRON 2 MG/ML
4 INJECTION INTRAMUSCULAR; INTRAVENOUS EVERY 6 HOURS PRN
Status: DISCONTINUED | OUTPATIENT
Start: 2024-05-13 | End: 2024-05-14 | Stop reason: HOSPADM

## 2024-05-13 RX ORDER — CLONIDINE HYDROCHLORIDE 0.1 MG/1
0.1 TABLET ORAL ONCE
Status: COMPLETED | OUTPATIENT
Start: 2024-05-13 | End: 2024-05-13

## 2024-05-13 RX ORDER — ONDANSETRON 4 MG/1
4 TABLET, ORALLY DISINTEGRATING ORAL EVERY 6 HOURS PRN
Status: DISCONTINUED | OUTPATIENT
Start: 2024-05-13 | End: 2024-05-14 | Stop reason: HOSPADM

## 2024-05-13 RX ORDER — PROCHLORPERAZINE MALEATE 10 MG
10 TABLET ORAL EVERY 6 HOURS PRN
Status: DISCONTINUED | OUTPATIENT
Start: 2024-05-13 | End: 2024-05-14 | Stop reason: HOSPADM

## 2024-05-13 RX ADMIN — FENTANYL CITRATE 50 MCG: 50 INJECTION INTRAMUSCULAR; INTRAVENOUS at 11:10

## 2024-05-13 RX ADMIN — CLONIDINE HYDROCHLORIDE 0.1 MG: 0.1 TABLET ORAL at 18:17

## 2024-05-13 RX ADMIN — FENTANYL CITRATE 25 MCG: 50 INJECTION INTRAMUSCULAR; INTRAVENOUS at 11:55

## 2024-05-13 RX ADMIN — METOPROLOL TARTRATE 50 MG: 50 TABLET, FILM COATED ORAL at 07:32

## 2024-05-13 RX ADMIN — LIDOCAINE HYDROCHLORIDE 3 ML: 10 INJECTION, SOLUTION EPIDURAL; INFILTRATION; INTRACAUDAL; PERINEURAL at 17:50

## 2024-05-13 RX ADMIN — CEFEPIME HYDROCHLORIDE 2 G: 2 INJECTION, POWDER, FOR SOLUTION INTRAVENOUS at 15:57

## 2024-05-13 RX ADMIN — PANTOPRAZOLE SODIUM 40 MG: 40 INJECTION, POWDER, FOR SOLUTION INTRAVENOUS at 07:31

## 2024-05-13 RX ADMIN — HEPARIN SODIUM 1 BAG: 200 INJECTION, SOLUTION INTRAVENOUS at 11:06

## 2024-05-13 RX ADMIN — CEFEPIME HYDROCHLORIDE 2 G: 2 INJECTION, POWDER, FOR SOLUTION INTRAVENOUS at 00:22

## 2024-05-13 RX ADMIN — CEFEPIME HYDROCHLORIDE 2 G: 2 INJECTION, POWDER, FOR SOLUTION INTRAVENOUS at 07:32

## 2024-05-13 RX ADMIN — FENTANYL CITRATE 25 MCG: 50 INJECTION INTRAMUSCULAR; INTRAVENOUS at 12:41

## 2024-05-13 RX ADMIN — MIDAZOLAM 0.5 MG: 1 INJECTION INTRAMUSCULAR; INTRAVENOUS at 11:55

## 2024-05-13 RX ADMIN — MIDAZOLAM 0.5 MG: 1 INJECTION INTRAMUSCULAR; INTRAVENOUS at 11:32

## 2024-05-13 RX ADMIN — HYDRALAZINE HYDROCHLORIDE 10 MG: 20 INJECTION INTRAMUSCULAR; INTRAVENOUS at 14:01

## 2024-05-13 RX ADMIN — MIDAZOLAM 1 MG: 1 INJECTION INTRAMUSCULAR; INTRAVENOUS at 11:05

## 2024-05-13 RX ADMIN — FENTANYL CITRATE 25 MCG: 50 INJECTION INTRAMUSCULAR; INTRAVENOUS at 11:32

## 2024-05-13 RX ADMIN — FENTANYL CITRATE 25 MCG: 50 INJECTION INTRAMUSCULAR; INTRAVENOUS at 11:17

## 2024-05-13 RX ADMIN — METOPROLOL TARTRATE 50 MG: 50 TABLET, FILM COATED ORAL at 19:42

## 2024-05-13 RX ADMIN — MIDAZOLAM 0.5 MG: 1 INJECTION INTRAMUSCULAR; INTRAVENOUS at 11:17

## 2024-05-13 RX ADMIN — MIDAZOLAM 0.5 MG: 1 INJECTION INTRAMUSCULAR; INTRAVENOUS at 12:15

## 2024-05-13 RX ADMIN — PANTOPRAZOLE SODIUM 40 MG: 40 INJECTION, POWDER, FOR SOLUTION INTRAVENOUS at 19:45

## 2024-05-13 RX ADMIN — HEPARIN SODIUM 1 BAG: 200 INJECTION, SOLUTION INTRAVENOUS at 11:07

## 2024-05-13 RX ADMIN — HEPARIN SODIUM: 1000 INJECTION INTRAVENOUS; SUBCUTANEOUS at 11:25

## 2024-05-13 RX ADMIN — FENTANYL CITRATE 25 MCG: 50 INJECTION INTRAMUSCULAR; INTRAVENOUS at 12:16

## 2024-05-13 RX ADMIN — CEFEPIME HYDROCHLORIDE 2 G: 2 INJECTION, POWDER, FOR SOLUTION INTRAVENOUS at 23:49

## 2024-05-13 RX ADMIN — LIDOCAINE HYDROCHLORIDE 3 ML: 10 INJECTION, SOLUTION EPIDURAL; INFILTRATION; INTRACAUDAL; PERINEURAL at 11:45

## 2024-05-13 RX ADMIN — MIDAZOLAM 0.5 MG: 1 INJECTION INTRAMUSCULAR; INTRAVENOUS at 12:41

## 2024-05-13 ASSESSMENT — ACTIVITIES OF DAILY LIVING (ADL)
ADLS_ACUITY_SCORE: 20

## 2024-05-13 NOTE — PLAN OF CARE
VSS, denied pain and nausea, no numbness, room air, NPO since 0000 for IR procedure on 5/13 for management of hepatic artery aneurysm, up independent, IV cefepime given as ordered, PIV saline locked between antibiotics, continue with plan of care

## 2024-05-13 NOTE — PROCEDURES
[facility]    Procedure: IR Procedure Note    Date/Time: 5/13/2024 12:58 PM    Performed by: Behzadi, Heshmatzadeh, MD  Authorized by: Danilo Crawford MD  IR Fellow Physician: Ashkan Behzadi      UNIVERSAL PROTOCOL   Site Marked: NA  Prior Images Obtained and Reviewed:  Yes  Required items: Required blood products, implants, devices and special equipment available    Patient identity confirmed:  Verbally with patient, arm band, provided demographic data and hospital-assigned identification number  Patient was reevaluated immediately before administering moderate or deep sedation or anesthesia  Confirmation Checklist:  Patient's identity using two indicators, relevant allergies, procedure was appropriate and matched the consent or emergent situation and correct equipment/implants were available  Time out: Immediately prior to the procedure a time out was called    Universal Protocol: the Joint Commission Universal Protocol was followed    Preparation: Patient was prepped and draped in usual sterile fashion       ANESTHESIA    Anesthesia:  Local infiltration  Local Anesthetic:  Lidocaine 1% without epinephrine      SEDATION    Patient Sedated: No    See dictated procedure note for full details.  Findings: -    Specimens: none    Complications: None    Condition: Stable    Plan: Bed rest for 2 hours.   CTA abdomen tomorrow .      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     Successful coil embolization of the aneurysm and pseudoaneurysm of the distal right Hepatic artery using multiple coils with front door backdoor technique.   Patient Tolerance:  Patient tolerated the procedure well with no immediate complications  Length of time physician/provider present for 1:1 monitoring during sedation: 30

## 2024-05-13 NOTE — PROGRESS NOTES
Care Management Follow Up    Length of Stay (days): 5    Expected Discharge Date: 05/14/2024     Concerns to be Addressed: discharge planning     Patient plan of care discussed at interdisciplinary rounds: Yes    Anticipated Discharge Disposition: Home with serivces     Anticipated Discharge Services: Home Infusion  Anticipated Discharge DME: None    Patient/family educated on Medicare website which has current facility and service quality ratings: no  Education Provided on the Discharge Plan: Yes  Patient/Family in Agreement with the Plan: yes    Referrals Placed by CM/SW: Home Infusion  Private pay costs discussed: Not applicable    Additional Information:  Received update from provider that plan is for discharge to home tomorrow on IV abx.  Per [facility] patient has coverage for IV abx, [employer] PMAP 100% coverage.  He will get PICC placed today.  Updated [facility] liaisonTiffanie.  RNCC will continue to follow and assist with discharge planning.      [facility](IV abx)  Phone: [phone]  Fax: [phone]      PATTI Camargo  Phone: [phone]  7B Med Surg Vocera  Nurse Coordinator      Social Work and Care Management Department       SEARCHABLE in [facility] - search CARE COORDINATOR       [employer] [place] ([phone]) Saturday & Sunday; [phone]) [facility] Recognized Holidays     Units: 5A Onc Vocera & 5C Vocera    Units: 6B Vocera & 6C Vocera     Units: 7A SOT RNCC Vocera, 7B Med Surg Vocera, & 7C Med Surg Vocera    Units: 6A Vocera & 4A CVICU Vocera, 4C MICU Vocera, and 4E SICU Vocera       Units: 5 Ortho Vocera & 5 Med Surg Vocera      Units: 6 Med Surg Vocera & 8 Med Surg Vocera

## 2024-05-13 NOTE — PROGRESS NOTES
Interventional Radiology Brief Note    Jong Quiroga is a 44 year old male with a history of hypertension, complex celiac artery dissection , known hepatic artery aneurysm with biliary obstruction, presented with hematemesis.  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 The patient status post successful biliary stent placement by GI team  today and the bleeding stopped.     I was visited the patient at bedside today and described the procedure well    Patient and mentioned to the patient and patient's wife about the procedure to be done today (hepatic angiogram and embolization of the aneurysmal right hepatic artery).    I mentioned to the patient's and patient's wife about the procedure is technically challenging and very high risk  due to postprocedural/postembolization complications including ischemic biliary changes/strictures and infection.  the patient understood the risk of the procedure and willing to have the procedure as soon as possible.    Transplant surgery team on board for the case.  Patient evaluated by surgery team and as per surgery/transplant surgery team , surgical option takes more risk and not recommended at this point.     Vitals:   BP (!) 137/91 (BP Location: Right arm, Patient Position: Supine, Cuff Size: Adult Large)   Pulse 76   Temp 98.1  F (36.7  C) (Oral)   Resp 18   Wt 82.6 kg (182 lb)   SpO2 100%   BMI 31.24 kg/m      Pertinent Labs:     Lab Results   Component Value Date    WBC 13.1 (H) 05/13/2024    WBC 11.2 (H) 05/12/2024    WBC 9.0 05/11/2024       Lab Results   Component Value Date    HGB 8.2 05/13/2024    HGB 7.9 05/12/2024    HGB 7.8 05/11/2024       Lab Results   Component Value Date     05/13/2024     05/12/2024     05/11/2024       Lab Results   Component Value Date    INR 1.24 (H) 05/08/2024       Lab Results   Component Value Date    POTASSIUM 3.5 05/12/2024    POTASSIUM 3.8 05/08/2024        Behzadi Heshmatzadeh,  MD  Interventional Radiology  Pager: [phone]

## 2024-05-13 NOTE — PROCEDURES
[facility]    Single Lumen PICC Placement    Date/Time: 5/13/2024 5:46 PM    Performed by: Mariluz Ornelas RN  Authorized by: Noemi Barkley MD  Indications: vascular access      UNIVERSAL PROTOCOL   Site Marked: Yes  Prior Images Obtained and Reviewed:  Yes  Required items: Required blood products, implants, devices and special equipment available    Patient identity confirmed:  Verbally with patient, arm band and hospital-assigned identification number  NA - No sedation, light sedation, or local anesthesia (lidocaine was given local anesthesia)  Confirmation Checklist:  Patient's identity using two indicators, relevant allergies, procedure was appropriate and matched the consent or emergent situation and correct equipment/implants were available  Time out: Immediately prior to the procedure a time out was called    Universal Protocol: the Joint Commission Universal Protocol was followed    Preparation: Patient was prepped and draped in usual sterile fashion       ANESTHESIA    Anesthesia:  Local infiltration  Local Anesthetic:  Lidocaine 1% without epinephrine  Anesthetic Total (mL):  3      SEDATION    Patient Sedated: No        Preparation: skin prepped with ChloraPrep and skin prepped with 2% chlorhexidine  Skin prep agent: skin prep agent completely dried prior to procedure  Sterile barriers: maximum sterile barriers were used: cap, mask, sterile gown, sterile gloves, and large sterile sheet  Hand hygiene: hand hygiene performed prior to central venous catheter insertion  Type of line used: PICC  Catheter type: single lumen  Lumen type: non-valved and power PICC  Catheter size: 4 Fr  Brand: Bard  Lot number: [id number]  Placement method: venipuncture, MST, ultrasound and tip navigation system  Number of attempts: 1  Difficulty threading catheter: no  Successful placement: yes  Orientation: right  Catheter to Vein (%): 25  Location: brachial vein (medial)  Tip  [place] [facility]  [place] circumference: adults 10 cm  Extremity circumference: 32  Visible catheter length: 1  Total catheter length: 42  Dressing and securement: adhesive securement device, alcohol impregnated caps, blood cleaned with CHG, chlorhexidine disc applied, dressing applied, glue, site cleansed, statlock, sterile dressing applied and transparent dressing  Post procedure assessment: blood return through all ports, free fluid flow and placement verified by 3CG technology  PROCEDURE   Patient Tolerance:  Patient tolerated the procedure well with no immediate complicationsDescribe Procedure: PICC placement verified by [facility] 3CG technology, PICC okay to use.   Disposal: sharps and needle count correct at the end of procedure, needles and guidewire disposed in sharps container

## 2024-05-13 NOTE — IR NOTE
Patient Name: Jong Quiroga  Medical Record Number: [id number]  Today's Date: 5/13/2024    Procedure: Visceral angiogram, possible stenting or embolization of hepatic artery aneurysm   Proceduralist: Dr. Talaie, Dr. Behzadi    Procedure Start: 1118  Procedure end: 1249  Sedation medications administered: 175 mcg fentanyl, 2.5 mg versed     Report given to: Justus BRANTLEY   : kristofer    Left radial artery accessed.   TR band applied at 1249. 16 ml air in cuff.     Other Notes: Pt arrived to IR room 1 from . Consent reviewed. Pt denies any questions or concerns regarding procedure. Pt positioned supine and monitored per protocol. Pt tolerated procedure without any noted complications. Pt transferred back to .

## 2024-05-13 NOTE — PROGRESS NOTES
[facility]    Progress Note - Medicine Service, MAROON TEAM 1       Date of Admission:  5/8/2024    Assessment & Plan   Jong Quiroga is a 44 year old male admitted on 5/8/2024. 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 Found to have arterial bleeding in hepatic vasculature, occlusion of previous biliary stents, and sepsis in the setting of bacteremia likely due to cholangitis.     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 He was evaluated by vascular surgery, IR, rhematology, and hepatology during hospitalization 1/4-1/7/2024. Presenting with 2 days of BRBPR and hematemesis. Hgb stable on admission, however could be falsley elevated in the setting of dehydration. He remainds HDS. Found to have arterial bleediung of hepatic artery during ERCP. Bleeding resolved and metal stent placed. LFTs improving. S/p 1u pRBC 5/10 for Hgb 6.7. Underwent coil embolization with IR on 5/13.   - GI consult, appreciate assistance in this evaluation   - IV PPI BID   - Transplant Surgery Consulted for definitive management of hepatic artery  - trend Hgb, transfuse if Hgb <7  - Type and Screen: 5/11, will repeat 5/14     Severe Sepsis  Bacteremia 2/2 Cholangitis  Hx of Cholangitis  4/11  Lactic acid 4.1 and WBC 17 in ED. Endorsed chills PTA. CRP and Procalcitonin significantly elevated on admission. Remains HDS and afebrile. Lactic acid returned to wnl with IVF and Abx.   - Micro:    - Blood Cx - Growing Strep pyogenes and Enterobacter cloacae      - UA/Ucx - NGTD  - Antimicrobials:     - Vanc (5/8 - 5/9 )     - Zosyn (5/8 - 5/9 )     - Cefepime (5/9 - ) - Likely total 7 day course to be followed by CTX for total 7 day course.  - Anticipate 7 days for E cloacae and eventual switch to Ceftriaxone for total 2 weeks for Strep.       HTN  - HOLD PTA anti-hypertensive regimen given continued concern for re-bleed and sepsis; will plan to give IV anti-hypertensives PRN if blood pressure consistently elevated (SBP >190 or DBP >100)   -Restart PTA antihypertensives      T2DM  - Lantus 10u at bedtime  - MDSSI   - Hypoglycemia protocol     Polysubstance Use History  Formerly smoked 1-2 ppd. Reduced tobacco, still daily marijuana use. Denies alcohol over last month.  - No concern about IVDU.           Diet: Advance Diet as Tolerated: Clear Liquid Diet  Diet    DVT Prophylaxis: Pneumatic Compression Devices  Tran Catheter: Not present  Fluids: None  Lines: None     Cardiac Monitoring: None  Code Status: Full Code      Clinically Significant Risk Factors                               # Financial/Environmental Concerns: none         Disposition Plan      Expected Discharge Date: 05/14/2024      Destination: home with help/services          The patient's care was discussed with the Attending Physician, Dr. Pantoja .    Noemi Barkley MD  PGY1, Internal Medicine-Pediatrics   [facility]  Available [email or web address]    ______________________________________________________________________    Interval History   NAEON. Nursing notes reviewed. He is doing well this morning with no complaints. Is ready for IR procedure this morning and is hoping to discharge tomorrow.     Physical Exam   Vital Signs: Temp: 98.2   F (36.8  C) Temp src: Oral BP: (!) 153/95 Pulse: 68   Resp: 20 SpO2: 100 % O2 Device: None (Room air)    Weight: 182 lbs 0 oz    General Appearance: Lying in bed, appears comfortable  Respiratory: breathing comfortably on room air, no increased WOB  Cardiovascular: RRR  Skin: warm and well perfused, no edema  Other: NFD, cranial nerves grossly intact      Medical Decision Making       Please see A&P for additional details of medical decision making.      Data     I have personally reviewed the following data over the past 24 hrs:    13.1 (H)  \   8.2 (L)   / 408     140 105 10.8 /  126 (H)   3.8 25 0.82 \     ALT: 44 AST: 16 AP: 213 (H) TBILI: 0.5   ALB: 3.8 TOT PROTEIN: 6.9 LIPASE: N/A       Imaging results reviewed over the past 24 hrs:   No results found for this or any previous visit (from the past 24 hour(s)).

## 2024-05-13 NOTE — PRE-PROCEDURE
GENERAL PRE-PROCEDURE:   Procedure:  Hepatic artery angiogram and interventions  Date/Time:  5/13/2024 10:52 AM    Risks and benefits: Risks, benefits and alternatives were discussed    Consent given by:  Patient  Patient states understanding of procedure being performed: Yes    Patient's understanding of procedure matches consent: Yes    Procedure consent matches procedure scheduled: Yes    Expected level of sedation:  Moderate  Appropriately NPO:  Yes  ASA Class:  2  Mallampati  :  Grade 2- soft palate, base of uvula, tonsillar pillars, and portion of posterior pharyngeal wall visible  Lungs:  Lungs clear with good breath sounds bilaterally  Heart:  Normal heart sounds and rate  History & Physical reviewed:  History and physical reviewed and no updates needed  Statement of review:  I have reviewed the lab findings, diagnostic data, medications, and the plan for sedation

## 2024-05-13 NOTE — PROGRESS NOTES
Home Infusion  Received referral from Anny Osborne RNCC for IV Cefepmine and Rocephin.  Benefits verified.  Patient has [employer] insurance and is covered 100%.  Called and spoke with Jong to review home infusion services, review benefits and offer choice of providers.  Patient would like to remain in the [facility] system and will use [facility] for home infusion.  Confirmed discharge address, phone, and emergency contact information. Patient denies recent illness (not related to pre-existing conditions) or travel in the house hold. Confirmed allergies. No home health agency has been seeing the patient.     Jong with spouse is willing to learn and manage home IV therapy.  Questions answered.  Plan for [facility] to provide nursing services after discharge.    I will continue to follow until discharge and update pt once final orders are determined.    Thank you for the referral    Pablito Barber LPN, Coordinator   [facility]   [email or web address]  Office: [phone]

## 2024-05-13 NOTE — PROGRESS NOTES
[facility] General ID Service: ProgressNote     Patient:  Jong Quiroga, Date of birth 1980, Medical record number [id number]  Date of Visit:  May 13, 2024         Assessment and Recommendations:   ID PROBLEM LIST:  1.   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     Severe Sepsis   A. Blood Cx 5/8/24 - Growing Strep pyogenes and Enterobacter cloacae   3.     Segmental arterial mediolysis , H/O Complex celiac artery dissection  4.     HTN  5.     T2DM  6.     Polysubstance use history    RECOMMENDATIONS:  1. Would recommend IV Cefepime 2g Q8H for 7 days from first neg blood culture(5/10-5/17), followed by Ceftriaxone 2g Iv Q24 H for 7 days after (5/18-5/24)  2. Inpatient ID will sign off, please call if questions  3. 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 Found to have arterial bleeding in hepatic vasculature and occlusion of previous biliary stents.     Blood cultures from 5/8/24 grew Enterobacter cloacae which has moderate to high levels of inducible AmpC beta lactamase expression hence we recommended Cefepime IV. Given there were no signs of persistent bacteremia  held any further imaging such as ECHO.    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     Blood cultures from 5/10 with NGTD for 48 hours, appear cleared. Will  continue cefepime for 7 days for E cloacae and eventual switch to Ceftriaxone for total 2 weeks for Strep.       Primary team:  Place order panel  OPAT Pharmacy ([facility]) Review and ID Care Transition   Contact ID teams about any missed ID clinic appointments and/or ongoing therapy needs.    Prolonged Parenteral/Oral Antibiotic Recommendations and ID Follow up  This template provides final ID recommendations as of this date.     Infectious Diseases Indication: Bacteremia 2/2 E clacae, Strep pyeogenes    Antibiotic Information  Name of Antibiotic Dose of Antibiotic1 Anticipated duration Effective start date2 End date   Cefepime 2g Iv Q8H 7 days 5/10/24 5/17/24   Ceftriaxone 2g Iv Q24 7 days 5/18/24 5/24/24          1.Dose of antibiotic will need to be renally adjusted if creatinine clearance changes  2.Effective start date is the date of adequate therapy with appropriate spectrum    Method of antibiotic delivery:PICC line.Is the line being used for another indication besides antimicrobials? No At the end of therapy should the line used for antimicrobials be removed or de-accessed? TBD     Weekly labs required: Creatinine, AST/ALT, CBC with diff and CRP. Dr. Duenas will follow labs at discharge until ID follow up. Fax labs to ID clinic.    Are there pending microbial tests: No     Imaging for ID follow up: ID Imaging: No.     No ID clinic follow up needed    **Until further notice patients discharged to [facility] will need to be seen by [facility] Infectious Diseases group if ID follow up is need. [facility] ID academic group is not credentialed there.**    ID provider route note: OPAT RN Care Coordinator [facility] ID Clinic Information:  Phone: [phone]  Fax: [phone]     Total time on day of visit including chart review, visit, counseling, documentation and coordination of care: 50 minutes    Rufus Duenas  Infectious Disease Staff Physician      Interval events      Doing well, no fevers or chills. 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      Patient admitted in [facility] on 5/8/24 and he reported to have had 1 episode of hematemesis and 1 episode of bright red stools on 5/7/24 and on 5/8/24 he had 2 episodes of hematemesis and 2 episodes of bright red stools.  He reports this is similar to his previous episode (per chart review at that time was thought to be due to bleeding from his sphincterotomy site).  He did endorse some right upper quadrant pain.  He endorses some chills but no other infectious symptoms such as fever, cough, URI symptoms etc.  He states he feels diffusely weak but denies chest pain, syncope or lightheadedness.  Denies heavy alcohol use, denies heavy ibuprofen use.      Per admission note, patient was admitted to [facility] from 4/10 - 4/14/2024 with acute cholangitis with bacteremia and acute blood loss anemia with melena likely from resolved biliary sphincterectomy bleeding.  The morning after the patient's admission he became tachycardic to the 110s, fever to 103 and leukocytosis to 17.7.  Patient was started on vancomycin and Zosyn.  Blood cultures drawn that resulted strep anginosus and H. influenzae.  ID was consulted and on 4/12 recommended ceasing vancomycin and switching Zosyn to Unasyn which was eventually transitioned to Augmentin for plan for 14-day treatment course from last negative blood culture.       Patient reviewed bedside today, wife and children present(5/13/24). Patient is doing well and is tolerating liquids. He hasn't had any episodes of hematemesis or blood in stools  today. He has no reports of abdominal pain, nausea or vomiting. No other symptoms of chest pain, cough, fever or congestion.         Past Medical History:     Past Medical History:   Diagnosis Date     Diabetes mellitus, type 2 (H)      Gall bladder stones      Hypertension      Past Surgical History:   Procedure Laterality Date     ABDOMEN SURGERY       ENDOSCOPIC RETROGRADE CHOLANGIOPANCREATOGRAM N/A 04/03/2024    Procedure: ENDOSCOPIC RETROGRADE CHOLANGIOPANCREATOGRAPHY, biliary sphincterotomy, stone removal, stent placement;  Surgeon: Harsha Ferguson MD;  Location: [facility] OR     ENDOSCOPIC RETROGRADE CHOLANGIOPANCREATOGRAM N/A 4/11/2024    Procedure: ENDOSCOPIC RETROGRADE CHOLANGIOPANCREATOGRAPHY with stent exchange;  Surgeon: Harsha Ferguson MD;  Location: [facility] OR     ENDOSCOPIC RETROGRADE CHOLANGIOPANCREATOGRAM N/A 5/8/2024    Procedure: ENDOSCOPIC RETROGRADE CHOLANGIOPANCREATOGRAPHY with biliary stent exchange, biliary shpincterotomy, and clot removal;  Surgeon: Westley Lehman MD;  Location: [facility] OR         Allergies:    No Known Allergies         Current Antimicrobials:   Cefipime 2g IV Q8H    Prior  Vancomycin, Piperacillin-Tazobactam     Family History:   Reviewed and noncontributory       Social History:     Social History     Socioeconomic History     Marital status: Single     Spouse name: Not on file     Number of children: Not on file     Years of education: Not on file     Highest education level: Not on file   Occupational History     Not on file   Tobacco Use     Smoking status: Every Day     Types: Cigarettes     Passive exposure: Current     Smokeless tobacco: Never   Substance and Sexual Activity     Alcohol use: Not Currently     Drug use: Yes     Types: Marijuana     Sexual activity: Not on file   Other Topics Concern     Not on file   Social History Narrative     Not on file     Social Determinants of Health     Financial Resource Strain: Not on file   Food Insecurity: Not on file    Transportation Needs: Not on file   Physical Activity: Not on file   Stress: Not on file   Social Connections: Unknown (11/22/2023)    Received from [facility], [facility]    Social Connections      Frequency of Communication with Friends and Family: Not on file   Interpersonal Safety: Not on file   Housing Stability: Not on file              Physical Exam:   Ranges forvital signs:  Temp:  [98  F (36.7  C)-98.4  F (36.9  C)] 98  F (36.7  C)  Pulse:  [74-83] 76  Resp:  [16-18] 18  BP: (118-163)/(75-98) 137/91  SpO2:  [100 %] 100 %  GENERAL:  well-developed, well-nourished, sitting in bed in no acute distress.   ENT:  Head is normocephalic, atraumatic. Oropharynx is moist without exudates or ulcers.  EYES:  Eyes have anicteric sclerae.    NECK:  Supple.  LUNGS:  Breathing comfortably  SKIN:  No acute rashes.  NEUROLOGIC:  Grossly nonfocal.         Laboratory Data:   Reviewed.  Pertinent for:    Culture data:    5/8/24  Blood culture   Enterobacter cloacae  Streptococcus pyogenes (Group A Streptococcus)    Urine culture 5/8/24  No growth after 2 days    5/10/24  Blood culture  No growth after 2 days    5/8/24  Nasal swab for MRSA MSSA  Negative    Color Urine (no units)   Date Value   05/08/2024 Yellow     Appearance Urine (no units)   Date Value   05/08/2024 Clear     Glucose Urine (mg/dL)   Date Value   05/08/2024 Negative     Bilirubin Urine (no units)   Date Value   05/08/2024 Negative     Ketones Urine (mg/dL)   Date Value   05/08/2024 Trace (A)     Specific Gravity Urine (no units)   Date Value   05/08/2024 1.034     pH Urine (no units)   Date Value   05/08/2024 5.5     Protein Albumin Urine (mg/dL)   Date Value   05/08/2024 20 (A)     Nitrite Urine (no units)   Date Value   05/08/2024 Negative     Leukocyte Esterase Urine (no units)   Date Value   05/08/2024 Negative           Imaging:   CTA ABDOMEN AND PELVIS 5/8/24  Impression:     1. No  significant change in diffuse aneurysmal configuration extending  from the common hepatic artery through the right intrahepatic  branches.     2. No evidence of active bleeding.     3. Internal biliary stents are in place however there is persistent  mild to moderate diffuse intrahepatic biliary dilatation. 2 cm  hypodensity in segments 5/8 of the liver persists. Initial tiny  hypodensity in the same segment.     4. Cholelithiasis.

## 2024-05-14 ENCOUNTER — APPOINTMENT (OUTPATIENT)
Dept: CT IMAGING | Facility: CLINIC | Age: 44
End: 2024-05-14
Attending: NURSE PRACTITIONER
Payer: COMMERCIAL

## 2024-05-14 VITALS
HEART RATE: 68 BPM | SYSTOLIC BLOOD PRESSURE: 145 MMHG | RESPIRATION RATE: 16 BRPM | DIASTOLIC BLOOD PRESSURE: 97 MMHG | BODY MASS INDEX: 31.24 KG/M2 | TEMPERATURE: 98.1 F | OXYGEN SATURATION: 100 % | WEIGHT: 182 LBS

## 2024-05-14 LAB — GLUCOSE BLDC GLUCOMTR-MCNC: 130 MG/DL (ref 70–99)

## 2024-05-14 PROCEDURE — 250N000009 HC RX 250: Performed by: INTERNAL MEDICINE

## 2024-05-14 PROCEDURE — C9113 INJ PANTOPRAZOLE SODIUM, VIA: HCPCS

## 2024-05-14 PROCEDURE — 250N000011 HC RX IP 250 OP 636: Performed by: INTERNAL MEDICINE

## 2024-05-14 PROCEDURE — 99238 HOSP IP/OBS DSCHRG MGMT 30/<: CPT | Mod: GC | Performed by: INTERNAL MEDICINE

## 2024-05-14 PROCEDURE — 250N000011 HC RX IP 250 OP 636

## 2024-05-14 PROCEDURE — 250N000013 HC RX MED GY IP 250 OP 250 PS 637

## 2024-05-14 PROCEDURE — 99232 SBSQ HOSP IP/OBS MODERATE 35: CPT | Performed by: PHYSICIAN ASSISTANT

## 2024-05-14 PROCEDURE — 74174 CTA ABD&PLVS W/CONTRAST: CPT | Mod: 26 | Performed by: STUDENT IN AN ORGANIZED HEALTH CARE EDUCATION/TRAINING PROGRAM

## 2024-05-14 PROCEDURE — 74174 CTA ABD&PLVS W/CONTRAST: CPT

## 2024-05-14 RX ORDER — MEPERIDINE HYDROCHLORIDE 25 MG/ML
25 INJECTION INTRAMUSCULAR; INTRAVENOUS; SUBCUTANEOUS EVERY 30 MIN PRN
Status: CANCELLED | OUTPATIENT
Start: 2024-05-18

## 2024-05-14 RX ORDER — HEPARIN SODIUM,PORCINE 10 UNIT/ML
5-20 VIAL (ML) INTRAVENOUS DAILY PRN
Status: CANCELLED | OUTPATIENT
Start: 2024-05-18

## 2024-05-14 RX ORDER — HEPARIN SODIUM (PORCINE) LOCK FLUSH IV SOLN 100 UNIT/ML 100 UNIT/ML
5 SOLUTION INTRAVENOUS
Status: CANCELLED | OUTPATIENT
Start: 2024-05-18

## 2024-05-14 RX ORDER — CEFTRIAXONE 2 G/1
2 INJECTION, POWDER, FOR SOLUTION INTRAMUSCULAR; INTRAVENOUS EVERY 24 HOURS
Qty: 140 ML | Refills: 0 | Status: ACTIVE | OUTPATIENT
Start: 2024-05-18 | End: 2024-05-25

## 2024-05-14 RX ORDER — HEPARIN SODIUM,PORCINE 10 UNIT/ML
5-20 VIAL (ML) INTRAVENOUS EVERY 24 HOURS
Status: DISCONTINUED | OUTPATIENT
Start: 2024-05-14 | End: 2024-05-14 | Stop reason: HOSPADM

## 2024-05-14 RX ORDER — EPINEPHRINE 1 MG/ML
0.3 INJECTION, SOLUTION, CONCENTRATE INTRAVENOUS EVERY 5 MIN PRN
Status: CANCELLED | OUTPATIENT
Start: 2024-05-18

## 2024-05-14 RX ORDER — HEPARIN SODIUM,PORCINE 10 UNIT/ML
5-20 VIAL (ML) INTRAVENOUS
Status: DISCONTINUED | OUTPATIENT
Start: 2024-05-14 | End: 2024-05-14 | Stop reason: HOSPADM

## 2024-05-14 RX ORDER — ALBUTEROL SULFATE 90 UG/1
1-2 AEROSOL, METERED RESPIRATORY (INHALATION)
Status: CANCELLED
Start: 2024-05-18

## 2024-05-14 RX ORDER — DIPHENHYDRAMINE HYDROCHLORIDE 50 MG/ML
50 INJECTION INTRAMUSCULAR; INTRAVENOUS
Status: CANCELLED
Start: 2024-05-18

## 2024-05-14 RX ORDER — CEFEPIME HYDROCHLORIDE 2 G/1
2 INJECTION, POWDER, FOR SOLUTION INTRAVENOUS EVERY 8 HOURS
Qty: 112.5 ML | Refills: 0 | Status: ACTIVE | OUTPATIENT
Start: 2024-05-14 | End: 2024-05-17

## 2024-05-14 RX ORDER — ALBUTEROL SULFATE 0.83 MG/ML
2.5 SOLUTION RESPIRATORY (INHALATION)
Status: CANCELLED | OUTPATIENT
Start: 2024-05-18

## 2024-05-14 RX ORDER — CEFTRIAXONE 2 G/1
2 INJECTION, POWDER, FOR SOLUTION INTRAMUSCULAR; INTRAVENOUS ONCE
Status: CANCELLED
Start: 2024-05-18 | End: 2024-05-18

## 2024-05-14 RX ORDER — METHYLPREDNISOLONE SODIUM SUCCINATE 125 MG/2ML
125 INJECTION, POWDER, LYOPHILIZED, FOR SOLUTION INTRAMUSCULAR; INTRAVENOUS
Status: CANCELLED
Start: 2024-05-18

## 2024-05-14 RX ORDER — IOPAMIDOL 755 MG/ML
90 INJECTION, SOLUTION INTRAVASCULAR ONCE
Status: COMPLETED | OUTPATIENT
Start: 2024-05-14 | End: 2024-05-14

## 2024-05-14 RX ADMIN — PANTOPRAZOLE SODIUM 40 MG: 40 INJECTION, POWDER, FOR SOLUTION INTRAVENOUS at 08:05

## 2024-05-14 RX ADMIN — Medication 5 ML: at 14:02

## 2024-05-14 RX ADMIN — SODIUM CHLORIDE, PRESERVATIVE FREE 100 ML: 5 INJECTION INTRAVENOUS at 06:30

## 2024-05-14 RX ADMIN — AMLODIPINE BESYLATE 10 MG: 10 TABLET ORAL at 08:05

## 2024-05-14 RX ADMIN — IOPAMIDOL 90 ML: 755 INJECTION, SOLUTION INTRAVENOUS at 06:31

## 2024-05-14 RX ADMIN — CEFEPIME HYDROCHLORIDE 2 G: 2 INJECTION, POWDER, FOR SOLUTION INTRAVENOUS at 08:04

## 2024-05-14 RX ADMIN — HYDROCHLOROTHIAZIDE 25 MG: 25 TABLET ORAL at 08:05

## 2024-05-14 RX ADMIN — CEFEPIME HYDROCHLORIDE 2 G: 2 INJECTION, POWDER, FOR SOLUTION INTRAVENOUS at 14:03

## 2024-05-14 RX ADMIN — METOPROLOL TARTRATE 50 MG: 50 TABLET, FILM COATED ORAL at 08:05

## 2024-05-14 ASSESSMENT — ACTIVITIES OF DAILY LIVING (ADL)
ADLS_ACUITY_SCORE: 20

## 2024-05-14 NOTE — PROGRESS NOTES
Interventional Radiology Note:     Jong Quiroga is a 44 year old male admitted on 5/8/2024. 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       Patient visited at bedside, has no complaints.  Patient denies any abdominal pain, distention, nausea or vomiting.      CTA this morning 5/14/2024 reviewed which demonstrated interval changes of hepatic artery coil embolization with total thrombosis of the hepatic artery and occlusion of the hepatic artery pseudoaneurysm. Thrombosed pseudoaneurysm has a masslike appearance  near the pancreatic head.   Interval changes metal biliary stent placement with significantly  reduced intrahepatic biliary dilatation. Pneumobilia secondary to  stenting.    Plan:    Patient is dischargeable from our standpoint.    Follow-up with vascular interventional radiology clinic (Dr. Crawford) in 1 month with previsit  CTA abdomen.        Vitals:   BP (!) 145/97 (BP Location: Left arm, Patient Position: Supine)   Pulse 68   Temp 98.1  F (36.7  C) (Oral)   Resp 16   Wt 82.6 kg (182 lb)   SpO2 100%   BMI 31.24 kg/m      Physical Exam     Pertinent Labs:     Lab Results   Component Value Date    WBC 13.1 (H) 05/13/2024    WBC 11.2 (H) 05/12/2024    WBC 9.0 05/11/2024       Lab Results   Component Value Date    HGB 8.2 05/13/2024    HGB 7.9 05/12/2024    HGB 7.8 05/11/2024       Lab Results   Component Value Date     05/13/2024     05/12/2024     05/11/2024       Lab Results   Component Value Date    INR 1.24  (H) 05/08/2024       Lab Results   Component Value Date    POTASSIUM 3.8 05/13/2024    POTASSIUM 3.8 05/08/2024      Plan:          Behzadi Heshmatzadeh, MD  Interventional Radiology  Pager: [phone]

## 2024-05-14 NOTE — CONFIDENTIAL NOTE
DIAGNOSIS:  HFU, Sepsis, due to unspecified organism, unspecified whether acute organ dysfunction present    DATE RECEIVED:  06.10.2024    NOTES (Gather within 2 years) STATUS DETAILS   OFFICE NOTE from referring provider   Internal 05.08.2024 Puma Pantoja MD    OFFICE NOTE from other specialist     DISCHARGE SUMMARY from hospital     DISCHARGE REPORT from the ER Internal 05.08.2024 Puma Pantoja MD    LABS (any labs)     MEDICATION LIST Internal    IMAGING  (NEED IMAGES AND REPORTS)     Osteomyelitis: Foot imaging      Liver Abscess: Abdominal imaging     Other (anything related to diagnoses

## 2024-05-14 NOTE — DISCHARGE SUMMARY
[facility]  Hospitalist Discharge Summary      Date of Admission:  5/8/2024  Date of Discharge:  5/14/2024  Discharging Provider: Fermin Santamaria MD  Discharge Service: Medicine Service, LUIS TEAM 1    Discharge Diagnoses     Biliary Hepatic Artery Fistula  Hepatic Artery Aneurysm  Hepatic Artery Pseudoaneurysm  Severe Sepsis secondary to Strep pyogenes Bacteremia and Enterobacter Cloaecae Bacteremia from Acute Cholangitis    Clinically Significant Risk Factors          Follow-ups Needed After Discharge   Follow-up Appointments     Adult [facility] Follow-up and recommended labs and tests      Follow up with your primary care provider in 1-2 weeks.   Follow up with Infectious Disease in 2 weeks.     Appointments on [place] and/or [facility] (with [facility] or [facility]   provider or service). Call [phone] if you haven't heard regarding   these appointments within 7 days of discharge.            Unresulted Labs Ordered in the Past 30 Days of this Admission       Date and Time Order Name Status Description    5/10/2024 11:15 AM Blood Culture Hand, Left Preliminary     5/10/2024 11:15 AM Blood Culture Hand, Right Preliminary     5/8/2024  3:25 PM Prepare red blood cells (unit) Preliminary     5/8/2024  3:25 PM Prepare red blood cells (unit) Preliminary     5/8/2024  8:07 AM Blood Culture Peripheral Blood Preliminary         These results will be followed up by Primary Care Provider     Discharge Disposition   Discharged to home  Condition at discharge: Stable    Hospital Course     44 year old male admitted on 5/8/2024. 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 He was evaluated by vascular surgery, IR, rhematology, and hepatology during hospitalization 1/4-1/7/2024. Presenting with 2 days of BRBPR and hematemesis. 5/8 ERCP revealed biliary hepatic artery fistula alongside two occluded biliary stents now s/p uncomplicated biliary sphincterotomy extension and metal biliary stent placement. 5/13 right hepatic artery angiogram revealed aneurysm and pseudoaneurysm s/p embolization with distal right hepatic artery coiling.   - Repeat ERCP on 6/20 as scheduled per GI  - PO pantoprazole BID   - IR follow up in 1 month     Severe Sepsis due to acute cholangitis causing Strep pyogenes Bacteremia and Enterobacter Cloaecae Bacteremia   Lactic acid 4.1 and WBC 17 in ED. Endorsed chills PTA. CRP and Procalcitonin significantly elevated on admission. Remains HDS and afebrile with lactate normalized after antibiotics. Plan for antibiotic course as below for strep pyogenes and enterobacter cloacae bacteremia.  - Cefepime (5/10 - 5/17)   - Ceftriaxone (5/18 - 5/24)  - Outpatient ID OPAT clinic referral placed with ID staff following outpatient labs    Consultations This Hospital Stay   GI LUMINAL ADULT IP CONSULT  NURSING TO CONSULT FOR VASCULAR ACCESS CARE IP CONSULT  PHARMACY TO DOSE VANCO  INTERVENTIONAL RADIOLOGY ADULT/PEDS IP CONSULT  TRANSPLANT SURGERY LIVER ADULT IP CONSULT  INFECTIOUS DISEASE GENERAL ADULT IP CONSULT  CARE MANAGEMENT / SOCIAL WORK IP CONSULT  NURSING TO CONSULT FOR VASCULAR ACCESS CARE IP CONSULT  NURSING TO CONSULT FOR VASCULAR ACCESS CARE IP CONSULT  VASCULAR ACCESS FOR PICC PLACEMENT ADULT IP CONSULT    Code Status   Full Code    Time Spent on this Encounter       Patient seen and staffed with attending Dr. Curry.     Noemi Barkley,  MD [facility] [address] [place] [address]  Phone: [phone]  ______________________________________________________________________    Physical Exam   Vital Signs: Temp: 98.1  F (36.7  C) Temp src: Oral BP: (!) 145/97 Pulse: 68   Resp: 16 SpO2: 100 % O2 Device: None (Room air)    Weight: 182 lbs 0 oz    General Appearance:  Lying in bed, appears comfortable  Respiratory: breathing comfortably on room air, no increased WOB  Cardiovascular: RRR  Skin: warm and well perfused, no edema  Other:  NFD, cranial nerves grossly intact         Primary Care Physician   Storm Armenta    Discharge Orders      Home Infusion Referral      Adult Infectious Disease  Referral      OPAT enrollment and ID Clinic Referral      Activity    Your activity upon discharge: activity as tolerated     Adult [facility] Follow-up and recommended labs and tests    Follow up with your primary care provider in 1-2 weeks.   Follow up with Infectious Disease in 2 weeks.     Appointments on [place] and/or [facility] (with [facility] or [facility] provider or service). Call [phone] if you haven't heard regarding these appointments within 7 days of discharge.     Discharge Instructions    Reasons to come back to the hospital:   -Vomiting or coughing up blood  -Bright red blood in the stool   -Dark black stool  -Dizziness, lightheadedness, falls     Reason for your hospital stay    You were admitted for a bleed from your hepatic artery that required a coil embolization (closing off the bleed with a coil) with interventional radiology. You also had a bacterial bloodstream infection for which you will be sent home on IV antibiotics.     Please use cefepime every 8 hours with the last dose being 5/17/24 AM. Starting 5/18/24, take ceftriaxone every 24 hours for 7 days.     PICC removal    Must check with ID provider Dr Rufus Duenas prior to PICC removal.  PICC must remain in place through at least 5/24/24  AFTER final ceftriaxone dose.     Diet    Follow this diet upon discharge: Orders Placed This Encounter      Advance Diet as Tolerated: Clear Liquid Diet       Significant Results and Procedures   Results for orders placed or performed during the hospital encounter of 05/08/24   CTA Abdomen Pelvis with Contrast    Narrative    Exam: Computed tomographic angiography of the abdomen and pelvis  without and with contrast, including 3D reformations dated 5/8/2024  11:28 AM    Clinical information:  h/o celiac and hepatic artery anersym causing  biliary obstruction s/p ERCP - now with sepsis, abdominal pain,  elevated LFTs, and coffee ground emesis.    Technique: Helical scans through the abdomen and pelvis obtained  before the administration of intravenous contrast media and following  the injection of contrast media  in the arterial phase. Source images  reviewed as well as 3D and multi-planar reconstructions.    Contrast: 112    DLP: 941 mGy*cm    Comparison study: 4/10/2024    Findings:      Internal biliary stents are in place. Again demonstrated is diffuse  aneurysmal dilatation of the common hepatic artery extending  continuously through the proper hepatic, right hepatic and proximal  branches of the right hepatic artery. Left hepatic artery is fed  through a collateral from the pancreaticoduodenal branches. Overall  the size and appearance is unchanged.    Mild intrahepatic biliary dilatation persists bilaterally. 2 cm  hypodensity in segment 5/8. Gallstones identified. No pericholecystic  fluid or color wall thickening.    No definite contrast extravasation in the bowel or stomach. Lobulated  contour of the kidneys persist. Prominence of the intrarenal  collecting system without hydroureter persists and is stable. The  bowel is not abnormally dilated. The spleen and pancreas are normal.  The adrenal glands demonstrate thickening bilaterally which may be  secondary to hyperplasia.    No ascites.    No abnormal  lymph nodes in the abdomen or pelvis.    In the partially visualized lower chest, no acute pulmonary opacities.  No pleural or pericardial effusions.    No suspicious osseous abnormality.      Impression    Impression:    1. No significant change in diffuse aneurysmal configuration extending  from the common hepatic artery through the right intrahepatic  branches.    2. No evidence of active bleeding.    3. Internal biliary stents are in place however there is persistent  mild to moderate diffuse intrahepatic biliary dilatation. 2 cm  hypodensity in segments 5/8 of the liver persists. Initial tiny  hypodensity in the same segment.    4. Cholelithiasis.    [employer]         SYSTEM ID:  [id number]   XR Surgery GIAN L/T 5 Min Fluoro w Stills    Narrative    This exam was marked as non-reportable because it will not be read by a   radiologist or a [place] non-radiologist provider.         CTA Abdomen Pelvis with Contrast    Narrative    EXAMINATION: CTA ABDOMEN PELVIS WITH CONTRAST, 5/14/2024 6:49 AM    TECHNIQUE:  Helical CT images from the lung bases through the  symphysis pubis were obtained without and with IV contrast and both  arterial and portal venous phase. Contrast dose: iopamidol  (ISOVUE-370) solution 90 mL    COMPARISON: 5/8/2024    HISTORY: s/p IR embolization of right hepatic artery PSA 5/13, eval  PSA    FINDINGS:  Vasculature: Interval changes of hepatic artery coil embolization with  total thrombosis of the hepatic artery, the celiac trunk remains  patent with normal flow through the left gastric and the splenic  arteries. Probable small dissection flap within the celiac trunk. The  hepatic arterial pseudoaneurysm appears completely occluded and  thrombosed with somewhat masslike appearance near the head of the  pancreas. The abdominal aorta is normal in caliber, superior  mesenteric artery, renal arteries, and inferior mesenteric artery are  patent. There is no evidence of arterial stenosis.  Portal vein is  patent.    Preserved arterial supply to both lobes of liver via collaterals (Left  lobe greater than right lobe).     Liver: Normal attenuation of the hepatic parenchyma with scattered  subcentimeter hepatic cysts.  Biliary system: Lipid rich cholelithiasis without signs of acute  cholecystitis. There is mild residual intrahepatic biliary dilatation  with pneumobilia which is significantly reduced since 5/8/2024.  Pancreas: No focal mass or dilation of the main pancreatic duct.  Stomach: Within normal limits.  Spleen: Within normal limits.  Adrenal glands: Within normal limits.  Kidneys: Lobulated appearance of the renal parenchyma.  Bladder: Linear anterior deformity of the into superior bladder  protruding toward the umbilicus possible partial urachal remnant.  Reproductive organs: Within normal limits.  Colon: Within normal limits.  Appendix: Within normal limits.  Small Bowel: Within normal limits.  Lymph nodes: No intra-abdominal or pelvic lymphadenopathy.  Peritoneum: No intraabdominal free fluid or air.     Lung bases: Clear.    Bones and soft tissues: No suspicious soft tissue mass or fluid  collection. No suspicious osseous lesion.      Impression    IMPRESSION:   1. Interval changes of hepatic artery coil embolization with total  thrombosis of the hepatic artery and occlusion of the hepatic artery  pseudoaneurysm. Thrombosed pseudoaneurysm has a masslike appearance  near the pancreatic head. Collateral supply to both lobes of liver  (L>R).   2. Interval changes metal biliary stent placement with significantly  reduced intrahepatic biliary dilatation. Pneumobilia secondary to  stenting.  3. Lipid rich cholelithiasis without signs of acute cholecystitis.   4. Probable small dissection flap within the celiac trunk with normal  arterial flow.     I have personally reviewed the examination and initial interpretation  and I agree with the findings.    DIANDRA ABERNATHY MD         SYSTEM ID:   [id number]       Discharge Medications   Current Discharge Medication List        START taking these medications    Details   ceFEPIme (MAXIPIME) 2 g vial Inject 2 g into the vein every 8 hours for 3 days  Qty: 112.5 mL, Refills: 0    Associated Diagnoses: Sepsis, due to unspecified organism, unspecified whether acute organ dysfunction present (H)      cefTRIAXone (ROCEPHIN) 2 GM vial Inject 2 g into the vein every 24 hours for 7 days  Qty: 140 mL, Refills: 0    Associated Diagnoses: Sepsis, due to unspecified organism, unspecified whether acute organ dysfunction present (H)           CONTINUE these medications which have CHANGED    Details   !! pantoprazole (PROTONIX) 40 MG EC tablet Take 1 tablet (40 mg) by mouth 2 times daily  Qty: 60 tablet, Refills: 3    Associated Diagnoses: Melena      !! pantoprazole (PROTONIX) 40 MG EC tablet Take 1 tablet (40 mg) by mouth 2 times daily  Qty: 60 tablet, Refills: 2    Associated Diagnoses: Upper GI bleed       !! - Potential duplicate medications found. Please discuss with provider.        CONTINUE these medications which have NOT CHANGED    Details   amLODIPine (NORVASC) 10 MG tablet Take 1 tablet (10 mg) by mouth daily  Qty: 30 tablet, Refills: 0    Associated Diagnoses: Hepatic artery dissection (H24); Essential hypertension      BD LINO U/F 32G X 4 MM insulin pen needle       blood glucose monitoring (SOFTCLIX) lancets       Blood Glucose Monitoring Suppl (ACCU-CHEK GUIDE ME) w/Device KIT       cloNIDine (CATAPRES) 0.1 MG tablet Take 1 tablet (0.1 mg) by mouth 2 times daily  Qty: 60 tablet, Refills: 0    Associated Diagnoses: Hepatic artery dissection (H24); Essential hypertension      hydrochlorothiazide (HYDRODIURIL) 25 MG tablet Take 1 tablet (25 mg) by mouth daily  Qty: 30 tablet, Refills: 0    Associated Diagnoses: Hepatic artery dissection (H24); Essential hypertension      insulin aspart (NOVOLOG PEN) 100 UNIT/ML pen Inject 0-12 Units Subcutaneous 3 times daily  (before meals) Blood Glucose             Dose  < 70                               see hypoglycemia protocol                             no insulin  150-199                         2 units  200-249                         4 units  250-299                         6 units  300-349                         8 units  350-399                         10 units  400 and greater             12 units and call MD      insulin glargine (LANTUS PEN) 100 UNIT/ML pen Inject 10 Units Subcutaneous at bedtime    Comments: If Lantus is not covered by insurance, may substitute Basaglar or Semglee or other insulin glargine product per insurance preference at same dose and frequency.    Associated Diagnoses: Type 2 diabetes mellitus with hyperglycemia, unspecified whether long term insulin use (H)      metoprolol tartrate (LOPRESSOR) 50 MG tablet Take 1 tablet (50 mg) by mouth 2 times daily  Qty: 60 tablet, Refills: 0    Associated Diagnoses: Hepatic artery dissection (H24); Essential hypertension      OPTIUM TEST STRIPS test strip Dispense item covered by pt ins. E11.65 NIDDM type II, uncontrolled - Test 4 times/day. Reason: High A1C      prochlorperazine (COMPAZINE) 10 MG tablet Take 1 tablet (10 mg) by mouth every 6 hours as needed for nausea or vomiting  Qty: 15 tablet, Refills: 0    Associated Diagnoses: Nausea and vomiting, unspecified vomiting type           Allergies   No Known Allergies

## 2024-05-14 NOTE — PLAN OF CARE
BP (!) 140/94 (BP Location: Left arm, Patient Position: Supine, Cuff Size: Adult Regular)   Pulse 68   Temp 98.5  F (36.9  C) (Oral)   Resp 18   Wt 82.6 kg (182 lb)   SpO2 100%   BMI 31.24 kg/m      VSS, room air, denied pain and nausea, voiding and having BM's but not saving, gave cefepime as ordered, appeared to sleep well, plan to have a CT of pelvis today, continue with plan of care

## 2024-05-14 NOTE — PLAN OF CARE
Vitals:    05/13/24 1435 05/13/24 1549 05/13/24 2357 05/14/24 0810   BP: (!) 155/86 (!) 153/95 (!) 140/94 (!) 145/97   BP Location:  Left arm Left arm Left arm   Patient Position:  Supine Supine Supine   Cuff Size:   Adult Regular    Pulse:       Resp:   18 16   Temp:  98.2  F (36.8  C) 98.5  F (36.9  C) 98.1  F (36.7  C)   TempSrc:  Oral Oral Oral   SpO2: 100% 100%  100%   Weight:        Discharge script written, PICC IV antibiotic teaching has been done, follow up appointment is set,   Medication will deliver to pt's home, last IV antibiotic will be infused at 1400  Pt will be discharge home at 1430.

## 2024-05-14 NOTE — PROGRESS NOTES
Home Infusion  Jong is expected to discharge today and will be going home on IV Cefepime until 05/16 morning dose then will be switching to IV ceftriaxone 2gm daily.   He has never done home IV therapy before.  Met with him at bedside to discuss discharge plans, provided him with information about IV abx therapy with [facility] services.  Explained about administration method which will be via IV push,  went over the teaching materials. Patient feels comfortable administering the IV medication himself.  Informed him about supplies and delivery of supplies, storage of medication, dosing schedule, plan for SNV and 24/7 availability of [facility] staff while on IV therapy.   Patient will be dosed at 1400 today at the hospital then will do his first dose at home at 2200.     Jong verbalized understanding of all information given.   He is willing and able to learn and manage home IV therapy and is agreeable with the plan.  Questions answered.  Will continue to follow update pt with final details once discharge is confirmed.     KARON Lorenz RN, BSN, B.S., [facility]  Resource Nurse  [facility]fusion  [facility], [address] [address]  [email or web address]

## 2024-05-14 NOTE — PLAN OF CARE
Pt IV antibiotic infused and done, a copy of discharge script given to pt,   Pt discharged home.

## 2024-05-14 NOTE — PHARMACY
[facility]  Parenteral ANtibiotic Review at Departure from Acute Care Collaborative Note     Patient: Jong Quiroga  MRN: [id number]  Allergies: Patient has no known allergies.    [facility]  OPAT to be provided by: [facility]       Line Type: PICC    Diagnosis/Indications: Polymicrobial bacteremia  Organism(s): Enterobacter cloacae, Strep pyogenes  MRDO? No  Pending Cultures/Microbiological Tests: yes 5/10 blood culture finalization    Inpatient ID involved in developing OPAT plan: Yes - discharge OPAT plan has no changes from ID provider, Dr. Rufus Duenas, OPAT plan charted on 5/13/2024    Outpatient ID Follow-up: ID OPAT Clinic Referral Placed ([facility] Ph: [phone] and Fax: [phone]) - no appointment needed  Designated Provider: Dr. Rufus Duenas    Antimicrobial Regimen / Route Anticipated  Duration Start Date Stop /  Reassess Date   Cefepime 2 g every 8 hours/IV 7 days 5/10/2024 (first negative blood culture) 5/17/2024   Ceftriaxone 2 g every 24 hours/IV 7 days (14 day total antibiotic course) 5/18/2024 5/24/2024     Laboratory Tests and Monitoring Frequency: CBC with Diff, SCr, ALT, AST, CRP Once Weekly    Imaging/Miscellaneous Monitoring: None    ID Pharmacist Interventions: Assist with 1st Dose Monitoring (Per chart review, patient has never received ceftriaxone. Search our system and CareEverywhere with no definitive administration records. Reached out to [facility] pharmacist who will help coordinate ceftriaxone first dose monitoring in a controlled setting.)                          Riri Hussein, PharmD, BCIDP  Pager: [phone]

## 2024-05-14 NOTE — PROGRESS NOTES
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     Underwent ERCP 5/8:  - Significant arterial bleeding across the biliary orifice following removal of two occluded biliary stents from a patent biliary sphincterotomy    - Complete opacification of the biliary tree was not feasible due to the rapid efflux of contrast in the setting of significant hemobilia, likely secondary to a fistulous connection with a branch of the hepatic artery    - Filling defect seen throughout the biliary system which likely represents clot and sloughing biliary epithelium    - Uncomplicated biliary sphincterotomy extension to accommodate placment of a 10mm covered metal stent placed across the entire length of the common     IR consulted and on 5/13 underwent successful Right Hepatic artery angiogram 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     CTA planned this morning for IR to review. Tentatively planning discharge this afternoon given clinical stability. No labs obtained this morning.     Patient at considerable risk for ischemic cholangiopathy with recent intervention. We will plan repeat ERCP as scheduled on 6/20, or sooner pending clinical course.     RECOMMENDATIONS:  -- Follow liver tests, H/H  -- Antibiotics per primary team/ID  -- Repeat ERCP on 6/20 as scheduled      The patient was discussed and plan agreed  "upon with GI staff, Dr Lehman.    Overall time spent on the date of this encounter preparing to see the patient (including chart review of available notes, clinical status events, imaging and labs); obtaining history; examining the patient , coordinating and/or ordering medications, tests and/or procedures; communicating with other health care professionals; and documenting the above clinical information in the electronic medical record was 35 minutes.      Keyona Patton PA-C  Advanced Endoscopy/Pancreaticobiliary GI Service  [facility]  Vocera     ______________________________________________________      Interval events since last evaluated: Nursing notes and 24hr events reviewed. NAEON, vitals stable. IR procedure report reviewed.     Patient seen and examined at 1250. Patient reports that he is feeling well today. Denies abdominal pain, \"no complaints\". Tolerated a general diet but might have eaten too much too fast. Hoping to discharge this afternoon.      Objective:  Blood pressure (!) 145/97, pulse 68, temperature 98.1  F (36.7  C), temperature source Oral, resp. rate 16, weight 82.6 kg (182 lb), SpO2 100%.  Gen: Ambulating in room, washing his face at bedside sink, NAD  HEENT: NCAT. Conjunctiva clear. Sclera anicteric   Chest: breathing comfortably on RA, speaking in full sentences  Msk: no gross deformity  Neuro: grossly normal  Mental status/Psych: A&O. Asks/answers questions appropriately         LABS:  [facility]  Recent Labs   Lab 05/14/24  0809 05/13/24  1550 05/13/24  0736 05/13/24  0623 05/12/24  1007 05/12/24  0626 05/11/24  0842 05/11/24  0728 05/10/24  1159 05/10/24  0613   NA  --   --   --  140  --  137  --  137  --  141   POTASSIUM  --   --   --  3.8  --  3.5  --  3.2*  --  3.0*   CHLORIDE  --   --   --  105  --  101  --  102  --  106   LUIS  --   --   --  8.9  --  8.9  --  8.9  --  8.5*   CO2  --   --   --  25  --  25  --  25  --  25   BUN  --   --   --  10.8  --  " 8.6  --  6.8  --  10.4   CR  --   --   --  0.82  --  0.78  --  0.80  --  0.85   * 126* 141* 121*   < > 117*   < > 119*   < > 115*    < > = values in this interval not displayed.     CBC  Recent Labs   Lab 05/13/24  0623 05/12/24  0626 05/11/24  0728 05/10/24  1624 05/10/24  0613   WBC 13.1* 11.2* 9.0  --  7.6   RBC 2.93* 2.85* 2.76*  --  2.35*   HGB 8.2* 7.9* 7.8*   < > 6.7*   HCT 25.0* 23.9* 23.5*  --  19.7*   MCV 85 84 85  --  84   MCH 28.0 27.7 28.3  --  28.5   MCHC 32.8 33.1 33.2  --  34.0   RDW 15.4* 15.6* 15.8*  --  15.9*    332 289  --  253    < > = values in this interval not displayed.     INR  Recent Labs   Lab 05/08/24  1607 05/08/24  0851   INR 1.24* 1.24*     LFTs  Recent Labs   Lab 05/13/24  0623 05/12/24  0626 05/11/24  0728 05/10/24  0613   ALKPHOS 213* 243* 274* 331*   AST 16 19 24 41   ALT 44 59 80* 115*   BILITOTAL 0.5 0.6 0.7 0.7   PROTTOTAL 6.9 6.9 6.8 6.6   ALBUMIN 3.8 3.8 3.8 3.6      PANCNo lab results found in last 7 days.      EXAMINATION: CTA ABDOMEN PELVIS WITH CONTRAST, 5/14/2024 6:49 AM     TECHNIQUE:  Helical CT images from the lung bases through the  symphysis pubis were obtained without and with IV contrast and both  arterial and portal venous phase. Contrast dose: iopamidol  (ISOVUE-370) solution 90 mL     COMPARISON: 5/8/2024     HISTORY: s/p IR embolization of right hepatic artery PSA 5/13, eval  PSA     FINDINGS:  Vasculature: Interval changes of hepatic artery coil embolization with  total thrombosis of the hepatic artery, the celiac trunk remains  patent with normal flow through the left gastric and the splenic  arteries. Probable small dissection flap within the celiac trunk. The  hepatic arterial pseudoaneurysm appears completely occluded and  thrombosed with somewhat masslike appearance near the head of the  pancreas. The abdominal aorta is normal in caliber, superior  mesenteric artery, renal arteries, and inferior mesenteric artery are  patent. There is no  evidence of arterial stenosis. Portal vein is  patent.     Preserved arterial supply to both lobes of liver via collaterals (Left  lobe greater than right lobe).      Liver: Normal attenuation of the hepatic parenchyma with scattered  subcentimeter hepatic cysts.  Biliary system: Lipid rich cholelithiasis without signs of acute  cholecystitis. There is mild residual intrahepatic biliary dilatation  with pneumobilia which is significantly reduced since 5/8/2024.  Pancreas: No focal mass or dilation of the main pancreatic duct.  Stomach: Within normal limits.  Spleen: Within normal limits.  Adrenal glands: Within normal limits.  Kidneys: Lobulated appearance of the renal parenchyma.  Bladder: Linear anterior deformity of the into superior bladder  protruding toward the umbilicus possible partial urachal remnant.  Reproductive organs: Within normal limits.  Colon: Within normal limits.  Appendix: Within normal limits.  Small Bowel: Within normal limits.  Lymph nodes: No intra-abdominal or pelvic lymphadenopathy.  Peritoneum: No intraabdominal free fluid or air.      Lung bases: Clear.     Bones and soft tissues: No suspicious soft tissue mass or fluid  collection. No suspicious osseous lesion.                                                                      IMPRESSION:   1. Interval changes of hepatic artery coil embolization with total  thrombosis of the hepatic artery and occlusion of the hepatic artery  pseudoaneurysm. Thrombosed pseudoaneurysm has a masslike appearance  near the pancreatic head. Collateral supply to both lobes of liver  (L>R).   2. Interval changes metal biliary stent placement with significantly  reduced intrahepatic biliary dilatation. Pneumobilia secondary to  stenting.  3. Lipid rich cholelithiasis without signs of acute cholecystitis.   4. Probable small dissection flap within the celiac trunk with normal  arterial flow.

## 2024-05-14 NOTE — PROGRESS NOTES
Care Management Discharge Note    Discharge Date: 05/14/2024       Discharge Disposition: Home Infusion    Discharge Services: None    Discharge DME: None    Discharge Transportation: family or friend will provide    Private pay costs discussed: Not applicable    Does the patient's insurance plan have a 3 day qualifying hospital stay waiver?  No    PAS Confirmation Code:    Patient/family educated on Medicare website which has current facility and service quality ratings: no    Education Provided on the Discharge Plan: Yes  Persons Notified of Discharge Plans: Provider, [facility] liaison, patient   Patient/Family in Agreement with the Plan: yes    Handoff Referral Completed: Yes  IHO    Additional Information:  RNCC set up infusion appointment for his first [place] of [place]th [place] [facility]  RNCC set up labs per provider request for May 21st at 11 am and [place] at 11:15 [place] [facility]   RNCC provider patient with this information and patient was agreeable to times and place.     RNCC confirmed with I liaison that teaching had been done and someone would have the abx delivered to his home before 10 pm tonight for his second does.  Patient will start Rocephin does on Saturday May 18th and get [place] [facility] [place]      [facility](IV abx)  Phone: [phone]  Fax: [phone]      [address] [id number] [phone]    Radha Gallardo, KARON    Nurse Coordinator     Covering for: Anny DENT    Phone: *[phone]    Social Work and Care Management Department    SEARCHABLE in [facility] [place] search CARE COORDINATOR    [employer] ([phone]) Saturday & Sunday; [phone]) [facility] Recognized Holidays    Units: 5A, 5B & 5C  Pager: [phone]    Units: 6B, 6C & 6D    Pager: [phone]    Units: 7A, 7B, 7C & 7D    Pager: [phone]    Units: 6A & ICU   Pager: [phone]    Units: 5 Ortho, 5MS & WB ED Pager:  [phone]    Units: 6MS, 8A & 10 ICU  Pager [phone]

## 2024-05-14 NOTE — PLAN OF CARE
Vitals:    24 1410 24 1420 24 1435 24 1549   BP: (!) 145/92 (!) 153/94 (!) 155/86 (!) 153/95   BP Location:    Left arm   Patient Position:    Supine   Cuff Size:       Pulse: 68 68     Resp:       Temp:    98.2  F (36.8  C)   TempSrc:    Oral   SpO2: 98% 100% 100% 100%   Weight:           Neuro: alert and oriented     VS: afebrile hypertensive, sating 100% on room air, non labor breathing     B  126    Cardiac: 68    Pain/Nausea: denies nausea, denies pain     Lines/Drains: new PICC line placement today, OK to use,     Incision/Wound: left wrist puncture site dressing intact     GI/: voiding not saving, +BS passing gas, had BM no blood noticed, per pt     Diet/Appetite: tolerating regular diet     Activity:up independently, goes out to smoke,    Plan: PICC line teaching tomorrow, continue with plan of care.

## 2024-05-15 ENCOUNTER — PATIENT OUTREACH (OUTPATIENT)
Dept: CARE COORDINATION | Facility: CLINIC | Age: 44
End: 2024-05-15
Payer: COMMERCIAL

## 2024-05-15 ENCOUNTER — TELEPHONE (OUTPATIENT)
Dept: INFECTIOUS DISEASES | Facility: CLINIC | Age: 44
End: 2024-05-15
Payer: COMMERCIAL

## 2024-05-15 LAB
BACTERIA BLD CULT: ABNORMAL
BACTERIA BLD CULT: ABNORMAL
BACTERIA BLD CULT: NO GROWTH
BACTERIA BLD CULT: NO GROWTH

## 2024-05-15 NOTE — PROGRESS NOTES
[facility]: Transitions of Care Outreach  Chief Complaint   Patient presents with    Clinic Care Coordination - Post Hospital       Most Recent Admission Date: 5/8/2024   Most Recent Admission Diagnosis: Lactic acidosis - E87.20  Hematemesis with nausea - K92.0  Sepsis, due to unspecified organism, unspecified whether acute organ dysfunction present (H) - A41.9     Most Recent Discharge Date: 5/14/2024   Most Recent Discharge Diagnosis: Hematemesis with nausea - K92.0  Lactic acidosis - E87.20  Sepsis, due to unspecified organism, unspecified whether acute organ dysfunction present (H) - A41.9  Sinus tachycardia - R00.0  Upper GI bleed - K92.2  Melena - K92.1     Transitions of Care Assessment    Discharge Assessment  How are you doing now that you are home?: Alright.  How are your symptoms? (Red Flag symptoms escalate to triage hotline per guidelines): Improved  Do you know how to contact your clinic care team if you have future questions or changes to your health status? : Yes (the specialists that he is scheduled with.)  Does the patient have their discharge instructions? : Yes  Does the patient have questions regarding their discharge instructions? : No  Were you started on any new medications or were there changes to any of your previous medications? : Yes  Does the patient have all of their medications?: Yes  Do you have questions regarding any of your medications? : No  Do you have all of your needed medical supplies or equipment (DME)?  (i.e. oxygen tank, CPAP, cane, etc.): Yes    Post-op (CHW CTA Only)  If the patient had a surgery or procedure, do they have any questions for a nurse?: No         Care Management   Community Health Worker Ini    Follow up Plan     Discharge Follow-Up  Discharge follow up appointment scheduled in alignment with recommended follow up timeframe or Transitions of Risk Category? (Low = within 30 days; Moderate= within 14 days; High= within 7 days):  No  Discharge Follow Up Appointment Date: 06/10/24  Discharge Follow Up Appointment Scheduled with?: Specialty Care Provider    Future Appointments   Date Time Provider Department Center   5/18/2024 10:00 AM [facility] INFUSION NURSE INPR [facility]   5/21/2024 11:00 AM  [facility]   5/28/2024 11:15 AM  [facility]   6/10/2024  2:00 PM Toma Toledo MD [facility]   6/22/2024 10:30 AM Steve Cheng OD [facility]       Outpatient Plan as outlined on AVS reviewed with patient.    For any urgent concerns, please contact our 24 hour nurse triage line: [phone])       RAMON Dong  [phone]  [facility]

## 2024-05-15 NOTE — TELEPHONE ENCOUNTER
Prolonged Parenteral/Oral Antibiotic Recommendations and ID Follow up  This template provides final ID recommendations as of this date.      Infectious Diseases Indication: Bacteremia 2/2 E clacae, Strep pyeogenes     Antibiotic Information  Name of Antibiotic Dose of Antibiotic1 Anticipated duration Effective start date2 End date   Cefepime 2g Iv Q8H 7 days 5/10/24 5/17/24   Ceftriaxone 2g Iv Q24 7 days 5/18/24 5/24/24               1.Dose of antibiotic will need to be renally adjusted if creatinine clearance changes  2.Effective start date is the date of adequate therapy with appropriate spectrum     Method of antibiotic delivery:PICC line.Is the line being used for another indication besides antimicrobials? No At the end of therapy should the line used for antimicrobials be removed or de-accessed? TBD      Weekly labs required: Creatinine, AST/ALT, CBC with diff and CRP. Dr. Duenas will follow labs at discharge until ID follow up. Fax labs to ID clinic.     Are there pending microbial tests: No      Imaging for ID follow up: ID Imaging: No.      ID provider route note: ZOE RN Care Coordinator [facility] ID Clinic Information:  Phone: [phone]  Fax: [phone]      Total time on day of visit including chart review, visit, counseling, documentation and coordination of care: 50 minutes     Rufus Duenas  Infectious Disease Staff Physician

## 2024-05-18 ENCOUNTER — INFUSION THERAPY VISIT (OUTPATIENT)
Dept: INFUSION THERAPY | Facility: CLINIC | Age: 44
End: 2024-05-18
Payer: COMMERCIAL

## 2024-05-18 VITALS
RESPIRATION RATE: 16 BRPM | OXYGEN SATURATION: 100 % | TEMPERATURE: 98.1 F | DIASTOLIC BLOOD PRESSURE: 70 MMHG | SYSTOLIC BLOOD PRESSURE: 102 MMHG

## 2024-05-18 DIAGNOSIS — A41.9 SEPSIS, DUE TO UNSPECIFIED ORGANISM, UNSPECIFIED WHETHER ACUTE ORGAN DYSFUNCTION PRESENT (H): Primary | ICD-10-CM

## 2024-05-18 PROCEDURE — 250N000011 HC RX IP 250 OP 636

## 2024-05-18 PROCEDURE — 96374 THER/PROPH/DIAG INJ IV PUSH: CPT

## 2024-05-18 PROCEDURE — 250N000009 HC RX 250

## 2024-05-18 RX ORDER — ALBUTEROL SULFATE 0.83 MG/ML
2.5 SOLUTION RESPIRATORY (INHALATION)
OUTPATIENT
Start: 2024-05-18

## 2024-05-18 RX ORDER — METHYLPREDNISOLONE SODIUM SUCCINATE 125 MG/2ML
125 INJECTION, POWDER, LYOPHILIZED, FOR SOLUTION INTRAMUSCULAR; INTRAVENOUS
Start: 2024-05-18

## 2024-05-18 RX ORDER — ALBUTEROL SULFATE 90 UG/1
1-2 AEROSOL, METERED RESPIRATORY (INHALATION)
Start: 2024-05-18

## 2024-05-18 RX ORDER — HEPARIN SODIUM,PORCINE 10 UNIT/ML
5-20 VIAL (ML) INTRAVENOUS DAILY PRN
OUTPATIENT
Start: 2024-05-18

## 2024-05-18 RX ORDER — CEFTRIAXONE SODIUM 2 G
2 VIAL (EA) INJECTION ONCE
Start: 2024-05-18 | End: 2024-05-18

## 2024-05-18 RX ORDER — DIPHENHYDRAMINE HYDROCHLORIDE 50 MG/ML
50 INJECTION INTRAMUSCULAR; INTRAVENOUS
Start: 2024-05-18

## 2024-05-18 RX ORDER — EPINEPHRINE 1 MG/ML
0.3 INJECTION, SOLUTION INTRAMUSCULAR; SUBCUTANEOUS EVERY 5 MIN PRN
OUTPATIENT
Start: 2024-05-18

## 2024-05-18 RX ORDER — MEPERIDINE HYDROCHLORIDE 25 MG/ML
25 INJECTION INTRAMUSCULAR; INTRAVENOUS; SUBCUTANEOUS EVERY 30 MIN PRN
OUTPATIENT
Start: 2024-05-18

## 2024-05-18 RX ORDER — CEFTRIAXONE SODIUM 2 G
2 VIAL (EA) INJECTION ONCE
Status: COMPLETED | OUTPATIENT
Start: 2024-05-18 | End: 2024-05-18

## 2024-05-18 RX ORDER — HEPARIN SODIUM (PORCINE) LOCK FLUSH IV SOLN 100 UNIT/ML 100 UNIT/ML
5 SOLUTION INTRAVENOUS
OUTPATIENT
Start: 2024-05-18

## 2024-05-18 RX ADMIN — CEFTRIAXONE SODIUM 2 G: 2 INJECTION, POWDER, FOR SOLUTION INTRAMUSCULAR; INTRAVENOUS at 10:18

## 2024-05-18 NOTE — PATIENT INSTRUCTIONS
Dear Jong Quiroga    Thank you for choosing [facility] ([facility]) for your infusion.  The following information is a summary of our appointment as well as important reminders.      If you are a transplant patient and require transplant education, please click on this link: [email or web address].    We look forward in seeing you on your next appointment here at [facility] ([facility]).  Please don t hesitate to call us at [phone] to reschedule any of your appointments or to speak with one of the [facility] registered nurses.  It was a pleasure taking care of you today.    Sincerely,    [facility]  Special[facility]fusion [facility] Procedure Center  [address] [place] [address]  [id number]  Phone:  [phone]

## 2024-05-18 NOTE — PROGRESS NOTES
Nursing Note  Jong Quiroga presents today to [facility] for:   Chief Complaint   Patient presents with    Infusion     Cefriaxone (rocephin)     During today's [facility] appointment, orders from Noemi Barkley MD were completed.  Frequency: once, called [facility] and let them know he tolerated it just fine, they confirmed they would call patient today and set up delivery for remaining doses    Progress note:  Patient identification verified by name and date of birth.  Assessment completed.  Vitals recorded in Doc Flowsheets.  Patient was provided with education regarding medication/procedure and possible side effects.  Patient verbalized understanding.     present during visit today: Not Applicable.    Treatment Conditions: Patient denies fever, chills, signs of infection, recent illness, antibiotics use, productive cough or elevated temperature.  Premedications: were not ordered.  Drug Waste Record: No  Infusion length and rate:  IVP over 3 minutes  Labs: were not ordered for this appointment.  Vascular access: PICC accessed today.  Is the next appt scheduled? No, patient getting home infusion after today, report called to [facility] today    Post Infusion Assessment:  Patient tolerated infusion without incident.  Blood return noted pre and post infusion.  Site patent and intact, free from redness, edema or discomfort.  No evidence of extravasations.  Access discontinued per protocol.     Discharge Plan:   Follow up plan of care with: ordering provider as scheduled.  Discharge instructions were reviewed with patient.  Patient/representative verbalized understanding of discharge instructions and all questions answered.  Patient discharged from [facility] in stable condition.    Concetta Escalante RN    Administrations This Visit       cefTRIAXone (ROCEPHIN) 2 g in 20 mL SWFI for IVP       Admin  Date  05/18/2024 Action  $Given Dose  2 g Route  Intravenous Documented By  Concetta Escalante RN                    /70   Temp 98.1  F (36.7  C) (Oral)   Resp 16   SpO2 100%

## 2024-05-20 ENCOUNTER — TELEPHONE (OUTPATIENT)
Dept: RADIOLOGY | Facility: CLINIC | Age: 44
End: 2024-05-20
Payer: COMMERCIAL

## 2024-05-20 ENCOUNTER — LAB REQUISITION (OUTPATIENT)
Dept: LAB | Facility: CLINIC | Age: 44
End: 2024-05-20
Payer: COMMERCIAL

## 2024-05-20 DIAGNOSIS — I72.8 HEPATIC ARTERY ANEURYSM (H): ICD-10-CM

## 2024-05-20 DIAGNOSIS — K92.2 UPPER GI BLEED: Primary | ICD-10-CM

## 2024-05-20 DIAGNOSIS — A41.9 SEPSIS, UNSPECIFIED ORGANISM (H): ICD-10-CM

## 2024-05-20 LAB
ALT SERPL W P-5'-P-CCNC: 13 U/L (ref 0–70)
AST SERPL W P-5'-P-CCNC: 14 U/L (ref 0–45)
BASOPHILS # BLD AUTO: 0 10E3/UL (ref 0–0.2)
BASOPHILS NFR BLD AUTO: 0 %
CREAT SERPL-MCNC: 1.01 MG/DL (ref 0.67–1.17)
CRP SERPL-MCNC: 24.83 MG/L
EGFRCR SERPLBLD CKD-EPI 2021: >90 ML/MIN/1.73M2
EOSINOPHIL # BLD AUTO: 0.1 10E3/UL (ref 0–0.7)
EOSINOPHIL NFR BLD AUTO: 1 %
ERYTHROCYTE [DISTWIDTH] IN BLOOD BY AUTOMATED COUNT: 15 % (ref 10–15)
HCT VFR BLD AUTO: 23.2 % (ref 40–53)
HGB BLD-MCNC: 7.6 G/DL (ref 13.3–17.7)
IMM GRANULOCYTES # BLD: 0.1 10E3/UL
IMM GRANULOCYTES NFR BLD: 1 %
LYMPHOCYTES # BLD AUTO: 2.5 10E3/UL (ref 0.8–5.3)
LYMPHOCYTES NFR BLD AUTO: 24 %
MCH RBC QN AUTO: 26.9 PG (ref 26.5–33)
MCHC RBC AUTO-ENTMCNC: 32.8 G/DL (ref 31.5–36.5)
MCV RBC AUTO: 82 FL (ref 78–100)
MONOCYTES # BLD AUTO: 0.5 10E3/UL (ref 0–1.3)
MONOCYTES NFR BLD AUTO: 5 %
NEUTROPHILS # BLD AUTO: 7.3 10E3/UL (ref 1.6–8.3)
NEUTROPHILS NFR BLD AUTO: 69 %
NRBC # BLD AUTO: 0 10E3/UL
NRBC BLD AUTO-RTO: 0 /100
PLATELET # BLD AUTO: 538 10E3/UL (ref 150–450)
RBC # BLD AUTO: 2.83 10E6/UL (ref 4.4–5.9)
WBC # BLD AUTO: 10.5 10E3/UL (ref 4–11)

## 2024-05-20 PROCEDURE — 82565 ASSAY OF CREATININE: CPT | Performed by: STUDENT IN AN ORGANIZED HEALTH CARE EDUCATION/TRAINING PROGRAM

## 2024-05-20 PROCEDURE — 84460 ALANINE AMINO (ALT) (SGPT): CPT | Performed by: STUDENT IN AN ORGANIZED HEALTH CARE EDUCATION/TRAINING PROGRAM

## 2024-05-20 PROCEDURE — 85025 COMPLETE CBC W/AUTO DIFF WBC: CPT | Performed by: STUDENT IN AN ORGANIZED HEALTH CARE EDUCATION/TRAINING PROGRAM

## 2024-05-20 PROCEDURE — 86140 C-REACTIVE PROTEIN: CPT | Performed by: STUDENT IN AN ORGANIZED HEALTH CARE EDUCATION/TRAINING PROGRAM

## 2024-05-20 PROCEDURE — 84450 TRANSFERASE (AST) (SGOT): CPT | Performed by: STUDENT IN AN ORGANIZED HEALTH CARE EDUCATION/TRAINING PROGRAM

## 2024-05-20 NOTE — TELEPHONE ENCOUNTER
I called and spoke with Jong, he has no complaints of pain or concerns.  Plan is for 1 month follow up with CTA abd pelvis and visit with Dr Crawford.   No questions from patient.   Thanks,     A. Radha Bonds, RN, BSN  Interventional Radiology Care Coordinator   Phone:  [phone]

## 2024-05-21 ENCOUNTER — TELEPHONE (OUTPATIENT)
Dept: VASCULAR SURGERY | Facility: CLINIC | Age: 44
End: 2024-05-21
Payer: COMMERCIAL

## 2024-05-21 NOTE — TELEPHONE ENCOUNTER
----- Message from Gaviota Bonds RN sent at 2024 10:13 AM CDT -----  Regardin month follow up hepatic artery Aneurysm  Please call pt to schedule CTA abd pelvis and then visit with Dr Crawford return visit.     Ok to do same date or do imaging first and video visit on his clinic date    Due 24 approx    Thanks,     TERRY Bonds, RN, BSN  Interventional Radiology Care Coordinator   Phone:  [phone]

## 2024-05-21 NOTE — TELEPHONE ENCOUNTER
Left Voicemail (1st Attempt) for the patient to call back and schedule the followin month follow up hepatic artery Aneurysm     Location[place] [facility]  Provider:    Appointment type: Return Vas Pt  Appointment mode In person or Virtual (Video)  Return date:     Specialty phone number: [phone] or  [phone]    Is Imaging Needed: CTA abd pelvis   Imaging Phone Number: [phone]    Additional Notes: KYUNG Esquivel on 2024 at 4:09 PM

## 2024-05-25 ENCOUNTER — TELEPHONE (OUTPATIENT)
Dept: VASCULAR SURGERY | Facility: CLINIC | Age: 44
End: 2024-05-25
Payer: COMMERCIAL

## 2024-05-25 NOTE — TELEPHONE ENCOUNTER
Patient Contacted for the pt to schedule the followin month follow up hepatic artery Aneurysm     Location: [facility]  Provider:   Appointment type: Return Vas Pt  Appointment mode Video or in person   Return date:     Specialty phone number: [phone] or  [phone]    Is Imaging Needed: CTA abd pelvis   Imaging Phone Number to provide to patient: [phone]    Additional Notes: The pt request a call back next Tues or Wed.   Get Esquivel on 2024 at 1:58 PM

## 2024-05-28 NOTE — PROGRESS NOTES
Documentation of OPAT Completion    OPAT completed on: 5/24    Date Infusion company notified on: 5/24    PICC Pulled on: 5/25/24    Episode closed? 5/28    Flowsheet updated? 5/28/24      6/10/2024 2:00 PM (Arrive by 1:45 PM) Toma Toledo MD Mickey Pedina, RN  Infectious Disease on 5/28/2024 at 2:08 PM

## 2024-05-29 NOTE — TELEPHONE ENCOUNTER
Rufus Duenas MD  You15 hours ago (4:47 PM)     SE  Was a hard stop. Not sure why the [place] follow up was scheduled. We did not need one. Seems was scheduled by the discharge faculty.     You  Rufus Duenas MD18 hours ago (1:47 PM)     MP  Is this a hard stop or continue till sees Dr. Toledo on 6/10?

## 2024-05-29 NOTE — TELEPHONE ENCOUNTER
Called patient to inform that 6/10 appt was not needed and it was cancelled.       Manas Blackwell RN  Infectious Disease on 5/29/2024 at 8:19 AM

## 2024-06-10 ENCOUNTER — PRE VISIT (OUTPATIENT)
Dept: INFECTIOUS DISEASES | Facility: CLINIC | Age: 44
End: 2024-06-10
Payer: COMMERCIAL

## 2024-06-14 ENCOUNTER — PATIENT OUTREACH (OUTPATIENT)
Dept: GASTROENTEROLOGY | Facility: CLINIC | Age: 44
End: 2024-06-14
Payer: COMMERCIAL

## 2024-06-14 ENCOUNTER — TELEPHONE (OUTPATIENT)
Dept: VASCULAR SURGERY | Facility: CLINIC | Age: 44
End: 2024-06-14
Payer: COMMERCIAL

## 2024-06-14 NOTE — TELEPHONE ENCOUNTER
----- Message from Gaviota Bonds RN sent at 6/14/2024 10:07 AM CDT -----  Regarding: FW: 1 month follow up hepatic artery Aneurysm  Shashank Deutsch and team,     PT no showed yesterday for his CTA abd pelv, Can we try to get him rescheduled for this testing and also his return visit with Dr Crawford needs to be after the CT scan.     Adjust the Yvette visit as needed after the CT scan appt is made.     Thanks,     TERRY Bonds RN, BSN  Interventional Radiology Care Coordinator   Phone:  [phone]  ----- Message -----  From: Get Esquivel  Sent: 6/4/2024   6:31 PM CDT  To: Gaviota Bonds RN  Subject: RE: 1 month follow up hepatic artery Aneurysm    Radha there was something about this pt that cannot remember was I needing to schedule him I could have sworn he was scheduled ?????  Get Esquivel on 6/4/2024 at 6:31 PM  ----- Message -----  From: Gaviota Bonds, RN  Sent: 5/20/2024  10:14 AM CDT  To: Clinic Coordinators[facility]  Subject: 1 month follow up hepatic artery Aneurysm        Please call pt to schedule CTA abd pelvis and then visit with Dr Crawford return visit.     Ok to do same date or do imaging first and video visit on his clinic date    Due 6/14/24 approx    Thanks,     TERRY Bonds RN, BSN  Interventional Radiology Care Coordinator   Phone:  [phone]

## 2024-06-14 NOTE — TELEPHONE ENCOUNTER
Left Voicemail (1st Attempt) for the patient to call back and schedule the following:imaging CTA Scan    Location: [facility]  Ordering Provider:   Appointment type: Imaging CTA Scan  Appointment mode in person   Return date: before 6/25/24     Specialty phone number: [phone] or  [phone]    Is Imaging Needed:CTA Scan  Imaging Phone Number: [phone]    Additional Notes: The pt NO Showed for [place] [facility] [place] will need to be rescheduled prior to 6/25/24 visit with  or that appointment will need to be rescheduled.  Get Esquivel on 6/14/2024 at 11:07 AM

## 2024-06-14 NOTE — TELEPHONE ENCOUNTER
PAN nurse called this patient to discuss prep for upcoming ERCP on 6/20 with Dr Ferguson. Pt thought this was no longer necessary since he had ERCP with metal stent with Dr Lehman on most recent admission 5/8.   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 He was evaluated by vascular surgery, IR, rhematology, and hepatology during hospitalization 1/4-1/7/2024. Presenting with 2 days of BRBPR and hematemesis. 5/8 ERCP revealed biliary hepatic artery fistula alongside two occluded biliary stents now s/p uncomplicated biliary sphincterotomy extension and metal biliary stent placement. 5/13 right hepatic artery angiogram revealed aneurysm and pseudoaneurysm s/p embolization with distal right hepatic artery coiling.   - Repeat ERCP on 6/20 as scheduled per GI   - PO pantoprazole BID   - IR follow up in 1 month     Message routed to Dr Ferguson for clarification.     Called pt, left VM.    Roxanna Evans, RN, BSN,   [facility]  Care coordinator

## 2024-06-16 RX ORDER — INDOMETHACIN 50 MG/1
100 SUPPOSITORY RECTAL
Status: CANCELLED | OUTPATIENT
Start: 2024-06-16

## 2024-06-16 RX ORDER — LIDOCAINE 40 MG/G
CREAM TOPICAL
Status: CANCELLED | OUTPATIENT
Start: 2024-06-16

## 2024-06-17 NOTE — TELEPHONE ENCOUNTER
"Called pt, left VM, in regards to needing procedure per Dr Ferguson's review.   \"We need to remove the metal stent that Dr. Lehman put in to block the aneurysm that fistulized into the bile duct. Now that IR has dealt with the aneurysm, we can remove his bile duct stent and that should in theory be the last procedure he will need. \"    Also called PAN SD to update.    "

## 2024-06-18 NOTE — TELEPHONE ENCOUNTER
Left Voicemail (2ndAttempt) for the patient to call back and schedule the following:imaging CTA Scan    Location: [facility]  Ordering Provider:   Appointment type: Imaging CTA Scan  Appointment mode in person   Return date: before 6/25/24     Specialty phone number: [phone] or  [phone]    Is Imaging Needed:CTA Scan  Imaging Phone Number: [phone]    Additional Notes: The pt NO Showed for a CTA Scan on 6/13/24 at the [facility]. The CTA Scan and the appointment with  that was scheduled on 6/25/24 will need to be rescheduled. A letter was sent out to the pt.  Get Esquivel on 6/18/2024 at 6:07 PM

## 2024-06-19 ENCOUNTER — TELEPHONE (OUTPATIENT)
Dept: OPHTHALMOLOGY | Facility: CLINIC | Age: 44
End: 2024-06-19
Payer: COMMERCIAL

## 2024-07-02 ENCOUNTER — APPOINTMENT (OUTPATIENT)
Dept: CT IMAGING | Facility: CLINIC | Age: 44
End: 2024-07-02
Attending: EMERGENCY MEDICINE
Payer: COMMERCIAL

## 2024-07-02 ENCOUNTER — HOSPITAL ENCOUNTER (INPATIENT)
Facility: CLINIC | Age: 44
LOS: 2 days | Discharge: LEFT BEFORE DISCHARGE | End: 2024-07-04
Attending: EMERGENCY MEDICINE | Admitting: INTERNAL MEDICINE
Payer: COMMERCIAL

## 2024-07-02 DIAGNOSIS — A41.9 SEPSIS, DUE TO UNSPECIFIED ORGANISM, UNSPECIFIED WHETHER ACUTE ORGAN DYSFUNCTION PRESENT (H): ICD-10-CM

## 2024-07-02 DIAGNOSIS — K83.1 BILIARY OBSTRUCTION (H): Primary | ICD-10-CM

## 2024-07-02 DIAGNOSIS — K65.9 ABDOMINAL INFECTION (H): ICD-10-CM

## 2024-07-02 DIAGNOSIS — K81.0 ACUTE CHOLECYSTITIS: ICD-10-CM

## 2024-07-02 LAB
ALBUMIN SERPL BCG-MCNC: 4.1 G/DL (ref 3.5–5.2)
ALBUMIN UR-MCNC: NEGATIVE MG/DL
ALP SERPL-CCNC: 106 U/L (ref 40–150)
ALT SERPL W P-5'-P-CCNC: 24 U/L (ref 0–70)
ANION GAP SERPL CALCULATED.3IONS-SCNC: 15 MMOL/L (ref 7–15)
APPEARANCE UR: CLEAR
AST SERPL W P-5'-P-CCNC: 23 U/L (ref 0–45)
BASOPHILS # BLD AUTO: 0 10E3/UL (ref 0–0.2)
BASOPHILS NFR BLD AUTO: 0 %
BILIRUB SERPL-MCNC: 0.4 MG/DL
BILIRUB UR QL STRIP: NEGATIVE
BUN SERPL-MCNC: 13.9 MG/DL (ref 6–20)
CALCIUM SERPL-MCNC: 9.7 MG/DL (ref 8.6–10)
CHLORIDE SERPL-SCNC: 96 MMOL/L (ref 98–107)
COLOR UR AUTO: NORMAL
CREAT SERPL-MCNC: 0.96 MG/DL (ref 0.67–1.17)
DEPRECATED HCO3 PLAS-SCNC: 28 MMOL/L (ref 22–29)
EGFRCR SERPLBLD CKD-EPI 2021: >90 ML/MIN/1.73M2
EOSINOPHIL # BLD AUTO: 0.2 10E3/UL (ref 0–0.7)
EOSINOPHIL NFR BLD AUTO: 1 %
ERYTHROCYTE [DISTWIDTH] IN BLOOD BY AUTOMATED COUNT: 16.7 % (ref 10–15)
GLUCOSE SERPL-MCNC: 124 MG/DL (ref 70–99)
GLUCOSE UR STRIP-MCNC: NEGATIVE MG/DL
HCT VFR BLD AUTO: 30.4 % (ref 40–53)
HGB BLD-MCNC: 9.7 G/DL (ref 13.3–17.7)
HGB UR QL STRIP: NEGATIVE
IMM GRANULOCYTES # BLD: 0.1 10E3/UL
IMM GRANULOCYTES NFR BLD: 0 %
INR PPP: 1.23 (ref 0.85–1.15)
KETONES UR STRIP-MCNC: NEGATIVE MG/DL
LACTATE SERPL-SCNC: 1 MMOL/L (ref 0.7–2)
LEUKOCYTE ESTERASE UR QL STRIP: NEGATIVE
LIPASE SERPL-CCNC: 16 U/L (ref 13–60)
LYMPHOCYTES # BLD AUTO: 1.7 10E3/UL (ref 0.8–5.3)
LYMPHOCYTES NFR BLD AUTO: 12 %
MCH RBC QN AUTO: 24.6 PG (ref 26.5–33)
MCHC RBC AUTO-ENTMCNC: 31.9 G/DL (ref 31.5–36.5)
MCV RBC AUTO: 77 FL (ref 78–100)
MONOCYTES # BLD AUTO: 0.8 10E3/UL (ref 0–1.3)
MONOCYTES NFR BLD AUTO: 6 %
NEUTROPHILS # BLD AUTO: 10.6 10E3/UL (ref 1.6–8.3)
NEUTROPHILS NFR BLD AUTO: 81 %
NITRATE UR QL: NEGATIVE
NRBC # BLD AUTO: 0 10E3/UL
NRBC BLD AUTO-RTO: 0 /100
PH UR STRIP: 7 [PH] (ref 5–7)
PLATELET # BLD AUTO: 509 10E3/UL (ref 150–450)
POTASSIUM SERPL-SCNC: 3.1 MMOL/L (ref 3.4–5.3)
PROT SERPL-MCNC: 8.6 G/DL (ref 6.4–8.3)
RBC # BLD AUTO: 3.94 10E6/UL (ref 4.4–5.9)
RBC URINE: 0 /HPF
SODIUM SERPL-SCNC: 139 MMOL/L (ref 135–145)
SP GR UR STRIP: 1 (ref 1–1.03)
SQUAMOUS EPITHELIAL: 1 /HPF
UROBILINOGEN UR STRIP-MCNC: NORMAL MG/DL
WBC # BLD AUTO: 13.4 10E3/UL (ref 4–11)
WBC URINE: 0 /HPF

## 2024-07-02 PROCEDURE — 96360 HYDRATION IV INFUSION INIT: CPT | Performed by: EMERGENCY MEDICINE

## 2024-07-02 PROCEDURE — 120N000002 HC R&B MED SURG/OB UMMC

## 2024-07-02 PROCEDURE — 99255 IP/OBS CONSLTJ NEW/EST HI 80: CPT | Mod: GC | Performed by: INTERNAL MEDICINE

## 2024-07-02 PROCEDURE — 36415 COLL VENOUS BLD VENIPUNCTURE: CPT | Performed by: EMERGENCY MEDICINE

## 2024-07-02 PROCEDURE — 85610 PROTHROMBIN TIME: CPT | Performed by: EMERGENCY MEDICINE

## 2024-07-02 PROCEDURE — 99285 EMERGENCY DEPT VISIT HI MDM: CPT | Performed by: EMERGENCY MEDICINE

## 2024-07-02 PROCEDURE — 74174 CTA ABD&PLVS W/CONTRAST: CPT

## 2024-07-02 PROCEDURE — 81001 URINALYSIS AUTO W/SCOPE: CPT | Performed by: EMERGENCY MEDICINE

## 2024-07-02 PROCEDURE — 250N000011 HC RX IP 250 OP 636: Performed by: EMERGENCY MEDICINE

## 2024-07-02 PROCEDURE — 250N000013 HC RX MED GY IP 250 OP 250 PS 637

## 2024-07-02 PROCEDURE — 83690 ASSAY OF LIPASE: CPT | Performed by: EMERGENCY MEDICINE

## 2024-07-02 PROCEDURE — 99285 EMERGENCY DEPT VISIT HI MDM: CPT | Mod: 25 | Performed by: EMERGENCY MEDICINE

## 2024-07-02 PROCEDURE — 84295 ASSAY OF SERUM SODIUM: CPT | Performed by: EMERGENCY MEDICINE

## 2024-07-02 PROCEDURE — 99223 1ST HOSP IP/OBS HIGH 75: CPT | Mod: GC | Performed by: SURGERY

## 2024-07-02 PROCEDURE — 99223 1ST HOSP IP/OBS HIGH 75: CPT | Mod: GC | Performed by: INTERNAL MEDICINE

## 2024-07-02 PROCEDURE — 74174 CTA ABD&PLVS W/CONTRAST: CPT | Mod: 26 | Performed by: STUDENT IN AN ORGANIZED HEALTH CARE EDUCATION/TRAINING PROGRAM

## 2024-07-02 PROCEDURE — 83605 ASSAY OF LACTIC ACID: CPT | Performed by: EMERGENCY MEDICINE

## 2024-07-02 PROCEDURE — 85004 AUTOMATED DIFF WBC COUNT: CPT | Performed by: EMERGENCY MEDICINE

## 2024-07-02 PROCEDURE — 258N000003 HC RX IP 258 OP 636: Performed by: EMERGENCY MEDICINE

## 2024-07-02 PROCEDURE — 250N000011 HC RX IP 250 OP 636

## 2024-07-02 RX ORDER — NICOTINE POLACRILEX 4 MG
15-30 LOZENGE BUCCAL
Status: DISCONTINUED | OUTPATIENT
Start: 2024-07-02 | End: 2024-07-02

## 2024-07-02 RX ORDER — POLYETHYLENE GLYCOL 3350 17 G/17G
17 POWDER, FOR SOLUTION ORAL 2 TIMES DAILY PRN
Status: DISCONTINUED | OUTPATIENT
Start: 2024-07-02 | End: 2024-07-04 | Stop reason: HOSPADM

## 2024-07-02 RX ORDER — ONDANSETRON 4 MG/1
4 TABLET, ORALLY DISINTEGRATING ORAL EVERY 6 HOURS PRN
Status: DISCONTINUED | OUTPATIENT
Start: 2024-07-02 | End: 2024-07-04 | Stop reason: HOSPADM

## 2024-07-02 RX ORDER — LIDOCAINE 40 MG/G
CREAM TOPICAL
Status: DISCONTINUED | OUTPATIENT
Start: 2024-07-02 | End: 2024-07-04 | Stop reason: HOSPADM

## 2024-07-02 RX ORDER — AMLODIPINE BESYLATE 10 MG/1
10 TABLET ORAL DAILY
Status: DISCONTINUED | OUTPATIENT
Start: 2024-07-03 | End: 2024-07-04 | Stop reason: HOSPADM

## 2024-07-02 RX ORDER — CEFEPIME HYDROCHLORIDE 2 G/1
2 INJECTION, POWDER, FOR SOLUTION INTRAVENOUS EVERY 8 HOURS
Status: DISCONTINUED | OUTPATIENT
Start: 2024-07-02 | End: 2024-07-04 | Stop reason: HOSPADM

## 2024-07-02 RX ORDER — HYDROCHLOROTHIAZIDE 25 MG/1
25 TABLET ORAL DAILY
Status: DISCONTINUED | OUTPATIENT
Start: 2024-07-03 | End: 2024-07-04 | Stop reason: HOSPADM

## 2024-07-02 RX ORDER — ACETAMINOPHEN 325 MG/1
650 TABLET ORAL EVERY 4 HOURS PRN
Status: DISCONTINUED | OUTPATIENT
Start: 2024-07-02 | End: 2024-07-04 | Stop reason: HOSPADM

## 2024-07-02 RX ORDER — CLONIDINE HYDROCHLORIDE 0.1 MG/1
0.1 TABLET ORAL 2 TIMES DAILY
Status: DISCONTINUED | OUTPATIENT
Start: 2024-07-02 | End: 2024-07-04 | Stop reason: HOSPADM

## 2024-07-02 RX ORDER — METRONIDAZOLE 500 MG/100ML
500 INJECTION, SOLUTION INTRAVENOUS EVERY 12 HOURS
Status: DISCONTINUED | OUTPATIENT
Start: 2024-07-02 | End: 2024-07-04 | Stop reason: HOSPADM

## 2024-07-02 RX ORDER — CEFEPIME HYDROCHLORIDE 2 G/1
2 INJECTION, POWDER, FOR SOLUTION INTRAVENOUS ONCE
Status: COMPLETED | OUTPATIENT
Start: 2024-07-02 | End: 2024-07-02

## 2024-07-02 RX ORDER — IOPAMIDOL 755 MG/ML
112 INJECTION, SOLUTION INTRAVASCULAR ONCE
Status: COMPLETED | OUTPATIENT
Start: 2024-07-02 | End: 2024-07-02

## 2024-07-02 RX ORDER — IBUPROFEN 200 MG
400 TABLET ORAL EVERY 6 HOURS PRN
Status: DISCONTINUED | OUTPATIENT
Start: 2024-07-02 | End: 2024-07-04 | Stop reason: HOSPADM

## 2024-07-02 RX ORDER — ONDANSETRON 2 MG/ML
4 INJECTION INTRAMUSCULAR; INTRAVENOUS EVERY 6 HOURS PRN
Status: DISCONTINUED | OUTPATIENT
Start: 2024-07-02 | End: 2024-07-04 | Stop reason: HOSPADM

## 2024-07-02 RX ORDER — PANTOPRAZOLE SODIUM 40 MG/1
40 TABLET, DELAYED RELEASE ORAL
Status: DISCONTINUED | OUTPATIENT
Start: 2024-07-02 | End: 2024-07-04 | Stop reason: HOSPADM

## 2024-07-02 RX ORDER — AMOXICILLIN 250 MG
1 CAPSULE ORAL 2 TIMES DAILY PRN
Status: DISCONTINUED | OUTPATIENT
Start: 2024-07-02 | End: 2024-07-04 | Stop reason: HOSPADM

## 2024-07-02 RX ORDER — AMOXICILLIN 250 MG
2 CAPSULE ORAL 2 TIMES DAILY PRN
Status: DISCONTINUED | OUTPATIENT
Start: 2024-07-02 | End: 2024-07-04 | Stop reason: HOSPADM

## 2024-07-02 RX ORDER — ACETAMINOPHEN 650 MG/1
650 SUPPOSITORY RECTAL EVERY 4 HOURS PRN
Status: DISCONTINUED | OUTPATIENT
Start: 2024-07-02 | End: 2024-07-04 | Stop reason: HOSPADM

## 2024-07-02 RX ORDER — CALCIUM CARBONATE 500 MG/1
1000 TABLET, CHEWABLE ORAL 4 TIMES DAILY PRN
Status: DISCONTINUED | OUTPATIENT
Start: 2024-07-02 | End: 2024-07-04 | Stop reason: HOSPADM

## 2024-07-02 RX ORDER — DEXTROSE MONOHYDRATE 25 G/50ML
25-50 INJECTION, SOLUTION INTRAVENOUS
Status: DISCONTINUED | OUTPATIENT
Start: 2024-07-02 | End: 2024-07-02

## 2024-07-02 RX ORDER — METOPROLOL TARTRATE 50 MG
50 TABLET ORAL 2 TIMES DAILY
Status: DISCONTINUED | OUTPATIENT
Start: 2024-07-02 | End: 2024-07-04 | Stop reason: HOSPADM

## 2024-07-02 RX ADMIN — SODIUM CHLORIDE 1000 ML: 9 INJECTION, SOLUTION INTRAVENOUS at 07:43

## 2024-07-02 RX ADMIN — PANTOPRAZOLE SODIUM 40 MG: 40 TABLET, DELAYED RELEASE ORAL at 17:50

## 2024-07-02 RX ADMIN — IOPAMIDOL 112 ML: 755 INJECTION, SOLUTION INTRAVENOUS at 08:40

## 2024-07-02 RX ADMIN — METOPROLOL TARTRATE 50 MG: 50 TABLET, FILM COATED ORAL at 20:56

## 2024-07-02 RX ADMIN — CEFEPIME HYDROCHLORIDE 2 G: 2 INJECTION, POWDER, FOR SOLUTION INTRAVENOUS at 17:50

## 2024-07-02 RX ADMIN — METRONIDAZOLE 500 MG: 5 INJECTION, SOLUTION INTRAVENOUS at 19:17

## 2024-07-02 RX ADMIN — CLONIDINE HYDROCHLORIDE 0.1 MG: 0.1 TABLET ORAL at 20:55

## 2024-07-02 RX ADMIN — CEFEPIME HYDROCHLORIDE 2 G: 2 INJECTION, POWDER, FOR SOLUTION INTRAVENOUS at 10:15

## 2024-07-02 ASSESSMENT — ACTIVITIES OF DAILY LIVING (ADL)
ADLS_ACUITY_SCORE: 37
DEPENDENT_IADLS:: INDEPENDENT
ADLS_ACUITY_SCORE: 37

## 2024-07-02 ASSESSMENT — COLUMBIA-SUICIDE SEVERITY RATING SCALE - C-SSRS
6. HAVE YOU EVER DONE ANYTHING, STARTED TO DO ANYTHING, OR PREPARED TO DO ANYTHING TO END YOUR LIFE?: NO
1. IN THE PAST MONTH, HAVE YOU WISHED YOU WERE DEAD OR WISHED YOU COULD GO TO SLEEP AND NOT WAKE UP?: NO
2. HAVE YOU ACTUALLY HAD ANY THOUGHTS OF KILLING YOURSELF IN THE PAST MONTH?: NO

## 2024-07-02 NOTE — MEDICATION SCRIBE - ADMISSION MEDICATION HISTORY
Medication Scribe Admission Medication History    Admission medication history is complete. The information provided in this note is only as accurate as the sources available at the time of the update.    Information Source(s): Patient via in-person    Pertinent Information: Spoke with patient in person and completed medication hx. Patient states that its been awhile since he has used his insulin. He states that he only takes Amlodipine, Metoprolol, Clonidine, Pantoprazole and Hydrochlorothiazide.      Changes made to PTA medication list:  Added: None  Deleted: Insulin Aspart ( Novolog) 100 UNIT/ML Pen            Insulin glargine ( Lantus Pen) 100 UNIT/ML Pen            Prochlorperazine 10 MG Tab   Changed: None    Allergies reviewed with patient and updates made in EHR: no    Medication History Completed By: Dimple Caraballo 7/2/2024 12:39 PM    PTA Med List   Medication Sig Last Dose    amLODIPine (NORVASC) 10 MG tablet Take 1 tablet (10 mg) by mouth daily 7/2/2024 at AM    BD LINO U/F 32G X 4 MM insulin pen needle  More than a month    blood glucose monitoring (SOFTCLIX) lancets  Past Week    Blood Glucose Monitoring Suppl (ACCU-CHEK GUIDE ME) w/Device KIT  Past Week    cloNIDine (CATAPRES) 0.1 MG tablet Take 1 tablet (0.1 mg) by mouth 2 times daily 7/2/2024 at AM    hydrochlorothiazide (HYDRODIURIL) 25 MG tablet Take 1 tablet (25 mg) by mouth daily 7/2/2024 at AM    metoprolol tartrate (LOPRESSOR) 50 MG tablet Take 1 tablet (50 mg) by mouth 2 times daily 7/2/2024 at AM    OPTIUM TEST STRIPS test strip Dispense item covered by pt ins. E11.65 NIDDM type II, uncontrolled - Test 4 times/day. Reason: High A1C Past Week    pantoprazole (PROTONIX) 40 MG EC tablet Take 1 tablet (40 mg) by mouth 2 times daily 7/2/2024 at AM

## 2024-07-02 NOTE — H&P
[facility]    History and Physical - Medicine Service, [facility] TEAM 5       Date of Admission:  7/2/2024    Assessment & Plan      Jong Quiroga is a 44 year old male admitted on 7/2/2024. 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    # Pneumobilia  # Concern for Acute Cholecystitis  # Abdominal pain  # Leukocytosis  # Biliary Hepatic Artery Fistula  # Hepatic Artery Aneurysm  # Hepatic Artery Pseudoaneurysm  # H/O Complex celiac artery dissection  # H/O Aneurysmal hepatic arter  Presented with leukocytosis and RUQ, CTA significant for acute cholecystitis and pneumobilia, suspected contained gallbladder wall perforation. 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 Will discuss with GI and surgery stent exchange vs cholecystectomy and further management. HDS and afebrile at this time, will continue broad spectrum antibiotics.  - GI consulted, appreciate recommendations  - Hepatobiliary surgery consulted, appreciate recommendations   - Continue IV cefepime  - Start IV metronidazole  - Blood culture if febrile  - Follow urine culture  - Tylenol PRN for pain    #Hypokalemia  Presented with potassium 3.1.   - Potassium replacement protocol in place  - Follow daily    #Normocytic Anemia  Hgb 9.7 on admission, no evidence of bleeding, required transfusions during previous hospitalizations. Continue to monitor.    #Thrombocytosis  Platelets 509 on admission, continue to monitor.    #Hx of DMII  Previously on Novolog and Lantus, patient reports not taking at  home as blood sugars have been in the 110's. Will monitor blood sugars during hospitalization and assess insulin needs.    #Hypertension  - PTA metoprolol  - PTA clonidine  - PTA hydrochlorothiazide  - PTA amlodipine  - Hold PTA meds for SBP < 100 and MAP < 70    #GERD  - PTA pantoprazole        Diet:  Regular, NPO at midnight  DVT Prophylaxis: Low Risk/Ambulatory with no VTE prophylaxis indicated  Tran Catheter: Not present  Fluids: None  Lines: PRESENT             Cardiac Monitoring: None  Code Status:  Full    Clinically Significant Risk Factors Present on Admission        # Hypokalemia: Lowest K = 3.1 mmol/L in last 2 days, will replace as needed        # Coagulation Defect: INR = 1.23 (Ref range: 0.85 - 1.15) and/or PTT = N/A, will monitor for bleeding    # Hypertension: Home medication list includes antihypertensive(s)    # Anemia: based on hgb <11               # Financial/Environmental Concerns:           Disposition Plan      Expected Discharge Date: 07/04/2024                The patient's care was discussed with the Attending Physician, Dr. Pantoja .      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     Has had cold sweats and RUQ abdominal pain intermittently for the past two weeks that significantly worsened in the past few days. Pain is cramping in nature and does not worsen with eating,  comes and goes and not sure of any specific modalities that improve it. He denies nausea or vomiting but can only eat a few bites of food before he doesn't want to eat more (says the food tastes like cardboard no matter what he eats). Denies cough, shortness of breath or chest pain. Denies diarrhea or constipation or dysuria. Has had some clear spit up from time to time. Over Father's day weekend did drink alcohol and tried Ecstasy, has not had a drink for six months. No other recent changes, does smoke cigarettes (~1/2 pack a day) and occasionally smokes marijuana. The pain presented after Father's day.    ED course significant for CTA consistent with pneumobilia and acute cholecystitis. Leukocytosis 13.4, potassium 3.1. Patient was started on IV antibiotics and admitted to medicine.      Past Medical History    Past Medical History:   Diagnosis Date    Diabetes mellitus, type 2 (H)     Gall bladder stones     Hypertension        Past Surgical History   Past Surgical History:   Procedure Laterality Date    ABDOMEN SURGERY      ENDOSCOPIC RETROGRADE CHOLANGIOPANCREATOGRAM N/A 04/03/2024    Procedure: ENDOSCOPIC RETROGRADE CHOLANGIOPANCREATOGRAPHY, biliary sphincterotomy, stone removal, stent placement;  Surgeon: Harsha Ferguson MD;  Location: [facility] OR    ENDOSCOPIC RETROGRADE CHOLANGIOPANCREATOGRAM N/A 04/11/2024    Procedure: ENDOSCOPIC RETROGRADE CHOLANGIOPANCREATOGRAPHY with stent exchange;  Surgeon: Harsha Ferguson MD;  Location: [facility] OR    ENDOSCOPIC RETROGRADE CHOLANGIOPANCREATOGRAM N/A 05/08/2024    Procedure: ENDOSCOPIC RETROGRADE CHOLANGIOPANCREATOGRAPHY with biliary stent exchange, biliary shpincterotomy, and clot removal;  Surgeon: Westley Lehman MD;  Location: [facility] OR    IR VISCERAL ANGIOGRAM  5/13/2024    PICC SINGLE LUMEN PLACEMENT Right 05/13/2024    Brachial Vein Medial SL 4F 42 cm, 1 cm out       Prior to Admission Medications   Prior to Admission Medications   Prescriptions Last Dose Informant  Patient Reported? Taking?   BD LINO U/F 32G X 4 MM insulin pen needle  Self Yes No   Blood Glucose Monitoring Suppl (ACCU-CHEK GUIDE ME) w/Device KIT  Self Yes No   OPTIUM TEST STRIPS test strip  Self Yes No   Sig: Dispense item covered by pt ins. E11.65 NIDDM type II, uncontrolled - Test 4 times/day. Reason: High A1C   amLODIPine (NORVASC) 10 MG tablet  Self No No   Sig: Take 1 tablet (10 mg) by mouth daily   blood glucose monitoring (SOFTCLIX) lancets  Self Yes No   cloNIDine (CATAPRES) 0.1 MG tablet  Self No No   Sig: Take 1 tablet (0.1 mg) by mouth 2 times daily   hydrochlorothiazide (HYDRODIURIL) 25 MG tablet  Self No No   Sig: Take 1 tablet (25 mg) by mouth daily   insulin aspart (NOVOLOG PEN) 100 UNIT/ML pen   Yes No   Sig: Inject 0-12 Units Subcutaneous 3 times daily (before meals) Blood Glucose             Dose  < 70                               see hypoglycemia protocol                             no insulin  150-199                         2 units  200-249                         4 units  250-299                         6 units  300-349                         8 units  350-399                         10 units  400 and greater             12 units and call MD   insulin glargine (LANTUS PEN) 100 UNIT/ML pen   No No   Sig: Inject 10 Units Subcutaneous at bedtime   metoprolol tartrate (LOPRESSOR) 50 MG tablet  Self No No   Sig: Take 1 tablet (50 mg) by mouth 2 times daily   pantoprazole (PROTONIX) 40 MG EC tablet   No No   Sig: Take 1 tablet (40 mg) by mouth 2 times daily   pantoprazole (PROTONIX) 40 MG EC tablet   No No   Sig: Take 1 tablet (40 mg) by mouth 2 times daily   prochlorperazine (COMPAZINE) 10 MG tablet  Self No No   Sig: Take 1 tablet (10 mg) by mouth every 6 hours as needed for nausea or vomiting      Facility-Administered Medications: None        Social History   I have reviewed this patient's social history and updated it with pertinent information if needed.  Social History     Tobacco  Use    Smoking status: Every Day     Types: Cigarettes     Passive exposure: Current    Smokeless tobacco: Never   Substance Use Topics    Alcohol use: Not Currently    Drug use: Yes     Types: Marijuana         Allergies   No Known Allergies     Physical Exam   Vital Signs: Temp: 99  F (37.2  C) Temp src: Oral BP: 110/62 Pulse: 75   Resp: 18 SpO2: 97 % O2 Device: None (Room air)    Weight: 0 lbs 0 oz    Constitutional: awake, alert, cooperative, no apparent distress, and appears stated age  Eyes: Lids and lashes normal, extra ocular muscles intact, sclera clear, conjunctiva normal  Respiratory: No increased work of breathing, good air exchange, clear to auscultation bilaterally, no crackles or wheezing  Cardiovascular: regular rate and rhythm, normal S1 and S2, no S3 or S4, and no murmur noted  GI: No scars, normal bowel sounds, soft, non-distended, tenderness to palpation of RUQ, no masses palpated, no hepatosplenomegally  Skin: no rashes and no lesions  Musculoskeletal: no lower extremity pitting edema present  Neurologic: Awake, alert, oriented to name, place and time.    Medical Decision Making       Please see A&P for additional details of medical decision making.      Data     I have personally reviewed the following data over the past 24 hrs:    13.4 (H)  \   9.7 (L)   / 509 (H)     139 96 (L) 13.9 /  124 (H)   3.1 (L) 28 0.96 \     ALT: 24 AST: 23 AP: 106 TBILI: 0.4   ALB: 4.1 TOT PROTEIN: 8.6 (H) LIPASE: 16     Procal: N/A CRP: N/A Lactic Acid: 1.0       INR:  1.23 (H) PTT:  N/A   D-dimer:  N/A Fibrinogen:  N/A       Imaging results reviewed over the past 24 hrs:   Recent Results (from the past 24 hour(s))   CTA Abdomen Pelvis with Contrast    Narrative    Exam: Computed tomographic angiography of the abdomen and pelvis  without and with contrast including 3D reformations dated 7/2/2024  9:11 AM    Clinical information: Hx of complex celiac artery dissection and  hepatic artery aneurysm secondary to  segmental arterial mediolysis and  presented with GI Bleed secondary to aneurysmal hepatic artery. 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 Presents to ED with symptoms of acute abdominal pain.    Technique: Axial images through the abdomen and pelvis obtained before  the administration of intravenous contrast media and following the  injection of contrast media  in the arterial phase. Source images  reviewed as well as 3D and multi-planar reconstructions at a 3D  workstation.    Contrast: 112cc of isovue 370    DLP: 1829 mGy*cm    Comparison: CT ABDOMEN PELVIS W/O & W CONTRAST 5/14/2024, RF Venous  5/13/2024.    Findings:    Lungs/Pleura: Clear with no evidence of consolidation, effusion, or  nodules.    Heart/Mediastinum: Normal size and contour with no masses or  lymphadenopathy.    Liver: Homogenously attenuated liver parenchyma. No masses or lesions.      Biliary System: Moderately distended with a remarkably thickened wall  and irregular margins. Inflammatory changes around the gallbladder  with significant gallbladder wall thickening. Contained perforation  within the posterior lateral gallbladder wall (series 9, image 160).  Beam hardening artifact from coil embolization near the gallbladder  neck limits evaluation. Radiolucent cholelithiasis with large  shadowing gallstones demonstrated on US 4/1/2024. Scattered gaseous  foci within the gallbladder lumen. Metallic biliary stent in the  common bile duct. Stable mild to moderate intrahepatic biliary  dilatation. Increased mild intrahepatic pneumobilia.    Spleen: Within normal limits.    Pancreas: Within normal limits. Masslike appearance of the thrombosed  hepatic artery pseudoaneurysm immediately adjacent the pancreatic  head.    Adrenal Glands: Thickened bilateral adrenal glands.    Kidneys and Ureters: Within normal limits without  evidence of  hydronephrosis or masses.    Gastrointestinal Tract: Normal caliber and wall thickness of the small  and large bowel. Diverticulosis coli without acute diverticulitis.  Stomach is unremarkable. Appendix is unremarkable.    Pelvis/reproductive: Partially distended with diffuse wall thickening,  which may be secondary to chronic inflammation. No masses.    Mesentery/Peritoneum/Retroperitoneum: No free fluid or free air.    Vasculature: Status post coil embolization of the right hepatic artery  for large pseudoaneurysm. Completely thrombosed right hepatic artery  to the common hepatic artery. Dissection flap of the celiac artery is  unchanged. No evidence of flow-limiting dissection to the splenic or  gastric arteries. The left hepatic artery is patent secondary to  collateral flow. Splenic artery is patent. Otherwise, patent abdominal  and pelvic arterial vasculature without evidence of stenosis,  occlusion, or aneurysm. Vena caval and portal venous systems are  patent.    Osseous Structures: Intact without evidence of suspicious lytic or  sclerotic lesions. Partially visualized lower chest is within normal  limits.    Soft Tissues: Small umbilical fat-containing hernia. Otherwise  unremarkable.    Lymph Nodes: No pathologic enlargement noted.      Impression    Impression:  1.  Gallbladder wall thickening with contained posterior lateral wall  perforation (series 9, image 158) vs gallbladder wall diverticuli.  2.  Stable postprocedural changes of coil embolization of the right  hepatic artery.   3.  Nonspecific, diffuse bladder wall thickening.    Findings of acute cholecystitis with suspected contained wall  perforation discussed directly with Dr. Boggs over the phone at 1303  hours on 7/2/2024.      I have personally reviewed the examination and initial interpretation  and I agree with the findings.    VIJAYA OSBORNE MD         SYSTEM ID:  [id number]

## 2024-07-02 NOTE — ED PROVIDER NOTES
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  Patient says his belly has been hurting more for approximately 2 weeks.  He notes that he gets sweats at night and feels like he has a fever.  Says that he has been having difficulty eating.  He says he takes 1 bite and then feels unwell.  Says he feels like he cannot take the second or third bite.  He has not had any vomiting but has been spitting up mildly.  She also been having difficulty having a bowel movement.  Says he passes gas but is not eating and not having full bowel movements.  Patient does note history of sepsis from biliary etiologies.  Patient last was on IV antibiotics about 1 month prior.  Patient's medical chart was reviewed.  Patient was post to come in at the end of June for a CTA of the abdomen and pelvis.  Patient did not follow-up with this test.    Patient's medical chart including his previous discharge summary was reviewed.  Patient currently notes abdominal pain more in the right upper quadrant.  Says the pain hurts more with touching and movement.  Also notes binge drinking about 2 weeks prior during Father's Day.  Says he drank heavily for 2 days that weekend.    Past Medical History  Past Medical History:   Diagnosis Date    Diabetes mellitus, type 2 (H)     Gall bladder stones     Hypertension      Past Surgical History:   Procedure Laterality Date    ABDOMEN SURGERY      ENDOSCOPIC RETROGRADE CHOLANGIOPANCREATOGRAM N/A 04/03/2024    Procedure: ENDOSCOPIC RETROGRADE CHOLANGIOPANCREATOGRAPHY, biliary sphincterotomy, stone removal, stent placement;  Surgeon: Harsha Ferguson MD;  Location: [facility] OR    ENDOSCOPIC RETROGRADE CHOLANGIOPANCREATOGRAM N/A 04/11/2024     Procedure: ENDOSCOPIC RETROGRADE CHOLANGIOPANCREATOGRAPHY with stent exchange;  Surgeon: Harsha Ferguson MD;  Location: [facility] OR    ENDOSCOPIC RETROGRADE CHOLANGIOPANCREATOGRAM N/A 05/08/2024    Procedure: ENDOSCOPIC RETROGRADE CHOLANGIOPANCREATOGRAPHY with biliary stent exchange, biliary shpincterotomy, and clot removal;  Surgeon: Westley Lehman MD;  Location: [facility] OR    IR VISCERAL ANGIOGRAM  5/13/2024    PICC SINGLE LUMEN PLACEMENT Right 05/13/2024    Brachial Vein Medial SL 4F 42 cm, 1 cm out     amLODIPine (NORVASC) 10 MG tablet  BD LINO U/F 32G X 4 MM insulin pen needle  blood glucose monitoring (SOFTCLIX) lancets  Blood Glucose Monitoring Suppl (ACCU-CHEK GUIDE ME) w/Device KIT  cloNIDine (CATAPRES) 0.1 MG tablet  hydrochlorothiazide (HYDRODIURIL) 25 MG tablet  insulin aspart (NOVOLOG PEN) 100 UNIT/ML pen  insulin glargine (LANTUS PEN) 100 UNIT/ML pen  metoprolol tartrate (LOPRESSOR) 50 MG tablet  OPTIUM TEST STRIPS test strip  pantoprazole (PROTONIX) 40 MG EC tablet  pantoprazole (PROTONIX) 40 MG EC tablet  prochlorperazine (COMPAZINE) 10 MG tablet      No Known Allergies  Family History  No family history on file.  Social History   Social History     Tobacco Use    Smoking status: Every Day     Types: Cigarettes     Passive exposure: Current    Smokeless tobacco: Never   Substance Use Topics    Alcohol use: Not Currently    Drug use: Yes     Types: Marijuana      Past medical history, past surgical history, medications, allergies, family history, and social history were reviewed with the patient. No additional pertinent items.   A complete review of systems was performed with pertinent positives and negatives noted in the HPI, and all other systems negative.    Physical Exam   BP: 116/75  Pulse: 79  Temp: 99.4  F (37.4  C)  Resp: 18  SpO2: 99 %  Physical Exam  Constitutional:       General: He is not in acute distress.     Appearance: He is well-developed. He is not ill-appearing, toxic-appearing or  diaphoretic.   HENT:      Head: Normocephalic and atraumatic.   Cardiovascular:      Rate and Rhythm: Normal rate and regular rhythm.      Heart sounds: Normal heart sounds.   Pulmonary:      Effort: Pulmonary effort is normal. No respiratory distress.      Breath sounds: No wheezing.   Abdominal:      General: There is no distension.      Palpations: Abdomen is soft.      Tenderness: There is abdominal tenderness (RUQ). There is guarding. There is no rebound.   Musculoskeletal:      Cervical back: Normal range of motion and neck supple.   Skin:     General: Skin is warm.   Neurological:      Mental Status: He is alert and oriented to person, place, and time.   Psychiatric:         Behavior: Behavior normal.         Thought Content: Thought content normal.           ED Course, Procedures, & Data      Procedures                Results for orders placed or performed during the hospital encounter of 07/02/24   Lactic acid whole blood with 1x repeat in 2 hr when >2     Status: Normal   Result Value Ref Range    Lactic Acid, Initial 1.0 0.7 - 2.0 mmol/L   CBC with platelets and differential     Status: Abnormal   Result Value Ref Range    WBC Count 13.4 (H) 4.0 - 11.0 10e3/uL    RBC Count 3.94 (L) 4.40 - 5.90 10e6/uL    Hemoglobin 9.7 (L) 13.3 - 17.7 g/dL    Hematocrit 30.4 (L) 40.0 - 53.0 %    MCV 77 (L) 78 - 100 fL    MCH 24.6 (L) 26.5 - 33.0 pg    MCHC 31.9 31.5 - 36.5 g/dL    RDW 16.7 (H) 10.0 - 15.0 %    Platelet Count 509 (H) 150 - 450 10e3/uL    % Neutrophils 81 %    % Lymphocytes 12 %    % Monocytes 6 %    % Eosinophils 1 %    % Basophils 0 %    % Immature Granulocytes 0 %    NRBCs per 100 WBC 0 <1 /100    Absolute Neutrophils 10.6 (H) 1.6 - 8.3 10e3/uL    Absolute Lymphocytes 1.7 0.8 - 5.3 10e3/uL    Absolute Monocytes 0.8 0.0 - 1.3 10e3/uL    Absolute Eosinophils 0.2 0.0 - 0.7 10e3/uL    Absolute Basophils 0.0 0.0 - 0.2 10e3/uL    Absolute Immature Granulocytes 0.1 <=0.4 10e3/uL    Absolute NRBCs 0.0 10e3/uL    CBC with platelets differential     Status: Abnormal    Narrative    The following orders were created for panel order CBC with platelets differential.  Procedure                               Abnormality         Status                     ---------                               -----------         ------                     CBC with platelets and d...[[id number]]  Abnormal            Final result                 Please view results for these tests on the individual orders.     Medications   sodium chloride 0.9% BOLUS 1,000 mL (1,000 mLs Intravenous $New Bag 7/2/24 0743)     Labs Ordered and Resulted from Time of ED Arrival to Time of ED Departure   CBC WITH PLATELETS AND DIFFERENTIAL - Abnormal       Result Value    WBC Count 13.4 (*)     RBC Count 3.94 (*)     Hemoglobin 9.7 (*)     Hematocrit 30.4 (*)     MCV 77 (*)     MCH 24.6 (*)     MCHC 31.9      RDW 16.7 (*)     Platelet Count 509 (*)     % Neutrophils 81      % Lymphocytes 12      % Monocytes 6      % Eosinophils 1      % Basophils 0      % Immature Granulocytes 0      NRBCs per 100 WBC 0      Absolute Neutrophils 10.6 (*)     Absolute Lymphocytes 1.7      Absolute Monocytes 0.8      Absolute Eosinophils 0.2      Absolute Basophils 0.0      Absolute Immature Granulocytes 0.1      Absolute NRBCs 0.0     LACTIC ACID WHOLE BLOOD WITH 1X REPEAT IN 2 HR WHEN >2 - Normal    Lactic Acid, Initial 1.0     COMPREHENSIVE METABOLIC PANEL   INR   LIPASE   ROUTINE UA WITH MICROSCOPIC REFLEX TO CULTURE     CTA Abdomen Pelvis with Contrast    (Results Pending)          Critical care was not performed.     Medical Decision Making  The patient's presentation was of high complexity (an acute health issue posing potential threat to life or bodily function).    The patient's evaluation involved:  review of external note(s) from 3+ sources (see separate area of note for details)  review of 3+ test result(s) ordered prior to this encounter (see separate area of note for  details)  ordering and/or review of 3+ test(s) in this encounter (see separate area of note for details)  discussion of management or test interpretation with another health professional (see separate area of note for details)      The patient's management necessitated moderate risk (prescription drug management including medications given in the ED), high risk (a parenteral controlled substance), and high risk (a decision regarding hospitalization).    Assessment & Plan    Patient is a 44-year-old male who presents to the ER due to worsening right upper quadrant pain as well and subjective fevers.  Concern for reinfection.  Plan will be to check labs including a CBC BMP lipase and lactic acid.  Concern for worsening hepatic aneurysm versus fistula.  Concern for repeat cholecystitis.  Plan will be to obtain a CTA abdomen and pelvis as recommended.  Patient is agreement of plan of care.  Patient's medical chart was reviewed.  Patient's previous labs were reviewed.  Patient's phone encounters were reviewed.    Patient with leukocytosis and concern for infection.  Abdominal CT shows concern for cholecystitis.  Patient been bacteremic from this in the past.  Plan will be admit to the for further care.  I spoke to the hospitalist excepted patient for admission.  I also spoke to the pain.  GI team will come and evaluate the patient and give further recommendations.  Patient is in agreement with plan of care.      I received a call from radiology regarding over read of the CT that shows possible gallbladder perforation.  I spoke to surgery regarding this finding and there and come evaluate the patient.  I also informed internal medicine regarding the CT finding.  Still remains hemodynamically stable.      I have reviewed the nursing notes. I have reviewed the findings, diagnosis, plan and need for follow up with the patient.    New Prescriptions    No medications on file       Final diagnoses:   Abdominal infection (H)    Sepsis, due to unspecified organism, unspecified whether acute organ dysfunction present (H)       Kavya GAR [facility] EMERGENCY DEPARTMENT  7/2/2024     Kavya Landrum MD  07/02/24 1132       Kavya Landrum MD  07/02/24 1431

## 2024-07-02 NOTE — ED TRIAGE NOTES
Ambulatory to triage for 4 days of R sided abdominal swelling and sweats/fevers.     Triage Assessment (Adult)       Row Name 07/02/24 0716          Triage Assessment    Airway WDL WDL        Respiratory WDL    Respiratory WDL WDL        Skin Circulation/Temperature WDL    Skin Circulation/Temperature WDL WDL        Cardiac WDL    Cardiac WDL WDL        Peripheral/Neurovascular WDL    Peripheral Neurovascular WDL WDL        Cognitive/Neuro/Behavioral WDL    Cognitive/Neuro/Behavioral WDL WDL

## 2024-07-02 NOTE — CONSULTS
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 Has been afebrile, WBC 13. Would recommend evaluation by GI for possible stent exchange. If stent exchange not possible, would recommend percutaneous cholecystostomy tube. Does not require urgent surgical intervention at this time.    - Evaluation by GI for possible stent exchange  - If stent exchange not possible, recommend percutaneous cholecystostomy   - Does not require urgent surgical intervention     Discussed with chief resident and staff surgeon Dr. Kwan.     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     He has had RUQ abdominal pain, subjective fevers/chills, decreased PO intake, and no BM over the past 5 days. No nausea or vomiting. His pain feels better since being here. Has been eating fruit and snacks over last several days, last ate earlier today. Takes no blood thinners. No personal or family history of bleeding dyscrasias or anesthetic complications.       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  Multiple episodes of choledocholithiasis and cholecystitis s/p ERCP x3 with stent placements and exchanges (most recently 5/8/2024)    Past Surgical History  Past Surgical History:   Procedure Laterality Date    ABDOMEN SURGERY      ENDOSCOPIC RETROGRADE CHOLANGIOPANCREATOGRAM N/A 04/03/2024    Procedure: ENDOSCOPIC RETROGRADE CHOLANGIOPANCREATOGRAPHY, biliary sphincterotomy, stone removal, stent placement;  Surgeon: Harsha Ferguson MD;  Location: [facility] OR    ENDOSCOPIC RETROGRADE CHOLANGIOPANCREATOGRAM N/A 04/11/2024    Procedure: ENDOSCOPIC RETROGRADE CHOLANGIOPANCREATOGRAPHY with stent exchange;  Surgeon: Harsha Ferguson MD;  Location: [facility] OR    ENDOSCOPIC RETROGRADE CHOLANGIOPANCREATOGRAM N/A 05/08/2024    Procedure: ENDOSCOPIC RETROGRADE CHOLANGIOPANCREATOGRAPHY with biliary stent exchange, biliary shpincterotomy, and clot removal;  Surgeon: Westley Lehman MD;  Location: [facility] OR    IR VISCERAL ANGIOGRAM  5/13/2024    PICC SINGLE LUMEN PLACEMENT Right 05/13/2024    Brachial Vein Medial SL 4F 42 cm, 1 cm out       Family History:  No family history of bleeding dyscrasias.   No family history on file.    Social History:  Tobacco - 1 ppd \"for forever\"  Alcohol - socially  Ecstasy - 1x last month  Social History     Socioeconomic History    Marital status: Single     Spouse name: Not on file    Number of children: Not on file    Years of education: Not on file    Highest education level: Not on file   Occupational History    Not on file   Tobacco Use    Smoking status: Every Day     Types: Cigarettes     Passive exposure: Current    Smokeless tobacco: Never   Substance and Sexual Activity    Alcohol use: Not Currently    Drug use: Yes     Types: Marijuana    Sexual activity: Not on file   Other Topics Concern    Not on file   Social History Narrative    Not " on file     Social Determinants of Health     Financial Resource Strain: Not on file   Food Insecurity: Not on file   Transportation Needs: Not on file   Physical Activity: Not on file   Stress: Not on file   Social Connections: Unknown (11/22/2023)    [facility]    Social Connections     Frequency of Communication with Friends and Family: Not on file   Interpersonal Safety: Not on file   Housing Stability: Not on file       Medications:  Current Outpatient Medications   Medication Sig Dispense Refill    amLODIPine (NORVASC) 10 MG tablet Take 1 tablet (10 mg) by mouth daily 30 tablet 0    BD LINO U/F 32G X 4 MM insulin pen needle       blood glucose monitoring (SOFTCLIX) lancets       Blood Glucose Monitoring Suppl (ACCU-CHEK GUIDE ME) w/Device KIT       cloNIDine (CATAPRES) 0.1 MG tablet Take 1 tablet (0.1 mg) by mouth 2 times daily 60 tablet 0    hydrochlorothiazide (HYDRODIURIL) 25 MG tablet Take 1 tablet (25 mg) by mouth daily 30 tablet 0    metoprolol tartrate (LOPRESSOR) 50 MG tablet Take 1 tablet (50 mg) by mouth 2 times daily 60 tablet 0    OPTIUM TEST STRIPS test strip Dispense item covered by pt ins. E11.65 NIDDM type II, uncontrolled - Test 4 times/day. Reason: High A1C      pantoprazole (PROTONIX) 40 MG EC tablet Take 1 tablet (40 mg) by mouth 2 times daily 60 tablet 3       Allergies:   No Known Allergies    Review of Symptoms:  A 10 point review of symptoms has been conducted and is negative except for that mentioned in the above HPI.    Physical Exam:    Blood pressure 110/62, pulse 75, temperature 99  F (37.2  C), temperature source Oral, resp. rate 18, SpO2 97%.  Gen:    Lying in bed in NAD, A&OX3  HEENT: Normocephalic and atraumatic  CV:  Regular rate, well perfused extremities  Pulm:  Non-labored breathing on room air  Abd:  Soft, tender to deep palpation in RUQ, Mata's sign negative,   non-distended,  no guarding  Ext:  Warm and well perfused, no obvious deformities    Labs:  CBC RESULTS:   Recent Labs   Lab Test 07/02/24  0740   WBC 13.4*   RBC 3.94*   HGB 9.7*   HCT 30.4*   MCV 77*   MCH 24.6*   MCHC 31.9   RDW 16.7*   *     Last Basic Metabolic Panel:  Lab Results   Component Value Date     07/02/2024      Lab Results   Component Value Date    POTASSIUM 3.1 07/02/2024    POTASSIUM 3.8 05/08/2024     Lab Results   Component Value Date    CHLORIDE 96 07/02/2024     Lab Results   Component Value Date    LUIS 9.7 07/02/2024     Lab Results   Component Value Date    CO2 28 07/02/2024     Lab Results   Component Value Date    BUN 13.9 07/02/2024     Lab Results   Component Value Date    CR 0.96 07/02/2024     Lab Results   Component Value Date     07/02/2024     05/14/2024     Lipase 16  Lactate 1.0  INR 1.23    Imaging:    CTA Abdomen Pelvis with Contrast    Result Date: 7/2/2024  Exam: Computed tomographic angiography of the abdomen and pelvis without and with contrast including 3D reformations dated 7/2/2024 9:11 AM Clinical information: 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 Presents to ED with symptoms of acute abdominal pain. Technique: Axial images through the abdomen and pelvis obtained before the administration of intravenous contrast media and following the injection of contrast media  in the arterial phase. Source images reviewed as well as 3D and multi-planar reconstructions at a 3D workstation. Contrast: 112cc of isovue 370 DLP: 1829 mGy*cm Comparison: CT ABDOMEN PELVIS W/O & W CONTRAST 5/14/2024, RF Venous 5/13/2024. Findings:  Lungs/Pleura: Clear with no evidence of consolidation,  effusion, or nodules. Heart/Mediastinum: Normal size and contour with no masses or lymphadenopathy. Liver: Homogenously attenuated liver parenchyma. No masses or lesions. Biliary System: Moderately distended with a remarkably thickened wall and irregular margins. Inflammatory changes around the gallbladder with significant gallbladder wall thickening. Contained perforation within the posterior lateral gallbladder wall (series 9, image 160). Beam hardening artifact from coil embolization near the gallbladder neck limits evaluation. Radiolucent cholelithiasis with large shadowing gallstones demonstrated on US 4/1/2024. Scattered gaseous foci within the gallbladder lumen. Metallic biliary stent in the common bile duct. Stable mild to moderate intrahepatic biliary dilatation. Increased mild intrahepatic pneumobilia. Spleen: Within normal limits. Pancreas: Within normal limits. 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 Adrenal Glands: Thickened bilateral adrenal glands. Kidneys and Ureters: Within normal limits without evidence of hydronephrosis or masses. Gastrointestinal Tract: Normal caliber and wall thickness of the small and large bowel. Diverticulosis coli without acute diverticulitis. Stomach is unremarkable. Appendix is unremarkable. Pelvis/reproductive: Partially distended with diffuse wall thickening, which may be secondary to chronic inflammation. No masses. Mesentery/Peritoneum/Retroperitoneum: No free fluid or free air. Vasculature: Status post coil embolization of the right hepatic artery for large pseudoaneurysm. Completely thrombosed right hepatic artery to the common hepatic artery. Dissection flap of the celiac artery is unchanged. No evidence of flow-limiting dissection to the splenic or gastric arteries. The left hepatic artery is patent secondary to collateral flow. Splenic artery is patent. Otherwise, patent abdominal and pelvic  arterial vasculature without evidence of stenosis, occlusion, or aneurysm. Vena caval and portal venous systems are patent. Osseous Structures: Intact without evidence of suspicious lytic or sclerotic lesions. Partially visualized lower chest is within normal limits. Soft Tissues: Small umbilical fat-containing hernia. Otherwise unremarkable. Lymph Nodes: No pathologic enlargement noted.     Impression: 1.  Gallbladder wall thickening with contained posterior lateral wall perforation (series 9, image 158) vs gallbladder wall diverticuli. 2.  Stable postprocedural changes of coil embolization of the right hepatic artery. 3.  Nonspecific, diffuse bladder wall thickening. Findings of acute cholecystitis with suspected contained wall perforation discussed directly with Dr. Boggs over the phone at 1303 hours on 7/2/2024.  I have personally reviewed the examination and initial interpretation and I agree with the findings. VIJAYA OSBORNE MD   SYSTEM ID:  [id number]

## 2024-07-02 NOTE — CONSULTS
GASTROENTEROLOGY CONSULTATION      Date of Admission: 7/2/2024           Reason for Consultation:   We were asked by Dr. Landrum of Emergency Department to evaluate this patient with abdominal pain.          ASSESSMENT AND RECOMMENDATIONS:   Assessment:  Mr. 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 He also has hypertension, type 2 diabetes, polysubstance use. He presented with RUQ abdominal pain and was found to have leukocytosis, gallbladder wall thickening with contained posterior lateral wall perforation vs gallbladder wall diverticuli.     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 He has not completed genetic testing.     For current presentation, he most likely has obstruction of stent and resultant infection given findings of gallbladder wall thickening, and ddx includes ischemic cholangiopathy given recent coiling. He was for follow up ERCP on 6/20, however did not get this procedure so will need likely need ERCP. From GI perspective, would recommend evaluation by [facility] surgical team to determine if cholecystectomy is indicated and if patient would be an appropriate candidate. If he is not a surgical candidate, then will consider endoscopic options.     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 Please do not hesitate to contact the GI service with any questions or concerns.     Pt care plan discussed with Dr. Castle, GI staff physician.    Lyndsey Castro MD  Gastroenterology Fellow         History of Present Illness:   Jong Quiroga is a 44 year old man who presents due to abdominal pain.    Patient reports that he was drinking several bottles of vodka after Father's Day.  He had been drinking for several days possibly 3 days starting Sara 15.  He then took some ecstasy as well.  He denies any other IV drug use, over-the-counter medication use, anticoagulant use.  He denies any NSAIDs.  He says that the abdominal pain started around that time, and has gotten slowly worse.  He says it is more of a tenderness, is not relieved by anything specific.  It is not worsened by food intake, does not radiate.  The pain is localized to his right upper quadrant, has not had any nausea or vomiting.  He has not been eating very well overall.  No diarrhea or constipation.      He does not have any fevers, does report some sweats.  He denies any chest pain, trouble breathing.  He denies urinary symptoms, cough.  He is able to walk and has not had any falls.  Patient lives in [place].    Prior history per chart review reveals diagnosis of hepatic artery aneurysm diagnosed in 12/20/2023.  He was evaluated by vascular surgery, IR, rheumatology, hepatology.  He was discussed at liver tumor conference.         Data:   Key relevant labs:   WBC 13, Hgb 9.7, plt 509   INR 1.23   Cr 0.9   Lipase  16  Tbili 0.4, AST 23, ALT 24  Lactate 1.0     Key relevant imagin/2 CTA AP:   1.  Gallbladder wall thickening with contained posterior lateral wall  perforation (series 9, image 158) vs gallbladder wall diverticuli.  2.  Stable postprocedural changes of coil embolization of the right  hepatic artery.   3.  Nonspecific, diffuse bladder wall thickening.    1.  Acute cholecystitis evidenced by significant wall thickening with  mild peripheral fat stranding. Large radiolucent stones previously  demonstrated on 2024 ultrasound.  2.  Stable postprocedural changes of coil embolization of the right  hepatic artery. No evidence of bleeding.  3.  Stable common bile duct biliary stent with increased intraluminal  and pneumobilia.     24 IR Procedure - Successful coil embolization of the aneurysm and pseudoaneurysm of the distal right Hepatic artery using multiple coils with front door backdoor technique.            Previous Endoscopy:   24: ERCP (Dr. Lehman) showed occluded stents and pt had arterial bleeding after removal concerning for fistula to artery. A 10 mm by 8 cm covered WallFlex stent was placed across the entire common extending into the duodenum.      24: ERCP (Dr. Ferguson) showed stent migration, A 7Fr double-pigtailed Zimmon stent and a 10Fr x 16 cm intraductal Johlin stent were placed into the bile duct to decrease chance of recurrent migration.     4/3/24: ERCP (Dr. Ferguson) for biliary sphincterotomy, stone extraction, found to have common hepatic duct stricture likely due to extrinsic compression from hepatic artery aneurysm. 10 Fr by 7 cm Sofflex stent with a single external flap and a single internal flap was placed 6.5 cm into the common bile duct.           Medications:     Current Facility-Administered Medications   Medication Dose Route Frequency Provider Last Rate Last Admin    calcium carbonate (TUMS) chewable tablet 1,000 mg  1,000 mg Oral 4x Daily Barbara Fitzgerald MD         glucose gel 15-30 g  15-30 g Oral Q15 Min PRBarbara Fajardo MD        Or    dextrose 50 % injection 25-50 mL  25-50 mL Intravenous Q15 Min Barbara Fitzgerald MD        Or    glucagon injection 1 mg  1 mg Subcutaneous Q15 Min Barbara Fitzgerald MD        insulin aspart (NovoLOG) injection (RAPID ACTING)  1-4 Units Subcutaneous Q4H Barbara Barillas MD        lidocaine (LMX4) cream   Topical Q1H PRBarbara Fajardo MD        lidocaine 1 % 0.1-1 mL  0.1-1 mL Other Q1H PRBarbara Fajardo MD        ondansetron (ZOFRAN ODT) ODT tab 4 mg  4 mg Oral Q6H PRBarbara Fajardo MD        Or    ondansetron (ZOFRAN) injection 4 mg  4 mg Intravenous Q6H PRBarbara Fajardo MD        pharmacy alert - intermittent dosing  1 each Other See Admin Instructions Cayla Moreno, [facility]        polyethylene glycol (MIRALAX) Packet 17 g  17 g Oral BID PRBarbara Fajardo MD        senna-docusate (SENOKOT-S/PERICOLACE) 8.6-50 MG per tablet 1 tablet  1 tablet Oral BID PRBarbara Fajardo MD        Or    senna-docusate (SENOKOT-S/PERICOLACE) 8.6-50 MG per tablet 2 tablet  2 tablet Oral BID PRBarbara Fajardo MD        sodium chloride (PF) 0.9% PF flush 3 mL  3 mL Intracatheter Q8H Barbara Barillas MD        sodium chloride (PF) 0.9% PF flush 3 mL  3 mL Intracatheter q1 min prBarbara Fajardo MD         Current Outpatient Medications   Medication Sig Dispense Refill    amLODIPine (NORVASC) 10 MG tablet Take 1 tablet (10 mg) by mouth daily 30 tablet 0    BD LINO U/F 32G X 4 MM insulin pen needle       blood glucose monitoring (SOFTCLIX) lancets       Blood Glucose Monitoring Suppl (ACCU-CHEK GUIDE ME) w/Device KIT       hydrochlorothiazide (HYDRODIURIL) 25 MG tablet Take 1 tablet (25 mg) by mouth daily 30 tablet 0    insulin aspart (NOVOLOG PEN) 100 UNIT/ML pen Inject 0-12 Units Subcutaneous 3 times daily (before meals) Blood Glucose             Dose  < 70                               see  hypoglycemia protocol                             no insulin  150-199                         2 units  200-249                         4 units  250-299                         6 units  300-349                         8 units  350-399                         10 units  400 and greater             12 units and call MD      insulin glargine (LANTUS PEN) 100 UNIT/ML pen Inject 10 Units Subcutaneous at bedtime      metoprolol tartrate (LOPRESSOR) 50 MG tablet Take 1 tablet (50 mg) by mouth 2 times daily 60 tablet 0    OPTIUM TEST STRIPS test strip Dispense item covered by pt ins. E11.65 NIDDM type II, uncontrolled - Test 4 times/day. Reason: High A1C      pantoprazole (PROTONIX) 40 MG EC tablet Take 1 tablet (40 mg) by mouth 2 times daily 60 tablet 3    prochlorperazine (COMPAZINE) 10 MG tablet Take 1 tablet (10 mg) by mouth every 6 hours as needed for nausea or vomiting 15 tablet 0    cloNIDine (CATAPRES) 0.1 MG tablet Take 1 tablet (0.1 mg) by mouth 2 times daily 60 tablet 0           Physical Exam:   /62   Pulse 75   Temp 99  F (37.2  C) (Oral)   Resp 18   SpO2 97%   Wt:   Wt Readings from Last 2 Encounters:   05/08/24 82.6 kg (182 lb)   04/11/24 82.5 kg (181 lb 12.8 oz)      Physical Examination:  Gen: lying in bed in no acute distress  Eyes: anicteric conjunciva  ENT: moist mucous membranes   Pulm: normal work of breathing on ambient air   Abd: soft, mild distension, tenderness to palpation in RUQ without rebound or guarding, positive bowel sounds   Ext: no peripheral edema  Skin: warm, well perfused   Neuro: alert, conversant   MSK: no gross deformities   Psych: appropriate, making eye contact           Past Medical History:   Reviewed and edited as appropriate  Past Medical History:   Diagnosis Date    Diabetes mellitus, type 2 (H)     Gall bladder stones     Hypertension           Past Surgical History:   Reviewed and edited as appropriate   Past Surgical History:   Procedure Laterality Date     ABDOMEN SURGERY      ENDOSCOPIC RETROGRADE CHOLANGIOPANCREATOGRAM N/A 04/03/2024    Procedure: ENDOSCOPIC RETROGRADE CHOLANGIOPANCREATOGRAPHY, biliary sphincterotomy, stone removal, stent placement;  Surgeon: Harsha Ferguson MD;  Location: [facility] OR    ENDOSCOPIC RETROGRADE CHOLANGIOPANCREATOGRAM N/A 04/11/2024    Procedure: ENDOSCOPIC RETROGRADE CHOLANGIOPANCREATOGRAPHY with stent exchange;  Surgeon: Harsha Ferguson MD;  Location: [facility] OR    ENDOSCOPIC RETROGRADE CHOLANGIOPANCREATOGRAM N/A 05/08/2024    Procedure: ENDOSCOPIC RETROGRADE CHOLANGIOPANCREATOGRAPHY with biliary stent exchange, biliary shpincterotomy, and clot removal;  Surgeon: Westley Lehman MD;  Location: [facility] OR    IR VISCERAL ANGIOGRAM  5/13/2024    PICC SINGLE LUMEN PLACEMENT Right 05/13/2024    Brachial Vein Medial SL 4F 42 cm, 1 cm out            Social History:   Alcohol use: Several bottles of vodka for 3 days around 6/15   Smoking history: Denies  Illicit drugs: Used ecstacy  Living situation: Lives in [place]         Family History:   No family history on file.         Allergies:   Reviewed and edited as appropriate   No Known Allergies         Review of Systems:     A complete 10 point review of systems was performed and is negative except as noted in the HPI

## 2024-07-02 NOTE — PLAN OF CARE
Goal Outcome Evaluation:      Plan of Care Reviewed With: patient    Overall Patient Progress: improvingOverall Patient Progress: improving    Outcome Evaluation: Plan: MARYELLEN Pompa RN, PHN, BSN   Float Nurse Care Coordinator  Covering for Unit ED  Phone [phone]

## 2024-07-02 NOTE — CONSULTS
Care Management Initial Consult    General Information  Assessment completed with: Patient,    Type of CM/SW Visit: Initial Assessment    Primary Care Provider verified and updated as needed: Yes   Readmission within the last 30 days: no previous admission in last 30 days      Reason for Consult: discharge planning  Advance Care Planning: Advance Care Planning Reviewed: present on chart        Communication Assessment  Patient's communication style: spoken language (English or Bilingual)        Cognitive  Cognitive/Neuro/Behavioral: WDL                      Living Environment:   People in home: child(karen), dependent, significant other  Milka  Current living Arrangements: house      Able to return to prior arrangements: yes     Family/Social Support:  Care provided by: self  Provides care for: child(karen)  Marital Status: Lives with Significant Other             Description of Support System: Supportive       Current Resources:   Patient receiving home care services: No     Community Resources: None  Equipment currently used at home:    Supplies currently used at home:      Employment/Financial:  Employment Status: employed part-time ([employer])        Financial Concerns: none   Referral to Financial Worker: No     Does the patient's insurance plan have a 3 day qualifying hospital stay waiver?  No    Lifestyle & Psychosocial Needs:  Social Determinants of Health     Food Insecurity: Not on file   Depression: Not at risk (4/23/2024)    [facility] from [facility]    PHQ-2     PHQ-2 Subtotal: 0   Housing Stability: Not on file   Tobacco Use: High Risk (4/10/2024)    Patient History     Smoking Tobacco Use: Every Day     Smokeless Tobacco Use: Never     Passive Exposure: Current   Financial Resource Strain: Not on file   Alcohol Use: Not on file   Transportation Needs: Not on file   Physical Activity: Not on file   Interpersonal Safety: Not on file   Stress: Not on file   Social Connections: Unknown (11/22/2023)     Received from [employer] [facility], [employer] [facility]    Social Connections     Frequency of Communication with Friends and Family: Not on file   Health Literacy: Not on file     Functional Status:  Prior to admission patient needed assistance:   Dependent ADLs:: Independent  Dependent IADLs:: Independent     Mental Health Status:  Mental Health Status: No Current Concerns       Chemical Dependency Status:  Chemical Dependency Status: No Current Concerns           Values/Beliefs:  Spiritual, Cultural Beliefs, Quaker Practices, Values that affect care: no            Additional Information:     Met with patient at bedside for initial consult due to high risk score. Introduced self and the role of the care team. Patient confirmed living at the house address on file with wife and 2 minor kids. Patient mentioned being independent with his care. He is back to [employer] part time. Patient confirmed his insurance and PCP on file.     CC will continue to follow up.   Tamra Pompa RN, PHN, BSN   Float Nurse Care Coordinator  Covering for Unit ED  Phone [phone]

## 2024-07-02 NOTE — PROGRESS NOTES
"Shift: 9466-5907     VS: WDL  Pain: intermittent abdominal pain. Pt describes it as tender located RUQ  Neuro: WDL  Cardiac: WDL  Respiratory: WDL  Diet/Appetite: poor appetite. Pt tolerating liquids well  GI/: Abdomen distended. Pt had 1 bowel movement. Described as brown and softer consistency (\"paste\")  LDAs: PIV R arm  Skin: WDL  Activity: independent  Test/Procedures: CT  Labs:elevated WBC     Shift Summary: PT admitted for likely cholecystitis. Surgery and GI consulted. Yet to be determined if cholecystectomy will be pursued.   "

## 2024-07-03 ENCOUNTER — APPOINTMENT (OUTPATIENT)
Dept: GENERAL RADIOLOGY | Facility: CLINIC | Age: 44
End: 2024-07-03
Attending: INTERNAL MEDICINE
Payer: COMMERCIAL

## 2024-07-03 ENCOUNTER — ANESTHESIA EVENT (OUTPATIENT)
Dept: SURGERY | Facility: CLINIC | Age: 44
End: 2024-07-03
Payer: COMMERCIAL

## 2024-07-03 ENCOUNTER — ANESTHESIA (OUTPATIENT)
Dept: SURGERY | Facility: CLINIC | Age: 44
End: 2024-07-03
Payer: COMMERCIAL

## 2024-07-03 LAB
ALBUMIN SERPL BCG-MCNC: 3.8 G/DL (ref 3.5–5.2)
ALP SERPL-CCNC: 104 U/L (ref 40–150)
ALT SERPL W P-5'-P-CCNC: 22 U/L (ref 0–70)
ANION GAP SERPL CALCULATED.3IONS-SCNC: 14 MMOL/L (ref 7–15)
AST SERPL W P-5'-P-CCNC: 24 U/L (ref 0–45)
BILIRUB SERPL-MCNC: 0.4 MG/DL
BUN SERPL-MCNC: 12.9 MG/DL (ref 6–20)
CALCIUM SERPL-MCNC: 9.4 MG/DL (ref 8.6–10)
CHLORIDE SERPL-SCNC: 96 MMOL/L (ref 98–107)
CREAT SERPL-MCNC: 0.95 MG/DL (ref 0.67–1.17)
DEPRECATED HCO3 PLAS-SCNC: 25 MMOL/L (ref 22–29)
EGFRCR SERPLBLD CKD-EPI 2021: >90 ML/MIN/1.73M2
ERCP: NORMAL
ERYTHROCYTE [DISTWIDTH] IN BLOOD BY AUTOMATED COUNT: 16.7 % (ref 10–15)
GLUCOSE BLDC GLUCOMTR-MCNC: 115 MG/DL (ref 70–99)
GLUCOSE SERPL-MCNC: 148 MG/DL (ref 70–99)
HCT VFR BLD AUTO: 28.3 % (ref 40–53)
HGB BLD-MCNC: 9.1 G/DL (ref 13.3–17.7)
LACTATE SERPL-SCNC: 2.2 MMOL/L (ref 0.7–2)
MCH RBC QN AUTO: 24.4 PG (ref 26.5–33)
MCHC RBC AUTO-ENTMCNC: 32.2 G/DL (ref 31.5–36.5)
MCV RBC AUTO: 76 FL (ref 78–100)
PLATELET # BLD AUTO: 476 10E3/UL (ref 150–450)
POTASSIUM SERPL-SCNC: 3.2 MMOL/L (ref 3.4–5.3)
POTASSIUM SERPL-SCNC: 3.6 MMOL/L (ref 3.4–5.3)
PROT SERPL-MCNC: 8.6 G/DL (ref 6.4–8.3)
RBC # BLD AUTO: 3.73 10E6/UL (ref 4.4–5.9)
SODIUM SERPL-SCNC: 135 MMOL/L (ref 135–145)
WBC # BLD AUTO: 14.1 10E3/UL (ref 4–11)

## 2024-07-03 PROCEDURE — 86140 C-REACTIVE PROTEIN: CPT

## 2024-07-03 PROCEDURE — 250N000011 HC RX IP 250 OP 636

## 2024-07-03 PROCEDURE — 250N000009 HC RX 250: Performed by: STUDENT IN AN ORGANIZED HEALTH CARE EDUCATION/TRAINING PROGRAM

## 2024-07-03 PROCEDURE — 0F798ZZ DILATION OF COMMON BILE DUCT, VIA NATURAL OR ARTIFICIAL OPENING ENDOSCOPIC: ICD-10-PCS | Performed by: INTERNAL MEDICINE

## 2024-07-03 PROCEDURE — 36415 COLL VENOUS BLD VENIPUNCTURE: CPT | Performed by: INTERNAL MEDICINE

## 2024-07-03 PROCEDURE — 360N000083 HC SURGERY LEVEL 3 W/ FLUORO, PER MIN: Performed by: INTERNAL MEDICINE

## 2024-07-03 PROCEDURE — 250N000013 HC RX MED GY IP 250 OP 250 PS 637: Performed by: INTERNAL MEDICINE

## 2024-07-03 PROCEDURE — 370N000017 HC ANESTHESIA TECHNICAL FEE, PER MIN: Performed by: INTERNAL MEDICINE

## 2024-07-03 PROCEDURE — 258N000003 HC RX IP 258 OP 636: Performed by: STUDENT IN AN ORGANIZED HEALTH CARE EDUCATION/TRAINING PROGRAM

## 2024-07-03 PROCEDURE — 0FPB8DZ REMOVAL OF INTRALUMINAL DEVICE FROM HEPATOBILIARY DUCT, VIA NATURAL OR ARTIFICIAL OPENING ENDOSCOPIC: ICD-10-PCS | Performed by: INTERNAL MEDICINE

## 2024-07-03 PROCEDURE — 250N000025 HC SEVOFLURANE, PER MIN: Performed by: INTERNAL MEDICINE

## 2024-07-03 PROCEDURE — 99233 SBSQ HOSP IP/OBS HIGH 50: CPT | Mod: GC | Performed by: INTERNAL MEDICINE

## 2024-07-03 PROCEDURE — 250N000013 HC RX MED GY IP 250 OP 250 PS 637

## 2024-07-03 PROCEDURE — 250N000011 HC RX IP 250 OP 636: Performed by: STUDENT IN AN ORGANIZED HEALTH CARE EDUCATION/TRAINING PROGRAM

## 2024-07-03 PROCEDURE — 82040 ASSAY OF SERUM ALBUMIN: CPT

## 2024-07-03 PROCEDURE — 84132 ASSAY OF SERUM POTASSIUM: CPT | Performed by: INTERNAL MEDICINE

## 2024-07-03 PROCEDURE — 999N000179 XR SURGERY CARM FLUORO LESS THAN 5 MIN W STILLS: Mod: TC

## 2024-07-03 PROCEDURE — 43275 ERCP REMOVE FORGN BODY DUCT: CPT | Performed by: ANESTHESIOLOGY

## 2024-07-03 PROCEDURE — 85027 COMPLETE CBC AUTOMATED: CPT

## 2024-07-03 PROCEDURE — 87040 BLOOD CULTURE FOR BACTERIA: CPT

## 2024-07-03 PROCEDURE — 272N000001 HC OR GENERAL SUPPLY STERILE: Performed by: INTERNAL MEDICINE

## 2024-07-03 PROCEDURE — 36415 COLL VENOUS BLD VENIPUNCTURE: CPT

## 2024-07-03 PROCEDURE — C1726 CATH, BAL DIL, NON-VASCULAR: HCPCS | Performed by: INTERNAL MEDICINE

## 2024-07-03 PROCEDURE — C1769 GUIDE WIRE: HCPCS | Performed by: INTERNAL MEDICINE

## 2024-07-03 PROCEDURE — 120N000002 HC R&B MED SURG/OB UMMC

## 2024-07-03 PROCEDURE — 999N000141 HC STATISTIC PRE-PROCEDURE NURSING ASSESSMENT: Performed by: INTERNAL MEDICINE

## 2024-07-03 PROCEDURE — 0FC98ZZ EXTIRPATION OF MATTER FROM COMMON BILE DUCT, VIA NATURAL OR ARTIFICIAL OPENING ENDOSCOPIC: ICD-10-PCS | Performed by: INTERNAL MEDICINE

## 2024-07-03 PROCEDURE — 255N000002 HC RX 255 OP 636: Performed by: INTERNAL MEDICINE

## 2024-07-03 PROCEDURE — 250N000009 HC RX 250: Performed by: INTERNAL MEDICINE

## 2024-07-03 PROCEDURE — 83605 ASSAY OF LACTIC ACID: CPT | Performed by: INTERNAL MEDICINE

## 2024-07-03 PROCEDURE — 258N000003 HC RX IP 258 OP 636

## 2024-07-03 PROCEDURE — 710N000010 HC RECOVERY PHASE 1, LEVEL 2, PER MIN: Performed by: INTERNAL MEDICINE

## 2024-07-03 RX ORDER — DEXAMETHASONE SODIUM PHOSPHATE 4 MG/ML
INJECTION, SOLUTION INTRA-ARTICULAR; INTRALESIONAL; INTRAMUSCULAR; INTRAVENOUS; SOFT TISSUE PRN
Status: DISCONTINUED | OUTPATIENT
Start: 2024-07-03 | End: 2024-07-03

## 2024-07-03 RX ORDER — LIDOCAINE 40 MG/G
CREAM TOPICAL
Status: DISCONTINUED | OUTPATIENT
Start: 2024-07-03 | End: 2024-07-03 | Stop reason: HOSPADM

## 2024-07-03 RX ORDER — PROPOFOL 10 MG/ML
INJECTION, EMULSION INTRAVENOUS PRN
Status: DISCONTINUED | OUTPATIENT
Start: 2024-07-03 | End: 2024-07-03

## 2024-07-03 RX ORDER — INDOMETHACIN 50 MG/1
SUPPOSITORY RECTAL PRN
Status: DISCONTINUED | OUTPATIENT
Start: 2024-07-03 | End: 2024-07-03 | Stop reason: HOSPADM

## 2024-07-03 RX ORDER — IOPAMIDOL 510 MG/ML
INJECTION, SOLUTION INTRAVASCULAR PRN
Status: DISCONTINUED | OUTPATIENT
Start: 2024-07-03 | End: 2024-07-03 | Stop reason: HOSPADM

## 2024-07-03 RX ORDER — INDOMETHACIN 50 MG/1
100 SUPPOSITORY RECTAL
Status: DISCONTINUED | OUTPATIENT
Start: 2024-07-03 | End: 2024-07-03 | Stop reason: HOSPADM

## 2024-07-03 RX ORDER — POTASSIUM CHLORIDE 1.5 G/1.58G
40 POWDER, FOR SOLUTION ORAL ONCE
Status: COMPLETED | OUTPATIENT
Start: 2024-07-03 | End: 2024-07-03

## 2024-07-03 RX ORDER — LIDOCAINE HYDROCHLORIDE 20 MG/ML
INJECTION, SOLUTION INFILTRATION; PERINEURAL PRN
Status: DISCONTINUED | OUTPATIENT
Start: 2024-07-03 | End: 2024-07-03

## 2024-07-03 RX ORDER — ESMOLOL HYDROCHLORIDE 10 MG/ML
INJECTION INTRAVENOUS PRN
Status: DISCONTINUED | OUTPATIENT
Start: 2024-07-03 | End: 2024-07-03

## 2024-07-03 RX ORDER — SODIUM CHLORIDE, SODIUM LACTATE, POTASSIUM CHLORIDE, CALCIUM CHLORIDE 600; 310; 30; 20 MG/100ML; MG/100ML; MG/100ML; MG/100ML
INJECTION, SOLUTION INTRAVENOUS CONTINUOUS PRN
Status: DISCONTINUED | OUTPATIENT
Start: 2024-07-03 | End: 2024-07-03

## 2024-07-03 RX ORDER — FENTANYL CITRATE 50 UG/ML
INJECTION, SOLUTION INTRAMUSCULAR; INTRAVENOUS PRN
Status: DISCONTINUED | OUTPATIENT
Start: 2024-07-03 | End: 2024-07-03

## 2024-07-03 RX ADMIN — ESMOLOL HYDROCHLORIDE 50 MG: 10 INJECTION, SOLUTION INTRAVENOUS at 17:51

## 2024-07-03 RX ADMIN — Medication 10 MG: at 18:16

## 2024-07-03 RX ADMIN — METRONIDAZOLE 500 MG: 5 INJECTION, SOLUTION INTRAVENOUS at 05:35

## 2024-07-03 RX ADMIN — LIDOCAINE HYDROCHLORIDE 100 MG: 20 INJECTION, SOLUTION INFILTRATION; PERINEURAL at 17:46

## 2024-07-03 RX ADMIN — ACETAMINOPHEN 650 MG: 325 TABLET, FILM COATED ORAL at 06:36

## 2024-07-03 RX ADMIN — AMLODIPINE BESYLATE 10 MG: 10 TABLET ORAL at 08:16

## 2024-07-03 RX ADMIN — SODIUM CHLORIDE, POTASSIUM CHLORIDE, SODIUM LACTATE AND CALCIUM CHLORIDE: 600; 310; 30; 20 INJECTION, SOLUTION INTRAVENOUS at 17:32

## 2024-07-03 RX ADMIN — DEXAMETHASONE SODIUM PHOSPHATE 4 MG: 4 INJECTION, SOLUTION INTRA-ARTICULAR; INTRALESIONAL; INTRAMUSCULAR; INTRAVENOUS; SOFT TISSUE at 18:03

## 2024-07-03 RX ADMIN — Medication 200 MG: at 18:29

## 2024-07-03 RX ADMIN — ESMOLOL HYDROCHLORIDE 50 MG: 10 INJECTION, SOLUTION INTRAVENOUS at 17:53

## 2024-07-03 RX ADMIN — POTASSIUM CHLORIDE 40 MEQ: 1.5 POWDER, FOR SOLUTION ORAL at 14:28

## 2024-07-03 RX ADMIN — CLONIDINE HYDROCHLORIDE 0.1 MG: 0.1 TABLET ORAL at 08:16

## 2024-07-03 RX ADMIN — METOPROLOL TARTRATE 50 MG: 50 TABLET, FILM COATED ORAL at 08:16

## 2024-07-03 RX ADMIN — FENTANYL CITRATE 25 MCG: 50 INJECTION INTRAMUSCULAR; INTRAVENOUS at 18:17

## 2024-07-03 RX ADMIN — CEFEPIME HYDROCHLORIDE 2 G: 2 INJECTION, POWDER, FOR SOLUTION INTRAVENOUS at 10:21

## 2024-07-03 RX ADMIN — ACETAMINOPHEN 650 MG: 325 TABLET, FILM COATED ORAL at 01:21

## 2024-07-03 RX ADMIN — SODIUM CHLORIDE 500 ML: 9 INJECTION, SOLUTION INTRAVENOUS at 23:46

## 2024-07-03 RX ADMIN — ONDANSETRON 4 MG: 2 INJECTION INTRAMUSCULAR; INTRAVENOUS at 18:26

## 2024-07-03 RX ADMIN — FENTANYL CITRATE 25 MCG: 50 INJECTION INTRAMUSCULAR; INTRAVENOUS at 18:13

## 2024-07-03 RX ADMIN — PROPOFOL 120 MG: 10 INJECTION, EMULSION INTRAVENOUS at 17:46

## 2024-07-03 RX ADMIN — HYDROCHLOROTHIAZIDE 25 MG: 25 TABLET ORAL at 08:16

## 2024-07-03 RX ADMIN — Medication 50 MG: at 17:46

## 2024-07-03 RX ADMIN — CEFEPIME HYDROCHLORIDE 2 G: 2 INJECTION, POWDER, FOR SOLUTION INTRAVENOUS at 03:05

## 2024-07-03 RX ADMIN — PANTOPRAZOLE SODIUM 40 MG: 40 TABLET, DELAYED RELEASE ORAL at 08:16

## 2024-07-03 ASSESSMENT — ACTIVITIES OF DAILY LIVING (ADL)
ADLS_ACUITY_SCORE: 37
ADLS_ACUITY_SCORE: 38
ADLS_ACUITY_SCORE: 37
ADLS_ACUITY_SCORE: 38
ADLS_ACUITY_SCORE: 37
ADLS_ACUITY_SCORE: 38
ADLS_ACUITY_SCORE: 37
ADLS_ACUITY_SCORE: 37
ADLS_ACUITY_SCORE: 38
ADLS_ACUITY_SCORE: 37

## 2024-07-03 ASSESSMENT — LIFESTYLE VARIABLES: TOBACCO_USE: 1

## 2024-07-03 NOTE — ANESTHESIA PREPROCEDURE EVALUATION
Anesthesia Pre-Procedure Evaluation    Patient: Jong Quiroga   MRN: [id number] : 1980        Procedure : Procedure(s):  ENDOSCOPIC RETROGRADE CHOLANGIOPANCREATOGRAPHY          Past Medical History:   Diagnosis Date    Diabetes mellitus, type 2 (H)     Gall bladder stones     Hypertension       Past Surgical History:   Procedure Laterality Date    ABDOMEN SURGERY      ENDOSCOPIC RETROGRADE CHOLANGIOPANCREATOGRAM N/A 2024    Procedure: ENDOSCOPIC RETROGRADE CHOLANGIOPANCREATOGRAPHY, biliary sphincterotomy, stone removal, stent placement;  Surgeon: Harsha Ferguson MD;  Location: [facility] OR    ENDOSCOPIC RETROGRADE CHOLANGIOPANCREATOGRAM N/A 2024    Procedure: ENDOSCOPIC RETROGRADE CHOLANGIOPANCREATOGRAPHY with stent exchange;  Surgeon: Harsha Ferguson MD;  Location: [facility] OR    ENDOSCOPIC RETROGRADE CHOLANGIOPANCREATOGRAM N/A 2024    Procedure: ENDOSCOPIC RETROGRADE CHOLANGIOPANCREATOGRAPHY with biliary stent exchange, biliary shpincterotomy, and clot removal;  Surgeon: Westley Lehman MD;  Location: [facility] OR    IR VISCERAL ANGIOGRAM  2024    PICC SINGLE LUMEN PLACEMENT Right 2024    Brachial Vein Medial SL 4F 42 cm, 1 cm out      No Known Allergies   Social History     Tobacco Use    Smoking status: Every Day     Types: Cigarettes     Passive exposure: Current    Smokeless tobacco: Never   Substance Use Topics    Alcohol use: Not Currently      Wt Readings from Last 1 Encounters:   24 77.6 kg (171 lb)        Anesthesia Evaluation   Pt has had prior anesthetic.     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  Hepatic artery aneurysm s/p coil embolization (5/2024)  History of biliary obstruction due to extrinsic compression from aneurysm and choledocholithiasis status post covered metal stent placement (5/2024)     self-contained perforated gallblader (cholecystitis)   (-) GERD   Renal/Genitourinary:  - neg Renal ROS     Endo:       Psychiatric/Substance Use:       Infectious Disease:       Malignancy:       Other:            Physical Exam    Airway        Mallampati: II   TM distance: > 3 FB   Neck ROM: full   Mouth opening: > 3 cm    Respiratory Devices and Support         Dental     Comment: Patient reports no loose or chipped teeth        Cardiovascular          Rhythm and rate: regular and normal     Pulmonary           breath sounds clear to auscultation           OUTSIDE LABS:  CBC:   Lab Results   Component Value Date    WBC 14.1 (H) 07/03/2024    WBC 13.4 (H) 07/02/2024    HGB 9.1 (L) 07/03/2024    HGB 9.7 (L) 07/02/2024    HCT 28.3 (L) 07/03/2024    HCT 30.4 (L) 07/02/2024     (H) 07/03/2024     (H) 07/02/2024     BMP:   Lab Results   Component Value Date     07/03/2024     07/02/2024    POTASSIUM 3.2 (L) 07/03/2024    POTASSIUM 3.1 (L) 07/02/2024    CHLORIDE 96 (L) 07/03/2024    CHLORIDE 96 (L) 07/02/2024    CO2 25 07/03/2024    CO2 28 07/02/2024    BUN 12.9 07/03/2024    BUN 13.9 07/02/2024    CR 0.95 07/03/2024    CR 0.96 07/02/2024     (H) 07/03/2024     (H) 07/03/2024     COAGS:   Lab Results   Component Value Date    INR 1.23 (H) 07/02/2024     POC: No results found for: \"BGM\", \"HCG\", \"HCGS\"  HEPATIC:   Lab Results   Component Value Date    ALBUMIN 3.8 07/03/2024    PROTTOTAL 8.6 (H) 07/03/2024    ALT 22 07/03/2024    AST 24 07/03/2024    ALKPHOS 104 07/03/2024    BILITOTAL 0.4 07/03/2024     OTHER:   Lab Results   Component Value Date    " LACT 1.0 07/02/2024    A1C 6.3 (H) 05/10/2024    LUIS 9.4 07/03/2024    LIPASE 16 07/02/2024    TSH 0.23 (L) 04/01/2024    T4 1.37 04/01/2024       Anesthesia Plan    ASA Status:  3    NPO Status:  NPO Appropriate    Anesthesia Type: General.     - Airway: ETT              Consents    Anesthesia Plan(s) and associated risks, benefits, and realistic alternatives discussed. Questions answered and patient/representative(s) expressed understanding.     - Discussed:     - Discussed with:  Patient      - Extended Intubation/Ventilatory Support Discussed: No.      - Patient is DNR/DNI Status: No     Use of blood products discussed: No .     Postoperative Care            Comments:               Storm Phoenix MD    I have reviewed the pertinent notes and labs in the chart from the past 30 days and (re)examined the patient.  Any updates or changes from those notes are reflected in this note.

## 2024-07-03 NOTE — PROGRESS NOTES
Pt transferred from ED to unit at 2000, upon arrival pt refused initial attempt to get vitals, used vulgarities when offered his 2000 medications and said he has not smoked since this morning, he then went outside to smoke, vitals WDL,denies pain, denies nausea/vomiting, up ad doris, alert/oriented, verbally aggressive towards writer, declined 2 RNs skin check, will be NPO at midnight and is aware          Plans  -Evaluation by GI for possible stent exchange  - If stent exchange not possible, recommend percutaneous cholecystostomy

## 2024-07-03 NOTE — ANESTHESIA PROCEDURE NOTES
Airway       Patient location during procedure: OR       Procedure Start/Stop Times: 7/3/2024 5:54 PM  Staff -        Anesthesiologist:  Connor Schultz MD       Resident/Fellow: Sanju Barnes MD       Performed By: resident  Consent for Airway        Urgency: elective  Indications and Patient Condition       Indications for airway management: oneil-procedural       Induction type:intravenous       Mask difficulty assessment: 1 - vent by mask    Final Airway Details       Final airway type: endotracheal airway       Successful airway: ETT - single  Endotracheal Airway Details        ETT size (mm): 7.5       Cuffed: yes       Cuff volume (mL): 9       Successful intubation technique: direct laryngoscopy       DL Blade Type: MAC 3       Grade View of Cords: 2       Adjucts: stylet       Position: Right       Measured from: lips       Secured at (cm): 24       Bite Block used: gi bite block.    Post intubation assessment        Placement verified by: capnometry        Number of attempts at approach: 1       Number of other approaches attempted: 0       Secured with: tape       Ease of procedure: easy       Dentition: Intact    Medication(s) Administered   Medication Administration Time: 7/3/2024 5:54 PM

## 2024-07-03 NOTE — PROGRESS NOTES
[facility]    Progress Note - Medicine Service, MAROON TEAM 5       Date of Admission:  7/2/2024    Assessment & Plan   Jong Quiroga is a 44 year old male admitted on 7/2/2024. 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     Changes Today:  - GI ERCP for biliary stent exchange  - Blood cultures ordered  - CRP ordered  - Potassium protocol in place    # Acute Cholecystitis complicated by pneumobilia and gallbladder wall contained perforation  # Sepsis 2/2 cholecystitis  # Biliary Hepatic Artery Fistula  # Hepatic Artery Aneurysm  # Hepatic Artery Pseudoaneurysm  # H/O Complex celiac artery dissection  # H/O Aneurysmal hepatic artery  Presented with leukocytosis and RUQ, CTA significant for acute cholecystitis and pneumobilia, suspected contained gallbladder wall perforation. 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 Acute cholecystitis likely secondary to biliary stent occlusion. GI and HPB surgery aware and plan for ERCP today. HDS and afebrile at this time, will continue broad spectrum antibiotics.  - GI consulted, appreciate recommendations   > ERCP today for stent removal  - Hepatobiliary surgery consulted, appreciate recommendations    > No acute cholecystectomy   > Consider perc maría pending GI evaluation  - Continue IV cefepime  - Continue IV metronidazole  - Blood culture pending  - CRP pending  - Follow urine culture  - Tylenol PRN for pain     #Hypokalemia  Presented with potassium 3.1.   - Potassium  replacement protocol in place  - Follow daily     #Normocytic Anemia  Hgb 9.7 on admission, no evidence of bleeding, required transfusions during previous hospitalizations. Continue to monitor.     #Thrombocytosis  Platelets 509 on admission, continue to monitor.     #Hx of DMII  Previously on Novolog and Lantus, patient reports not taking at home as blood sugars have been in the 110's. Will monitor blood sugars during hospitalization and assess insulin needs.     #Hypertension  - PTA metoprolol  - PTA clonidine  - PTA hydrochlorothiazide  - PTA amlodipine  - Hold PTA meds for SBP < 100 and MAP < 70     #GERD  - PTA pantoprazole           Diet: NPO per Anesthesia Guidelines for Procedure/Surgery Except for: Meds    DVT Prophylaxis: Low Risk/Ambulatory with no VTE prophylaxis indicated  Tran Catheter: Not present  Fluids: none  Lines: PRESENT             Cardiac Monitoring: None  Code Status: Full Code      Clinically Significant Risk Factors Present on Admission        # Hypokalemia: Lowest K = 3.1 mmol/L in last 2 days, will replace as needed        # Coagulation Defect: INR = 1.23 (Ref range: 0.85 - 1.15) and/or PTT = N/A, will monitor for bleeding    # Hypertension: Home medication list includes antihypertensive(s)    # Anemia: based on hgb <11               # Financial/Environmental Concerns: none         Disposition Plan     Expected Discharge Date: 07/04/2024      Destination: home with family          The patient's care was discussed with the Attending Physician, Dr. Pantoja .    Barbara Barillas MD  Medicine Service, [address]  Securely message with [employer] (more info)  Text page via [facility] Paging/Directory   See signed in provider for up to date coverage information  ______________________________________________________________________    Interval History   NAEON. Still having RUQ pain 5-6/10, overall better than yesterday. Denies  diarrhea/constipation, no fevers or shortness of breath.    Physical Exam   Vital Signs: Temp: 98.6  F (37  C) Temp src: Oral BP: 133/76 Pulse: 91   Resp: 14 SpO2: 99 % O2 Device: None (Room air)    Weight: 0 lbs 0 oz    Constitutional: awake, alert, cooperative, no apparent distress, and appears stated age  Eyes: Lids and lashes normal, extra ocular muscles intact, sclera clear, conjunctiva normal  Respiratory: No increased work of breathing, good air exchange, clear to auscultation bilaterally, no crackles or wheezing  Cardiovascular: regular rate and rhythm, normal S1 and S2, no S3 or S4, and no murmur noted  GI: multiple scars, normal bowel sounds, soft, non-distended, tenderness to palpation of RUQ overall improved, no masses palpated, no hepatosplenomegally  Skin: no rashes and no lesions  Musculoskeletal: no lower extremity pitting edema present  Neurologic: Awake, alert, oriented to name, place and time.    Medical Decision Making       Please see A&P for additional details of medical decision making.      Data     I have personally reviewed the following data over the past 24 hrs:    14.1 (H)  \   9.1 (L)   / 476 (H)     135 96 (L) 12.9 /  148 (H)   3.2 (L) 25 0.95 \     ALT: 22 AST: 24 AP: 104 TBILI: 0.4   ALB: 3.8 TOT PROTEIN: 8.6 (H) LIPASE: N/A       Imaging results reviewed over the past 24 hrs:   No results found for this or any previous visit (from the past 24 hour(s)).

## 2024-07-03 NOTE — PROGRESS NOTES
Surgery Progress Note  07/03/2024       Subjective:  Resting comfortably in bed this AM. Discussed plan about possible GI vs IR intervention which he understands. Is NPO.     Objective:  Temp:  [98.6  F (37  C)-100.3  F (37.9  C)] 98.6  F (37  C)  Pulse:  [75-91] 91  Resp:  [14-18] 14  BP: (110-142)/(62-92) 133/76  SpO2:  [97 %-99 %] 99 %    No intake/output data recorded.      Gen: Awake, alert, NAD  Resp: NLB on RA  Abd: soft, nondistended, nontender  Ext: WWP, no edema     Labs:  Recent Labs   Lab 07/02/24  0740   WBC 13.4*   HGB 9.7*   *       Recent Labs   Lab 07/02/24  0740      POTASSIUM 3.1*   CHLORIDE 96*   CO2 28   BUN 13.9   CR 0.96   *   LUIS 9.7       Imaging:  CTA Abdomen Pelvis with Contrast    Result Date: 7/2/2024  Exam: Computed tomographic angiography of the abdomen and pelvis without and with contrast including 3D reformations dated 7/2/2024 9:11 AM Clinical information: 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 Presents to ED with symptoms of acute abdominal pain. Technique: Axial images through the abdomen and pelvis obtained before the administration of intravenous contrast media and following the injection of contrast media  in the arterial phase. Source images reviewed as well as 3D and multi-planar reconstructions at a 3D workstation. Contrast: 112cc of isovue 370 DLP: 1829 mGy*cm Comparison: CT ABDOMEN PELVIS W/O & W CONTRAST 5/14/2024, RF Venous 5/13/2024. Findings:  Lungs/Pleura: Clear with no evidence of consolidation, effusion, or nodules. Heart/Mediastinum: Normal size and contour with no masses or lymphadenopathy. Liver: Homogenously attenuated liver parenchyma. No masses or  lesions. Biliary System: Moderately distended with a remarkably thickened wall and irregular margins. Inflammatory changes around the gallbladder with significant gallbladder wall thickening. Contained perforation within the posterior lateral gallbladder wall (series 9, image 160). Beam hardening artifact from coil embolization near the gallbladder neck limits evaluation. Radiolucent cholelithiasis with large shadowing gallstones demonstrated on US 4/1/2024. Scattered gaseous foci within the gallbladder lumen. Metallic biliary stent in the common bile duct. Stable mild to moderate intrahepatic biliary dilatation. Increased mild intrahepatic pneumobilia. Spleen: Within normal limits. Pancreas: Within normal limits. 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 Adrenal Glands: Thickened bilateral adrenal glands. Kidneys and Ureters: Within normal limits without evidence of hydronephrosis or masses. Gastrointestinal Tract: Normal caliber and wall thickness of the small and large bowel. Diverticulosis coli without acute diverticulitis. Stomach is unremarkable. Appendix is unremarkable. Pelvis/reproductive: Partially distended with diffuse wall thickening, which may be secondary to chronic inflammation. No masses. Mesentery/Peritoneum/Retroperitoneum: No free fluid or free air. Vasculature: Status post coil embolization of the right hepatic artery for large pseudoaneurysm. Completely thrombosed right hepatic artery to the common hepatic artery. Dissection flap of the celiac artery is unchanged. No evidence of flow-limiting dissection to the splenic or gastric arteries. The left hepatic artery is patent secondary to collateral flow. Splenic artery is patent. Otherwise, patent abdominal and pelvic arterial vasculature without evidence of stenosis, occlusion, or aneurysm. Vena caval and portal venous systems are patent. Osseous Structures: Intact without  evidence of suspicious lytic or sclerotic lesions. Partially visualized lower chest is within normal limits. Soft Tissues: Small umbilical fat-containing hernia. Otherwise unremarkable. Lymph Nodes: No pathologic enlargement noted.     Impression: 1.  Gallbladder wall thickening with contained posterior lateral wall perforation (series 9, image 158) vs gallbladder wall diverticuli. 2.  Stable postprocedural changes of coil embolization of the right hepatic artery. 3.  Nonspecific, diffuse bladder wall thickening. Findings of acute cholecystitis with suspected contained wall perforation discussed directly with Dr. Boggs over the phone at 1303 hours on 7/2/2024.  I have personally reviewed the examination and initial interpretation and I agree with the findings. 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 CTA consistent with pneumobilia and acute cholecystitis. Started on IV antibiotics and admitted to medicine. Currently NPO pending possible GI procedure for contained perforated cholecystitis.    -NPO  -ABX  -GI to evaluate for possible ERCP with stent  -Recommend outpatient HPB/Surg Onc follow up       Seen, examined, and discussed with chief resident, who will discuss with staff.  - - - - - - - - - - - - - - - - - -  Greg Seth MD  PGY-1 Surgery Resident

## 2024-07-03 NOTE — BRIEF OP NOTE
[facility]    Brief Operative Note    Pre-operative diagnosis: Biliary obstruction (H28) [K83.1]  Post-operative diagnosis Same as pre-operative diagnosis    Procedure: ENDOSCOPIC RETROGRADE CHOLANGIOPANCREATOGRAPHY, Stent removal, stone removal, pus removal, N/A - Mouth    Surgeon: Surgeons and Role:     * Harsha Ferguson MD - Primary  Anesthesia: General   Estimated Blood Loss: None    Drains: None  Specimens: * No specimens in log *    Findings:     - Prior FCMS removed   - Purulent output noted form the major papilla   - Balloon dilation and sweep of biliary sludge and stone    Recommendations   - Continue antibiotic therapy for 5-7 days   - Monitor clinical condition and labs   - Further management of acute cholecystitis per surgery     Complications: None.  Implants:   Implant Name Type Inv. Item Serial No.  Lot No. LRB No. Used Action   STENT BILIARY WALLFLEX COVERED [id number] - [id number] Stent STENT BILIARY WALLFLEX COVERED [id number]  [place] [id number] N/A 1 Explanted

## 2024-07-03 NOTE — ANESTHESIA CARE TRANSFER NOTE
Patient: Jong Quiroga    Procedure: Procedure(s):  ENDOSCOPIC RETROGRADE CHOLANGIOPANCREATOGRAPHY, Stent removal, stone removal, pus removal       Diagnosis: Biliary obstruction (H28) [K83.1]  Diagnosis Additional Information: No value filed.    Anesthesia Type:   General     Note:    Oropharynx: oropharynx clear of all foreign objects and spontaneously breathing  Level of Consciousness: awake  Oxygen Supplementation: face mask  Level of Supplemental Oxygen (L/min / FiO2): 8  Independent Airway: airway patency satisfactory and stable  Dentition: dentition unchanged  Vital Signs Stable: post-procedure vital signs reviewed and stable  Report to RN Given: handoff report given  Patient transferred to: PACU    Handoff Report: Identifed the Patient, Identified the Reponsible Provider, Reviewed the pertinent medical history, Discussed the surgical course, Reviewed Intra-OP anesthesia mangement and issues during anesthesia, Set expectations for post-procedure period and Allowed opportunity for questions and acknowledgement of understanding    Vitals:  Vitals Value Taken Time   /72 07/03/24 1846   Temp     Pulse 103 07/03/24 1848   Resp 37 07/03/24 1848   SpO2 99 % 07/03/24 1848   Vitals shown include unfiled device data.    Electronically Signed By: Sanju Barnes MD  July 3, 2024  6:48 PM

## 2024-07-03 NOTE — PROGRESS NOTES
"Brief Gastroenterology Progress Note:   S: Saw patient this morning and again in the afternoon.  He is overall doing okay, he continues to have some right upper quadrant pain.  He denies any nausea or vomiting, fevers or chills.    O:   /83 (BP Location: Left arm)   Pulse 89   Temp 100  F (37.8  C) (Oral)   Resp 16   Ht 1.626 m (5' 4\")   Wt 77.6 kg (171 lb)   SpO2 100%   BMI 29.35 kg/m    General: Sitting up in bed in no acute distress  Pulm: Normal work of breathing on ambient air  Abdomen: Soft, nondistended, tender to palpation right upper quadrant, no rebound or guarding  Extremities: Moving extremities spontaneously  Neuro: Alert and conversant    A/P: Mr. Quiroga is a 44 year old man with a history of likely segmental arterial mediolysis resulting in celiac artery dissection and hepatic artery aneurysm (s/p coiling 5/2024) causing biliary obstruction from extrinsic compression status post stenting who was lost to follow up and did not have stent removal. He presented with gallbladder wall thickening with contained posterior lateral wall perforation.  He will need stent exchange, which will help drainage and based on findings on ERCP, will determine further recommendations.    Plan:   - ERCP today for stent removal and exchange   - See procedure note post-procedure for further recommendations   - Plan of care communicated with patient and his friend at bedside    Plan discussed with attending GI physician Dr. Ferguson.     Lyndsey Castro MD  Gastroenterology Fellow   "

## 2024-07-03 NOTE — PLAN OF CARE
Patient declined having his vital signs overnight, 4-7/10 pain on R side abdomen describes it as throbbing, prn tylenol given x 2, voiding, passing flatus, up independently, active bowel sounds, room air, continue with plan of care

## 2024-07-04 VITALS
HEART RATE: 75 BPM | TEMPERATURE: 98.2 F | HEIGHT: 64 IN | OXYGEN SATURATION: 100 % | BODY MASS INDEX: 29.19 KG/M2 | RESPIRATION RATE: 18 BRPM | SYSTOLIC BLOOD PRESSURE: 127 MMHG | DIASTOLIC BLOOD PRESSURE: 91 MMHG | WEIGHT: 171 LBS

## 2024-07-04 PROBLEM — K81.0 ACUTE CHOLECYSTITIS: Status: ACTIVE | Noted: 2024-07-04

## 2024-07-04 LAB
ALBUMIN SERPL BCG-MCNC: 3.6 G/DL (ref 3.5–5.2)
ALP SERPL-CCNC: 104 U/L (ref 40–150)
ALT SERPL W P-5'-P-CCNC: 21 U/L (ref 0–70)
ANION GAP SERPL CALCULATED.3IONS-SCNC: 12 MMOL/L (ref 7–15)
AST SERPL W P-5'-P-CCNC: 21 U/L (ref 0–45)
BILIRUB SERPL-MCNC: 0.2 MG/DL
BUN SERPL-MCNC: 18.2 MG/DL (ref 6–20)
CALCIUM SERPL-MCNC: 9 MG/DL (ref 8.6–10)
CHLORIDE SERPL-SCNC: 98 MMOL/L (ref 98–107)
CREAT SERPL-MCNC: 0.95 MG/DL (ref 0.67–1.17)
CRP SERPL-MCNC: 219 MG/L
DEPRECATED HCO3 PLAS-SCNC: 24 MMOL/L (ref 22–29)
EGFRCR SERPLBLD CKD-EPI 2021: >90 ML/MIN/1.73M2
ERYTHROCYTE [DISTWIDTH] IN BLOOD BY AUTOMATED COUNT: 16.1 % (ref 10–15)
GLUCOSE SERPL-MCNC: 125 MG/DL (ref 70–99)
HCT VFR BLD AUTO: 27.2 % (ref 40–53)
HGB BLD-MCNC: 8.6 G/DL (ref 13.3–17.7)
LACTATE SERPL-SCNC: 1 MMOL/L (ref 0.7–2)
LACTATE SERPL-SCNC: 2.3 MMOL/L (ref 0.7–2)
MCH RBC QN AUTO: 24 PG (ref 26.5–33)
MCHC RBC AUTO-ENTMCNC: 31.6 G/DL (ref 31.5–36.5)
MCV RBC AUTO: 76 FL (ref 78–100)
PLATELET # BLD AUTO: 478 10E3/UL (ref 150–450)
POTASSIUM SERPL-SCNC: 4 MMOL/L (ref 3.4–5.3)
PROT SERPL-MCNC: 7.9 G/DL (ref 6.4–8.3)
RBC # BLD AUTO: 3.59 10E6/UL (ref 4.4–5.9)
SODIUM SERPL-SCNC: 134 MMOL/L (ref 135–145)
WBC # BLD AUTO: 17 10E3/UL (ref 4–11)

## 2024-07-04 PROCEDURE — 36415 COLL VENOUS BLD VENIPUNCTURE: CPT

## 2024-07-04 PROCEDURE — 250N000011 HC RX IP 250 OP 636

## 2024-07-04 PROCEDURE — 999N000128 HC STATISTIC PERIPHERAL IV START W/O US GUIDANCE

## 2024-07-04 PROCEDURE — 250N000011 HC RX IP 250 OP 636: Performed by: INTERNAL MEDICINE

## 2024-07-04 PROCEDURE — 80053 COMPREHEN METABOLIC PANEL: CPT

## 2024-07-04 PROCEDURE — 99239 HOSP IP/OBS DSCHRG MGMT >30: CPT | Performed by: INTERNAL MEDICINE

## 2024-07-04 PROCEDURE — 250N000013 HC RX MED GY IP 250 OP 250 PS 637: Performed by: INTERNAL MEDICINE

## 2024-07-04 PROCEDURE — 83605 ASSAY OF LACTIC ACID: CPT | Performed by: INTERNAL MEDICINE

## 2024-07-04 PROCEDURE — 258N000003 HC RX IP 258 OP 636

## 2024-07-04 PROCEDURE — 85027 COMPLETE CBC AUTOMATED: CPT

## 2024-07-04 PROCEDURE — 99221 1ST HOSP IP/OBS SF/LOW 40: CPT | Mod: 4UV | Performed by: RADIOLOGY

## 2024-07-04 PROCEDURE — 36415 COLL VENOUS BLD VENIPUNCTURE: CPT | Performed by: INTERNAL MEDICINE

## 2024-07-04 PROCEDURE — 83605 ASSAY OF LACTIC ACID: CPT

## 2024-07-04 RX ORDER — FLUMAZENIL 0.1 MG/ML
0.2 INJECTION, SOLUTION INTRAVENOUS
Status: DISCONTINUED | OUTPATIENT
Start: 2024-07-04 | End: 2024-07-04 | Stop reason: HOSPADM

## 2024-07-04 RX ORDER — ONDANSETRON 2 MG/ML
4 INJECTION INTRAMUSCULAR; INTRAVENOUS EVERY 6 HOURS PRN
Status: DISCONTINUED | OUTPATIENT
Start: 2024-07-04 | End: 2024-07-04

## 2024-07-04 RX ORDER — NALOXONE HYDROCHLORIDE 0.4 MG/ML
0.4 INJECTION, SOLUTION INTRAMUSCULAR; INTRAVENOUS; SUBCUTANEOUS
Status: DISCONTINUED | OUTPATIENT
Start: 2024-07-04 | End: 2024-07-04 | Stop reason: HOSPADM

## 2024-07-04 RX ORDER — ONDANSETRON 4 MG/1
4 TABLET, ORALLY DISINTEGRATING ORAL EVERY 6 HOURS PRN
Status: DISCONTINUED | OUTPATIENT
Start: 2024-07-04 | End: 2024-07-04

## 2024-07-04 RX ORDER — METRONIDAZOLE 500 MG/1
500 TABLET ORAL 3 TIMES DAILY
Qty: 30 TABLET | Refills: 0 | Status: SHIPPED | OUTPATIENT
Start: 2024-07-04 | End: 2024-07-14

## 2024-07-04 RX ORDER — NALOXONE HYDROCHLORIDE 0.4 MG/ML
0.2 INJECTION, SOLUTION INTRAMUSCULAR; INTRAVENOUS; SUBCUTANEOUS
Status: DISCONTINUED | OUTPATIENT
Start: 2024-07-04 | End: 2024-07-04 | Stop reason: HOSPADM

## 2024-07-04 RX ORDER — CIPROFLOXACIN 500 MG/1
500 TABLET, FILM COATED ORAL 2 TIMES DAILY
Qty: 20 TABLET | Refills: 0 | Status: SHIPPED | OUTPATIENT
Start: 2024-07-04 | End: 2024-07-14

## 2024-07-04 RX ADMIN — HYDROCHLOROTHIAZIDE 25 MG: 25 TABLET ORAL at 08:18

## 2024-07-04 RX ADMIN — METRONIDAZOLE 500 MG: 5 INJECTION, SOLUTION INTRAVENOUS at 06:51

## 2024-07-04 RX ADMIN — SODIUM CHLORIDE 500 ML: 9 INJECTION, SOLUTION INTRAVENOUS at 02:35

## 2024-07-04 RX ADMIN — PANTOPRAZOLE SODIUM 40 MG: 40 TABLET, DELAYED RELEASE ORAL at 08:18

## 2024-07-04 RX ADMIN — CEFEPIME HYDROCHLORIDE 2 G: 2 INJECTION, POWDER, FOR SOLUTION INTRAVENOUS at 01:28

## 2024-07-04 RX ADMIN — AMLODIPINE BESYLATE 10 MG: 10 TABLET ORAL at 08:18

## 2024-07-04 RX ADMIN — METOPROLOL TARTRATE 50 MG: 50 TABLET, FILM COATED ORAL at 08:18

## 2024-07-04 RX ADMIN — CLONIDINE HYDROCHLORIDE 0.1 MG: 0.1 TABLET ORAL at 08:18

## 2024-07-04 RX ADMIN — CEFEPIME HYDROCHLORIDE 2 G: 2 INJECTION, POWDER, FOR SOLUTION INTRAVENOUS at 11:34

## 2024-07-04 ASSESSMENT — ACTIVITIES OF DAILY LIVING (ADL)
ADLS_ACUITY_SCORE: 38

## 2024-07-04 NOTE — PROGRESS NOTES
Paged Dr Westfall and Dr Schultz for sign out.    Received verbal confirmation that pt can go back upstairs. They will put in sign out when they are done with an emergent case.

## 2024-07-04 NOTE — DISCHARGE SUMMARY
"[facility]  Hospitalist Discharge Summary      Date of Admission:  7/2/2024  Date of Discharge:  7/4/2024  3:33 PM  Discharging Provider: Puma Pantoja MD  Discharge Service: Medicine Service, [facility] TEAM 5    Discharge Diagnoses   Sepsis due to Acute perforated cholecystitis  Gallbladder perforation  Biliary obstruction  Hypokalemia  Anemia of chronic disease  Thrombocytosis  DM2  HTN  GERD    Clinically Significant Risk Factors     # Overweight: Estimated body mass index is 29.35 kg/m  as calculated from the following:    Height as of this encounter: 1.626 m (5' 4\").    Weight as of this encounter: 77.6 kg (171 lb).       Follow-ups Needed After Discharge   Follow-up Appointments     Adult [facility] Follow-up and recommended labs and tests      Follow up with General surgery , at (location with clinic name or city)   [address] [place] [address] in [place], within 2 weeks  for hospital follow- up. No   follow up labs or test are needed.    Appointments on [place] and/or [facility] (with [facility] or [facility]   provider or service). Call [phone] if you haven't heard regarding   these appointments within 7 days of discharge.            Unresulted Labs Ordered in the Past 30 Days of this Admission       Date and Time Order Name Status Description    7/3/2024 11:48 AM Blood Culture Hand, Left Preliminary     7/3/2024 11:48 AM Blood Culture Hand, Right Preliminary         These results will be followed up by Dr. Pantoja    Discharge Disposition   Discharged to home, against medical advice  Condition at discharge: Guarded    Hospital Course   Jong Quiroga is a 44 year old male admitted on 7/2/2024. 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 He had ERCP with GI on 7/3. He chose to leave A on 7/4.    # " Acute Cholecystitis complicated by pneumobilia and perforation  # Sepsis 2/2 cholecystitis  Bile obstruction with prior biliary stent in place  Presented with leukocytosis, fever at home, and RUQ pain, CTA significant for acute cholecystitis and pneumobilia, suspected contained gallbladder wall perforation. Acute cholecystitis likely secondary to biliary stent occlusion.  - Treated with IV cefepime and metonidazole  - GI consulted, appreciate recommendations   > ERCP 7/3 for stent removal and washout of purulent materia  - Hepatobiliary surgery consulted, appreciate recommendations    > No acute cholecystectomy   > recommending IR percutaneous cholecystectomy,     Patient was unhappy with communication regarding this decision and felt that this was not communicated to him properly. He did not want a drain in place. He also had a family  this weekend with family in from [place]. We discussed the risks of a gall bladder perforation in the setting of cholecystitis. While I am not an expert in this area, I shared that I was concerned about worsening infection including life threatening sepsis, biloma formation from bile leakage through perforation, possible abscess formation and life threatening sepsis. Patient acknowledged my concerns but made the decision to leave, I discussed this with his significant other as well with patient permission. I recommended he return to the hospital if he has fever, worsening chills, worsening abdominal pain, inability to tolerate food, or any other new symptoms. I offered to discharge him with antibiotics but warned that these antibiotics may not be sufficient to prevent complications that I listed above    I discharged him with the following new prescriptions to a 24 hour pharmacy given the holiday.  - Ciprofloxacin 500mg BID for 10 days  - Metronidazole 500mg TID for 10 days    I placed a surgery  referral to gall bladder clinic and reminded patient of the importance  of cholecystectomy and controlling this issue further. He voiced understanding and that he would call for appointment on 7/5.      #Hypokalemia  Presented with potassium 3.1.   - Potassium replacement protocol in place  - Follow daily     #Normocytic Anemia  Hgb 9.7 on admission, no evidence of bleeding, required transfusions during previous hospitalizations. Continue to monitor.     #Thrombocytosis  Platelets 509 on admission, continue to monitor.     DMII  Previously on Novolog and Lantus, patient reports not taking at home as blood sugars have been in the 110's. Will monitor blood sugars during hospitalization and assess insulin needs.     Hypertension  - PTA metoprolol  - PTA clonidine  - PTA hydrochlorothiazide  - PTA amlodipine  - Hold PTA meds for SBP < 100 and MAP < 70     GERD  - PTA pantoprazole       Consultations This Hospital Stay   GI PANCREATICOBILIARY ADULT IP CONSULT  SURGERY GENERAL ADULT IP CONSULT  CARE MANAGEMENT / SOCIAL WORK IP CONSULT  NURSING TO CONSULT FOR VASCULAR ACCESS CARE IP CONSULT  INTERVENTIONAL RADIOLOGY ADULT/PEDS IP CONSULT    Code Status   Full Code    Time Spent on this Encounter   I, Puma Pantoja MD, personally saw the patient today and spent 60 minutes discharging this patient.       Puma Pantoja MD  [facility] [address] [address]  Phone: [phone]  ______________________________________________________________________    Physical Exam   Vital Signs: Temp: 98.2  F (36.8  C) Temp src: Oral BP: (!) 127/91 Pulse: 75   Resp: 18 SpO2: 100 % O2 Device: None (Room air) Oxygen Delivery: 6 LPM  Weight: 171 lbs 0 oz  Gen: NAD, sitting comfortably in bed  Mouth: OP clear, no lesions  Neck: No cervical LAD  CV: RRR, no murmurs, 2+ radial pulses  RESP: CTA bilaterally, no w/r/c  Abd: soft, nontender, nondistended  Ext: no edema bilaterally       Primary Care Physician   Storm Armenta    Discharge Orders      Adult Gen Surg   Referral      Primary Care Referral      Reason for your hospital stay    You were admitted for gall bladder infection, cholecystitis. You had ERCP with gall bladder stent removal and surgery was recommending putting a drain in the abdomen. There is a risk for overwhelming infection for untreated gall bladder infection that could be life threatening. We discussed this and you still want to go so I am discharging you against our advice with antibiotics to hopefully prevent this complication. If you have fever, worsening chills, abdominal pain, or any other problems please come back to the hospitalist     Activity    Your activity upon discharge: activity as tolerated     Adult [facility] Follow-up and recommended labs and tests    Follow up with General surgery , at (location with clinic name or city) [address] [place] [address] in [place], within 2 weeks  for hospital follow- up. No follow up labs or test are needed.    Appointments on [place] and/or [facility] (with [facility] or [facility] provider or service). Call [phone] if you haven't heard regarding these appointments within 7 days of discharge.     Diet    Follow this diet upon discharge: Orders Placed This Encounter      Regular Diet Adult       Significant Results and Procedures   Most Recent 3 CBC's:  Recent Labs   Lab Test 07/04/24  0540 07/03/24  0755 07/02/24  0740   WBC 17.0* 14.1* 13.4*   HGB 8.6* 9.1* 9.7*   MCV 76* 76* 77*   * 476* 509*     Most Recent 3 BMP's:  Recent Labs   Lab Test 07/04/24  0540 07/03/24  2224 07/03/24  1637 07/03/24  0755 07/02/24  0740   *  --   --  135 139   POTASSIUM 4.0 3.6  --  3.2* 3.1*   CHLORIDE 98  --   --  96* 96*   CO2 24  --   --  25 28   BUN 18.2  --   --  12.9 13.9   CR 0.95  --   --  0.95 0.96   ANIONGAP 12  --   --  14 15   LUIS 9.0  --   --  9.4 9.7   *  --  115* 148* 124*     Most Recent 2 LFT's:  Recent Labs   Lab Test 07/04/24  0540 07/03/24  0755   AST 21 24   ALT 21 22   ALKPHOS 104 104   BILITOTAL  0.2 0.4     Most Recent 3 INR's:  Recent Labs   Lab Test 07/02/24  0740 05/08/24  1607 05/08/24  0851   INR 1.23* 1.24* 1.24*   ,   Results for orders placed or performed during the hospital encounter of 07/02/24   CTA Abdomen Pelvis with Contrast    Narrative    Exam: Computed tomographic angiography of the abdomen and pelvis  without and with contrast including 3D reformations dated 7/2/2024  9:11 AM    Clinical information: Hx of complex celiac artery dissection and  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 Presents to ED with symptoms of acute abdominal pain.    Technique: Axial images through the abdomen and pelvis obtained before  the administration of intravenous contrast media and following the  injection of contrast media  in the arterial phase. Source images  reviewed as well as 3D and multi-planar reconstructions at a 3D  workstation.    Contrast: 112cc of isovue 370    DLP: 1829 mGy*cm    Comparison: CT ABDOMEN PELVIS W/O & W CONTRAST 5/14/2024, RF Venous  5/13/2024.    Findings:    Lungs/Pleura: Clear with no evidence of consolidation, effusion, or  nodules.    Heart/Mediastinum: Normal size and contour with no masses or  lymphadenopathy.    Liver: Homogenously attenuated liver parenchyma. No masses or lesions.      Biliary System: Moderately distended with a remarkably thickened wall  and irregular margins. Inflammatory changes around the gallbladder  with significant gallbladder wall thickening. Contained perforation  within the posterior lateral gallbladder wall (series 9, image 160).  Beam hardening artifact from coil embolization near the gallbladder  neck limits evaluation. Radiolucent cholelithiasis with large  shadowing gallstones demonstrated  on US 4/1/2024. Scattered gaseous  foci within the gallbladder lumen. Metallic biliary stent in the  common bile duct. Stable mild to moderate intrahepatic biliary  dilatation. Increased mild intrahepatic pneumobilia.    Spleen: Within normal limits.    Pancreas: Within normal limits. Masslike appearance of the thrombosed  hepatic artery pseudoaneurysm immediately adjacent the pancreatic  head.    Adrenal Glands: Thickened bilateral adrenal glands.    Kidneys and Ureters: Within normal limits without evidence of  hydronephrosis or masses.    Gastrointestinal Tract: Normal caliber and wall thickness of the small  and large bowel. Diverticulosis coli without acute diverticulitis.  Stomach is unremarkable. Appendix is unremarkable.    Pelvis/reproductive: Partially distended with diffuse wall thickening,  which may be secondary to chronic inflammation. No masses.    Mesentery/Peritoneum/Retroperitoneum: No free fluid or free air.    Vasculature: Status post coil embolization of the right hepatic artery  for large pseudoaneurysm. Completely thrombosed right hepatic artery  to the common hepatic artery. Dissection flap of the celiac artery is  unchanged. No evidence of flow-limiting dissection to the splenic or  gastric arteries. The left hepatic artery is patent secondary to  collateral flow. Splenic artery is patent. Otherwise, patent abdominal  and pelvic arterial vasculature without evidence of stenosis,  occlusion, or aneurysm. Vena caval and portal venous systems are  patent.    Osseous Structures: Intact without evidence of suspicious lytic or  sclerotic lesions. Partially visualized lower chest is within normal  limits.    Soft Tissues: Small umbilical fat-containing hernia. Otherwise  unremarkable.    Lymph Nodes: No pathologic enlargement noted.      Impression    Impression:  1.  Gallbladder wall thickening with contained posterior lateral wall  perforation (series 9, image 158) vs gallbladder wall  diverticuli.  2.  Stable postprocedural changes of coil embolization of the right  hepatic artery.   3.  Nonspecific, diffuse bladder wall thickening.    Findings of acute cholecystitis with suspected contained wall  perforation discussed directly with Dr. Boggs over the phone at 1303  hours on 7/2/2024.      I have personally reviewed the examination and initial interpretation  and I agree with the findings.    VIJAYA OSBORNE MD         SYSTEM ID:  [id number]   XR Surgery GIAN Fluoro Less Than 5 Min w Stills    Narrative    This exam was marked as non-reportable because it will not be read by a   radiologist or a [facility] non-radiologist provider.             Discharge Medications   Discharge Medication List as of 7/4/2024  3:21 PM        START taking these medications    Details   ciprofloxacin (CIPRO) 500 MG tablet Take 1 tablet (500 mg) by mouth 2 times daily for 10 days, Disp-20 tablet, R-0, E-Prescribe      metroNIDAZOLE (FLAGYL) 500 MG tablet Take 1 tablet (500 mg) by mouth 3 times daily for 10 days, Disp-30 tablet, R-0, E-Prescribe           CONTINUE these medications which have NOT CHANGED    Details   amLODIPine (NORVASC) 10 MG tablet Take 1 tablet (10 mg) by mouth daily, Disp-30 tablet, R-0, E-Prescribe      BD LINO U/F 32G X 4 MM insulin pen needle DAWHistorical      blood glucose monitoring (SOFTCLIX) lancets Historical      Blood Glucose Monitoring Suppl (ACCU-CHEK GUIDE ME) w/Device KIT Historical      cloNIDine (CATAPRES) 0.1 MG tablet Take 1 tablet (0.1 mg) by mouth 2 times daily, Disp-60 tablet, R-0, E-Prescribe      hydrochlorothiazide (HYDRODIURIL) 25 MG tablet Take 1 tablet (25 mg) by mouth daily, Disp-30 tablet, R-0, E-Prescribe      metoprolol tartrate (LOPRESSOR) 50 MG tablet Take 1 tablet (50 mg) by mouth 2 times daily, Disp-60 tablet, R-0, E-Prescribe      OPTIUM TEST STRIPS test strip Dispense item covered by pt ins. E11.65 NIDDM type II, uncontrolled - Test 4 times/day. Reason: High  A1C, TRINITY, Historical      pantoprazole (PROTONIX) 40 MG EC tablet Take 1 tablet (40 mg) by mouth 2 times daily, Disp-60 tablet, R-3, E-Prescribe           Allergies   No Known Allergies

## 2024-07-04 NOTE — PLAN OF CARE
Goal Outcome Evaluation:      Plan of Care Reviewed With: patient    Overall Patient Progress: no changeOverall Patient Progress: no change    Temp: 98.2  F (36.8  C) Temp src: Oral BP: 106/64 Pulse: 73   Resp: 18 SpO2: 96 % O2 Device: None (Room air)     A&Ox4. VSS on RA. Denies pain or nausea. Tolerating regular diet. Independent. Pt triggered sepsis, lactic drawn and was 2.2, 500ml bolus given and recheck 2.3, another 500ml bolus given and recheck 1.0. No other acute major changes.

## 2024-07-04 NOTE — PROGRESS NOTES
Gastroenterology Follow up Note         Assessment and Plan:     Jong Quiroga is a 44 year old male with a history of likely segmental arterial mediolysis resulting in celiac artery dissection and hepatic artery aneurysm (s/p coiling 5/2024) causing biliary obstruction from extrinsic compression status post stenting who was lost to follow up and did not have stent removal. He presented with gallbladder wall thickening with contained posterior lateral wall perforation. Now s/p ERCP.    #. Acute cholecystitis  #. Likely contained gallbladder perforation  #. Cystic duct occlusion from covered metal stent    Now s/p ERCP 07/03/2024 with removal of covered metal stent, pus drained out of papilla, biliary tree was swept.     Patient doing remarkably better today, passing gas, denies abdominal pain or fever. Tolerates regular diet. No evidence of post-procedure complications.            Recommendations:     - Okay for discharge from GI standpoint.  - Continue antibiotics for 5 days for cholecystitis.   - Ultimately may need cholecystectomy, defer to surgery.     It has been a pleasure to participate in the care of this patient.  Patient discussed with GI staff, Dr. Ferguson.  Please do not hesitate to contact the GI service with any questions or concerns.     Mikhail Mancia MD  Gastroenterology Fellow  Pager [phone]         Subjective/Objective:   Patient doing remarkably better today, passing gas, denies abdominal pain or fever. Tolerates regular diet.          Medications:     Current Facility-Administered Medications   Medication Dose Route Frequency Provider Last Rate Last Admin    acetaminophen (TYLENOL) tablet 650 mg  650 mg Oral Q4H PRN Fuad Keane MD   650 mg at 07/03/24 0636    Or    acetaminophen (TYLENOL) Suppository 650 mg  650 mg Rectal Q4H PRN Fuad Keane MD        amLODIPine (NORVASC) tablet 10 mg  10 mg Oral Daily Fuad Keane MD   10 mg at 07/04/24 0818    calcium carbonate (TUMS)  chewable tablet 1,000 mg  1,000 mg Oral 4x Daily PRN Fuad Keane MD        ceFEPIme (MAXIPIME) 2 g vial to attach to  mL bag for ADULTS or 50 mL bag for PEDS  2 g Intravenous Q8H Fuad Keane MD   2 g at 07/04/24 1134    cloNIDine (CATAPRES) tablet 0.1 mg  0.1 mg Oral BID Fuad Keane MD   0.1 mg at 07/04/24 0818    flumazenil (ROMAZICON) injection 0.2 mg  0.2 mg Intravenous q1 min prn Fuad Keane MD        hydrochlorothiazide (HYDRODIURIL) tablet 25 mg  25 mg Oral Daily Fuad Keane MD   25 mg at 07/04/24 0818    ibuprofen (ADVIL/MOTRIN) tablet 400 mg  400 mg Oral Q6H PRN Fuad Keane MD        lidocaine (LMX4) cream   Topical Q1H PRN Fuad Keane MD        lidocaine 1 % 0.1-1 mL  0.1-1 mL Other Q1H PRN Fuad Keane MD        metoprolol tartrate (LOPRESSOR) tablet 50 mg  50 mg Oral BID Fuad Keane MD   50 mg at 07/04/24 0818    metroNIDAZOLE (FLAGYL) infusion 500 mg  500 mg Intravenous Q12H Fuad Keane MD   500 mg at 07/04/24 0651    naloxone (NARCAN) injection 0.2 mg  0.2 mg Intravenous Q2 Min PRN Fuad Keane MD        naloxone (NARCAN) injection 0.2 mg  0.2 mg Intramuscular Q2 Min PRN Fuad Keane MD        naloxone (NARCAN) injection 0.4 mg  0.4 mg Intravenous Q2 Min PRN Fuad Keane MD        naloxone (NARCAN) injection 0.4 mg  0.4 mg Intramuscular Q2 Min PRN Fuad Keane MD        ondansetron (ZOFRAN ODT) ODT tab 4 mg  4 mg Oral Q6H PRN Fuad Keane MD        Or    ondansetron (ZOFRAN) injection 4 mg  4 mg Intravenous Q6H PRN Fuad Keane MD   4 mg at 07/03/24 1826    pantoprazole (PROTONIX) EC tablet 40 mg  40 mg Oral BID AC Fuad Keane MD   40 mg at 07/04/24 0818    polyethylene glycol (MIRALAX) Packet 17 g  17 g Oral BID PRFuad Melgoza MD senna-docusate (SENOKOT-S/PERICOLACE) 8.6-50 MG per tablet 1 tablet  1 tablet Oral BID PRFuad Melgoza MD        Or    mika  "(SENOKOT-S/PERICOLACE) 8.6-50 MG per tablet 2 tablet  2 tablet Oral BID PRN Fuad Keane MD        sodium chloride (PF) 0.9% PF flush 3 mL  3 mL Intracatheter Q8H Fuad Keane MD   3 mL at 07/04/24 1134    sodium chloride (PF) 0.9% PF flush 3 mL  3 mL Intracatheter q1 min prn Fuad Keane MD                Physical Exam:   VS:  BP (!) 127/91 (BP Location: Right arm)   Pulse 75   Temp 98.2  F (36.8  C) (Oral)   Resp 18   Ht 1.626 m (5' 4\")   Wt 77.6 kg (171 lb)   SpO2 100%   BMI 29.35 kg/m      Wt:   Wt Readings from Last 2 Encounters:   07/03/24 77.6 kg (171 lb)   05/08/24 82.6 kg (182 lb)      Constitutional: cooperative, pleasant, no acute distress  Eyes: Conjunctiva anicteric  HEENT: Normal oropharynx without ulcers or exudate, mucus membranes moist  CV: RRR, no m/r/g  Respiratory: CTAB  Abd:  Nondistended, +bs, no hepatosplenomegaly, nontender, no rebound or guarding   Skin: warm, perfused, no jaundice  Neuro: AAO x 3, No asterixis         Laboratory:   BMP  Recent Labs   Lab 07/04/24  0540 07/03/24  2224 07/03/24  1637 07/03/24  0755 07/02/24  0740   *  --   --  135 139   POTASSIUM 4.0 3.6  --  3.2* 3.1*   CHLORIDE 98  --   --  96* 96*   LUIS 9.0  --   --  9.4 9.7   CO2 24  --   --  25 28   BUN 18.2  --   --  12.9 13.9   CR 0.95  --   --  0.95 0.96   *  --  115* 148* 124*     CBC  Recent Labs   Lab 07/04/24  0540 07/03/24  0755 07/02/24  0740   WBC 17.0* 14.1* 13.4*   RBC 3.59* 3.73* 3.94*   HGB 8.6* 9.1* 9.7*   HCT 27.2* 28.3* 30.4*   MCV 76* 76* 77*   MCH 24.0* 24.4* 24.6*   MCHC 31.6 32.2 31.9   RDW 16.1* 16.7* 16.7*   * 476* 509*     INR  Recent Labs   Lab 07/02/24  0740   INR 1.23*     LFTs  Recent Labs   Lab 07/04/24  0540 07/03/24  0755 07/02/24  0740   ALKPHOS 104 104 106   AST 21 24 23   ALT 21 22 24   BILITOTAL 0.2 0.4 0.4   PROTTOTAL 7.9 8.6* 8.6*   ALBUMIN 3.6 3.8 4.1      PANC  Recent Labs   Lab 07/02/24  0740   LIPASE 16            " Imaging/Procedures/Studies:   No results found for this or any previous visit.  ERCP 07/03/2024    Impression:            - Fully covered metal stent removed from the CBD                          - Pus immediately drained out of the papilla                          presumably from the gallbladder that was no longer                          obstructed                          - Biliary tree swept several times removing pus and                          one stone                          - Balloon occlusion cholangiogram showed patent cystic                          duct and filling of the gallbladder.                          - Elected not to place a transpapillary cystic duct                          stent given good drainage across the cystic duct and                          problems with follow up

## 2024-07-04 NOTE — ANESTHESIA POSTPROCEDURE EVALUATION
Patient: Jong Quiroga    Procedure: Procedure(s):  ENDOSCOPIC RETROGRADE CHOLANGIOPANCREATOGRAPHY, Stent removal, stone removal, pus removal       Anesthesia Type:  General    Note:  Disposition: Inpatient   Postop Pain Control: Uneventful            Sign Out: Well controlled pain   PONV: No   Neuro/Psych: Uneventful            Sign Out: Acceptable/Baseline neuro status   Airway/Respiratory: Uneventful            Sign Out: Acceptable/Baseline resp. status   CV/Hemodynamics: Uneventful            Sign Out: Acceptable CV status; No obvious hypovolemia; No obvious fluid overload   Other NRE: NONE   DID A NON-ROUTINE EVENT OCCUR? No           Last vitals:  Vitals Value Taken Time   /72 07/03/24 2000   Temp 37.3  C (99.2  F) 07/03/24 1915   Pulse 84 07/03/24 1951   Resp 22 07/03/24 1951   SpO2 98 % 07/03/24 2009   Vitals shown include unfiled device data.    Electronically Signed By: Connor Schultz MD  July 3, 2024  9:17 PM

## 2024-07-04 NOTE — CONSULTS
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 ERCP performed 7/3 with biliary stent removal and balloon dilation and sweep of biliary sludge and stone. Patient is VSS.    Plan:   -NPO at midnight  -Please order any labs desired for fluid  -IR cholecystostomy tube placement. Timing TBD by IR triage.      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 IR consulted for cholecystostomy tube placement.    Imaging:   Results for orders placed or performed during the hospital encounter of 07/02/24   CTA Abdomen Pelvis with Contrast    Impression    Impression:  1.  Gallbladder wall thickening with contained posterior lateral wall  perforation (series 9, image 158) vs gallbladder wall diverticuli.  2.  Stable postprocedural changes of coil embolization of the right  hepatic artery.   3.  Nonspecific, diffuse bladder wall thickening.    Findings of acute cholecystitis with suspected contained wall  perforation discussed directly with Dr. Boggs over the phone at 1303  hours on 7/2/2024.      I have personally reviewed the examination and initial interpretation  and I agree with the findings.    VIJAYA OSBORNE MD         SYSTEM ID:  [id number]         Labs:  Lab Results   Component Value Date    HGB 8.6 07/04/2024     Lab Results   Component Value Date     07/04/2024     Lab Results   Component Value Date    WBC 17.0 07/04/2024       Lab Results   Component Value Date    INR 1.23 07/02/2024       Lab Results   Component Value Date    PROTTOTAL 7.9 07/04/2024     "  Lab Results   Component Value Date    ALBUMIN 3.6 07/04/2024     Lab Results   Component Value Date    BILITOTAL 0.2 07/04/2024     No results found for: \"BILICONJ\"   Lab Results   Component Value Date    ALKPHOS 104 07/04/2024     Lab Results   Component Value Date    AST 21 07/04/2024     Lab Results   Component Value Date    ALT 21 07/04/2024       Lab Results   Component Value Date    CR 0.95 07/04/2024     Lab Results   Component Value Date    BUN 18.2 07/04/2024         Discussed with Dr. Silva.    Karely Costa, DO  Interventional Radiology  PGY-6  [phone]   "

## 2024-07-04 NOTE — PROGRESS NOTES
Surgery Progress Note  07/04/2024       Subjective:  Resting comfortably in bed this AM. ERCP went well. He is not in any pain and denies N/V. Feels mildly distended. Tolerating regular diet.      Objective:  Temp:  [98.2  F (36.8  C)-100  F (37.8  C)] 98.2  F (36.8  C)  Pulse:  [] 75  Resp:  [16-34] 18  BP: (106-134)/(62-91) 127/91  SpO2:  [96 %-100 %] 100 %    I/O last 3 completed shifts:  In: 300 [I.V.:300]  Out: -       Gen: Awake, alert, NAD  Resp: NLB on RA  Abd: soft, mildly distended, nontender to palpation  Ext: WWP, no edema     Labs:  Recent Labs   Lab 07/04/24  0540 07/03/24  0755 07/02/24  0740   WBC 17.0* 14.1* 13.4*   HGB 8.6* 9.1* 9.7*   * 476* 509*       Recent Labs   Lab 07/04/24  0540 07/03/24  2224 07/03/24  1637 07/03/24  0755 07/02/24  0740   *  --   --  135 139   POTASSIUM 4.0 3.6  --  3.2* 3.1*   CHLORIDE 98  --   --  96* 96*   CO2 24  --   --  25 28   BUN 18.2  --   --  12.9 13.9   CR 0.95  --   --  0.95 0.96   *  --  115* 148* 124*   LUIS 9.0  --   --  9.4 9.7       Imaging:  CTA Abdomen Pelvis with Contrast    Result Date: 7/2/2024  Exam: Computed tomographic angiography of the abdomen and pelvis without and with contrast including 3D reformations dated 7/2/2024 9:11 AM Clinical information: 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 Presents to ED with symptoms of acute abdominal pain. Technique: Axial images through the abdomen and pelvis obtained before the administration of intravenous contrast media and following the injection of contrast media  in the arterial phase. Source images reviewed as well as 3D and multi-planar reconstructions at a 3D workstation. Contrast: 112cc  of isovue 370 DLP: 1829 mGy*cm Comparison: CT ABDOMEN PELVIS W/O & W CONTRAST 5/14/2024, RF Venous 5/13/2024. Findings:  Lungs/Pleura: Clear with no evidence of consolidation, effusion, or nodules. Heart/Mediastinum: Normal size and contour with no masses or lymphadenopathy. Liver: Homogenously attenuated liver parenchyma. No masses or lesions. Biliary System: Moderately distended with a remarkably thickened wall and irregular margins. Inflammatory changes around the gallbladder with significant gallbladder wall thickening. Contained perforation within the posterior lateral gallbladder wall (series 9, image 160). Beam hardening artifact from coil embolization near the gallbladder neck limits evaluation. Radiolucent cholelithiasis with large shadowing gallstones demonstrated on US 4/1/2024. Scattered gaseous foci within the gallbladder lumen. Metallic biliary stent in the common bile duct. Stable mild to moderate intrahepatic biliary dilatation. Increased mild intrahepatic pneumobilia. Spleen: Within normal limits. Pancreas: Within normal limits. 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 Adrenal Glands: Thickened bilateral adrenal glands. Kidneys and Ureters: Within normal limits without evidence of hydronephrosis or masses. Gastrointestinal Tract: Normal caliber and wall thickness of the small and large bowel. Diverticulosis coli without acute diverticulitis. Stomach is unremarkable. Appendix is unremarkable. Pelvis/reproductive: Partially distended with diffuse wall thickening, which may be secondary to chronic inflammation. No masses. Mesentery/Peritoneum/Retroperitoneum: No free fluid or free air. Vasculature: Status post coil embolization of the right hepatic artery for large pseudoaneurysm. Completely thrombosed right hepatic artery to the common hepatic artery. Dissection flap of the celiac artery is unchanged. No evidence of flow-limiting  dissection to the splenic or gastric arteries. The left hepatic artery is patent secondary to collateral flow. Splenic artery is patent. Otherwise, patent abdominal and pelvic arterial vasculature without evidence of stenosis, occlusion, or aneurysm. Vena caval and portal venous systems are patent. Osseous Structures: Intact without evidence of suspicious lytic or sclerotic lesions. Partially visualized lower chest is within normal limits. Soft Tissues: Small umbilical fat-containing hernia. Otherwise unremarkable. Lymph Nodes: No pathologic enlargement noted.     Impression: 1.  Gallbladder wall thickening with contained posterior lateral wall perforation (series 9, image 158) vs gallbladder wall diverticuli. 2.  Stable postprocedural changes of coil embolization of the right hepatic artery. 3.  Nonspecific, diffuse bladder wall thickening. Findings of acute cholecystitis with suspected contained wall perforation discussed directly with Dr. Boggs over the phone at 1303 hours on 7/2/2024.  I have personally reviewed the examination and initial interpretation and I agree with the findings. 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 CTA consistent with pneumobilia and acute cholecystitis. Started on IV antibiotics and admitted to medicine. Now s/p ERCP. Stent was removed and did a sweep of stone with sludge and purulent drainage.    -Tolerating general diet  -Continuing ABX  -Recommend outpatient [facility]/Surg Onc follow up      Seen, examined, and discussed with chief resident, who will discuss with staff.  - - - - - - - - - - - - - - - - - -  Greg Seth MD  PGY-1 Surgery Resident

## 2024-07-08 LAB
BACTERIA BLD CULT: NO GROWTH
BACTERIA BLD CULT: NO GROWTH

## 2025-02-11 NOTE — PROGRESS NOTES
A&Ox4. VSS on RA.   Denies pain or nausea.   Tolerating regular diet.   Independent.   Voids spont.  No other acute major changes.     Appropriate to discharge home.    Gave all discharge paperwork to Jong.  Pt agreed to all discharge instructions.  Pt aware to go to pharmacy in [place].  Pt stated his wife is an RN.  Pt left on his own, declined a w/c .  Pt has been going outside to smoke independently multiple times today.  All belongings were removed from room per pt.  Denied pain.   No past medical history on file.    Past Surgical History:   Procedure Laterality Date    Left leg surgery         Review of patient's allergies indicates:  No Known Allergies    No current facility-administered medications on file prior to encounter.     Current Outpatient Medications on File Prior to Encounter   Medication Sig    acetaminophen (TYLENOL) 500 MG tablet Take 500 mg by mouth as needed for Pain.    aspirin (ECOTRIN) 81 MG EC tablet Take 81 mg by mouth as needed for Pain.    aspirin-acetaminophen-caffeine 250-250-65 mg (EXCEDRIN MIGRAINE) 250-250-65 mg per tablet Take 1 tablet by mouth every 6 (six) hours as needed for Pain.    ibuprofen (ADVIL,MOTRIN) 200 MG tablet Take 200 mg by mouth as needed for Pain.     Family History    None       Tobacco Use    Smoking status: Light Smoker     Types: Cigarettes    Smokeless tobacco: Never   Substance and Sexual Activity    Alcohol use: Not Currently    Drug use: Yes     Types: Marijuana    Sexual activity: Yes     Review of Systems   Constitutional:  Negative for activity change and appetite change.   HENT:  Negative for ear discharge and ear pain.    Eyes:  Negative for pain and redness.   Respiratory:  Negative for chest tightness.    Cardiovascular:  Negative for chest pain.   Gastrointestinal:  Negative for anal bleeding and blood in stool.   Endocrine: Negative for polydipsia and polyphagia.   Genitourinary:  Negative for frequency and genital sores.   Musculoskeletal:  Negative for gait problem and joint swelling.   Skin:  Negative for pallor and rash.   Allergic/Immunologic: Negative for environmental allergies and food allergies.   Neurological:  Negative for seizures and numbness.   Hematological:  Negative for adenopathy. Does not bruise/bleed easily.   Psychiatric/Behavioral:  Negative for confusion and decreased concentration.      Objective:     Vital Signs (Most Recent):  Temp: 98.4 °F (36.9 °C) (02/10/25 1930)  Pulse: 62 (02/10/25 2145)  Resp:  (!) 22 (02/10/25 2145)  BP: 136/85 (02/10/25 2145)  SpO2: 96 % (02/10/25 2145) Vital Signs (24h Range):  Temp:  [97.6 °F (36.4 °C)-98.5 °F (36.9 °C)] 98.4 °F (36.9 °C)  Pulse:  [58-75] 62  Resp:  [17-31] 22  SpO2:  [94 %-99 %] 96 %  BP: (134-214)/() 136/85     Weight: 132 kg (291 lb 0.1 oz)  Body mass index is 37.36 kg/m².     Physical Exam  Constitutional:       General: He is not in acute distress.     Appearance: Normal appearance. He is not ill-appearing.   HENT:      Head: Normocephalic and atraumatic.      Right Ear: There is no impacted cerumen.      Left Ear: Tympanic membrane normal. There is no impacted cerumen.      Nose: No congestion or rhinorrhea.      Mouth/Throat:      Pharynx: No oropharyngeal exudate or posterior oropharyngeal erythema.   Eyes:      General:         Right eye: No discharge.         Left eye: No discharge.   Cardiovascular:      Rate and Rhythm: Tachycardia present.      Heart sounds: No murmur heard.     No gallop.   Abdominal:      Tenderness: There is abdominal tenderness. There is no guarding or rebound.      Hernia: No hernia is present.   Musculoskeletal:         General: No deformity.      Cervical back: No rigidity.      Right lower leg: No edema.   Lymphadenopathy:      Cervical: No cervical adenopathy.   Skin:     Findings: No bruising or lesion.   Neurological:      Mental Status: He is alert.      Motor: No weakness.      Gait: Gait normal.   Psychiatric:         Mood and Affect: Mood normal.         Behavior: Behavior normal.                Significant Labs: All pertinent labs within the past 24 hours have been reviewed.  BMP:   Recent Labs   Lab 02/10/25  1456 02/10/25  1544 02/10/25  2054   *   < > 162*   *   < > 136   K 4.6   < > 3.4*   CL 96   < > 105   CO2 10*   < > 15*   BUN 12   < > 10   CREATININE 1.5*   < > 0.9   CALCIUM 9.0   < > 8.5*   MG 2.1  2.2  --   --     < > = values in this interval not displayed.     CBC:   Recent Labs   Lab  02/10/25  1332 02/10/25  1504 02/10/25  2116   WBC 9.95  --  9.42   HGB 16.6  --  15.5   HCT 48.7 51 44.3     --  195       Significant Imaging: I have reviewed all pertinent imaging results/findings within the past 24 hours.  I have reviewed and interpreted all pertinent imaging results/findings within the past 24 hours.

## (undated) DEVICE — BIOPSY VALVE BIOSHIELD 00711135

## (undated) DEVICE — SUCTION MANIFOLD NEPTUNE 2 SYS 4 PORT 0702-020-000

## (undated) DEVICE — CATH RETRIEVAL BALLOON EXTRACTOR PRO RX-S INJ ABOVE 9-12MM

## (undated) DEVICE — ENDO ENDO DISTAL MAJ-2315

## (undated) DEVICE — KIT ENDO FIRST STEP DISINFECTANT 200ML W/POUCH EP-4

## (undated) DEVICE — KIT CONNECTOR FOR OLYMPUS ENDOSCOPES DEFENDO 100310

## (undated) DEVICE — SOL WATER IRRIG 1000ML BOTTLE 2F7114

## (undated) DEVICE — PACK ENDOSCOPY GI CUSTOM UMMC

## (undated) DEVICE — WIRE GUIDE 0.025"X270CM ANG VISIGLIDE G-240-2527A

## (undated) DEVICE — ENDO TUBING CO2 SMARTCAP STERILE DISP 100145CO2EXT

## (undated) DEVICE — ESU GROUND PAD ADULT W/CORD E7507

## (undated) DEVICE — INTR ENDOSCOPIC STENT FUSION OASIS 09.0FRX200CM

## (undated) DEVICE — ENDO FUSION OMNI-TOME G31903

## (undated) DEVICE — ENDO SNARE POLYPECTOMY OVAL 15MM LOOP SD-240U-15

## (undated) DEVICE — ENDO CAP AND TUBING STERILE FOR ENDOGATOR  100130

## (undated) DEVICE — GUIDEWIRE NOVAGOLD .018X260CM STR TIP M00552000

## (undated) DEVICE — ENDO DEVICE LOCKING AND BIOPSY CAP M00545261

## (undated) DEVICE — ZIMMON BILIARY STENT
Type: IMPLANTABLE DEVICE | Status: NON-FUNCTIONAL
Brand: ZIMMON

## (undated) DEVICE — LABEL MEDICATION SYSTEM 3303-P

## (undated) DEVICE — ENDO BITE BLOCK ADULT OMNI-BLOC

## (undated) DEVICE — DRSG GAUZE 4X4" TRAY 6939

## (undated) DEVICE — WIRE GUIDE 0.025"X270CM STR VISIGLIDE G-240-2527S

## (undated) DEVICE — ENDO FUSION OMNI-TOME 21 FS-OMNI-21 G48675

## (undated) RX ORDER — ESMOLOL HYDROCHLORIDE 10 MG/ML
INJECTION INTRAVENOUS
Status: DISPENSED
Start: 2024-05-08

## (undated) RX ORDER — SODIUM CHLORIDE, SODIUM LACTATE, POTASSIUM CHLORIDE, CALCIUM CHLORIDE 600; 310; 30; 20 MG/100ML; MG/100ML; MG/100ML; MG/100ML
INJECTION, SOLUTION INTRAVENOUS
Status: DISPENSED
Start: 2024-05-08

## (undated) RX ORDER — FENTANYL CITRATE-0.9 % NACL/PF 10 MCG/ML
PLASTIC BAG, INJECTION (ML) INTRAVENOUS
Status: DISPENSED
Start: 2024-04-11

## (undated) RX ORDER — ONDANSETRON 2 MG/ML
INJECTION INTRAMUSCULAR; INTRAVENOUS
Status: DISPENSED
Start: 2024-04-03

## (undated) RX ORDER — LABETALOL HYDROCHLORIDE 5 MG/ML
INJECTION, SOLUTION INTRAVENOUS
Status: DISPENSED
Start: 2024-04-03

## (undated) RX ORDER — ONDANSETRON 2 MG/ML
INJECTION INTRAMUSCULAR; INTRAVENOUS
Status: DISPENSED
Start: 2024-04-11

## (undated) RX ORDER — METOPROLOL TARTRATE 1 MG/ML
INJECTION, SOLUTION INTRAVENOUS
Status: DISPENSED
Start: 2024-04-03

## (undated) RX ORDER — FENTANYL CITRATE 50 UG/ML
INJECTION, SOLUTION INTRAMUSCULAR; INTRAVENOUS
Status: DISPENSED
Start: 2024-07-03

## (undated) RX ORDER — FENTANYL CITRATE 50 UG/ML
INJECTION, SOLUTION INTRAMUSCULAR; INTRAVENOUS
Status: DISPENSED
Start: 2024-05-13

## (undated) RX ORDER — FENTANYL CITRATE 50 UG/ML
INJECTION, SOLUTION INTRAMUSCULAR; INTRAVENOUS
Status: DISPENSED
Start: 2024-04-11

## (undated) RX ORDER — PROPOFOL 10 MG/ML
INJECTION, EMULSION INTRAVENOUS
Status: DISPENSED
Start: 2024-04-11

## (undated) RX ORDER — LIDOCAINE HYDROCHLORIDE 10 MG/ML
INJECTION, SOLUTION EPIDURAL; INFILTRATION; INTRACAUDAL; PERINEURAL
Status: DISPENSED
Start: 2024-05-13

## (undated) RX ORDER — DEXAMETHASONE SODIUM PHOSPHATE 4 MG/ML
INJECTION, SOLUTION INTRA-ARTICULAR; INTRALESIONAL; INTRAMUSCULAR; INTRAVENOUS; SOFT TISSUE
Status: DISPENSED
Start: 2024-04-11

## (undated) RX ORDER — FENTANYL CITRATE 50 UG/ML
INJECTION, SOLUTION INTRAMUSCULAR; INTRAVENOUS
Status: DISPENSED
Start: 2024-04-03

## (undated) RX ORDER — PROPOFOL 10 MG/ML
INJECTION, EMULSION INTRAVENOUS
Status: DISPENSED
Start: 2024-04-03

## (undated) RX ORDER — FENTANYL CITRATE 50 UG/ML
INJECTION, SOLUTION INTRAMUSCULAR; INTRAVENOUS
Status: DISPENSED
Start: 2024-05-08

## (undated) RX ORDER — INDOMETHACIN 50 MG/1
SUPPOSITORY RECTAL
Status: DISPENSED
Start: 2024-04-11

## (undated) RX ORDER — VERAPAMIL HYDROCHLORIDE 2.5 MG/ML
INJECTION, SOLUTION INTRAVENOUS
Status: DISPENSED
Start: 2024-05-13

## (undated) RX ORDER — NITROGLYCERIN 5 MG/ML
VIAL (ML) INTRAVENOUS
Status: DISPENSED
Start: 2024-05-13

## (undated) RX ORDER — HEPARIN SODIUM 1000 [USP'U]/ML
INJECTION, SOLUTION INTRAVENOUS; SUBCUTANEOUS
Status: DISPENSED
Start: 2024-05-13

## (undated) RX ORDER — HYDRALAZINE HYDROCHLORIDE 20 MG/ML
INJECTION INTRAMUSCULAR; INTRAVENOUS
Status: DISPENSED
Start: 2024-05-13